# Patient Record
Sex: FEMALE | Race: WHITE | NOT HISPANIC OR LATINO | Employment: OTHER | URBAN - METROPOLITAN AREA
[De-identification: names, ages, dates, MRNs, and addresses within clinical notes are randomized per-mention and may not be internally consistent; named-entity substitution may affect disease eponyms.]

---

## 2017-01-17 ENCOUNTER — TELEPHONE (OUTPATIENT)
Dept: RHEUMATOLOGY | Facility: CLINIC | Age: 67
End: 2017-01-17

## 2017-01-17 DIAGNOSIS — M05.772 RHEUMATOID ARTHRITIS INVOLVING BOTH ANKLES WITH POSITIVE RHEUMATOID FACTOR: ICD-10-CM

## 2017-01-17 DIAGNOSIS — M05.771 RHEUMATOID ARTHRITIS INVOLVING BOTH ANKLES WITH POSITIVE RHEUMATOID FACTOR: ICD-10-CM

## 2017-01-17 RX ORDER — METHOTREXATE 25 MG/ML
INJECTION, SOLUTION INTRA-ARTERIAL; INTRAMUSCULAR; INTRAVENOUS
Qty: 10 ML | Refills: 0 | Status: SHIPPED | OUTPATIENT
Start: 2017-01-17 | End: 2017-03-06 | Stop reason: SDUPTHER

## 2017-03-02 ENCOUNTER — TELEPHONE (OUTPATIENT)
Dept: RHEUMATOLOGY | Facility: CLINIC | Age: 67
End: 2017-03-02

## 2017-03-02 ENCOUNTER — HOSPITAL ENCOUNTER (OUTPATIENT)
Dept: RADIOLOGY | Facility: HOSPITAL | Age: 67
Discharge: HOME | End: 2017-03-02
Attending: INTERNAL MEDICINE
Payer: MEDICARE

## 2017-03-02 DIAGNOSIS — D72.10 EOSINOPHILIA: Primary | ICD-10-CM

## 2017-03-02 DIAGNOSIS — M05.79 RHEUMATOID ARTHRITIS INVOLVING MULTIPLE SITES WITH POSITIVE RHEUMATOID FACTOR: Chronic | ICD-10-CM

## 2017-03-02 PROCEDURE — 77077 JOINT SURVEY SINGLE VIEW: CPT | Mod: TC

## 2017-03-02 PROCEDURE — 77077 JOINT SURVEY SINGLE VIEW: CPT | Mod: 26,,, | Performed by: RADIOLOGY

## 2017-03-02 NOTE — TELEPHONE ENCOUNTER
----- Message from Timi Royal MD sent at 3/2/2017  3:42 PM CST -----  Please tell pt the neck x-rays show mild slippage of C4- % on degenerative basis, no RA changes. The hands show degenerative changes in the thumb bases and mild narrowing of the index and middle finger knuckle joints as before likely related to RA. There are mild degenerative changes in knees and great toes. Overall, no substantial change compared with previous x-rays. DELMY

## 2017-03-06 ENCOUNTER — OFFICE VISIT (OUTPATIENT)
Dept: RHEUMATOLOGY | Facility: CLINIC | Age: 67
End: 2017-03-06
Payer: MEDICARE

## 2017-03-06 VITALS
WEIGHT: 219.5 LBS | HEIGHT: 64 IN | HEART RATE: 104 BPM | DIASTOLIC BLOOD PRESSURE: 93 MMHG | SYSTOLIC BLOOD PRESSURE: 151 MMHG | BODY MASS INDEX: 37.47 KG/M2

## 2017-03-06 DIAGNOSIS — M17.12 PRIMARY OSTEOARTHRITIS OF LEFT KNEE: ICD-10-CM

## 2017-03-06 DIAGNOSIS — I10 BENIGN ESSENTIAL HTN: ICD-10-CM

## 2017-03-06 DIAGNOSIS — M18.0 PRIMARY OSTEOARTHRITIS OF BOTH FIRST CARPOMETACARPAL JOINTS: ICD-10-CM

## 2017-03-06 DIAGNOSIS — E55.9 VITAMIN D DEFICIENCY: ICD-10-CM

## 2017-03-06 DIAGNOSIS — Z79.899 ON ABATACEPT THERAPY: Primary | ICD-10-CM

## 2017-03-06 DIAGNOSIS — M05.771 RHEUMATOID ARTHRITIS INVOLVING BOTH ANKLES WITH POSITIVE RHEUMATOID FACTOR: ICD-10-CM

## 2017-03-06 DIAGNOSIS — G47.00 INSOMNIA, UNSPECIFIED TYPE: ICD-10-CM

## 2017-03-06 DIAGNOSIS — Z79.631 LONG TERM METHOTREXATE USER: ICD-10-CM

## 2017-03-06 DIAGNOSIS — M85.80 OSTEOPENIA: ICD-10-CM

## 2017-03-06 DIAGNOSIS — M05.772 RHEUMATOID ARTHRITIS INVOLVING BOTH ANKLES WITH POSITIVE RHEUMATOID FACTOR: ICD-10-CM

## 2017-03-06 PROBLEM — M19.041 OSTEOARTHRITIS OF HANDS, BILATERAL: Status: ACTIVE | Noted: 2017-03-06

## 2017-03-06 PROBLEM — M19.042 OSTEOARTHRITIS OF HANDS, BILATERAL: Status: ACTIVE | Noted: 2017-03-06

## 2017-03-06 PROBLEM — M18.9 CMC ARTHRITIS, THUMB, DEGENERATIVE: Status: ACTIVE | Noted: 2017-03-06

## 2017-03-06 PROCEDURE — 99999 PR PBB SHADOW E&M-EST. PATIENT-LVL III: CPT | Mod: PBBFAC,,, | Performed by: INTERNAL MEDICINE

## 2017-03-06 PROCEDURE — 99213 OFFICE O/P EST LOW 20 MIN: CPT | Mod: PBBFAC | Performed by: INTERNAL MEDICINE

## 2017-03-06 PROCEDURE — 96372 THER/PROPH/DIAG INJ SC/IM: CPT | Mod: PBBFAC

## 2017-03-06 PROCEDURE — 99214 OFFICE O/P EST MOD 30 MIN: CPT | Mod: S$PBB,,, | Performed by: INTERNAL MEDICINE

## 2017-03-06 RX ORDER — AMLODIPINE BESYLATE 5 MG/1
5 TABLET ORAL DAILY
Qty: 90 TABLET | Refills: 3 | Status: SHIPPED | OUTPATIENT
Start: 2017-03-06 | End: 2017-06-06 | Stop reason: SDUPTHER

## 2017-03-06 RX ORDER — TRIAMCINOLONE ACETONIDE 40 MG/ML
40 INJECTION, SUSPENSION INTRA-ARTICULAR; INTRAMUSCULAR
Status: COMPLETED | OUTPATIENT
Start: 2017-03-06 | End: 2017-03-06

## 2017-03-06 RX ORDER — PRAVASTATIN SODIUM 40 MG/1
40 TABLET ORAL NIGHTLY
Qty: 90 TABLET | Refills: 3 | Status: SHIPPED | OUTPATIENT
Start: 2017-03-06 | End: 2017-06-06 | Stop reason: SDUPTHER

## 2017-03-06 RX ORDER — METHOTREXATE 25 MG/ML
INJECTION, SOLUTION INTRA-ARTERIAL; INTRAMUSCULAR; INTRAVENOUS
Qty: 10 ML | Refills: 0 | Status: SHIPPED | OUTPATIENT
Start: 2017-03-06 | End: 2017-06-06 | Stop reason: SDUPTHER

## 2017-03-06 RX ORDER — FOLIC ACID 1 MG/1
1 TABLET ORAL DAILY
Qty: 90 TABLET | Refills: 3 | Status: SHIPPED | OUTPATIENT
Start: 2017-03-06 | End: 2017-06-06 | Stop reason: SDUPTHER

## 2017-03-06 RX ORDER — LEFLUNOMIDE 20 MG/1
20 TABLET ORAL DAILY
Qty: 90 TABLET | Refills: 0 | Status: SHIPPED | OUTPATIENT
Start: 2017-03-06 | End: 2017-06-06 | Stop reason: SDUPTHER

## 2017-03-06 RX ORDER — NORTRIPTYLINE HYDROCHLORIDE 25 MG/1
CAPSULE ORAL
Qty: 270 CAPSULE | Refills: 0 | Status: SHIPPED | OUTPATIENT
Start: 2017-03-06 | End: 2017-06-06 | Stop reason: SDUPTHER

## 2017-03-06 RX ORDER — ERGOCALCIFEROL 1.25 MG/1
50000 CAPSULE ORAL
Qty: 24 CAPSULE | Refills: 3 | Status: SHIPPED | OUTPATIENT
Start: 2017-03-06 | End: 2017-06-06 | Stop reason: SDUPTHER

## 2017-03-06 RX ORDER — ALENDRONATE SODIUM 35 MG/1
TABLET ORAL
Qty: 12 TABLET | Refills: 3 | Status: SHIPPED | OUTPATIENT
Start: 2017-03-06 | End: 2017-06-06 | Stop reason: SDUPTHER

## 2017-03-06 RX ADMIN — TRIAMCINOLONE ACETONIDE 40 MG: 40 INJECTION, SUSPENSION INTRA-ARTICULAR; INTRAMUSCULAR at 08:03

## 2017-03-06 ASSESSMENT — DISEASE ACTIVITY SCORE (DAS28)
TENDER_JOINTS_COUNT: 3
TOTAL_SCORE_CRP: 3.52
GLOBAL_HEALTH_SCORE: 45
ESR_MM_PER_HR: 31
TOTAL_SCORE_ESR: 4
SWOLLEN_JOINTS_COUNT: 0
CRP_MG_PER_LITER: 13.2

## 2017-03-06 ASSESSMENT — ROUTINE ASSESSMENT OF PATIENT INDEX DATA (RAPID3)
PAIN SCORE: 5.5
FATIGUE SCORE: 4.5
PSYCHOLOGICAL DISTRESS SCORE: 0
MDHAQ FUNCTION SCORE: .8
PATIENT GLOBAL ASSESSMENT SCORE: 4.5
TOTAL RAPID3 SCORE: 4.22
AM STIFFNESS SCORE: 0, NO

## 2017-03-06 NOTE — PROGRESS NOTES
Subjective:       Patient ID: Maru Shelby is a 66 y.o. female.    Chief Complaint: RA  Resolving URI, resolving wheezing. Mild residual dry cough. No fever/chills/ SOB at present. Held methotrexate appropriately last 2 wks. Some increased pain  Left and right 2nd mcps, left and right 1st cmcs, left knee, and concerned about bunions both great toes. No am stiffness.  HPI  Review of Systems   Constitutional: Positive for fatigue. Negative for appetite change, fever and unexpected weight change.   HENT: Negative for mouth sores.    Eyes: Negative for visual disturbance.   Respiratory: Positive for cough. Negative for shortness of breath and wheezing.    Cardiovascular: Negative for chest pain and palpitations.   Gastrointestinal: Negative for abdominal pain, anal bleeding, blood in stool, constipation, diarrhea, nausea and vomiting.   Genitourinary: Negative for dysuria, frequency and urgency.   Musculoskeletal: Negative for arthralgias, back pain, gait problem, joint swelling, myalgias, neck pain and neck stiffness.   Skin: Negative for rash.   Neurological: Negative for weakness, numbness and headaches.   Hematological: Negative for adenopathy. Does not bruise/bleed easily.   Psychiatric/Behavioral: Negative for sleep disturbance. The patient is not nervous/anxious.          Objective:   There were no vitals taken for this visit.     Physical Exam   Constitutional: She is oriented to person, place, and time and well-developed, well-nourished, and in no distress.   HENT:   Head: Normocephalic and atraumatic.   Mouth/Throat: Oropharynx is clear and moist.   Eyes: Conjunctivae and EOM are normal.   Neck: Normal range of motion. Neck supple. No thyromegaly present.   Cardiovascular: Normal rate, regular rhythm, normal heart sounds and intact distal pulses.  Exam reveals no gallop and no friction rub.    No murmur heard.  Pulmonary/Chest: Breath sounds normal. She has no wheezes. She has no rales. She exhibits no  tenderness.   Abdominal: Soft. She exhibits no distension and no mass. There is no tenderness.       Right Side Rheumatological Exam     Examination finds the shoulder, elbow, wrist, knee, 1st PIP, 1st MCP, 2nd PIP, 2nd MCP, 3rd PIP, 3rd MCP, 4th PIP, 4th MCP, 5th PIP and 5th MCP normal.    Left Side Rheumatological Exam     Examination finds the shoulder, elbow, wrist, knee, 1st PIP, 1st MCP, 2nd PIP, 2nd MCP, 3rd PIP, 3rd MCP, 4th PIP, 4th MCP, 5th PIP and 5th MCP normal.      Lymphadenopathy:     She has no cervical adenopathy.   Neurological: She is alert and oriented to person, place, and time. She displays normal reflexes. Gait normal.   Nl motor strength UE and LE bilateral   Musculoskeletal: She exhibits no edema.         Results for MECCA DUONG (MRN [id number]) as of 3/6/2017 08:00   Ref. Range 3/2/2017 07:10 3/2/2017 07:21   WBC Latest Ref Range: 3.90 - 12.70 K/uL 11.98    RBC Latest Ref Range: 4.00 - 5.40 M/uL 4.99    Hemoglobin Latest Ref Range: 12.0 - 16.0 g/dL 15.2    Hematocrit Latest Ref Range: 37.0 - 48.5 % 45.2    MCV Latest Ref Range: 82 - 98 fL 91    MCH Latest Ref Range: 27.0 - 31.0 pg 30.5    MCHC Latest Ref Range: 32.0 - 36.0 % 33.6    RDW Latest Ref Range: 11.5 - 14.5 % 14.6 (H)    Platelets Latest Ref Range: 150 - 350 K/uL 259    MPV Latest Ref Range: 9.2 - 12.9 fL 9.6    Gran% Latest Ref Range: 38.0 - 73.0 % 38.6    Gran # Latest Ref Range: 1.8 - 7.7 K/uL 4.6    Lymph% Latest Ref Range: 18.0 - 48.0 % 39.2    Lymph # Latest Ref Range: 1.0 - 4.8 K/uL 4.7    Mono% Latest Ref Range: 4.0 - 15.0 % 6.8    Mono # Latest Ref Range: 0.3 - 1.0 K/uL 0.8    Eosinophil% Latest Ref Range: 0.0 - 8.0 % 14.9 (H)    Eos # Latest Ref Range: 0.0 - 0.5 K/uL 1.8 (H)    Basophil% Latest Ref Range: 0.0 - 1.9 % 0.5    Baso # Latest Ref Range: 0.00 - 0.20 K/uL 0.06    Sed Rate Latest Ref Range: 0 - 20 mm/Hr 31 (H)    Sodium Latest Ref Range: 136 - 145 mmol/L 142    Potassium Latest Ref Range: 3.5 - 5.1 mmol/L 4.4     Chloride Latest Ref Range: 95 - 110 mmol/L 105    CO2 Latest Ref Range: 23 - 29 mmol/L 26    Anion Gap Latest Ref Range: 8 - 16 mmol/L 11    BUN, Bld Latest Ref Range: 8 - 23 mg/dL 14    Creatinine Latest Ref Range: 0.5 - 1.4 mg/dL 0.9    eGFR if non African American Latest Ref Range: >60 mL/min/1.73 m^2 >60    eGFR if  Latest Ref Range: >60 mL/min/1.73 m^2 >60    Glucose Latest Ref Range: 70 - 110 mg/dL 107    Calcium Latest Ref Range: 8.7 - 10.5 mg/dL 9.4    Alkaline Phosphatase Latest Ref Range: 55 - 135 U/L 97    Total Protein Latest Ref Range: 6.0 - 8.4 g/dL 7.1    Albumin Latest Ref Range: 3.5 - 5.2 g/dL 3.7    Total Bilirubin Latest Ref Range: 0.1 - 1.0 mg/dL 0.4    AST Latest Ref Range: 10 - 40 U/L 20    ALT Latest Ref Range: 10 - 44 U/L 19    CRP Latest Ref Range: 0.0 - 8.2 mg/L 13.2 (H)    XR ARTHRITIS SURVEY Unknown  Rpt   Differential Method Unknown Automated    Results for MECCA DUONG (MRN [id number]) as of 3/6/2017 08:00   Ref. Range 8/8/2016 08:15   Cholesterol Latest Ref Range: 120 - 199 mg/dL 153   HDL Latest Ref Range: 40 - 75 mg/dL 43   LDL Cholesterol Latest Ref Range: 63.0 - 159.0 mg/dL 72.4   Total Cholesterol/HDL Ratio Latest Ref Range: 2.0 - 5.0  3.6   T  Please tell pt the neck x-rays show mild slippage of C4- % on degenerative basis, no RA changes. The hands show degenerative changes in the thumb bases and mild narrowing of the index and middle finger knuckle joints as before likely related to RA. There are mild degenerative changes in knees and great toes. Overall, no substantial change compared with previous x-rays. RJQ          Signed by   Signed Credentials Date/Time    Phone Pager   CLAUDIO MOORE MD 3/02/2017 09:00 [phone]    PACS Images   Show images for XR Arthritis Survey   Reviewed By Brooks Crabtree MD on 3/2/2017  1:34 PM   External Result Report   External Result Report   Narrative   Arthritis survey:    Bilateral hands: The bones are  slightly osteopenic.  No osseous erosions are identified.  There are moderate degenerative changes of the bilateral carpometacarpal joint spaces, left greater than right.    Cervical spine: There is anterolisthesis of C4 and C5 by approximately 4 mm, stable.  The atlantodental interval is maintained.  There are multilevel degenerative changes consisting of disc space narrowing, endplate sclerosis and facet arthropathy.    Bilateral knees: There is mild bilateral medial compartment joint narrowing with subchondral sclerosis and marginal osteophytosis.  No osseous erosions.    Bilateral feet: There is bilateral hallux valgus deformity with bunion formation.  Minimal degenerative changes are noted.  No osseous erosions are detected.   Impression    As above.      Electronically signed by: Farida Llanes MD  Date: 17  Time: 09:00         riglycerides Latest Ref Range: 30 - 150 mg/dL 188 (H)         : 1950 ORDERING PHYSICIAN: MARI Royal LOCATION: [facility]    HISTORY: 66 y/o female with no hx of fractures. She had menopausal symptoms at 48 y/o. Pts Sibling had a hip fx. Pt is taking Calcium, Vit D and 50,000 once a week. She has a hx of Rheumatoid Arthritis. She does not exercise or smoke.    TECHNIQUE: Bone Mineral Density performed using [employer] [place] (S/N [id number]) reveals good positioning of lumbar spine and hip.    BONE MINERAL DENSITY RESULTS:  Lumbar Spine: Lumbar bone mineral density L1-L4 is 1.084g/cm2, which is a t-score of 0.3. The z-score is 2.3.    Total Hip: The total hip bone mineral density is 0.960g/cm2.  The t-score is 0.1, and the z-score is 1.5.  Femoral neck BMD is 0.595g/cm2 and the t-score is -2.3.    COMPARISONS:  Date Location BMD T-score  13 L-spine 1.120 0.7  Total Hip 0.924 -0.1   Impression     Osteopenia, approaching osteoporosis of the femoral neck. Recalculation of FRAX with diagnosis of RA yields 10 year at hip= 3.2% and spine 15%.    Recommendations:  1) Adequate  calcium and Vitamin D therapy  2) Appropriate exercise  3) Consider medical therapy including bisphosphonates,  denosumab.  4) Consider repeat BMD in 2 years.    EXPLANATION OF RESULTS:  The t-score compares this results to the bone density of a 25 year old of the same gender. The z-score compares this result to the average bone density to people of the same age and gender. The amounts indicate the number of standard deviations above or   below the mean.  * Osteoporosis is generally defined as having a t-score between less than -2.5.  * Osteopenia is generally defined as having a t-score between -1 and -2.5.  * The normal range is generally defined as having a t-score between -1 to 1.      Electronically signed by: ANTON BLAIR MD  Date: 09/19/15  Time: 10:37     Encounter   View Encounter      Reviewed By   Timi Royal MD on 9/19/2015  1:09 PM         Assessment:         RA DAS28: 4(moderate) LEES-CRP 3.52(moderate)due for Orencia 1000mg IV 3/13/17 and resume methotrexate then as well, missed 2 wks b/o URI  Osteopenia > NOF FRAX therapeutic threshold on alendronate 35mg po once a week  Hypertension, not ideally controlled, needs to monitor at home 1-2 x wk keep < 140/90  Obesity gained 3#  Hyperlipidemia with est 10 yr ASCVD risk 10.8% improved with pravastatin 40mg nightly      Resolving URI      Plan:     *Kenalog 40mg IM  Home BP monitor 1-2 x wk, call if running > 140/90 and will increase amlodipine from 5 to 10mg daily  Resume weight reduction  Daily exercise gym, outdoor bike, with bunions, walking not best exercise.  May reconsult with her podiatrist to orthoses, not interested in surgery.    cont pravastatin 40mg q pm  Cont abatacept 1000mg IV q 4wks. Always hold with suspected, actual, or resolving infection, antibiotics, just like mtx. Re-ordered next dose 3/13/17  resume methotrexate 25mg sc once a week with folic acid 1mg daily next week  Cont leflunomide 20mg daily  Cont alendronate 35mg once a  week  Cont amlodipine 5mg daily for persistent mild hypertension  Cont pravastatin 40mg nightly for hyperlipidemia with elevated ASCVD risk  RTC 3 months with standing labs and quantiferon gold TB

## 2017-03-06 NOTE — MR AVS SNAPSHOT
[facility]  [address]  [facility] [address]  Phone: [phone]  Fax: [phone]                  Maru Shelby   3/6/2017 8:00 AM   Office Visit    Description:  Female : 1950   Provider:  Timi Royal MD   Department:  [facility]           Diagnoses this Visit        Comments    On abatacept therapy    -  Primary     Insomnia, unspecified type         Rheumatoid arthritis involving both ankles with positive rheumatoid factor         Long term methotrexate user         Osteopenia         Benign essential HTN         Vitamin D deficiency         Primary osteoarthritis of left knee         Primary osteoarthritis of both first carpometacarpal joints                To Do List           Future Appointments        Provider Department Dept Phone    3/13/2017 8:00 AM CHAIR 03 [facility] [phone]    3/21/2017 9:40 AM LAB, [facility] [phone]    2017 8:00 AM CHAIR 03 [facility] [phone]    2017 8:10 AM LAB, [facility] [phone]    2017 8:20 AM LAB, [facility] [phone]      Goals (5 Years of Data)     None      Follow-Up and Disposition     Return in about 3 months (around 2017).       These Medications        Disp Refills Start End    pravastatin (PRAVACHOL) 40 MG tablet 90 tablet 3 3/6/2017 3/6/2018    Take 1 tablet (40 mg total) by mouth every evening. - Oral    [facility] [address] [place] [address] [facility] [place] [facility] [address] AT [facility] Ph #: [phone]       nortriptyline (PAMELOR) 25 MG capsule 270 capsule 0 3/6/2017     TAKE 3 CAPSULES BY MOUTH EVERY EVENING    [facility] [id number] [facility] [place] - [address] AT [facility] Ph #: [phone]       methotrexate 25 mg/mL injection 10 mL 0 3/6/2017      INJECT 1 ML( 25 MG) EVERY 7 DAYS    Pharmacy: [place] [address] [facility] [place] [facility] Ph #: [phone]       leflunomide (ARAVA) 20 MG Tab 90 tablet 0 3/6/2017 3/6/2018    Take 1 tablet (20 mg total) by mouth once daily. - Oral    Pharmacy: [address] Ph #: [phone]       folic acid (FOLVITE) 1 MG tablet 90 tablet 3 3/6/2017     Take 1 tablet (1 mg total) by mouth once daily. - Oral    Pharmacy: [address] Ph #: [phone]       ergocalciferol (VITAMIN D2) 50,000 unit Cap 24 capsule 3 3/6/2017     Take 1 capsule (50,000 Units total) by mouth twice a week. - Oral    Pharmacy: [address] Ph #: [phone]       Notes to Pharmacy: **Patient requests 90 days supply**    amlodipine (NORVASC) 5 MG tablet 90 tablet 3 3/6/2017 3/6/2018    Take 1 tablet (5 mg total) by mouth once daily. - Oral    Pharmacy: [address] Ph #: [phone]       alendronate (FOSAMAX) 35 MG tablet 12 tablet 3 3/6/2017     TAKE 1 TABLET BY MOUTH EVERY 7 DAYS    Pharmacy: [place] [address] [facility] [place] [facility] Ph #: [phone]       Notes to Pharmacy: **Patient requests 90 days supply**      [facility]     [facility] On [facility] Nurse Care Line - 24/7 Assistance  Registered nurses in the [facility] provide clinical advisement, health education, appointment booking, and other advisory services.  Call for this free service at [phone].             Medications           Message regarding Medications     Verify the changes and/or additions to your medication regime listed below are the same as discussed with your clinician today.  If any of  "these changes or additions are incorrect, please notify your healthcare provider.        These medications were administered today        Dose Freq    triamcinolone acetonide injection 40 mg 40 mg Clinic/HOD 1 time    Sig: Inject 1 mL (40 mg total) into the muscle one time.    Class: Normal    Route: Intramuscular           Verify that the below list of medications is an accurate representation of the medications you are currently taking.  If none reported, the list may be blank. If incorrect, please contact your healthcare provider. Carry this list with you in case of emergency.           Current Medications     alendronate (FOSAMAX) 35 MG tablet TAKE 1 TABLET BY MOUTH EVERY 7 DAYS    amlodipine (NORVASC) 5 MG tablet Take 1 tablet (5 mg total) by mouth once daily.    BD SAFETY-ALONDRA TUBERCULIN 1 mL 27 x 1/2" Syrg USE AS DIRECTED    DOCOSAHEXANOIC ACID/EPA (FISH OIL ORAL) Take by mouth.      eflornithine 13.9 % cream Apply topically 2 (two) times daily with meals.    ergocalciferol (VITAMIN D2) 50,000 unit Cap Take 1 capsule (50,000 Units total) by mouth twice a week.    folic acid (FOLVITE) 1 MG tablet Take 1 tablet (1 mg total) by mouth once daily.    latanoprost 0.005 % ophthalmic solution     leflunomide (ARAVA) 20 MG Tab Take 1 tablet (20 mg total) by mouth once daily.    methotrexate 25 mg/mL injection INJECT 1 ML( 25 MG) EVERY 7 DAYS    multivitamin (MULTIVITAMIN) per tablet Take 1 tablet by mouth once daily.      nortriptyline (PAMELOR) 25 MG capsule TAKE 3 CAPSULES BY MOUTH EVERY EVENING    pravastatin (PRAVACHOL) 40 MG tablet Take 1 tablet (40 mg total) by mouth every evening.           Clinical Reference Information           Your Vitals Were     BP Pulse Height Weight BMI    151/93 104 5' 3.6" (1.615 m) 99.6 kg (219 lb 8 oz) 38.15 kg/m2      Blood Pressure          Most Recent Value    BP  (!)  151/93      Allergies as of 3/6/2017     No Known Allergies      Immunizations Administered on Date of Encounter " - 3/6/2017     None      Orders Placed During Today's Visit     Future Labs/Procedures Expected by Expires    Quantiferon Gold TB  3/6/2017 5/5/2018      [facility] Sign-Up     Activating your [facility] account is as easy as 1-2-3!     1) Visit [email or web address], select Sign Up Now, enter this activation code and your date of birth, then select Next.  [email or web address]  Expires: 4/20/2017  8:31 AM      2) Create a username and password to use when you visit [facility] in the future and select a security question in case you lose your password and select Next.    3) Enter your e-mail address and click Sign Up!    Additional Information  If you have questions, please e-mail [email or web address] or call [phone] to talk to our [facility] staff. Remember, [facility] is NOT to be used for urgent needs. For medical emergencies, dial 911.         Instructions    Check BP 1-2 x/wk. Should be < 140/90 consistently.       Language Assistance Services     ATTENTION: Language assistance services are available, free of charge. Please call [phone].      ATENCIÓN: Si habla español, tiene a driver disposición servicios gratuitos de asistencia lingüística. Llame al [phone].     SHANNAN Ý: N?u b?n nói Ti?ng Vi?t, có các d?ch v? h? tr? ngôn ng? mi?n phí dành cho b?n. G?i s? [phone].         [facility] complies with applicable Federal civil rights laws and does not discriminate on the basis of race, color, national origin, age, disability, or sex.

## 2017-03-13 ENCOUNTER — INFUSION (OUTPATIENT)
Dept: INFUSION THERAPY | Facility: HOSPITAL | Age: 67
End: 2017-03-13
Attending: INTERNAL MEDICINE
Payer: MEDICARE

## 2017-03-13 VITALS
SYSTOLIC BLOOD PRESSURE: 180 MMHG | DIASTOLIC BLOOD PRESSURE: 93 MMHG | RESPIRATION RATE: 18 BRPM | TEMPERATURE: 96 F | HEART RATE: 93 BPM

## 2017-03-13 DIAGNOSIS — M05.79 RHEUMATOID ARTHRITIS INVOLVING MULTIPLE SITES WITH POSITIVE RHEUMATOID FACTOR: Primary | ICD-10-CM

## 2017-03-13 PROCEDURE — 63600175 PHARM REV CODE 636 W HCPCS: Performed by: INTERNAL MEDICINE

## 2017-03-13 PROCEDURE — 25000003 PHARM REV CODE 250: Performed by: INTERNAL MEDICINE

## 2017-03-13 PROCEDURE — 96365 THER/PROPH/DIAG IV INF INIT: CPT

## 2017-03-13 RX ORDER — HEPARIN 100 UNIT/ML
500 SYRINGE INTRAVENOUS
Status: CANCELLED | OUTPATIENT
Start: 2017-03-20

## 2017-03-13 RX ORDER — ACETAMINOPHEN 325 MG/1
650 TABLET ORAL
Status: CANCELLED | OUTPATIENT
Start: 2017-03-20

## 2017-03-13 RX ORDER — SODIUM CHLORIDE 0.9 % (FLUSH) 0.9 %
10 SYRINGE (ML) INJECTION
Status: CANCELLED | OUTPATIENT
Start: 2017-03-20

## 2017-03-13 RX ADMIN — SODIUM CHLORIDE 1000 MG: 9 INJECTION, SOLUTION INTRAVENOUS at 10:03

## 2017-03-13 NOTE — PATIENT INSTRUCTIONS
Abatacept solution for injection (subcutaneous or intravenous use)  What is this medicine?  ABATACEPT (a ba TA sept) is used to treat moderate to severe active rheumatoid arthritis in adults. This medicine is also used to treat juvenile idiopathic arthritis.  How should I use this medicine?  This medicine is for infusion into a vein or for injection under the skin. Infusions are given by a health care professional in a hospital or clinic setting. If you are to give your own medicine at home, you will be taught how to prepare and give this medicine under the skin. Use exactly as directed. Take your medicine at regular intervals. Do not take your medicine more often than directed.  It is important that you put your used needles and syringes in a special sharps container. Do not put them in a trash can. If you do not have a sharps container, call your pharmacist or healthcare provider to get one.  Talk to your pediatrician regarding the use of this medicine in children. While infusions in a clinic may be prescribed for children as young as 6 years for selected conditions, precautions do apply.  What side effects may I notice from receiving this medicine?  Side effects that you should report to your doctor or health care professional as soon as possible:  · allergic reactions like skin rash, itching or hives, swelling of the face, lips, or tongue  · breathing problems  · chest pain  · signs of infection - fever or chills, cough, unusual tiredness, pain or trouble passing urine, or warm, red or painful skin  Side effects that usually do not require medical attention (Report these to your doctor or health care professional if they continue or are bothersome.):  · dizziness  · headache  · nausea, vomiting  · sore throat  · stomach upset  What may interact with this medicine?  Do not take this medicine with any of the following  medications:  · adalimumab  · anakinra  · certolizumab  · etanercept  · golimumab  · infliximab  · live virus vaccines  · rituximab  · tocilizumab  This medicine may also interact with the following medications:  · vaccines  What if I miss a dose?  This medicine is used once a week if given by injection under the skin. If you miss a dose, take it as soon as you can. If it is almost time for your next dose, take only that dose. Do not take double or extra doses.  If you are to be given an infusion, it is important not to miss your dose. Doses are usually every 4 weeks. Call your doctor or health care professional if you are unable to keep an appointment.  Where should I keep my medicine?  Infusions will be given in a hospital or clinic and will not be stored at home.  Storage for syringes given under the skin and stored at home:  Keep out of the reach of children. Store in a refrigerator between 2 and 8 degrees C (36 and 46 degrees F). Keep this medicine in the original container. Protect from light. Do not freeze. Throw away any unused medicine after the expiration date.  What should I tell my health care provider before I take this medicine?  They need to know if you have any of these conditions:  · are taking other medicines to treat rheumatoid arthritis  · COPD  · diabetes  · infection or history of infections  · recently received or scheduled to receive a vaccine  · scheduled to have surgery  · tuberculosis, a positive skin test for tuberculosis or have recently been in close contact with someone who has tuberculosis  · viral hepatitis  · an unusual or allergic reaction to abatacept, other medicines, foods, dyes, or preservatives  · pregnant or trying to get pregnant  · breast-feeding  What should I watch for while using this medicine?  Visit your doctor for regular check ups while you are taking this medicine. Tell your doctor or healthcare professional if your symptoms do not start to get better or if they get  worse.  Call your doctor or health care professional if you get a cold or other infection while receiving this medicine. Do not treat yourself. This medicine may decrease your body's ability to fight infection. Try to avoid being around people who are sick.  Date Last Reviewed:   NOTE:This sheet is a summary. It may not cover all possible information. If you have questions about this medicine, talk to your doctor, pharmacist, or health care provider. Copyright© 2016 [employer]

## 2017-03-13 NOTE — MR AVS SNAPSHOT
[facility] [address] [address]  Phone: [phone]                  Maru Shelby   3/13/2017 8:00 AM   Infusion    Description:  Female : 1950   Provider:  CHAIR Adam QUINN   Department:  [facility] Diagnoses this Visit        Comments    Rheumatoid arthritis involving multiple sites with positive rheumatoid factor    -  Primary            To Do List           Future Appointments        Provider Department Dept Phone    3/20/2017 11:10 AM LAB, [facility] [phone]    3/21/2017 9:40 AM LAB, [facility] [phone]    2017 8:00 AM CHAIR Adam QUINN [facility] [phone]    2017 8:10 AM LAB, [facility] [phone]    2017 8:20 AM LAB, [facility] [phone]      Goals (5 Years of Data)     None      [facility]     [facility] -  Assistance  Registered nurses in the [facility] provide clinical advisement, health education, appointment booking, and other advisory services.  Call for this free service at [phone].             Medications           Message regarding Medications     Verify the changes and/or additions to your medication regime listed below are the same as discussed with your clinician today.  If any of these changes or additions are incorrect, please notify your healthcare provider.        These medications were administered today        Dose Freq    abatacept (with maltose) (ORENCIA) 1,000 mg in sodium chloride 0.9% 100 mL IVPB 1,000 mg Clinic/HOD 1 time    Sig: Inject 1,000 mg into the vein one time.    Class: Normal    Route: Intravenous           Verify that the below list of medications is an accurate representation of the medications you are currently taking.  If none reported, the list  "may be blank. If incorrect, please contact your healthcare provider. Carry this list with you in case of emergency.           Current Medications     alendronate (FOSAMAX) 35 MG tablet TAKE 1 TABLET BY MOUTH EVERY 7 DAYS    amlodipine (NORVASC) 5 MG tablet Take 1 tablet (5 mg total) by mouth once daily.    BD SAFETY-ALONDRA TUBERCULIN 1 mL 27 x 1/2" Syrg USE AS DIRECTED    DOCOSAHEXANOIC ACID/EPA (FISH OIL ORAL) Take by mouth.      eflornithine 13.9 % cream Apply topically 2 (two) times daily with meals.    ergocalciferol (VITAMIN D2) 50,000 unit Cap Take 1 capsule (50,000 Units total) by mouth twice a week.    folic acid (FOLVITE) 1 MG tablet Take 1 tablet (1 mg total) by mouth once daily.    latanoprost 0.005 % ophthalmic solution     leflunomide (ARAVA) 20 MG Tab Take 1 tablet (20 mg total) by mouth once daily.    methotrexate 25 mg/mL injection INJECT 1 ML( 25 MG) EVERY 7 DAYS    multivitamin (MULTIVITAMIN) per tablet Take 1 tablet by mouth once daily.      nortriptyline (PAMELOR) 25 MG capsule TAKE 3 CAPSULES BY MOUTH EVERY EVENING    pravastatin (PRAVACHOL) 40 MG tablet Take 1 tablet (40 mg total) by mouth every evening.           Clinical Reference Information           Your Vitals Were     BP Pulse Temp Resp          180/93 93 96.3 °F (35.7 °C) 18        Allergies as of 3/13/2017     No Known Allergies      Immunizations Administered on Date of Encounter - 3/13/2017     None      Administrations This Visit     abatacept (with maltose) (ORENCIA) 1,000 mg in sodium chloride 0.9% 100 mL IVPB     Admin Date Action Dose Rate Route Administered By          03/13/2017 New Bag 1000 mg 200 mL/hr Intravenous Cristine Mc RN                     [facility] Sign-Up     Activating your [facility] account is as easy as 1-2-3!     1) Visit [email or web address], select Sign Up Now, enter this activation code and your date of birth, then select Next.  [email or web address]  Expires: 4/20/2017  9:31 AM      2) Create a username and password to " use when you visit [facility] in the future and select a security question in case you lose your password and select Next.    3) Enter your e-mail address and click Sign Up!    Additional Information  If you have questions, please e-mail [email or web address] or call [phone] to talk to our [facility]. Remember, [facility] is NOT to be used for urgent needs. For medical emergencies, dial 911.         Instructions      Abatacept solution for injection (subcutaneous or intravenous use)  What is this medicine?  ABATACEPT (a ba TA sept) is used to treat moderate to severe active rheumatoid arthritis in adults. This medicine is also used to treat juvenile idiopathic arthritis.  How should I use this medicine?  This medicine is for infusion into a vein or for injection under the skin. Infusions are given by a health care professional in a hospital or clinic setting. If you are to give your own medicine at home, you will be taught how to prepare and give this medicine under the skin. Use exactly as directed. Take your medicine at regular intervals. Do not take your medicine more often than directed.  It is important that you put your used needles and syringes in a special sharps container. Do not put them in a trash can. If you do not have a sharps container, call your pharmacist or healthcare provider to get one.  Talk to your pediatrician regarding the use of this medicine in children. While infusions in a clinic may be prescribed for children as young as 6 years for selected conditions, precautions do apply.  What side effects may I notice from receiving this medicine?  Side effects that you should report to your doctor or health care professional as soon as possible:  · allergic reactions like skin rash, itching or hives, swelling of the face, lips, or tongue  · breathing problems  · chest pain  · signs of infection - fever or chills, cough, unusual tiredness, pain or trouble passing urine, or warm, red or  painful skin  Side effects that usually do not require medical attention (Report these to your doctor or health care professional if they continue or are bothersome.):  · dizziness  · headache  · nausea, vomiting  · sore throat  · stomach upset  What may interact with this medicine?  Do not take this medicine with any of the following medications:  · adalimumab  · anakinra  · certolizumab  · etanercept  · golimumab  · infliximab  · live virus vaccines  · rituximab  · tocilizumab  This medicine may also interact with the following medications:  · vaccines  What if I miss a dose?  This medicine is used once a week if given by injection under the skin. If you miss a dose, take it as soon as you can. If it is almost time for your next dose, take only that dose. Do not take double or extra doses.  If you are to be given an infusion, it is important not to miss your dose. Doses are usually every 4 weeks. Call your doctor or health care professional if you are unable to keep an appointment.  Where should I keep my medicine?  Infusions will be given in a hospital or clinic and will not be stored at home.  Storage for syringes given under the skin and stored at home:  Keep out of the reach of children. Store in a refrigerator between 2 and 8 degrees C (36 and 46 degrees F). Keep this medicine in the original container. Protect from light. Do not freeze. Throw away any unused medicine after the expiration date.  What should I tell my health care provider before I take this medicine?  They need to know if you have any of these conditions:  · are taking other medicines to treat rheumatoid arthritis  · COPD  · diabetes  · infection or history of infections  · recently received or scheduled to receive a vaccine  · scheduled to have surgery  · tuberculosis, a positive skin test for tuberculosis or have recently been in close contact with someone who has tuberculosis  · viral hepatitis  · an unusual or allergic reaction to  abatacept, other medicines, foods, dyes, or preservatives  · pregnant or trying to get pregnant  · breast-feeding  What should I watch for while using this medicine?  Visit your doctor for regular check ups while you are taking this medicine. Tell your doctor or healthcare professional if your symptoms do not start to get better or if they get worse.  Call your doctor or health care professional if you get a cold or other infection while receiving this medicine. Do not treat yourself. This medicine may decrease your body's ability to fight infection. Try to avoid being around people who are sick.  Date Last Reviewed:   NOTE:This sheet is a summary. It may not cover all possible information. If you have questions about this medicine, talk to your doctor, pharmacist, or health care provider. Copyright© 2016 [employer]             Language Assistance Services     ATTENTION: Language assistance services are available, free of charge. Please call [phone].      ATENCIÓN: Si habla shyanne, tiene a driver disposición servicios gratuitos de asistencia lingüística. Llame al [phone].     [facility] Ý: N?u b?n nói Ti?ng Vi?t, có các d?ch v? h? tr? ngôn ng? mi?n phí dành cho b?n. G?i s? [phone].         [facility] complies with applicable Federal civil rights laws and does not discriminate on the basis of race, color, national origin, age, disability, or sex.

## 2017-03-20 ENCOUNTER — LAB VISIT (OUTPATIENT)
Dept: LAB | Facility: HOSPITAL | Age: 67
End: 2017-03-20
Attending: INTERNAL MEDICINE
Payer: MEDICARE

## 2017-03-20 DIAGNOSIS — M06.9 RHEUMATOID ARTHRITIS OF HAND, UNSPECIFIED LATERALITY, UNSPECIFIED RHEUMATOID FACTOR PRESENCE: ICD-10-CM

## 2017-03-20 LAB
BASOPHILS # BLD AUTO: 0.06 K/UL
BASOPHILS NFR BLD: 0.4 %
DIFFERENTIAL METHOD: ABNORMAL
EOSINOPHIL # BLD AUTO: 0.7 K/UL
EOSINOPHIL NFR BLD: 4.8 %
ERYTHROCYTE [DISTWIDTH] IN BLOOD BY AUTOMATED COUNT: 14.8 %
HCT VFR BLD AUTO: 44.6 %
HGB BLD-MCNC: 14.9 G/DL
LYMPHOCYTES # BLD AUTO: 4.3 K/UL
LYMPHOCYTES NFR BLD: 29.6 %
MCH RBC QN AUTO: 30.5 PG
MCHC RBC AUTO-ENTMCNC: 33.4 %
MCV RBC AUTO: 91 FL
MONOCYTES # BLD AUTO: 1 K/UL
MONOCYTES NFR BLD: 7.1 %
NEUTROPHILS # BLD AUTO: 8.5 K/UL
NEUTROPHILS NFR BLD: 58.1 %
PLATELET # BLD AUTO: 306 K/UL
PMV BLD AUTO: 9.5 FL
RBC # BLD AUTO: 4.89 M/UL
WBC # BLD AUTO: 14.6 K/UL

## 2017-03-20 PROCEDURE — 85025 COMPLETE CBC W/AUTO DIFF WBC: CPT

## 2017-03-20 PROCEDURE — 36415 COLL VENOUS BLD VENIPUNCTURE: CPT

## 2017-03-21 ENCOUNTER — TELEPHONE (OUTPATIENT)
Dept: RHEUMATOLOGY | Facility: CLINIC | Age: 67
End: 2017-03-21

## 2017-03-21 NOTE — TELEPHONE ENCOUNTER
----- Message from Timi Royal MD sent at 3/20/2017 10:56 AM CDT -----  Please tell pt the WBC is elevated which can be seen with there steroid injection she received 3/7. Any current infectious symptoms? The eosinophilia seen with allergies is much improved, likely also from the steroid injection. DELMY

## 2017-05-01 ENCOUNTER — INFUSION (OUTPATIENT)
Dept: INFUSION THERAPY | Facility: HOSPITAL | Age: 67
End: 2017-05-01
Attending: INTERNAL MEDICINE
Payer: MEDICARE

## 2017-05-01 VITALS
HEIGHT: 66 IN | WEIGHT: 219.56 LBS | RESPIRATION RATE: 18 BRPM | DIASTOLIC BLOOD PRESSURE: 73 MMHG | SYSTOLIC BLOOD PRESSURE: 128 MMHG | BODY MASS INDEX: 35.29 KG/M2 | HEART RATE: 82 BPM | TEMPERATURE: 97 F

## 2017-05-01 DIAGNOSIS — M05.79 RHEUMATOID ARTHRITIS INVOLVING MULTIPLE SITES WITH POSITIVE RHEUMATOID FACTOR: Primary | ICD-10-CM

## 2017-05-01 PROCEDURE — 25000003 PHARM REV CODE 250: Performed by: INTERNAL MEDICINE

## 2017-05-01 PROCEDURE — 96365 THER/PROPH/DIAG IV INF INIT: CPT

## 2017-05-01 PROCEDURE — 63600175 PHARM REV CODE 636 W HCPCS: Performed by: INTERNAL MEDICINE

## 2017-05-01 RX ORDER — SODIUM CHLORIDE 0.9 % (FLUSH) 0.9 %
10 SYRINGE (ML) INJECTION
Status: CANCELLED | OUTPATIENT
Start: 2017-05-08

## 2017-05-01 RX ORDER — ACETAMINOPHEN 325 MG/1
650 TABLET ORAL
Status: CANCELLED | OUTPATIENT
Start: 2017-05-08

## 2017-05-01 RX ORDER — HEPARIN 100 UNIT/ML
500 SYRINGE INTRAVENOUS
Status: CANCELLED | OUTPATIENT
Start: 2017-05-08

## 2017-05-01 RX ADMIN — SODIUM CHLORIDE 1000 MG: 9 INJECTION, SOLUTION INTRAVENOUS at 08:05

## 2017-06-05 ENCOUNTER — LAB VISIT (OUTPATIENT)
Dept: LAB | Facility: HOSPITAL | Age: 67
End: 2017-06-05
Attending: INTERNAL MEDICINE
Payer: MEDICARE

## 2017-06-05 DIAGNOSIS — M06.9 RHEUMATOID ARTHRITIS OF HAND, UNSPECIFIED LATERALITY, UNSPECIFIED RHEUMATOID FACTOR PRESENCE: ICD-10-CM

## 2017-06-05 DIAGNOSIS — Z79.899 ON ABATACEPT THERAPY: ICD-10-CM

## 2017-06-05 LAB
ALBUMIN SERPL BCP-MCNC: 3.7 G/DL
ALP SERPL-CCNC: 98 U/L
ALT SERPL W/O P-5'-P-CCNC: 17 U/L
ANION GAP SERPL CALC-SCNC: 10 MMOL/L
AST SERPL-CCNC: 17 U/L
BASOPHILS # BLD AUTO: 0.04 K/UL
BASOPHILS NFR BLD: 0.3 %
BILIRUB SERPL-MCNC: 0.5 MG/DL
BUN SERPL-MCNC: 14 MG/DL
CALCIUM SERPL-MCNC: 9.4 MG/DL
CHLORIDE SERPL-SCNC: 105 MMOL/L
CO2 SERPL-SCNC: 27 MMOL/L
CREAT SERPL-MCNC: 0.8 MG/DL
CRP SERPL-MCNC: 10.3 MG/L
DIFFERENTIAL METHOD: ABNORMAL
EOSINOPHIL # BLD AUTO: 0.4 K/UL
EOSINOPHIL NFR BLD: 3.7 %
ERYTHROCYTE [DISTWIDTH] IN BLOOD BY AUTOMATED COUNT: 15.6 %
ERYTHROCYTE [SEDIMENTATION RATE] IN BLOOD BY WESTERGREN METHOD: 23 MM/HR
EST. GFR  (AFRICAN AMERICAN): >60 ML/MIN/1.73 M^2
EST. GFR  (NON AFRICAN AMERICAN): >60 ML/MIN/1.73 M^2
GLUCOSE SERPL-MCNC: 111 MG/DL
HCT VFR BLD AUTO: 45.5 %
HGB BLD-MCNC: 15 G/DL
LYMPHOCYTES # BLD AUTO: 3.8 K/UL
LYMPHOCYTES NFR BLD: 32 %
MCH RBC QN AUTO: 30.1 PG
MCHC RBC AUTO-ENTMCNC: 33 %
MCV RBC AUTO: 91 FL
MONOCYTES # BLD AUTO: 0.8 K/UL
MONOCYTES NFR BLD: 7 %
NEUTROPHILS # BLD AUTO: 6.7 K/UL
NEUTROPHILS NFR BLD: 57 %
PLATELET # BLD AUTO: 283 K/UL
PMV BLD AUTO: 9.5 FL
POTASSIUM SERPL-SCNC: 4.6 MMOL/L
PROT SERPL-MCNC: 7.4 G/DL
RBC # BLD AUTO: 4.99 M/UL
SODIUM SERPL-SCNC: 142 MMOL/L
WBC # BLD AUTO: 11.78 K/UL

## 2017-06-05 PROCEDURE — 80053 COMPREHEN METABOLIC PANEL: CPT

## 2017-06-05 PROCEDURE — 86480 TB TEST CELL IMMUN MEASURE: CPT

## 2017-06-05 PROCEDURE — 85651 RBC SED RATE NONAUTOMATED: CPT

## 2017-06-05 PROCEDURE — 86140 C-REACTIVE PROTEIN: CPT

## 2017-06-05 PROCEDURE — 85025 COMPLETE CBC W/AUTO DIFF WBC: CPT

## 2017-06-05 PROCEDURE — 36415 COLL VENOUS BLD VENIPUNCTURE: CPT

## 2017-06-06 ENCOUNTER — OFFICE VISIT (OUTPATIENT)
Dept: RHEUMATOLOGY | Facility: CLINIC | Age: 67
End: 2017-06-06
Payer: MEDICARE

## 2017-06-06 VITALS
SYSTOLIC BLOOD PRESSURE: 155 MMHG | HEART RATE: 104 BPM | HEIGHT: 64 IN | WEIGHT: 223.31 LBS | DIASTOLIC BLOOD PRESSURE: 89 MMHG | BODY MASS INDEX: 38.12 KG/M2

## 2017-06-06 DIAGNOSIS — M19.041 PRIMARY OSTEOARTHRITIS OF BOTH HANDS: ICD-10-CM

## 2017-06-06 DIAGNOSIS — M85.80 OSTEOPENIA, UNSPECIFIED LOCATION: ICD-10-CM

## 2017-06-06 DIAGNOSIS — Z78.0 MENOPAUSE: ICD-10-CM

## 2017-06-06 DIAGNOSIS — I10 BENIGN ESSENTIAL HTN: ICD-10-CM

## 2017-06-06 DIAGNOSIS — M18.0 PRIMARY OSTEOARTHRITIS OF BOTH FIRST CARPOMETACARPAL JOINTS: ICD-10-CM

## 2017-06-06 DIAGNOSIS — M05.772 RHEUMATOID ARTHRITIS INVOLVING BOTH ANKLES WITH POSITIVE RHEUMATOID FACTOR: ICD-10-CM

## 2017-06-06 DIAGNOSIS — I10 ESSENTIAL HYPERTENSION: ICD-10-CM

## 2017-06-06 DIAGNOSIS — G47.00 INSOMNIA, UNSPECIFIED TYPE: ICD-10-CM

## 2017-06-06 DIAGNOSIS — M15.9 PRIMARY OSTEOARTHRITIS INVOLVING MULTIPLE JOINTS: Chronic | ICD-10-CM

## 2017-06-06 DIAGNOSIS — E55.9 VITAMIN D DEFICIENCY: ICD-10-CM

## 2017-06-06 DIAGNOSIS — Z79.899 ON ABATACEPT THERAPY: Primary | ICD-10-CM

## 2017-06-06 DIAGNOSIS — M05.771 RHEUMATOID ARTHRITIS INVOLVING BOTH ANKLES WITH POSITIVE RHEUMATOID FACTOR: ICD-10-CM

## 2017-06-06 DIAGNOSIS — E66.01 MORBID OBESITY DUE TO EXCESS CALORIES: ICD-10-CM

## 2017-06-06 DIAGNOSIS — E78.00 PURE HYPERCHOLESTEROLEMIA: ICD-10-CM

## 2017-06-06 DIAGNOSIS — M19.042 PRIMARY OSTEOARTHRITIS OF BOTH HANDS: ICD-10-CM

## 2017-06-06 DIAGNOSIS — Z79.631 LONG TERM METHOTREXATE USER: ICD-10-CM

## 2017-06-06 LAB
MITOGEN NIL: >10 IU/ML
NIL: 0.06 IU/ML
TB ANTIGEN NIL: -0.01 IU/ML
TB ANTIGEN: 0.06 IU/ML
TB GOLD: NEGATIVE

## 2017-06-06 PROCEDURE — 99214 OFFICE O/P EST MOD 30 MIN: CPT | Mod: PBBFAC | Performed by: INTERNAL MEDICINE

## 2017-06-06 PROCEDURE — 99214 OFFICE O/P EST MOD 30 MIN: CPT | Mod: S$PBB,,, | Performed by: INTERNAL MEDICINE

## 2017-06-06 PROCEDURE — 99999 PR PBB SHADOW E&M-EST. PATIENT-LVL IV: CPT | Mod: PBBFAC,,, | Performed by: INTERNAL MEDICINE

## 2017-06-06 RX ORDER — AMLODIPINE BESYLATE 10 MG/1
10 TABLET ORAL DAILY
Qty: 90 TABLET | Refills: 3 | Status: SHIPPED | OUTPATIENT
Start: 2017-06-06 | End: 2018-01-09 | Stop reason: SDUPTHER

## 2017-06-06 RX ORDER — PRAVASTATIN SODIUM 40 MG/1
40 TABLET ORAL NIGHTLY
Qty: 90 TABLET | Refills: 3 | Status: SHIPPED | OUTPATIENT
Start: 2017-06-06 | End: 2018-01-09 | Stop reason: SDUPTHER

## 2017-06-06 RX ORDER — FOLIC ACID 1 MG/1
1 TABLET ORAL DAILY
Qty: 90 TABLET | Refills: 3 | Status: SHIPPED | OUTPATIENT
Start: 2017-06-06 | End: 2018-01-09 | Stop reason: SDUPTHER

## 2017-06-06 RX ORDER — NORTRIPTYLINE HYDROCHLORIDE 25 MG/1
CAPSULE ORAL
Qty: 270 CAPSULE | Refills: 0 | Status: SHIPPED | OUTPATIENT
Start: 2017-06-06 | End: 2017-10-06 | Stop reason: SDUPTHER

## 2017-06-06 RX ORDER — LEFLUNOMIDE 20 MG/1
20 TABLET ORAL DAILY
Qty: 90 TABLET | Refills: 0 | Status: SHIPPED | OUTPATIENT
Start: 2017-06-06 | End: 2017-09-12 | Stop reason: SDUPTHER

## 2017-06-06 RX ORDER — ERGOCALCIFEROL 1.25 MG/1
50000 CAPSULE ORAL
Qty: 24 CAPSULE | Refills: 3 | Status: SHIPPED | OUTPATIENT
Start: 2017-06-08 | End: 2017-06-06 | Stop reason: SDUPTHER

## 2017-06-06 RX ORDER — ERGOCALCIFEROL 1.25 MG/1
CAPSULE ORAL
Qty: 25 CAPSULE | Refills: 3 | Status: SHIPPED | OUTPATIENT
Start: 2017-06-06 | End: 2018-01-09 | Stop reason: SDUPTHER

## 2017-06-06 RX ORDER — METHOTREXATE 25 MG/ML
INJECTION, SOLUTION INTRA-ARTERIAL; INTRAMUSCULAR; INTRAVENOUS
Qty: 10 ML | Refills: 0 | Status: SHIPPED | OUTPATIENT
Start: 2017-06-06 | End: 2017-09-12 | Stop reason: SDUPTHER

## 2017-06-06 RX ORDER — TRAMADOL HYDROCHLORIDE 50 MG/1
50 TABLET ORAL EVERY 6 HOURS PRN
Qty: 30 TABLET | Refills: 0 | Status: SHIPPED | OUTPATIENT
Start: 2017-06-06 | End: 2017-06-16

## 2017-06-06 RX ORDER — ALENDRONATE SODIUM 35 MG/1
TABLET ORAL
Qty: 12 TABLET | Refills: 3 | Status: SHIPPED | OUTPATIENT
Start: 2017-06-06 | End: 2017-09-12

## 2017-06-06 ASSESSMENT — DISEASE ACTIVITY SCORE (DAS28)
TOTAL_SCORE_CRP: 3.57
TOTAL_SCORE_ESR: 3.93
CRP_MG_PER_LITER: 10.3
SWOLLEN_JOINTS_COUNT: 3
TENDER_JOINTS_COUNT: 3
ESR_MM_PER_HR: 23
GLOBAL_HEALTH_SCORE: 20

## 2017-06-06 ASSESSMENT — ROUTINE ASSESSMENT OF PATIENT INDEX DATA (RAPID3)
TOTAL RAPID3 SCORE: 2.28
PATIENT GLOBAL ASSESSMENT SCORE: 2
MDHAQ FUNCTION SCORE: .4
AM STIFFNESS SCORE: 0, NO
PSYCHOLOGICAL DISTRESS SCORE: 0
FATIGUE SCORE: 3
PAIN SCORE: 3.5

## 2017-06-06 NOTE — TELEPHONE ENCOUNTER
----- Message from Timi Royal MD sent at 6/5/2017  4:50 PM CDT -----  Please tell pt the sed rate and crp inflammation tests are both minimally elevated and both improved compared with three months ago. The liver and kidney function tests are normal. The cbc is normal, with wbc back to normal as well. DELMY

## 2017-06-06 NOTE — PROGRESS NOTES
Subjective:       Patient ID: Maru Shelby is a 66 y.o. female.    Chief Complaint: RA    HPI last week had some pain and swelling left knee. And also right index mcp, both now resolved. Some walking, no other exercise. BP checks at home 140/90 only on 5mg amlodipine.  Review of Systems   Constitutional: Positive for fatigue. Negative for appetite change, fever and unexpected weight change.   HENT: Negative for mouth sores.    Eyes: Negative for visual disturbance.   Respiratory: Negative for cough, shortness of breath and wheezing.    Cardiovascular: Negative for chest pain and palpitations.   Gastrointestinal: Negative for abdominal pain, anal bleeding, blood in stool, constipation, diarrhea, nausea and vomiting.   Genitourinary: Negative for dysuria, frequency and urgency.   Musculoskeletal: Positive for arthralgias. Negative for back pain, gait problem, joint swelling, myalgias, neck pain and neck stiffness.   Skin: Negative for rash.   Neurological: Negative for weakness, numbness and headaches.   Hematological: Negative for adenopathy. Does not bruise/bleed easily.   Psychiatric/Behavioral: Negative for sleep disturbance. The patient is not nervous/anxious.          Objective:   There were no vitals taken for this visit.     Physical Exam   Constitutional: She is oriented to person, place, and time and well-developed, well-nourished, and in no distress.   HENT:   Head: Normocephalic and atraumatic.   Mouth/Throat: Oropharynx is clear and moist.   Eyes: Conjunctivae and EOM are normal.   Neck: Normal range of motion. Neck supple. No thyromegaly present.   Cardiovascular: Normal rate, regular rhythm, normal heart sounds and intact distal pulses.  Exam reveals no gallop and no friction rub.    No murmur heard.  Pulmonary/Chest: Breath sounds normal. She has no wheezes. She has no rales. She exhibits no tenderness.   Abdominal: Soft. She exhibits no distension and no mass. There is no tenderness.       Right  Side Rheumatological Exam     Examination finds the shoulder, elbow, wrist, knee, 1st PIP, 1st MCP, 2nd PIP, 3rd PIP, 3rd MCP, 4th PIP, 4th MCP, 5th PIP and 5th MCP normal.    The patient is tender to palpation of the 2nd MCP    She has swelling of the 2nd MCP    The patient has an enlarged 2nd MCP    Left Side Rheumatological Exam     Examination finds the shoulder, elbow, wrist, 1st PIP, 1st MCP, 2nd PIP, 3rd PIP, 3rd MCP, 4th PIP, 4th MCP, 5th PIP and 5th MCP normal.    The patient is tender to palpation of the knee and 2nd MCP.    She has swelling of the knee and 2nd MCP    The patient has an enlarged knee and 2nd MCP.      Lymphadenopathy:     She has no cervical adenopathy.   Neurological: She is alert and oriented to person, place, and time. She displays normal reflexes. Gait normal.   Nl motor strength UE and LE bilateral   Musculoskeletal: She exhibits no edema.       Results for MECCA DUONG (MRN [id number]) as of 6/6/2017 08:06   Ref. Range 6/5/2017 07:13   WBC Latest Ref Range: 3.90 - 12.70 K/uL 11.78   RBC Latest Ref Range: 4.00 - 5.40 M/uL 4.99   Hemoglobin Latest Ref Range: 12.0 - 16.0 g/dL 15.0   Hematocrit Latest Ref Range: 37.0 - 48.5 % 45.5   MCV Latest Ref Range: 82 - 98 fL 91   MCH Latest Ref Range: 27.0 - 31.0 pg 30.1   MCHC Latest Ref Range: 32.0 - 36.0 % 33.0   RDW Latest Ref Range: 11.5 - 14.5 % 15.6 (H)   Platelets Latest Ref Range: 150 - 350 K/uL 283   MPV Latest Ref Range: 9.2 - 12.9 fL 9.5   Gran% Latest Ref Range: 38.0 - 73.0 % 57.0   Gran # Latest Ref Range: 1.8 - 7.7 K/uL 6.7   Lymph% Latest Ref Range: 18.0 - 48.0 % 32.0   Lymph # Latest Ref Range: 1.0 - 4.8 K/uL 3.8   Mono% Latest Ref Range: 4.0 - 15.0 % 7.0   Mono # Latest Ref Range: 0.3 - 1.0 K/uL 0.8   Eosinophil% Latest Ref Range: 0.0 - 8.0 % 3.7   Eos # Latest Ref Range: 0.0 - 0.5 K/uL 0.4   Basophil% Latest Ref Range: 0.0 - 1.9 % 0.3   Baso # Latest Ref Range: 0.00 - 0.20 K/uL 0.04   Sed Rate Latest Ref Range: 0 - 20 mm/Hr  23 (H)   Sodium Latest Ref Range: 136 - 145 mmol/L 142   Potassium Latest Ref Range: 3.5 - 5.1 mmol/L 4.6   Chloride Latest Ref Range: 95 - 110 mmol/L 105   CO2 Latest Ref Range: 23 - 29 mmol/L 27   Anion Gap Latest Ref Range: 8 - 16 mmol/L 10   BUN, Bld Latest Ref Range: 8 - 23 mg/dL 14   Creatinine Latest Ref Range: 0.5 - 1.4 mg/dL 0.8   eGFR if non African American Latest Ref Range: >60 mL/min/1.73 m^2 >60   eGFR if  Latest Ref Range: >60 mL/min/1.73 m^2 >60   Glucose Latest Ref Range: 70 - 110 mg/dL 111 (H)   Calcium Latest Ref Range: 8.7 - 10.5 mg/dL 9.4   Alkaline Phosphatase Latest Ref Range: 55 - 135 U/L 98   Total Protein Latest Ref Range: 6.0 - 8.4 g/dL 7.4   Albumin Latest Ref Range: 3.5 - 5.2 g/dL 3.7   Total Bilirubin Latest Ref Range: 0.1 - 1.0 mg/dL 0.5   AST Latest Ref Range: 10 - 40 U/L 17   ALT Latest Ref Range: 10 - 44 U/L 17   CRP Latest Ref Range: 0.0 - 8.2 mg/L 10.3 (H)   Differential Method Unknown Automated     Please tell pt the neck x-rays show mild slippage of C4- % on degenerative basis, no RA changes. The hands show degenerative changes in the thumb bases and mild narrowing of the index and middle finger knuckle joints as before likely related to RA. There are mild degenerative changes in knees and great toes. Overall, no substantial change compared with previous x-rays. RJQ          Signed by     Signed Credentials Date/Time  Phone Pager   CLAUDIO MOORE MD 3/02/2017 09:00 [phone]    PACS Images     Show images for XR Arthritis Survey   Reviewed By Brooks Crabtree MD on 3/2/2017 13:34   External Result Report     External Result Report   Narrative     Arthritis survey:    Bilateral hands: The bones are slightly osteopenic.  No osseous erosions are identified.  There are moderate degenerative changes of the bilateral carpometacarpal joint spaces, left greater than right.    Cervical spine: There is anterolisthesis of C4 and C5 by approximately 4 mm,  stable.  The atlantodental interval is maintained.  There are multilevel degenerative changes consisting of disc space narrowing, endplate sclerosis and facet arthropathy.    Bilateral knees: There is mild bilateral medial compartment joint narrowing with subchondral sclerosis and marginal osteophytosis.  No osseous erosions.    Bilateral feet: There is bilateral hallux valgus deformity with bunion formation.  Minimal degenerative changes are noted.  No osseous erosions are detected.   Impression      As above.      Electronically signed by: Farida Llanes MD  Date: 17  Time: 09:00     Encounter     View Encounter          Reviewed By     Mikey Crabtree MD on 3/2/2017 13:34          : 1950 ORDERING PHYSICIAN: MARI Royal LOCATION: [facility]    HISTORY: 66 y/o female with no hx of fractures. She had menopausal symptoms at 48 y/o. Pts Sibling had a hip fx. Pt is taking Calcium, Vit D and 50,000 once a week. She has a hx of Rheumatoid Arthritis. She does not exercise or smoke.    TECHNIQUE: Bone Mineral Density performed using V[employer] Dodge (S/N [id number]) reveals good positioning of lumbar spine and hip.    BONE MINERAL DENSITY RESULTS:  Lumbar Spine: Lumbar bone mineral density L1-L4 is 1.084g/cm2, which is a t-score of 0.3. The z-score is 2.3.    Total Hip: The total hip bone mineral density is 0.960g/cm2.  The t-score is 0.1, and the z-score is 1.5.  Femoral neck BMD is 0.595g/cm2 and the t-score is -2.3.    COMPARISONS:  Date Location BMD T-score  13 L-spine 1.120 0.7  Total Hip 0.924 -0.1   Impression       Osteopenia, approaching osteoporosis of the femoral neck. Recalculation of FRAX with diagnosis of RA yields 10 year at hip= 3.2% and spine 15%.    Recommendations:  1) Adequate calcium and Vitamin D therapy  2) Appropriate exercise  3) Consider medical therapy including bisphosphonates,  denosumab.  4) Consider repeat BMD in 2 years.    EXPLANATION OF RESULTS:  The t-score compares  this results to the bone density of a 25 year old of the same gender. The z-score compares this result to the average bone density to people of the same age and gender. The amounts indicate the number of standard deviations above or   below the mean.  * Osteoporosis is generally defined as having a t-score between less than -2.5.  * Osteopenia is generally defined as having a t-score between -1 and -2.5.  * The normal range is generally defined as having a t-score between -1 to 1.      Electronically signed by: ANTON BLAIR MD  Date: 09/19/15  Time: 10:37     Encounter     View Encounter          Reviewed By     Timi Royal MD on 9/19/2015 13:09     Results for MECCA DUONG (MRN [id number]) as of 6/6/2017 08:08   Ref. Range 8/8/2016 08:15   Cholesterol Latest Ref Range: 120 - 199 mg/dL 153   HDL Latest Ref Range: 40 - 75 mg/dL 43   LDL Cholesterol Latest Ref Range: 63.0 - 159.0 mg/dL 72.4   Total Cholesterol/HDL Ratio Latest Ref Range: 2.0 - 5.0  3.6   Triglycerides Latest Ref Range: 30 - 150 mg/dL 188 (H)     Assessment:            RA DAS28: 3.57(moderate) LEES-CRP 3.93(moderate)due for Orencia 1000mg IV 6/12/17 Osteopenia > NOF FRAX therapeutic threshold on alendronate 35mg po once a week due for DXA in 3 months  Hypertension, not ideally controlled, needs to monitor at home 1-2 x wk keep < 140/90 will increase amlodipine from 5 to 10mg daily  Obesity gained 3#  Hyperlipidemia with est 10 yr ASCVD risk 10.8% improved with pravastatin 40mg nightly due for recheck in 3 months               Plan:       Declines Kenalog for mild flare as due for abatacept in 6 days.  Home BP monitor 1-2 x wk, increase amlodipine to 10mg daily  Resume weight reduction  Daily exercise gym, outdoor bike, with bunions, walking not best exercise.    cont pravastatin 40mg q pm  Cont abatacept 1000mg IV q 4wks. Always hold with suspected, actual, or resolving infection, antibiotics, just like mtx. Re-ordered next dose  3/13/17  resume methotrexate 25mg sc once a week with folic acid 1mg daily next week  Cont leflunomide 20mg daily  Cont alendronate 35mg once a week  Cont amlodipine 5mg daily for persistent mild hypertension  Cont pravastatin 40mg nightly for hyperlipidemia with elevated ASCVD risk  RTC 3 months with standing labs, lipids and DXA

## 2017-06-07 NOTE — TELEPHONE ENCOUNTER
----- Message from Timi Royal MD sent at 6/6/2017  4:44 PM CDT -----  Please tell pt the TB blood test remains negative. DELMY

## 2017-07-10 ENCOUNTER — INFUSION (OUTPATIENT)
Dept: INFUSION THERAPY | Facility: HOSPITAL | Age: 67
End: 2017-07-10
Attending: INTERNAL MEDICINE
Payer: MEDICARE

## 2017-07-10 VITALS
DIASTOLIC BLOOD PRESSURE: 59 MMHG | BODY MASS INDEX: 38.07 KG/M2 | TEMPERATURE: 97 F | HEIGHT: 64 IN | SYSTOLIC BLOOD PRESSURE: 110 MMHG | RESPIRATION RATE: 18 BRPM | WEIGHT: 223 LBS | HEART RATE: 80 BPM

## 2017-07-10 DIAGNOSIS — M05.79 RHEUMATOID ARTHRITIS INVOLVING MULTIPLE SITES WITH POSITIVE RHEUMATOID FACTOR: Primary | ICD-10-CM

## 2017-07-10 PROCEDURE — 96365 THER/PROPH/DIAG IV INF INIT: CPT

## 2017-07-10 PROCEDURE — 63600175 PHARM REV CODE 636 W HCPCS: Performed by: INTERNAL MEDICINE

## 2017-07-10 PROCEDURE — 25000003 PHARM REV CODE 250: Performed by: INTERNAL MEDICINE

## 2017-07-10 RX ORDER — SODIUM CHLORIDE 0.9 % (FLUSH) 0.9 %
10 SYRINGE (ML) INJECTION
Status: CANCELLED | OUTPATIENT
Start: 2017-07-10

## 2017-07-10 RX ORDER — ACETAMINOPHEN 325 MG/1
650 TABLET ORAL
Status: CANCELLED | OUTPATIENT
Start: 2017-07-10

## 2017-07-10 RX ORDER — HEPARIN 100 UNIT/ML
500 SYRINGE INTRAVENOUS
Status: CANCELLED | OUTPATIENT
Start: 2017-07-10

## 2017-07-10 RX ADMIN — SODIUM CHLORIDE 1000 MG: 9 INJECTION, SOLUTION INTRAVENOUS at 08:07

## 2017-07-10 NOTE — PLAN OF CARE
Problem: Health Knowledge, Opportunity to Enhance (Adult,NICU,[place],Obstetrics,Pediatric)  Goal: Knowledgeable about Health Subject/Topic  Patient will demonstrate the desired outcomes by discharge/transition of care.   07/10/17 0825   Health Knowledge, Opportunity to Enhance (Adult,NICU,[place],Obstetrics,Pediatric)   Knowledgeable about Health Subject/Topic achieves outcome   Reviewed plan of care

## 2017-07-10 NOTE — PATIENT INSTRUCTIONS
Abatacept Solution for injection(Orencia)  What is this medicine?  ABATACEPT (a ba TA sept) is used to treat moderate to severe active rheumatoid arthritis in adults. This medicine is also used to treat juvenile idiopathic arthritis.  How should I use this medicine?  This medicine is for infusion into a vein. It is given by a health care professional in a hospital or clinic setting.  Talk to your pediatrician regarding the use of this medicine in children. While this drug may be prescribed for children as young as 6 years for selected conditions, precautions do apply.  What side effects may I notice from receiving this medicine?  Side effects that you should report to your doctor or health care professional as soon as possible:  · allergic reactions like skin rash, itching or hives, swelling of the face, lips, or tongue  · breathing problems  · chest pain  · signs of infection - fever or chills, cough, unusual tiredness, pain or trouble passing urine, or warm, red or painful skin  Side effects that usually do not require medical attention (Report these to your doctor or health care professional if they continue or are bothersome.):  · dizziness  · headache  · nausea, vomiting  · sore throat  · stomach upset  What may interact with this medicine?  Do not take this medicine with any of the following medications:  · adalimumab  · anakinra  · certolizumab  · etanercept  · golimumab  · infliximab  · live virus vaccines  · rituximab  · tocilizumab  This medicine may also interact with the following medications:  · vaccines  What if I miss a dose?  It is important not to miss your dose. Call your doctor or health care professional if you are unable to keep an appointment.  Where should I keep my medicine?  This drug is given in a hospital or clinic and will not be stored at home.  What should I tell my health care provider before I take this medicine?  They need to know if you have any of these conditions:  · are taking  other medicines to treat rheumatoid arthritis  · COPD  · diabetes  · infection or history of infections  · recently received or scheduled to receive a vaccine  · scheduled to have surgery  · tuberculosis, a positive skin test for tuberculosis or have recently been in close contact with someone who has tuberculosis  · viral hepatitis  · an unusual or allergic reaction to abatacept, other medicines, foods, dyes, or preservatives  · pregnant or trying to get pregnant  · breast-feeding  What should I watch for while using this medicine?  Visit your doctor for regular check ups while you are taking this medicine. Tell your doctor or healthcare professional if your symptoms do not start to get better or if they get worse.  Call your doctor or health care professional if you get a cold or other infection while receiving this medicine. Do not treat yourself. This medicine may decrease your body's ability to fight infection. Try to avoid being around people who are sick.  Date Last Reviewed:   NOTE:This sheet is a summary. It may not cover all possible information. If you have questions about this medicine, talk to your doctor, pharmacist, or health care provider. Copyright© 2016 [employer]

## 2017-07-10 NOTE — PLAN OF CARE
Problem: Fall Risk (Adult)  Goal: Identify Related Risk Factors and Signs and Symptoms  Related risk factors and signs and symptoms are identified upon initiation of Human Response Clinical Practice Guideline (CPG)   07/10/17 0823   Fall Risk   Related Risk Factors (Fall Risk) age-related changes;gait/mobility problems

## 2017-07-10 NOTE — PLAN OF CARE
Problem: Infection, Risk/Actual (Adult)  Goal: Infection Prevention/Resolution  Patient will demonstrate the desired outcomes by discharge/transition of care.   07/10/17 0824   Infection, Risk/Actual (Adult)   Infection Prevention/Resolution achieves outcome   Voiced understanding of instructions.  Hand hygiene promoted

## 2017-07-10 NOTE — PLAN OF CARE
Problem: Fall Risk (Adult)  Goal: Absence of Falls  Patient will demonstrate the desired outcomes by discharge/transition of care.   07/10/17 0852   Fall Risk (Adult)   Absence of Falls achieves outcome   Asks for asst with getting up

## 2017-07-10 NOTE — PLAN OF CARE
Problem: Fall Risk (Adult)  Intervention: Safety Promotion/Fall Prevention   07/10/17 0826   Safety Interventions   Safety Promotion/Fall Prevention Fall Risk reviewed with patient/family;in recliner, wheels locked   Safety reinforced

## 2017-07-10 NOTE — PLAN OF CARE
Problem: Infection, Risk/Actual (Adult)  Goal: Identify Related Risk Factors and Signs and Symptoms  Related risk factors and signs and symptoms are identified upon initiation of Human Response Clinical Practice Guideline (CPG)   07/10/17 0824   Infection, Risk/Actual   Related Risk Factors (Infection, Risk/Actual) chronic illness/condition

## 2017-07-10 NOTE — PLAN OF CARE
Problem: Health Knowledge, Opportunity to Enhance (Adult,NICU,[place],Obstetrics,Pediatric)  Goal: Identify Related Risk Factors and Signs and Symptoms  Related risk factors and signs and symptoms are identified upon initiation of Human Response Clinical Practice Guideline (CPG)   07/10/17 0825   Health Knowledge, Opportunity to Enhance   Related Risk Factors (Health Knowledge Enhance) fatigue;pain

## 2017-07-10 NOTE — PLAN OF CARE
Problem: Health Knowledge, Opportunity to Enhance (Adult,NICU,[place],Obstetrics,Pediatric)  Intervention: Enhance Health Knowledge   07/10/17 0825   Coping/Psychosocial Interventions   Supportive Measures active listening utilized   Reviewed plan of care-voiced understanding

## 2017-07-10 NOTE — PLAN OF CARE
Problem: Infection, Risk/Actual (Adult)  Intervention: Prevent Infection/Maximize Resistance   07/10/17 0823   Hygiene Care   Bathing/Skin Care other (see comments)  (Hand hygiene promoted)   Pain/Comfort/Sleep Interventions   Sleep/Rest Enhancement consistent schedule promoted   Voiced understanding of hand hygiene

## 2017-08-06 ENCOUNTER — HOSPITAL ENCOUNTER (EMERGENCY)
Facility: HOSPITAL | Age: 67
Discharge: HOME | End: 2017-08-06
Attending: SURGERY
Payer: MEDICARE

## 2017-08-06 VITALS
HEART RATE: 76 BPM | WEIGHT: 225 LBS | DIASTOLIC BLOOD PRESSURE: 80 MMHG | BODY MASS INDEX: 39.11 KG/M2 | TEMPERATURE: 97 F | RESPIRATION RATE: 16 BRPM | OXYGEN SATURATION: 98 % | SYSTOLIC BLOOD PRESSURE: 144 MMHG

## 2017-08-06 DIAGNOSIS — R13.10 DYSPHAGIA: ICD-10-CM

## 2017-08-06 DIAGNOSIS — J02.9 SORETHROAT: Primary | ICD-10-CM

## 2017-08-06 LAB
ALBUMIN SERPL BCP-MCNC: 3.6 G/DL
ALP SERPL-CCNC: 104 U/L
ALT SERPL W/O P-5'-P-CCNC: 16 U/L
ANION GAP SERPL CALC-SCNC: 11 MMOL/L
APTT BLDCRRT: 27.1 SEC
AST SERPL-CCNC: 16 U/L
BASOPHILS # BLD AUTO: 0.01 K/UL
BASOPHILS NFR BLD: 0.1 %
BILIRUB SERPL-MCNC: 0.4 MG/DL
BNP SERPL-MCNC: <10 PG/ML
BUN SERPL-MCNC: 19 MG/DL
CALCIUM SERPL-MCNC: 9.6 MG/DL
CHLORIDE SERPL-SCNC: 102 MMOL/L
CK MB SERPL-MCNC: 0.5 NG/ML
CK MB SERPL-RTO: 1 %
CK SERPL-CCNC: 52 U/L
CK SERPL-CCNC: 52 U/L
CO2 SERPL-SCNC: 24 MMOL/L
CREAT SERPL-MCNC: 1.1 MG/DL
DIFFERENTIAL METHOD: ABNORMAL
EOSINOPHIL # BLD AUTO: 0.6 K/UL
EOSINOPHIL NFR BLD: 4.6 %
ERYTHROCYTE [DISTWIDTH] IN BLOOD BY AUTOMATED COUNT: 14.6 %
EST. GFR  (AFRICAN AMERICAN): 59 ML/MIN/1.73 M^2
EST. GFR  (NON AFRICAN AMERICAN): 51 ML/MIN/1.73 M^2
GLUCOSE SERPL-MCNC: 148 MG/DL
HCT VFR BLD AUTO: 45 %
HGB BLD-MCNC: 14.8 G/DL
INR PPP: 1
LYMPHOCYTES # BLD AUTO: 1.5 K/UL
LYMPHOCYTES NFR BLD: 10.8 %
MCH RBC QN AUTO: 29.9 PG
MCHC RBC AUTO-ENTMCNC: 32.9 G/DL
MCV RBC AUTO: 91 FL
MONOCYTES # BLD AUTO: 0.5 K/UL
MONOCYTES NFR BLD: 3.4 %
NEUTROPHILS # BLD AUTO: 11.1 K/UL
NEUTROPHILS NFR BLD: 81.1 %
PLATELET # BLD AUTO: 342 K/UL
PMV BLD AUTO: 9.5 FL
POTASSIUM SERPL-SCNC: 4.3 MMOL/L
PROT SERPL-MCNC: 7.6 G/DL
PROTHROMBIN TIME: 10.4 SEC
RBC # BLD AUTO: 4.95 M/UL
SODIUM SERPL-SCNC: 137 MMOL/L
TROPONIN I SERPL DL<=0.01 NG/ML-MCNC: <0.006 NG/ML
WBC # BLD AUTO: 13.66 K/UL

## 2017-08-06 PROCEDURE — 85730 THROMBOPLASTIN TIME PARTIAL: CPT

## 2017-08-06 PROCEDURE — 96372 THER/PROPH/DIAG INJ SC/IM: CPT

## 2017-08-06 PROCEDURE — 85610 PROTHROMBIN TIME: CPT

## 2017-08-06 PROCEDURE — 63600175 PHARM REV CODE 636 W HCPCS: Performed by: SURGERY

## 2017-08-06 PROCEDURE — 84484 ASSAY OF TROPONIN QUANT: CPT

## 2017-08-06 PROCEDURE — 99284 EMERGENCY DEPT VISIT MOD MDM: CPT | Mod: 25

## 2017-08-06 PROCEDURE — 93005 ELECTROCARDIOGRAM TRACING: CPT

## 2017-08-06 PROCEDURE — 83880 ASSAY OF NATRIURETIC PEPTIDE: CPT

## 2017-08-06 PROCEDURE — 80053 COMPREHEN METABOLIC PANEL: CPT

## 2017-08-06 PROCEDURE — 85025 COMPLETE CBC W/AUTO DIFF WBC: CPT

## 2017-08-06 PROCEDURE — 82553 CREATINE MB FRACTION: CPT

## 2017-08-06 PROCEDURE — 36415 COLL VENOUS BLD VENIPUNCTURE: CPT

## 2017-08-06 RX ORDER — METHYLPREDNISOLONE SOD SUCC 125 MG
125 VIAL (EA) INJECTION
Status: COMPLETED | OUTPATIENT
Start: 2017-08-06 | End: 2017-08-06

## 2017-08-06 RX ORDER — AZITHROMYCIN 200 MG/5ML
500 POWDER, FOR SUSPENSION ORAL DAILY
Qty: 65 ML | Refills: 0 | Status: SHIPPED | OUTPATIENT
Start: 2017-08-06 | End: 2017-08-06

## 2017-08-06 RX ORDER — SUCRALFATE 1 G/10ML
1 SUSPENSION ORAL 4 TIMES DAILY
Qty: 1200 ML | Refills: 0 | Status: SHIPPED | OUTPATIENT
Start: 2017-08-06 | End: 2017-09-05

## 2017-08-06 RX ORDER — HYDROCODONE BITARTRATE AND ACETAMINOPHEN 7.5; 325 MG/15ML; MG/15ML
15 SOLUTION ORAL EVERY 6 HOURS PRN
Qty: 240 ML | Refills: 0 | Status: SHIPPED | OUTPATIENT
Start: 2017-08-06 | End: 2017-08-16

## 2017-08-06 RX ORDER — AZITHROMYCIN 200 MG/5ML
500 POWDER, FOR SUSPENSION ORAL DAILY
Qty: 65 ML | Refills: 0 | Status: SHIPPED | OUTPATIENT
Start: 2017-08-06 | End: 2017-08-11

## 2017-08-06 RX ADMIN — METHYLPREDNISOLONE SODIUM SUCCINATE 125 MG: 125 INJECTION, POWDER, FOR SOLUTION INTRAMUSCULAR; INTRAVENOUS at 10:08

## 2017-08-06 NOTE — ED TRIAGE NOTES
STATED SHE TOOK RX PILL YESTERDAY AND THE PILL GOT STUCK IN BACK OF HER THROAT CAUSING DISCOMFORT.

## 2017-08-06 NOTE — ED PROVIDER NOTES
[facility]                                        August 6, 2017                   Chief Complaint  67 y.o. female with Sore Throat (developed sore throat after a pill got stuck)    History of Present Illness  Maru Shelby presents to the emergency room with a sore throat today  Patient swallowed some pills last Thursday with residual pain after that event  Patient states she's had burning in the throat with pain on any swallowing since  Patient on exam is mild oropharyngitis with no exudate or complication noted  Patient has normal swallowing in the ER without stridor or drool noted this a.m.  Has normal phonation, has been eating and drinking with some degree of pain  Pt is afebrile with good stable vital signs, no signs of distress on presentation     The history is provided by the patient    Past Medical History    Anxiety     Arthritis     Depression     Hyperlipidemia     Hypertension     Neuromuscular disorder     Obesity      Past Surgical History    CHOLECYSTECTOMY      HAND SURGERY      LUMBAR SPINE SURGERY      TUBAL LIGATION        No Known Allergies   Social history: , nonsmoker children  Family history: Pancreatitis and cancer    Review of Systems and Physical Exam     Review of Systems  -- Constitution - no fever, denies fatigue, no weakness, no chills  -- Eyes - no tearing or redness, no visual disturbance  -- Ear, Nose - no tinnitus or earache, no nasal congestion or discharge  -- Mouth,Throat - sore throat and pain with swallowing  -- Respiratory - denies cough and congestion, no shortness of breath, no SPEARS  -- Cardiovascular - denies chest pain, no palpitations, denies claudication  -- Gastrointestinal - denies abdominal pain, nausea, vomiting, or diarrhea  -- Genitourinary - no dysuria, no denies flank pain, no hematuria or frequency   -- Musculoskeletal - denies back pain, negative for myalgias and arthralgias   -- Neurological - no headache, denies weakness or  seizure; no LOC  -- Skin - denies pallor, rash, or changes in skin. no hives or welts noted    Vital Signs  -- Her oral temperature is 97 °F (36.1 °C).   -- Her blood pressure is 130/90 and her pulse is 96.   -- Her respiration is 16 and oxygen saturation is 98%.      Physical Exam  -- Nursing note and vitals reviewed  -- Constitutional: Appears well-developed and well-nourished  -- Head: Atraumatic. Normocephalic. No obvious abnormality  -- Eyes: Pupils are equal and reactive to light. Normal conjunctiva and lids  -- Nose: Nose normal in appearance, nares grossly normal. No discharge  -- Throat: mild posterior oropharnyx erythema with no exudate   -- Ears: External ears and TM normal bilaterally. Normal hearing and no drainage  -- Neck: Normal range of motion. Neck supple. No masses, trachea midline  -- Cardiac: Normal rate, regular rhythm and normal heart sounds  -- Pulmonary: Normal respiratory effort, breath sounds clear to auscultation  -- Abdominal: Soft, no tenderness. Normal bowel sounds. Normal liver edge  -- Musculoskeletal: Normal range of motion, no effusions. Joints stable   -- Neurological: No focal deficits. Showed good interaction with staff  -- Vascular: Posterior tibial, dorsalis pedis and radial pulses 2+ bilaterally      Emergency Room Course     Lab Values  --    -- K 4.3   --    -- CO2 24   -- BUN 19   -- CREATININE 1.1   --  (H)   -- ALKPHOS 104   -- AST 16   -- ALT 16   -- BILITOT 0.4   -- ALBUMIN 3.6   -- PROT 7.6   -- WBC 13.66 (H)   -- HGB 14.8   -- HCT 45.0   --    -- CPK 52   -- CPK 52   -- CPKMB 0.5   -- TROPONINI <0.006   -- INR 1.0   -- BNP <10     EKG  -- The EKG findings today were without concerning findings from baseline    Radiology  -- CT of the neck showed no foreign body or complication  -- Chest x-ray showed no infiltrate and showed no acute pathology     Medications Given  --  mg Solumedrol given today in the ER    ED Management  -- Maru ECKERT  Afsaneh is a 67 y.o. female presents with a sore throat ×3 days  -- This occurred after swallowing pills last Thursday, residual soreness after  -- Normal CT of the neck, no retained pill fragments noted on evaluation today  -- Discussed with her MD Jas, liquid pain medicine and antibiotics with Carafate  -- Patient to follow up in clinic tomorrow for further evaluation of this throat pain    Diagnosis  -- The primary encounter diagnosis was Sorethroat.   -- A diagnosis of Dysphagia was also pertinent to this visit.    Disposition and Plan  -- Disposition: home  -- Condition: stable  -- Follow-up: Patient to follow up with internal medicine clinic tomorrow  -- I advised the patient that we have found no life threatening condition today  -- At this time, I believe the patient is clinically stable for discharge.   -- The patient acknowledges that close follow up with a MD is required   -- Patient agrees to comply with all instruction and direction given in the ER    This note is dictated on Dragon Natural Speaking word recognition program.  There are word recognition mistakes that are occasionally missed on review.           Jeffrey Garcia MD  08/06/17 1038

## 2017-08-07 ENCOUNTER — OFFICE VISIT (OUTPATIENT)
Dept: INTERNAL MEDICINE | Facility: CLINIC | Age: 67
End: 2017-08-07
Payer: MEDICARE

## 2017-08-07 VITALS
HEIGHT: 67 IN | DIASTOLIC BLOOD PRESSURE: 58 MMHG | HEART RATE: 96 BPM | BODY MASS INDEX: 34.5 KG/M2 | RESPIRATION RATE: 18 BRPM | SYSTOLIC BLOOD PRESSURE: 124 MMHG | WEIGHT: 219.81 LBS

## 2017-08-07 DIAGNOSIS — S03.00XD TMJ (DISLOCATION OF TEMPOROMANDIBULAR JOINT), SUBSEQUENT ENCOUNTER: ICD-10-CM

## 2017-08-07 DIAGNOSIS — R13.10 DYSPHAGIA, UNSPECIFIED TYPE: ICD-10-CM

## 2017-08-07 DIAGNOSIS — Z09 FOLLOW UP: Primary | ICD-10-CM

## 2017-08-07 DIAGNOSIS — R07.0 THROAT PAIN: ICD-10-CM

## 2017-08-07 DIAGNOSIS — Z09 HOSPITAL DISCHARGE FOLLOW-UP: ICD-10-CM

## 2017-08-07 DIAGNOSIS — M05.79 RHEUMATOID ARTHRITIS INVOLVING MULTIPLE SITES WITH POSITIVE RHEUMATOID FACTOR: Chronic | ICD-10-CM

## 2017-08-07 PROCEDURE — 99999 PR PBB SHADOW E&M-EST. PATIENT-LVL IV: CPT | Mod: PBBFAC,,, | Performed by: NURSE PRACTITIONER

## 2017-08-07 PROCEDURE — 99214 OFFICE O/P EST MOD 30 MIN: CPT | Mod: PBBFAC | Performed by: NURSE PRACTITIONER

## 2017-08-07 PROCEDURE — 99999 PR STA SHADOW: CPT | Mod: PBBFAC,,, | Performed by: NURSE PRACTITIONER

## 2017-08-07 PROCEDURE — 99214 OFFICE O/P EST MOD 30 MIN: CPT | Mod: S$PBB | Performed by: NURSE PRACTITIONER

## 2017-08-07 NOTE — PROGRESS NOTES
Subjective:       Patient ID: Maru Shelby is a 67 y.o. female.    Chief Complaint: ER Follow up (sore throat)    HPI: pt known to me presents for follow up on sore throat. Reports she swallowed 3 amitryptyline 3 nights ago and they got stuck in her throat. Reports the next am waking up with a terrible sore throat. Reports gradually getting better.     Also reports having to go to ER yesterday for severe arthritis. Give cortisone shot and got immediate relief. Reports her sore throat is getting better with carafate. Admits to having some issues with swallowing, but nothing like this.     Review of Systems   Constitutional: Negative for chills, diaphoresis, fatigue and fever.   HENT: Positive for sore throat and trouble swallowing. Negative for congestion, rhinorrhea, sinus pressure and sneezing.         Jaw pain   Respiratory: Negative for cough, shortness of breath and wheezing.    Cardiovascular: Negative for chest pain.   Gastrointestinal: Negative for abdominal distention, abdominal pain, constipation, diarrhea, nausea and vomiting.   Musculoskeletal: Positive for arthralgias (with limited rom to mult joints). Negative for back pain and myalgias.   Neurological: Negative for headaches.   Psychiatric/Behavioral: Negative for sleep disturbance.       Objective:      Physical Exam   Constitutional: She is oriented to person, place, and time. She appears well-developed and well-nourished.   HENT:   Head: Normocephalic and atraumatic.   Right Ear: External ear normal.   Left Ear: External ear normal.   Nose: Nose normal.   Mouth/Throat: Oropharynx is clear and moist. No oropharyngeal exudate.   Partial view of oropharynx; having a hard time opening mouth wide secondary to TMJ s/s   Cardiovascular: Normal rate, regular rhythm and normal heart sounds.    Pulmonary/Chest: Effort normal and breath sounds normal.   Abdominal: Soft. Bowel sounds are normal. There is no tenderness.   Musculoskeletal: She exhibits no  edema.   Neurological: She is alert and oriented to person, place, and time.   Skin: Skin is warm and dry.   Psychiatric: She has a normal mood and affect. Her behavior is normal. Judgment and thought content normal.   Nursing note and vitals reviewed.      Assessment:       1. Follow up    2. Hospital discharge follow-up    3. Dysphagia, unspecified type    4. TMJ (dislocation of temporomandibular joint), subsequent encounter    5. Rheumatoid arthritis involving multiple sites with positive rheumatoid factor    6. Throat pain        Plan:   1. Follow up      2. Hospital discharge follow-up      3. Dysphagia, unspecified type  Advised she may be starting with a stricture, I would suggest f/up with GI.   - Ambulatory referral to Gastroenterology    4. TMJ (dislocation of temporomandibular joint), subsequent encounter  Getting better. Advised to contact rheumatology to see what else she can take.     5. Rheumatoid arthritis involving multiple sites with positive rheumatoid factor  See above, overall getting some relief s/p steroid.     6. Throat pain  Better, no need for a/b.

## 2017-08-14 ENCOUNTER — INFUSION (OUTPATIENT)
Dept: INFUSION THERAPY | Facility: HOSPITAL | Age: 67
End: 2017-08-14
Attending: INTERNAL MEDICINE
Payer: MEDICARE

## 2017-08-14 VITALS
HEIGHT: 64 IN | SYSTOLIC BLOOD PRESSURE: 99 MMHG | BODY MASS INDEX: 38.07 KG/M2 | RESPIRATION RATE: 18 BRPM | TEMPERATURE: 97 F | WEIGHT: 223 LBS | DIASTOLIC BLOOD PRESSURE: 63 MMHG | HEART RATE: 86 BPM

## 2017-08-14 DIAGNOSIS — M05.79 RHEUMATOID ARTHRITIS INVOLVING MULTIPLE SITES WITH POSITIVE RHEUMATOID FACTOR: Primary | ICD-10-CM

## 2017-08-14 PROCEDURE — 96374 THER/PROPH/DIAG INJ IV PUSH: CPT

## 2017-08-14 PROCEDURE — 63600175 PHARM REV CODE 636 W HCPCS: Performed by: INTERNAL MEDICINE

## 2017-08-14 PROCEDURE — 25000003 PHARM REV CODE 250: Performed by: INTERNAL MEDICINE

## 2017-08-14 RX ORDER — SODIUM CHLORIDE 0.9 % (FLUSH) 0.9 %
10 SYRINGE (ML) INJECTION
Status: CANCELLED | OUTPATIENT
Start: 2017-08-14

## 2017-08-14 RX ORDER — ACETAMINOPHEN 325 MG/1
650 TABLET ORAL
Status: CANCELLED | OUTPATIENT
Start: 2017-08-14

## 2017-08-14 RX ORDER — HEPARIN 100 UNIT/ML
500 SYRINGE INTRAVENOUS
Status: CANCELLED | OUTPATIENT
Start: 2017-08-14

## 2017-08-14 RX ADMIN — SODIUM CHLORIDE 1000 MG: 9 INJECTION, SOLUTION INTRAVENOUS at 08:08

## 2017-09-07 ENCOUNTER — TELEPHONE (OUTPATIENT)
Dept: RHEUMATOLOGY | Facility: CLINIC | Age: 67
End: 2017-09-07

## 2017-09-07 ENCOUNTER — HOSPITAL ENCOUNTER (OUTPATIENT)
Dept: RADIOLOGY | Facility: HOSPITAL | Age: 67
Discharge: HOME | End: 2017-09-07
Attending: INTERNAL MEDICINE
Payer: MEDICARE

## 2017-09-07 DIAGNOSIS — Z78.0 MENOPAUSE: ICD-10-CM

## 2017-09-07 PROCEDURE — 77080 DXA BONE DENSITY AXIAL: CPT | Mod: TC

## 2017-09-07 PROCEDURE — 77080 DXA BONE DENSITY AXIAL: CPT | Mod: 26,,, | Performed by: RADIOLOGY

## 2017-09-07 NOTE — TELEPHONE ENCOUNTER
----- Message from Timi Royal MD sent at 9/7/2017  2:36 PM CDT -----  Please tell pt the bone density shows osteopenia but not low enough to require continued alendronate(Fosamax) She can finish whatever Fosamax she has left and then can stop and not refill. Will continue vitamin D2 50,000 units twice a week, and repeat bone density in 2 years. DELMY

## 2017-09-08 ENCOUNTER — LAB VISIT (OUTPATIENT)
Dept: LAB | Facility: HOSPITAL | Age: 67
End: 2017-09-08
Attending: INTERNAL MEDICINE
Payer: MEDICARE

## 2017-09-08 DIAGNOSIS — M06.9 RHEUMATOID ARTHRITIS OF HAND, UNSPECIFIED LATERALITY, UNSPECIFIED RHEUMATOID FACTOR PRESENCE: ICD-10-CM

## 2017-09-08 DIAGNOSIS — E78.00 PURE HYPERCHOLESTEROLEMIA: ICD-10-CM

## 2017-09-08 LAB
ALBUMIN SERPL BCP-MCNC: 3.5 G/DL
ALP SERPL-CCNC: 103 U/L
ALT SERPL W/O P-5'-P-CCNC: 20 U/L
ANION GAP SERPL CALC-SCNC: 7 MMOL/L
AST SERPL-CCNC: 22 U/L
BASOPHILS # BLD AUTO: 0.05 K/UL
BASOPHILS NFR BLD: 0.5 %
BILIRUB SERPL-MCNC: 0.5 MG/DL
BUN SERPL-MCNC: 13 MG/DL
CALCIUM SERPL-MCNC: 9.3 MG/DL
CHLORIDE SERPL-SCNC: 107 MMOL/L
CHOLEST SERPL-MCNC: 160 MG/DL
CHOLEST/HDLC SERPL: 3.6 {RATIO}
CO2 SERPL-SCNC: 28 MMOL/L
CREAT SERPL-MCNC: 0.8 MG/DL
CRP SERPL-MCNC: 15.2 MG/L
DIFFERENTIAL METHOD: ABNORMAL
EOSINOPHIL # BLD AUTO: 0.5 K/UL
EOSINOPHIL NFR BLD: 5.2 %
ERYTHROCYTE [DISTWIDTH] IN BLOOD BY AUTOMATED COUNT: 14.9 %
ERYTHROCYTE [SEDIMENTATION RATE] IN BLOOD BY WESTERGREN METHOD: 22 MM/HR
EST. GFR  (AFRICAN AMERICAN): >60 ML/MIN/1.73 M^2
EST. GFR  (NON AFRICAN AMERICAN): >60 ML/MIN/1.73 M^2
GLUCOSE SERPL-MCNC: 115 MG/DL
HCT VFR BLD AUTO: 44.6 %
HDLC SERPL-MCNC: 45 MG/DL
HDLC SERPL: 28.1 %
HGB BLD-MCNC: 14.7 G/DL
LDLC SERPL CALC-MCNC: 79.2 MG/DL
LYMPHOCYTES # BLD AUTO: 3.6 K/UL
LYMPHOCYTES NFR BLD: 36.6 %
MCH RBC QN AUTO: 30.2 PG
MCHC RBC AUTO-ENTMCNC: 33 G/DL
MCV RBC AUTO: 92 FL
MONOCYTES # BLD AUTO: 0.7 K/UL
MONOCYTES NFR BLD: 6.9 %
NEUTROPHILS # BLD AUTO: 5 K/UL
NEUTROPHILS NFR BLD: 50.8 %
NONHDLC SERPL-MCNC: 115 MG/DL
PLATELET # BLD AUTO: 257 K/UL
PMV BLD AUTO: 9.5 FL
POTASSIUM SERPL-SCNC: 4.9 MMOL/L
PROT SERPL-MCNC: 7.2 G/DL
RBC # BLD AUTO: 4.86 M/UL
SODIUM SERPL-SCNC: 142 MMOL/L
TRIGL SERPL-MCNC: 179 MG/DL
WBC # BLD AUTO: 9.79 K/UL

## 2017-09-08 PROCEDURE — 80061 LIPID PANEL: CPT

## 2017-09-08 PROCEDURE — 36415 COLL VENOUS BLD VENIPUNCTURE: CPT

## 2017-09-08 PROCEDURE — 86140 C-REACTIVE PROTEIN: CPT

## 2017-09-08 PROCEDURE — 80053 COMPREHEN METABOLIC PANEL: CPT

## 2017-09-08 PROCEDURE — 85025 COMPLETE CBC W/AUTO DIFF WBC: CPT

## 2017-09-08 PROCEDURE — 85651 RBC SED RATE NONAUTOMATED: CPT

## 2017-09-12 ENCOUNTER — OFFICE VISIT (OUTPATIENT)
Dept: RHEUMATOLOGY | Facility: CLINIC | Age: 67
End: 2017-09-12
Payer: MEDICARE

## 2017-09-12 VITALS
DIASTOLIC BLOOD PRESSURE: 79 MMHG | HEART RATE: 99 BPM | SYSTOLIC BLOOD PRESSURE: 132 MMHG | HEIGHT: 64 IN | BODY MASS INDEX: 38.7 KG/M2 | WEIGHT: 226.69 LBS

## 2017-09-12 DIAGNOSIS — M05.771 RHEUMATOID ARTHRITIS INVOLVING BOTH ANKLES WITH POSITIVE RHEUMATOID FACTOR: ICD-10-CM

## 2017-09-12 DIAGNOSIS — E55.9 VITAMIN D DEFICIENCY: ICD-10-CM

## 2017-09-12 DIAGNOSIS — E78.5 HYPERLIPIDEMIA, UNSPECIFIED HYPERLIPIDEMIA TYPE: ICD-10-CM

## 2017-09-12 DIAGNOSIS — M19.042 PRIMARY OSTEOARTHRITIS OF BOTH HANDS: ICD-10-CM

## 2017-09-12 DIAGNOSIS — M05.772 RHEUMATOID ARTHRITIS INVOLVING BOTH ANKLES WITH POSITIVE RHEUMATOID FACTOR: ICD-10-CM

## 2017-09-12 DIAGNOSIS — I10 BENIGN ESSENTIAL HTN: ICD-10-CM

## 2017-09-12 DIAGNOSIS — M25.551 PAIN OF BOTH HIP JOINTS: Primary | ICD-10-CM

## 2017-09-12 DIAGNOSIS — E66.9 NON MORBID OBESITY, UNSPECIFIED OBESITY TYPE: ICD-10-CM

## 2017-09-12 DIAGNOSIS — M18.0 PRIMARY OSTEOARTHRITIS OF BOTH FIRST CARPOMETACARPAL JOINTS: ICD-10-CM

## 2017-09-12 DIAGNOSIS — M19.041 PRIMARY OSTEOARTHRITIS OF BOTH HANDS: ICD-10-CM

## 2017-09-12 DIAGNOSIS — M25.552 PAIN OF BOTH HIP JOINTS: Primary | ICD-10-CM

## 2017-09-12 DIAGNOSIS — M17.12 PRIMARY OSTEOARTHRITIS OF LEFT KNEE: ICD-10-CM

## 2017-09-12 PROCEDURE — 3075F SYST BP GE 130 - 139MM HG: CPT | Mod: ,,, | Performed by: INTERNAL MEDICINE

## 2017-09-12 PROCEDURE — 99999 PR PBB SHADOW E&M-EST. PATIENT-LVL III: CPT | Mod: PBBFAC,,, | Performed by: INTERNAL MEDICINE

## 2017-09-12 PROCEDURE — 1159F MED LIST DOCD IN RCRD: CPT | Mod: ,,, | Performed by: INTERNAL MEDICINE

## 2017-09-12 PROCEDURE — 99213 OFFICE O/P EST LOW 20 MIN: CPT | Mod: PBBFAC | Performed by: INTERNAL MEDICINE

## 2017-09-12 PROCEDURE — 99214 OFFICE O/P EST MOD 30 MIN: CPT | Mod: S$PBB,,, | Performed by: INTERNAL MEDICINE

## 2017-09-12 PROCEDURE — 1125F AMNT PAIN NOTED PAIN PRSNT: CPT | Mod: ,,, | Performed by: INTERNAL MEDICINE

## 2017-09-12 PROCEDURE — 3078F DIAST BP <80 MM HG: CPT | Mod: ,,, | Performed by: INTERNAL MEDICINE

## 2017-09-12 RX ORDER — TRAMADOL HYDROCHLORIDE 50 MG/1
50 TABLET ORAL DAILY PRN
Qty: 7 TABLET | Refills: 0 | Status: SHIPPED | OUTPATIENT
Start: 2017-09-12 | End: 2017-09-22

## 2017-09-12 RX ORDER — METHOTREXATE 25 MG/ML
INJECTION, SOLUTION INTRA-ARTERIAL; INTRAMUSCULAR; INTRAVENOUS
Qty: 10 ML | Refills: 0 | Status: SHIPPED | OUTPATIENT
Start: 2017-09-12 | End: 2017-11-28 | Stop reason: SDUPTHER

## 2017-09-12 RX ORDER — LEFLUNOMIDE 20 MG/1
20 TABLET ORAL DAILY
Qty: 90 TABLET | Refills: 0 | Status: SHIPPED | OUTPATIENT
Start: 2017-09-12 | End: 2018-01-09 | Stop reason: SDUPTHER

## 2017-09-12 ASSESSMENT — ROUTINE ASSESSMENT OF PATIENT INDEX DATA (RAPID3)
MDHAQ FUNCTION SCORE: .4
FATIGUE SCORE: 4
WHEN YOU AWAKENED IN THE MORNING OVER THE LAST WEEK, PLEASE INDICATE THE AMOUNT OF TIME IT TAKES UNTIL YOU ARE AS LIMBER AS YOU WILL BE FOR THE DAY: 30 MINS
AM STIFFNESS SCORE: 1, YES
PSYCHOLOGICAL DISTRESS SCORE: 3.3
PATIENT GLOBAL ASSESSMENT SCORE: 5
TOTAL RAPID3 SCORE: 3.78
PAIN SCORE: 5

## 2017-09-12 ASSESSMENT — DISEASE ACTIVITY SCORE (DAS28)
ESR_MM_PER_HR: 22
GLOBAL_HEALTH_SCORE: 50
TENDER_JOINTS_COUNT: 0
TOTAL_SCORE_CRP: 2.66
TOTAL_SCORE_ESR: 2.86
SWOLLEN_JOINTS_COUNT: 0
CRP_MG_PER_LITER: 15.2

## 2017-09-12 NOTE — PROGRESS NOTES
"Subjective:       Patient ID: Maru Shelby is a 67 y.o. female.    Chief Complaint: RA :ACPA+, OA    HPI 30 min am stiffness. For 2.5 months, not doing well.Sister passed. Had severe pain for several weeks either groin,now resolved. The left knee pain, now also resolved.  Still trying to get over sister's death. No exercise  Review of Systems   Constitutional: Positive for fatigue. Negative for appetite change, fever and unexpected weight change.   HENT: Negative for mouth sores.    Eyes: Negative for visual disturbance.   Respiratory: Negative for cough, shortness of breath and wheezing.    Cardiovascular: Negative for chest pain and palpitations.   Gastrointestinal: Negative for abdominal pain, anal bleeding, blood in stool, constipation, diarrhea, nausea and vomiting.   Genitourinary: Negative for dysuria, frequency and urgency.   Musculoskeletal: Negative for arthralgias, back pain, gait problem, joint swelling, myalgias, neck pain and neck stiffness.   Skin: Negative for rash.   Neurological: Negative for weakness, numbness and headaches.   Hematological: Negative for adenopathy. Does not bruise/bleed easily.   Psychiatric/Behavioral: Negative for sleep disturbance. The patient is not nervous/anxious.          Objective:   /79   Pulse 99   Ht 5' 3.6" (1.615 m)   Wt 102.8 kg (226 lb 11.2 oz)   BMI 39.40 kg/m²      Physical Exam   Constitutional: She is oriented to person, place, and time and well-developed, well-nourished, and in no distress.   HENT:   Head: Normocephalic and atraumatic.   Mouth/Throat: Oropharynx is clear and moist.   Eyes: Conjunctivae and EOM are normal.   Neck: Normal range of motion. Neck supple. No thyromegaly present.   Cardiovascular: Normal rate, regular rhythm, normal heart sounds and intact distal pulses.  Exam reveals no gallop and no friction rub.    No murmur heard.  Pulmonary/Chest: Breath sounds normal. She has no wheezes. She has no rales. She exhibits no " tenderness.   Abdominal: Soft. She exhibits no distension and no mass. There is no tenderness.   obese       Right Side Rheumatological Exam     Examination finds the shoulder, elbow, wrist, knee, 1st PIP, 1st MCP, 2nd PIP, 2nd MCP, 3rd PIP, 3rd MCP, 4th PIP, 4th MCP, 5th PIP and 5th MCP normal.    Left Side Rheumatological Exam     Examination finds the shoulder, elbow, wrist, knee, 1st PIP, 1st MCP, 2nd PIP, 2nd MCP, 3rd PIP, 3rd MCP, 4th PIP, 4th MCP, 5th PIP and 5th MCP normal.      Lymphadenopathy:     She has no cervical adenopathy.   Neurological: She is alert and oriented to person, place, and time. She displays normal reflexes. Gait normal.   Nl motor strength UE and LE bilateral   Skin: No rash noted. No erythema.     Musculoskeletal: She exhibits no edema.         Results for MECCA DUONG (MRN [id number]) as of 9/12/2017 08:02   Ref. Range 9/8/2017 07:20   WBC Latest Ref Range: 3.90 - 12.70 K/uL 9.79   RBC Latest Ref Range: 4.00 - 5.40 M/uL 4.86   Hemoglobin Latest Ref Range: 12.0 - 16.0 g/dL 14.7   Hematocrit Latest Ref Range: 37.0 - 48.5 % 44.6   MCV Latest Ref Range: 82 - 98 fL 92   MCH Latest Ref Range: 27.0 - 31.0 pg 30.2   MCHC Latest Ref Range: 32.0 - 36.0 g/dL 33.0   RDW Latest Ref Range: 11.5 - 14.5 % 14.9 (H)   Platelets Latest Ref Range: 150 - 350 K/uL 257   MPV Latest Ref Range: 9.2 - 12.9 fL 9.5   Gran% Latest Ref Range: 38.0 - 73.0 % 50.8   Gran # Latest Ref Range: 1.8 - 7.7 K/uL 5.0   Lymph% Latest Ref Range: 18.0 - 48.0 % 36.6   Lymph # Latest Ref Range: 1.0 - 4.8 K/uL 3.6   Mono% Latest Ref Range: 4.0 - 15.0 % 6.9   Mono # Latest Ref Range: 0.3 - 1.0 K/uL 0.7   Eosinophil% Latest Ref Range: 0.0 - 8.0 % 5.2   Eos # Latest Ref Range: 0.0 - 0.5 K/uL 0.5   Basophil% Latest Ref Range: 0.0 - 1.9 % 0.5   Baso # Latest Ref Range: 0.00 - 0.20 K/uL 0.05   Sed Rate Latest Ref Range: 0 - 20 mm/Hr 22 (H)   Sodium Latest Ref Range: 136 - 145 mmol/L 142   Potassium Latest Ref Range: 3.5 - 5.1 mmol/L  4.9   Chloride Latest Ref Range: 95 - 110 mmol/L 107   CO2 Latest Ref Range: 23 - 29 mmol/L 28   Anion Gap Latest Ref Range: 8 - 16 mmol/L 7 (L)   BUN, Bld Latest Ref Range: 8 - 23 mg/dL 13   Creatinine Latest Ref Range: 0.5 - 1.4 mg/dL 0.8   eGFR if non African American Latest Ref Range: >60 mL/min/1.73 m^2 >60   eGFR if  Latest Ref Range: >60 mL/min/1.73 m^2 >60   Glucose Latest Ref Range: 70 - 110 mg/dL 115 (H)   Calcium Latest Ref Range: 8.7 - 10.5 mg/dL 9.3   Alkaline Phosphatase Latest Ref Range: 55 - 135 U/L 103   Total Protein Latest Ref Range: 6.0 - 8.4 g/dL 7.2   Albumin Latest Ref Range: 3.5 - 5.2 g/dL 3.5   Total Bilirubin Latest Ref Range: 0.1 - 1.0 mg/dL 0.5   AST Latest Ref Range: 10 - 40 U/L 22   ALT Latest Ref Range: 10 - 44 U/L 20   CRP Latest Ref Range: 0.0 - 8.2 mg/L 15.2 (H)   Triglycerides Latest Ref Range: 30 - 150 mg/dL 179 (H)   Cholesterol Latest Ref Range: 120 - 199 mg/dL 160   HDL Latest Ref Range: 40 - 75 mg/dL 45   LDL Cholesterol Latest Ref Range: 63.0 - 159.0 mg/dL 79.2   Total Cholesterol/HDL Ratio Latest Ref Range: 2.0 - 5.0  3.6   Differential Method Unknown Automated   HDL/Chol Ratio Latest Ref Range: 20.0 - 50.0 % 28.1   Non-HDL Cholesterol Latest Units: mg/dL 115     Please tell pt the bone density shows osteopenia but not low enough to require continued alendronate(Fosamax) She can finish whatever Fosamax she has left and then can stop and not refill. Will continue vitamin D2 50,000 units twice a week, and repeat bone density in 2 years. RJQ          PACS Images     Show images for DXA Bone Density Spine And Hip   Reviewed By Brooks Royal MD on 9/7/2017 14:36   External Result Report     External Result Report   Narrative     Bone density examination was performed dated 09/07/2017.    Prior study from 09/16/2015.    Bone density examination was performed with measurements obtained of the spine and the femoral necks.    The T score value of the  lumbar spine from L1-L4 is 0.7.  The T score value of the left femoral neck is -1.7.  The T score value of the right femoral neck is -2.0.    Findings are compatible with osteopenia.  The patient is at increased risk of fracture compared to the young adult reference group.   Impression       Osteopenia.    The probability of a major osteoporotic fracture is 13.5% within the next ten years.    The probability of a hip fracture is 2.6% within the next ten years.      Electronically signed by: ASPEN DOWNING MD  Date: 09/07/17  Time: 14:05     Encounter     View Encounter          Signed by     Signed Credentials Date/Time Phone Pager   ASPEN DOWNING MD        Please tell pt the neck x-rays show mild slippage of C4- % on degenerative basis, no RA changes. The hands show degenerative changes in the thumb bases and mild narrowing of the index and middle finger knuckle joints as before likely related to RA. There are mild degenerative changes in knees and great toes. Overall, no substantial change compared with previous x-rays. [facility]          PACS Images     Show images for XR Arthritis Survey   Reviewed By Brooks Crabtree MD on 3/2/2017 13:34   External Result Report     External Result Report   Narrative     Arthritis survey:    Bilateral hands: The bones are slightly osteopenic.  No osseous erosions are identified.  There are moderate degenerative changes of the bilateral carpometacarpal joint spaces, left greater than right.    Cervical spine: There is anterolisthesis of C4 and C5 by approximately 4 mm, stable.  The atlantodental interval is maintained.  There are multilevel degenerative changes consisting of disc space narrowing, endplate sclerosis and facet arthropathy.    Bilateral knees: There is mild bilateral medial compartment joint narrowing with subchondral sclerosis and marginal osteophytosis.  No osseous erosions.    Bilateral feet: There is bilateral hallux valgus deformity with bunion  "formation.  Minimal degenerative changes are noted.  No osseous erosions are detected.   Impression      As above.      Electronically signed by: Claudio Llanes MD  Date: 03/02/17  Time: 09:00     Encounter     View Encounter          Signed by     Signed Credentials Date/Time  Phone Pager   CLAUDIO LLANES MD 3/02/2017 09:00 [phone]    Reviewed By     Mikey Crabtree MD on 3/2/2017 13:34           Assessment:         RA DAS28: 2.86(LDA) LEES-CRP 2.66(LDA)) improved compared with previous visit  Resolved hip pain   Osteopenia now < NOF FRAX therapy threshold  Hypertension,  controlled, with increased amlodipine to 10mg daily  Obesity gained another 3#  Hyperlipidemia with est 10 yr ASCVD risk 10.8% improved with pravastatin 40mg nightly due for recheck in 3 months    Plan:       Cont amlodipine  10mg daily  Start weight reduction discussed in detail with patient and   Daily exercise gym, outdoor bike, with bunions, walking not best exercise discussed in detail pt and , not interested in PT  Cont abatacept 1000mg IV q 4wks. Always hold with suspected, actual, or resolving infection, antibiotics, just like mtx. Re-ordered next dose 3/13/17  Cont methotrexate 25mg sc once a week with folic acid 1mg daily next week  Cont leflunomide 20mg daily  May stop alendronate 35mg once a week and go on "drug holiday"  Cont pravastatin 40mg nightly for hyperlipidemia with elevated ASCVD risk  X-ray hips/pelvis  RTC 3 months with standing labs  "

## 2017-09-19 ENCOUNTER — HOSPITAL ENCOUNTER (OUTPATIENT)
Dept: RADIOLOGY | Facility: HOSPITAL | Age: 67
Discharge: HOME | End: 2017-09-19
Attending: INTERNAL MEDICINE
Payer: MEDICARE

## 2017-09-19 DIAGNOSIS — M25.552 PAIN OF BOTH HIP JOINTS: ICD-10-CM

## 2017-09-19 DIAGNOSIS — M25.551 PAIN OF BOTH HIP JOINTS: ICD-10-CM

## 2017-09-19 PROCEDURE — 73521 X-RAY EXAM HIPS BI 2 VIEWS: CPT | Mod: 26,,, | Performed by: RADIOLOGY

## 2017-09-19 PROCEDURE — 73521 X-RAY EXAM HIPS BI 2 VIEWS: CPT | Mod: TC

## 2017-09-20 ENCOUNTER — TELEPHONE (OUTPATIENT)
Dept: RHEUMATOLOGY | Facility: CLINIC | Age: 67
End: 2017-09-20

## 2017-09-20 NOTE — TELEPHONE ENCOUNTER
----- Message from Timi Royal MD sent at 9/19/2017  2:04 PM CDT -----  Please tell pt the hip x-rays show minimal degenerative changes only. DELMY

## 2017-09-21 ENCOUNTER — HOSPITAL ENCOUNTER (EMERGENCY)
Facility: HOSPITAL | Age: 67
Discharge: HOME | End: 2017-09-21
Attending: SURGERY
Payer: MEDICARE

## 2017-09-21 VITALS
OXYGEN SATURATION: 99 % | TEMPERATURE: 97 F | BODY MASS INDEX: 34.76 KG/M2 | WEIGHT: 200 LBS | SYSTOLIC BLOOD PRESSURE: 132 MMHG | HEART RATE: 74 BPM | DIASTOLIC BLOOD PRESSURE: 68 MMHG | RESPIRATION RATE: 17 BRPM

## 2017-09-21 DIAGNOSIS — M25.562 CHRONIC PAIN OF LEFT KNEE: Primary | ICD-10-CM

## 2017-09-21 DIAGNOSIS — G89.29 CHRONIC PAIN OF LEFT KNEE: Primary | ICD-10-CM

## 2017-09-21 DIAGNOSIS — R52 PAIN: ICD-10-CM

## 2017-09-21 PROCEDURE — 63600175 PHARM REV CODE 636 W HCPCS: Performed by: SURGERY

## 2017-09-21 PROCEDURE — 96372 THER/PROPH/DIAG INJ SC/IM: CPT

## 2017-09-21 PROCEDURE — 29505 APPLICATION LONG LEG SPLINT: CPT | Mod: LT

## 2017-09-21 PROCEDURE — 99283 EMERGENCY DEPT VISIT LOW MDM: CPT | Mod: 25

## 2017-09-21 RX ORDER — KETOROLAC TROMETHAMINE 30 MG/ML
30 INJECTION, SOLUTION INTRAMUSCULAR; INTRAVENOUS
Status: COMPLETED | OUTPATIENT
Start: 2017-09-21 | End: 2017-09-21

## 2017-09-21 RX ADMIN — KETOROLAC TROMETHAMINE 30 MG: 30 INJECTION, SOLUTION INTRAMUSCULAR at 07:09

## 2017-09-22 NOTE — ED TRIAGE NOTES
Patient states she fell 2 days ago and her left knee is swollen and painful. States she can bear wheight bu can't bend it.

## 2017-09-22 NOTE — ED PROVIDER NOTES
[facility]                                     September 21, 2017                   Chief Complaint  67 y.o. female with Knee Pain (left)    History of Present Illness  Maru Shelby presents to the emergency room with left knee pain ×2 days  Patient injured her left knee with the slip and fall yesterday per interview today  Patient on exam his left knee tenderness with a small joint effusion, no instability  Patient has good distal pulses and capillary refill, neurovascular intact on exam now  The pain is entirely anterior with a negative Homans sign today in the emergency room    The history is provided by the patient     Past Medical History    -- Anxiety      -- Arthritis      -- Depression      -- Hyperlipidemia      -- Hypertension      -- Neuromuscular disorder      -- Obesity        Past Surgical History    -- CHOLECYSTECTOMY        -- HAND SURGERY        -- LUMBAR SPINE SURGERY        -- TUBAL LIGATION          No Known Allergies   Social history: , nonsmoker children  Family history: Pancreatitis and cancer    Review of Systems and Physical Exam     Review of Systems  -- Constitution - no fever, denies fatigue, no weakness, no chills  -- Eyes - no tearing or redness, no visual disturbance  -- Ear, Nose - no tinnitus or earache, no nasal congestion or discharge  -- Mouth,Throat - no sore throat, no toothache, normal voice, normal swallowing  -- Respiratory - denies cough and congestion, no shortness of breath, no SPEARS  -- Cardiovascular - denies chest pain, no palpitations, denies claudication  -- Gastrointestinal - denies abdominal pain, nausea, vomiting, or diarrhea  -- Musculoskeletal - left knee pain  -- Neurological - no headache, denies weakness or seizure; no LOC  -- Skin - denies pallor, rash, or changes in skin. no hives or welts noted    Vital Signs  --  Her oral temperature is 97.3 °F (36.3 °C).   -- Her blood pressure is 132/68 and her pulse is 74.   -- Her  respiration is 17 and oxygen saturation is 99%.      Physical Exam  -- Nursing note and vitals reviewed  -- Head: Atraumatic. Normocephalic. No obvious abnormality  -- Eyes: Pupils are equal and reactive to light. Normal conjunctiva and lids  -- Cardiac: Normal rate, regular rhythm and normal heart sounds  -- Pulmonary: Normal respiratory effort, breath sounds clear to auscultation  -- Abdominal: Soft, no tenderness. Normal bowel sounds. Normal liver edge  -- Musculoskeletal: Left knee pain and swelling, no instability noted  -- Neurological: No focal deficits. Showed good interaction with staff  -- Vascular: Posterior tibial, dorsalis pedis and radial pulses 2+ bilaterally      Emergency Room Course     Treatment and Evaluation  -- Preliminary ER x-ray readings showed no evidence of fracture or dislocation  -- All x-rays are reviewed with a final disposition given by the radiologist   -- A knee immobilizer is placed on the affected knee by the CNA  -- Crutches were also given and taught for ambulation    -- IM 30 mg Toradol given today in the ER    Diagnosis  -- The primary encounter diagnosis was Chronic pain of left knee    Disposition and Plan  -- Disposition: home  -- Condition: stable  -- Follow-up: Patient to follow up with Florence Mark MD in 1-2 days.  -- I advised the patient that we have found no life threatening condition today  -- At this time, I believe the patient is clinically stable for discharge.   -- The patient acknowledges that close follow up with a MD is required   -- Patient agrees to comply with all instruction and direction given in the ER    This note is dictated on Dragon Natural Speaking word recognition program.  There are word recognition mistakes that are occasionally missed on review.             Jeffrey Garcia MD  09/21/17 [id number]

## 2017-09-25 ENCOUNTER — INFUSION (OUTPATIENT)
Dept: INFUSION THERAPY | Facility: HOSPITAL | Age: 67
End: 2017-09-25
Attending: INTERNAL MEDICINE
Payer: MEDICARE

## 2017-09-25 VITALS
RESPIRATION RATE: 18 BRPM | HEIGHT: 63 IN | HEART RATE: 83 BPM | WEIGHT: 200 LBS | BODY MASS INDEX: 35.44 KG/M2 | DIASTOLIC BLOOD PRESSURE: 67 MMHG | SYSTOLIC BLOOD PRESSURE: 139 MMHG | TEMPERATURE: 97 F

## 2017-09-25 DIAGNOSIS — M05.79 RHEUMATOID ARTHRITIS INVOLVING MULTIPLE SITES WITH POSITIVE RHEUMATOID FACTOR: Primary | ICD-10-CM

## 2017-09-25 PROCEDURE — 96374 THER/PROPH/DIAG INJ IV PUSH: CPT

## 2017-09-25 PROCEDURE — 63600175 PHARM REV CODE 636 W HCPCS: Performed by: INTERNAL MEDICINE

## 2017-09-25 PROCEDURE — 25000003 PHARM REV CODE 250: Performed by: INTERNAL MEDICINE

## 2017-09-25 RX ORDER — SODIUM CHLORIDE 0.9 % (FLUSH) 0.9 %
10 SYRINGE (ML) INJECTION
Status: CANCELLED | OUTPATIENT
Start: 2017-09-25

## 2017-09-25 RX ORDER — HEPARIN 100 UNIT/ML
500 SYRINGE INTRAVENOUS
Status: CANCELLED | OUTPATIENT
Start: 2017-09-25

## 2017-09-25 RX ORDER — ACETAMINOPHEN 325 MG/1
650 TABLET ORAL
Status: CANCELLED | OUTPATIENT
Start: 2017-09-25

## 2017-09-25 RX ADMIN — SODIUM CHLORIDE 1000 MG: 9 INJECTION, SOLUTION INTRAVENOUS at 08:09

## 2017-09-25 NOTE — PATIENT INSTRUCTIONS
Abatacept solution for injection (subcutaneous or intravenous use)  What is this medicine?  ABATACEPT (a ba TA sept) is used to treat moderate to severe active rheumatoid arthritis in adults. This medicine is also used to treat juvenile idiopathic arthritis.  How should I use this medicine?  This medicine is for infusion into a vein or for injection under the skin. Infusions are given by a health care professional in a hospital or clinic setting. If you are to give your own medicine at home, you will be taught how to prepare and give this medicine under the skin. Use exactly as directed. Take your medicine at regular intervals. Do not take your medicine more often than directed.  It is important that you put your used needles and syringes in a special sharps container. Do not put them in a trash can. If you do not have a sharps container, call your pharmacist or healthcare provider to get one.  Talk to your pediatrician regarding the use of this medicine in children. While infusions in a clinic may be prescribed for children as young as 6 years for selected conditions, precautions do apply.  What side effects may I notice from receiving this medicine?  Side effects that you should report to your doctor or health care professional as soon as possible:  · allergic reactions like skin rash, itching or hives, swelling of the face, lips, or tongue  · breathing problems  · chest pain  · signs of infection - fever or chills, cough, unusual tiredness, pain or trouble passing urine, or warm, red or painful skin  Side effects that usually do not require medical attention (Report these to your doctor or health care professional if they continue or are bothersome.):  · dizziness  · headache  · nausea, vomiting  · sore throat  · stomach upset  What may interact with this medicine?  Do not take this medicine with any of the following  medications:  · adalimumab  · anakinra  · certolizumab  · etanercept  · golimumab  · infliximab  · live virus vaccines  · rituximab  · tocilizumab  This medicine may also interact with the following medications:  · vaccines  What if I miss a dose?  This medicine is used once a week if given by injection under the skin. If you miss a dose, take it as soon as you can. If it is almost time for your next dose, take only that dose. Do not take double or extra doses.  If you are to be given an infusion, it is important not to miss your dose. Doses are usually every 4 weeks. Call your doctor or health care professional if you are unable to keep an appointment.  Where should I keep my medicine?  Infusions will be given in a hospital or clinic and will not be stored at home.  Storage for syringes given under the skin and stored at home:  Keep out of the reach of children. Store in a refrigerator between 2 and 8 degrees C (36 and 46 degrees F). Keep this medicine in the original container. Protect from light. Do not freeze. Throw away any unused medicine after the expiration date.  What should I tell my health care provider before I take this medicine?  They need to know if you have any of these conditions:  · are taking other medicines to treat rheumatoid arthritis  · COPD  · diabetes  · infection or history of infections  · recently received or scheduled to receive a vaccine  · scheduled to have surgery  · tuberculosis, a positive skin test for tuberculosis or have recently been in close contact with someone who has tuberculosis  · viral hepatitis  · an unusual or allergic reaction to abatacept, other medicines, foods, dyes, or preservatives  · pregnant or trying to get pregnant  · breast-feeding  What should I watch for while using this medicine?  Visit your doctor for regular check ups while you are taking this medicine. Tell your doctor or healthcare professional if your symptoms do not start to get better or if they get  worse.  Call your doctor or health care professional if you get a cold or other infection while receiving this medicine. Do not treat yourself. This medicine may decrease your body's ability to fight infection. Try to avoid being around people who are sick.  NOTE:This sheet is a summary. It may not cover all possible information. If you have questions about this medicine, talk to your doctor, pharmacist, or health care provider. Copyright© 2017 [employer]

## 2017-10-06 DIAGNOSIS — G47.00 INSOMNIA, UNSPECIFIED TYPE: ICD-10-CM

## 2017-10-06 RX ORDER — NORTRIPTYLINE HYDROCHLORIDE 25 MG/1
CAPSULE ORAL
Qty: 270 CAPSULE | Refills: 1 | Status: SHIPPED | OUTPATIENT
Start: 2017-10-06 | End: 2018-01-09 | Stop reason: SDUPTHER

## 2017-10-19 ENCOUNTER — TELEPHONE (OUTPATIENT)
Dept: INTERNAL MEDICINE | Facility: CLINIC | Age: 67
End: 2017-10-19

## 2017-10-19 NOTE — TELEPHONE ENCOUNTER
----- Message from Lyndsey Enamorado sent at 10/19/2017 10:15 AM CDT -----  Contact: self  Maru Shelby  MRN: [id number]  : 1950  PCP: Florence Mark  Home Phone      [phone]  Work Phone      Not on file.  Mobile          [phone]      MESSAGE: [facility] told her to call us to get a referral to go to there clinic--------[phone]

## 2017-11-28 DIAGNOSIS — M05.771 RHEUMATOID ARTHRITIS INVOLVING BOTH ANKLES WITH POSITIVE RHEUMATOID FACTOR: ICD-10-CM

## 2017-11-28 DIAGNOSIS — M05.772 RHEUMATOID ARTHRITIS INVOLVING BOTH ANKLES WITH POSITIVE RHEUMATOID FACTOR: ICD-10-CM

## 2017-11-29 RX ORDER — METHOTREXATE 25 MG/ML
INJECTION INTRA-ARTERIAL; INTRAMUSCULAR; INTRATHECAL; INTRAVENOUS
Qty: 10 ML | Refills: 0 | Status: SHIPPED | OUTPATIENT
Start: 2017-11-29 | End: 2018-01-09 | Stop reason: SDUPTHER

## 2017-12-04 ENCOUNTER — INFUSION (OUTPATIENT)
Dept: INFUSION THERAPY | Facility: HOSPITAL | Age: 67
End: 2017-12-04
Attending: INTERNAL MEDICINE
Payer: MEDICARE

## 2017-12-04 VITALS
BODY MASS INDEX: 31.39 KG/M2 | RESPIRATION RATE: 18 BRPM | WEIGHT: 200 LBS | SYSTOLIC BLOOD PRESSURE: 147 MMHG | HEIGHT: 67 IN | DIASTOLIC BLOOD PRESSURE: 73 MMHG | HEART RATE: 91 BPM

## 2017-12-04 DIAGNOSIS — M05.79 RHEUMATOID ARTHRITIS INVOLVING MULTIPLE SITES WITH POSITIVE RHEUMATOID FACTOR: Primary | ICD-10-CM

## 2017-12-04 PROCEDURE — 96365 THER/PROPH/DIAG IV INF INIT: CPT

## 2017-12-04 PROCEDURE — 25000003 PHARM REV CODE 250: Performed by: INTERNAL MEDICINE

## 2017-12-04 PROCEDURE — 63600175 PHARM REV CODE 636 W HCPCS: Performed by: INTERNAL MEDICINE

## 2017-12-04 RX ORDER — SODIUM CHLORIDE 0.9 % (FLUSH) 0.9 %
10 SYRINGE (ML) INJECTION
Status: CANCELLED | OUTPATIENT
Start: 2017-12-04

## 2017-12-04 RX ORDER — ACETAMINOPHEN 325 MG/1
650 TABLET ORAL
Status: CANCELLED | OUTPATIENT
Start: 2017-12-04

## 2017-12-04 RX ORDER — HEPARIN 100 UNIT/ML
500 SYRINGE INTRAVENOUS
Status: CANCELLED | OUTPATIENT
Start: 2017-12-04

## 2017-12-04 RX ADMIN — SODIUM CHLORIDE 1000 MG: 9 INJECTION, SOLUTION INTRAVENOUS at 08:12

## 2017-12-04 NOTE — PLAN OF CARE
Problem: Health Knowledge, Opportunity to Enhance (Adult,NICU,[place],Obstetrics,Pediatric)  Goal: Knowledgeable about Health Subject/Topic  Patient will demonstrate the desired outcomes by discharge/transition of care.  Outcome: Outcome(s) achieved Date Met: 17 0904   Health Knowledge, Opportunity to Enhance (Adult,NICU,[place],Obstetrics,Pediatric)   Knowledgeable about Health Subject/Topic achieves outcome

## 2017-12-04 NOTE — PATIENT INSTRUCTIONS
Abatacept Solution for injection  What is this medicine?  ABATACEPT (a ba TA sept) is used to treat moderate to severe active rheumatoid arthritis in adults. This medicine is also used to treat juvenile idiopathic arthritis.  How should I use this medicine?  This medicine is for infusion into a vein. It is given by a health care professional in a hospital or clinic setting.  Talk to your pediatrician regarding the use of this medicine in children. While this drug may be prescribed for children as young as 6 years for selected conditions, precautions do apply.  What side effects may I notice from receiving this medicine?  Side effects that you should report to your doctor or health care professional as soon as possible:  · allergic reactions like skin rash, itching or hives, swelling of the face, lips, or tongue  · breathing problems  · chest pain  · signs of infection - fever or chills, cough, unusual tiredness, pain or trouble passing urine, or warm, red or painful skin  Side effects that usually do not require medical attention (Report these to your doctor or health care professional if they continue or are bothersome.):  · dizziness  · headache  · nausea, vomiting  · sore throat  · stomach upset  What may interact with this medicine?  Do not take this medicine with any of the following medications:  · adalimumab  · anakinra  · certolizumab  · etanercept  · golimumab  · infliximab  · live virus vaccines  · rituximab  · tocilizumab  This medicine may also interact with the following medications:  · vaccines  What if I miss a dose?  It is important not to miss your dose. Call your doctor or health care professional if you are unable to keep an appointment.  Where should I keep my medicine?  This drug is given in a hospital or clinic and will not be stored at home.  What should I tell my health care provider before I take this medicine?  They need to know if you have any of these conditions:  · are taking other  medicines to treat rheumatoid arthritis  · COPD  · diabetes  · infection or history of infections  · recently received or scheduled to receive a vaccine  · scheduled to have surgery  · tuberculosis, a positive skin test for tuberculosis or have recently been in close contact with someone who has tuberculosis  · viral hepatitis  · an unusual or allergic reaction to abatacept, other medicines, foods, dyes, or preservatives  · pregnant or trying to get pregnant  · breast-feeding  What should I watch for while using this medicine?  Visit your doctor for regular check ups while you are taking this medicine. Tell your doctor or healthcare professional if your symptoms do not start to get better or if they get worse.  Call your doctor or health care professional if you get a cold or other infection while receiving this medicine. Do not treat yourself. This medicine may decrease your body's ability to fight infection. Try to avoid being around people who are sick.  NOTE:This sheet is a summary. It may not cover all possible information. If you have questions about this medicine, talk to your doctor, pharmacist, or health care provider. Copyright© 2017 [employer]        Abatacept Solution for injection  What is this medicine?  ABATACEPT (a ba TA sept) is used to treat moderate to severe active rheumatoid arthritis in adults. This medicine is also used to treat juvenile idiopathic arthritis.  How should I use this medicine?  This medicine is for infusion into a vein. It is given by a health care professional in a hospital or clinic setting.  Talk to your pediatrician regarding the use of this medicine in children. While this drug may be prescribed for children as young as 6 years for selected conditions, precautions do apply.  What side effects may I notice from receiving this medicine?  Side effects that you should report to your doctor or health care professional as soon as possible:  · allergic reactions like skin rash,  itching or hives, swelling of the face, lips, or tongue  · breathing problems  · chest pain  · signs of infection - fever or chills, cough, unusual tiredness, pain or trouble passing urine, or warm, red or painful skin  Side effects that usually do not require medical attention (Report these to your doctor or health care professional if they continue or are bothersome.):  · dizziness  · headache  · nausea, vomiting  · sore throat  · stomach upset  What may interact with this medicine?  Do not take this medicine with any of the following medications:  · adalimumab  · anakinra  · certolizumab  · etanercept  · golimumab  · infliximab  · live virus vaccines  · rituximab  · tocilizumab  This medicine may also interact with the following medications:  · vaccines  What if I miss a dose?  It is important not to miss your dose. Call your doctor or health care professional if you are unable to keep an appointment.  Where should I keep my medicine?  This drug is given in a hospital or clinic and will not be stored at home.  What should I tell my health care provider before I take this medicine?  They need to know if you have any of these conditions:  · are taking other medicines to treat rheumatoid arthritis  · COPD  · diabetes  · infection or history of infections  · recently received or scheduled to receive a vaccine  · scheduled to have surgery  · tuberculosis, a positive skin test for tuberculosis or have recently been in close contact with someone who has tuberculosis  · viral hepatitis  · an unusual or allergic reaction to abatacept, other medicines, foods, dyes, or preservatives  · pregnant or trying to get pregnant  · breast-feeding  What should I watch for while using this medicine?  Visit your doctor for regular check ups while you are taking this medicine. Tell your doctor or healthcare professional if your symptoms do not start to get better or if they get worse.  Call your doctor or health care professional if you  get a cold or other infection while receiving this medicine. Do not treat yourself. This medicine may decrease your body's ability to fight infection. Try to avoid being around people who are sick.  NOTE:This sheet is a summary. It may not cover all possible information. If you have questions about this medicine, talk to your doctor, pharmacist, or health care provider. Copyright© 2017 [employer]

## 2017-12-04 NOTE — PLAN OF CARE
Problem: Patient Care Overview  Goal: Individualization & Mutuality  Outcome: Outcome(s) achieved Date Met: 12/04/17 12/04/17 0902   Individualization   Patient Specific Preferences none   Mutuality/Individual Preferences   What Anxieties, Fears, Concerns, or Questions Do You Have About Your Care? none   Mutuality   What Questions Do You Have About Your Health or Care? none

## 2017-12-19 ENCOUNTER — TELEPHONE (OUTPATIENT)
Dept: INTERNAL MEDICINE | Facility: CLINIC | Age: 67
End: 2017-12-19

## 2017-12-19 NOTE — TELEPHONE ENCOUNTER
----- Message from Sammy Zhao sent at 2017  3:23 PM CST -----  Contact: SELF   Maru Shelby  MRN: [id number]  : 1950  PCP: Florence Mark  Home Phone      [phone]  Work Phone      Not on file.  Mobile          [phone]      MESSAGE: PT STATED THAT LAST YEAR, SHE HAD MAMMOGRAM DONE, AND RESULTS FROM THAT LEAD HER TO HAVE ADDITIONAL ULTRASOUND AND BIOPSY. SHE WAS SUPPOSED TO SCHEDULE TO HAVE ANOTHER ULTRASOUND DONE IN 6 MONTHS BUT WAS WITHOUT INSURANCE FOR A WHILE. SHE IS NOW COVERED BY [facility] AND MEDICARE AND NEEDS TO RESCHEDULE THESE APPTS. PLEASE CALL     PHONE: [phone]

## 2017-12-19 NOTE — TELEPHONE ENCOUNTER
I advised this patient to follow-up with Dr. Roth because he ordered the breast imaging that she had done last year. This patient was seeing Hafsa Escobar NP @ this clinic, but she no longer works here so patient will need to establish care with another provider.

## 2017-12-20 ENCOUNTER — LAB VISIT (OUTPATIENT)
Dept: LAB | Facility: HOSPITAL | Age: 67
End: 2017-12-20
Attending: INTERNAL MEDICINE
Payer: MEDICARE

## 2017-12-20 DIAGNOSIS — M06.9 RHEUMATOID ARTHRITIS OF HAND, UNSPECIFIED LATERALITY, UNSPECIFIED RHEUMATOID FACTOR PRESENCE: ICD-10-CM

## 2017-12-20 LAB
ALBUMIN SERPL BCP-MCNC: 3.5 G/DL
ALP SERPL-CCNC: 120 U/L
ALT SERPL W/O P-5'-P-CCNC: 22 U/L
ANION GAP SERPL CALC-SCNC: 8 MMOL/L
AST SERPL-CCNC: 21 U/L
BASOPHILS # BLD AUTO: 0.06 K/UL
BASOPHILS NFR BLD: 0.6 %
BILIRUB SERPL-MCNC: 0.4 MG/DL
BUN SERPL-MCNC: 11 MG/DL
CALCIUM SERPL-MCNC: 9.1 MG/DL
CHLORIDE SERPL-SCNC: 107 MMOL/L
CO2 SERPL-SCNC: 28 MMOL/L
CREAT SERPL-MCNC: 0.8 MG/DL
CRP SERPL-MCNC: 11.7 MG/L
DIFFERENTIAL METHOD: ABNORMAL
EOSINOPHIL # BLD AUTO: 1.3 K/UL
EOSINOPHIL NFR BLD: 13 %
ERYTHROCYTE [DISTWIDTH] IN BLOOD BY AUTOMATED COUNT: 15.2 %
ERYTHROCYTE [SEDIMENTATION RATE] IN BLOOD BY WESTERGREN METHOD: 33 MM/HR
EST. GFR  (AFRICAN AMERICAN): >60 ML/MIN/1.73 M^2
EST. GFR  (NON AFRICAN AMERICAN): >60 ML/MIN/1.73 M^2
GLUCOSE SERPL-MCNC: 128 MG/DL
HCT VFR BLD AUTO: 43 %
HGB BLD-MCNC: 14.1 G/DL
LYMPHOCYTES # BLD AUTO: 3.7 K/UL
LYMPHOCYTES NFR BLD: 37.5 %
MCH RBC QN AUTO: 30.1 PG
MCHC RBC AUTO-ENTMCNC: 32.8 G/DL
MCV RBC AUTO: 92 FL
MONOCYTES # BLD AUTO: 0.9 K/UL
MONOCYTES NFR BLD: 9 %
NEUTROPHILS # BLD AUTO: 3.9 K/UL
NEUTROPHILS NFR BLD: 39.9 %
PLATELET # BLD AUTO: 273 K/UL
PMV BLD AUTO: 9.3 FL
POTASSIUM SERPL-SCNC: 4.6 MMOL/L
PROT SERPL-MCNC: 6.8 G/DL
RBC # BLD AUTO: 4.69 M/UL
SODIUM SERPL-SCNC: 143 MMOL/L
WBC # BLD AUTO: 9.82 K/UL

## 2017-12-20 PROCEDURE — 85651 RBC SED RATE NONAUTOMATED: CPT

## 2017-12-20 PROCEDURE — 36415 COLL VENOUS BLD VENIPUNCTURE: CPT

## 2017-12-20 PROCEDURE — 80053 COMPREHEN METABOLIC PANEL: CPT

## 2017-12-20 PROCEDURE — 85025 COMPLETE CBC W/AUTO DIFF WBC: CPT

## 2017-12-20 PROCEDURE — 86140 C-REACTIVE PROTEIN: CPT

## 2018-01-08 ENCOUNTER — INFUSION (OUTPATIENT)
Dept: INFUSION THERAPY | Facility: HOSPITAL | Age: 68
End: 2018-01-08
Attending: INTERNAL MEDICINE
Payer: MEDICARE

## 2018-01-08 VITALS
SYSTOLIC BLOOD PRESSURE: 139 MMHG | DIASTOLIC BLOOD PRESSURE: 75 MMHG | HEIGHT: 67 IN | RESPIRATION RATE: 18 BRPM | BODY MASS INDEX: 31.39 KG/M2 | WEIGHT: 200 LBS | TEMPERATURE: 96 F | HEART RATE: 88 BPM

## 2018-01-08 DIAGNOSIS — M05.79 RHEUMATOID ARTHRITIS INVOLVING MULTIPLE SITES WITH POSITIVE RHEUMATOID FACTOR: Primary | ICD-10-CM

## 2018-01-08 PROCEDURE — 63600175 PHARM REV CODE 636 W HCPCS: Performed by: INTERNAL MEDICINE

## 2018-01-08 PROCEDURE — 96365 THER/PROPH/DIAG IV INF INIT: CPT

## 2018-01-08 PROCEDURE — 25000003 PHARM REV CODE 250: Performed by: INTERNAL MEDICINE

## 2018-01-08 RX ORDER — HEPARIN 100 UNIT/ML
500 SYRINGE INTRAVENOUS
Status: CANCELLED | OUTPATIENT
Start: 2018-01-08

## 2018-01-08 RX ORDER — SODIUM CHLORIDE 0.9 % (FLUSH) 0.9 %
10 SYRINGE (ML) INJECTION
Status: CANCELLED | OUTPATIENT
Start: 2018-01-08

## 2018-01-08 RX ORDER — ACETAMINOPHEN 325 MG/1
650 TABLET ORAL
Status: CANCELLED | OUTPATIENT
Start: 2018-01-08

## 2018-01-08 RX ADMIN — SODIUM CHLORIDE 1000 MG: 900 INJECTION, SOLUTION INTRAVENOUS at 08:01

## 2018-01-08 NOTE — PATIENT INSTRUCTIONS
Abatacept Solution ([place])  What is this medicine?  ABATACEPT (a ba TA sept) is used to treat moderate to severe active rheumatoid arthritis in adults. This medicine is also used to treat juvenile idiopathic arthritis.  How should I use this medicine?  This medicine is for infusion into a vein. It is given by a health care professional in a hospital or clinic setting.  Talk to your pediatrician regarding the use of this medicine in children. While this drug may be prescribed for children as young as 6 years for selected conditions, precautions do apply.  What side effects may I notice from receiving this medicine?  Side effects that you should report to your doctor or health care professional as soon as possible:  · allergic reactions like skin rash, itching or hives, swelling of the face, lips, or tongue  · breathing problems  · chest pain  · signs of infection - fever or chills, cough, unusual tiredness, pain or trouble passing urine, or warm, red or painful skin  Side effects that usually do not require medical attention (Report these to your doctor or health care professional if they continue or are bothersome.):  · dizziness  · headache  · nausea, vomiting  · sore throat  · stomach upset  What may interact with this medicine?  Do not take this medicine with any of the following medications:  · adalimumab  · anakinra  · certolizumab  · etanercept  · golimumab  · infliximab  · live virus vaccines  · rituximab  · tocilizumab  This medicine may also interact with the following medications:  · vaccines  What if I miss a dose?  It is important not to miss your dose. Call your doctor or health care professional if you are unable to keep an appointment.  Where should I keep my medicine?  This drug is given in a hospital or clinic and will not be stored at home.  What should I tell my health care provider before I take this medicine?  They need to know if you have any of these conditions:  · are taking other  medicines to treat rheumatoid arthritis  · COPD  · diabetes  · infection or history of infections  · recently received or scheduled to receive a vaccine  · scheduled to have surgery  · tuberculosis, a positive skin test for tuberculosis or have recently been in close contact with someone who has tuberculosis  · viral hepatitis  · an unusual or allergic reaction to abatacept, other medicines, foods, dyes, or preservatives  · pregnant or trying to get pregnant  · breast-feeding  What should I watch for while using this medicine?  Visit your doctor for regular check ups while you are taking this medicine. Tell your doctor or healthcare professional if your symptoms do not start to get better or if they get worse.  Call your doctor or health care professional if you get a cold or other infection while receiving this medicine. Do not treat yourself. This medicine may decrease your body's ability to fight infection. Try to avoid being around people who are sick.  NOTE:This sheet is a summary. It may not cover all possible information. If you have questions about this medicine, talk to your doctor, pharmacist, or health care provider. Copyright© 2017 [employer]

## 2018-01-09 ENCOUNTER — OFFICE VISIT (OUTPATIENT)
Dept: RHEUMATOLOGY | Facility: CLINIC | Age: 68
End: 2018-01-09
Payer: MEDICARE

## 2018-01-09 VITALS
HEART RATE: 104 BPM | BODY MASS INDEX: 35.63 KG/M2 | HEIGHT: 67 IN | WEIGHT: 227 LBS | SYSTOLIC BLOOD PRESSURE: 153 MMHG | DIASTOLIC BLOOD PRESSURE: 93 MMHG

## 2018-01-09 DIAGNOSIS — M05.772 RHEUMATOID ARTHRITIS INVOLVING BOTH ANKLES WITH POSITIVE RHEUMATOID FACTOR: ICD-10-CM

## 2018-01-09 DIAGNOSIS — M06.9 RHEUMATOID ARTHRITIS, INVOLVING UNSPECIFIED SITE, UNSPECIFIED RHEUMATOID FACTOR PRESENCE: Chronic | ICD-10-CM

## 2018-01-09 DIAGNOSIS — M17.12 PRIMARY OSTEOARTHRITIS OF LEFT KNEE: ICD-10-CM

## 2018-01-09 DIAGNOSIS — M05.771 RHEUMATOID ARTHRITIS INVOLVING BOTH ANKLES WITH POSITIVE RHEUMATOID FACTOR: ICD-10-CM

## 2018-01-09 DIAGNOSIS — M17.12 LOCALIZED OSTEOARTHRITIS OF LEFT KNEE: Primary | ICD-10-CM

## 2018-01-09 DIAGNOSIS — I10 ESSENTIAL HYPERTENSION: ICD-10-CM

## 2018-01-09 DIAGNOSIS — E66.01 CLASS 2 SEVERE OBESITY DUE TO EXCESS CALORIES WITH SERIOUS COMORBIDITY AND BODY MASS INDEX (BMI) OF 35.0 TO 35.9 IN ADULT: ICD-10-CM

## 2018-01-09 DIAGNOSIS — M85.80 OSTEOPENIA, UNSPECIFIED LOCATION: ICD-10-CM

## 2018-01-09 DIAGNOSIS — E78.49 OTHER HYPERLIPIDEMIA: ICD-10-CM

## 2018-01-09 DIAGNOSIS — I10 BENIGN ESSENTIAL HTN: ICD-10-CM

## 2018-01-09 DIAGNOSIS — Z79.631 LONG TERM METHOTREXATE USER: ICD-10-CM

## 2018-01-09 DIAGNOSIS — G47.00 INSOMNIA, UNSPECIFIED TYPE: ICD-10-CM

## 2018-01-09 PROCEDURE — 99214 OFFICE O/P EST MOD 30 MIN: CPT | Mod: PBBFAC | Performed by: INTERNAL MEDICINE

## 2018-01-09 PROCEDURE — 99214 OFFICE O/P EST MOD 30 MIN: CPT | Mod: S$PBB,,, | Performed by: INTERNAL MEDICINE

## 2018-01-09 PROCEDURE — 99999 PR PBB SHADOW E&M-EST. PATIENT-LVL IV: CPT | Mod: PBBFAC,,, | Performed by: INTERNAL MEDICINE

## 2018-01-09 RX ORDER — AMLODIPINE BESYLATE 10 MG/1
10 TABLET ORAL DAILY
Qty: 90 TABLET | Refills: 3 | Status: SHIPPED | OUTPATIENT
Start: 2018-01-09 | End: 2018-06-14 | Stop reason: SDUPTHER

## 2018-01-09 RX ORDER — LEFLUNOMIDE 20 MG/1
20 TABLET ORAL DAILY
Qty: 90 TABLET | Refills: 0 | Status: SHIPPED | OUTPATIENT
Start: 2018-01-09 | End: 2018-03-05 | Stop reason: SDUPTHER

## 2018-01-09 RX ORDER — NORTRIPTYLINE HYDROCHLORIDE 25 MG/1
CAPSULE ORAL
Qty: 270 CAPSULE | Refills: 1 | Status: SHIPPED | OUTPATIENT
Start: 2018-01-09 | End: 2019-01-14 | Stop reason: SDUPTHER

## 2018-01-09 RX ORDER — FOLIC ACID 1 MG/1
1 TABLET ORAL DAILY
Qty: 90 TABLET | Refills: 3 | Status: SHIPPED | OUTPATIENT
Start: 2018-01-09 | End: 2019-01-14 | Stop reason: SDUPTHER

## 2018-01-09 RX ORDER — METHOTREXATE 25 MG/ML
INJECTION INTRA-ARTERIAL; INTRAMUSCULAR; INTRATHECAL; INTRAVENOUS
Qty: 10 ML | Refills: 0 | Status: SHIPPED | OUTPATIENT
Start: 2018-01-09 | End: 2018-10-15

## 2018-01-09 RX ORDER — ERGOCALCIFEROL 1.25 MG/1
50000 CAPSULE ORAL
Qty: 24 CAPSULE | Refills: 3 | Status: SHIPPED | OUTPATIENT
Start: 2018-01-11 | End: 2018-10-15 | Stop reason: SDUPTHER

## 2018-01-09 RX ORDER — PRAVASTATIN SODIUM 40 MG/1
40 TABLET ORAL NIGHTLY
Qty: 90 TABLET | Refills: 3 | Status: SHIPPED | OUTPATIENT
Start: 2018-01-09 | End: 2019-01-14 | Stop reason: SDUPTHER

## 2018-01-09 ASSESSMENT — DISEASE ACTIVITY SCORE (DAS28)
TENDER_JOINTS_COUNT: 0
CRP_MG_PER_LITER: 11.7
TOTAL_SCORE_CRP: 2.57
GLOBAL_HEALTH_SCORE: 30
TOTAL_SCORE_ESR: 3.15
ESR_MM_PER_HR: 33
SWOLLEN_JOINTS_COUNT: 1

## 2018-01-09 ASSESSMENT — ROUTINE ASSESSMENT OF PATIENT INDEX DATA (RAPID3)
PAIN SCORE: 1.5
FATIGUE SCORE: 3
MDHAQ FUNCTION SCORE: .4
AM STIFFNESS SCORE: 1, YES
TOTAL RAPID3 SCORE: 1.94
PSYCHOLOGICAL DISTRESS SCORE: 0
PATIENT GLOBAL ASSESSMENT SCORE: 3
WHEN YOU AWAKENED IN THE MORNING OVER THE LAST WEEK, PLEASE INDICATE THE AMOUNT OF TIME IT TAKES UNTIL YOU ARE AS LIMBER AS YOU WILL BE FOR THE DAY: 30 MINS

## 2018-01-09 NOTE — ASSESSMENT & PLAN NOTE
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

## 2018-01-09 NOTE — ASSESSMENT & PLAN NOTE
Ref to Bariatric Medicine for weight control  Ref to PT, ext for quad/hamstring muscle strengthening, gait training

## 2018-01-09 NOTE — ASSESSMENT & PLAN NOTE
153/93    Cont Amlodipine 10mg daily  See Dr. Mark for further management, adding 2nd agent: diuretic or ACEI

## 2018-01-09 NOTE — PROGRESS NOTES
"Subjective:       Patient ID: Maru Shelby is a 67 y.o. female.    Chief Complaint: RA, ACPA+  HPI Had 2 falls onto left knee, 2 ED visits. X-rays mild OA medial TF, treated symptomatically, both resolved both times in 1.5  Wks, used walker for a time. No fever, antibiotics, infection. + dry skin using cream from Derm given 2 years ago.  Review of Systems   Constitutional: Positive for fatigue. Negative for appetite change, fever and unexpected weight change.   HENT: Negative for mouth sores.    Eyes: Negative for visual disturbance.   Respiratory: Negative for cough, shortness of breath and wheezing.    Cardiovascular: Negative for chest pain and palpitations.   Gastrointestinal: Negative for abdominal pain, anal bleeding, blood in stool, constipation, diarrhea, nausea and vomiting.   Genitourinary: Negative for dysuria, frequency and urgency.   Musculoskeletal: Negative for arthralgias, back pain, gait problem, joint swelling, myalgias, neck pain and neck stiffness.   Skin: Positive for rash.   Neurological: Negative for weakness, numbness and headaches.   Hematological: Negative for adenopathy. Does not bruise/bleed easily.   Psychiatric/Behavioral: Negative for sleep disturbance. The patient is not nervous/anxious.          Objective:   BP (!) 153/93   Pulse 104   Ht 5' 3.6" (1.615 m)   Wt 103 kg (227 lb)   BMI 39.46 kg/m²      Physical Exam   Constitutional: She is oriented to person, place, and time and well-developed, well-nourished, and in no distress.   HENT:   Head: Normocephalic and atraumatic.   Mouth/Throat: Oropharynx is clear and moist.   Eyes: Conjunctivae and EOM are normal.   Neck: Normal range of motion. Neck supple. No thyromegaly present.   Cardiovascular: Normal rate, regular rhythm, normal heart sounds and intact distal pulses.  Exam reveals no gallop and no friction rub.    No murmur heard.  Pulmonary/Chest: Breath sounds normal. She has no wheezes. She has no rales. She exhibits no " tenderness.   Abdominal: Soft. She exhibits no distension and no mass. There is no tenderness.       Right Side Rheumatological Exam     Examination finds the shoulder, elbow, wrist, knee, 1st PIP, 1st MCP, 2nd PIP, 2nd MCP, 3rd PIP, 3rd MCP, 4th PIP, 4th MCP, 5th PIP and 5th MCP normal.    The patient has an enlarged 1st CMC    Left Side Rheumatological Exam     Examination finds the shoulder, elbow, wrist, 1st PIP, 1st MCP, 2nd PIP, 2nd MCP, 3rd PIP, 3rd MCP, 4th PIP, 4th MCP, 5th PIP and 5th MCP normal.    She has swelling of the knee    The patient has an enlarged 1st CMC.      Lymphadenopathy:     She has no cervical adenopathy.   Neurological: She is alert and oriented to person, place, and time. She displays normal reflexes. Gait normal.   Nl motor strength UE and LE bilateral   Skin:     Dry   eczematous patches forearms, dorsal hands, legs   Musculoskeletal: She exhibits no edema.           Assessment/Plan         Problem List Items Addressed This Visit     RA (rheumatoid arthritis) (Chronic)    Overview     Results for MECCA DUONG (MRN [id number]) as of 1/9/2018 07:13   Ref. Range 8/1/2008 07:11 7/6/2009 08:26 8/15/2013 08:25   ds DNA Ab Latest Ref Range: Negative Titer Negative     Protein, Serum Latest Ref Range: 6.0 - 8.4 g/dL   6.4   Albumin grams/dl Latest Ref Range: 3.35 - 5.55 gm/dL   3.88   Alpha-1 grams/dl Latest Ref Range: 0.17 - 0.41 gm/dL   0.29   Alpha-2 grams/dl Latest Ref Range: 0.43 - 0.99 gm/dL   0.71   Beta grams/dl Latest Ref Range: 0.50 - 1.10 gm/dL   0.82   Gamma grams/dl Latest Ref Range: 0.67 - 1.58 gm/dL   0.70   CCP Antibodies Latest Ref Range: <5.0 U/mL  119.9      Please tell pt the neck x-rays show mild slippage of C4- % on degenerative basis, no RA changes. The hands show degenerative changes in the thumb bases and mild narrowing of the index and middle finger knuckle joints as before likely related to RA. There are mild degenerative changes in knees and great toes.  Overall, no substantial change compared with previous x-rays. RJQ          PACS Images     Show images for XR Arthritis Survey   Reviewed By Brooks Crabtree MD on 3/2/2017 13:34   External Result Report     External Result Report   Narrative     Arthritis survey:    Bilateral hands: The bones are slightly osteopenic.  No osseous erosions are identified.  There are moderate degenerative changes of the bilateral carpometacarpal joint spaces, left greater than right.    Cervical spine: There is anterolisthesis of C4 and C5 by approximately 4 mm, stable.  The atlantodental interval is maintained.  There are multilevel degenerative changes consisting of disc space narrowing, endplate sclerosis and facet arthropathy.    Bilateral knees: There is mild bilateral medial compartment joint narrowing with subchondral sclerosis and marginal osteophytosis.  No osseous erosions.    Bilateral feet: There is bilateral hallux valgus deformity with bunion formation.  Minimal degenerative changes are noted.  No osseous erosions are detected.   Impression      As above.      Electronically signed by: Farida Llanes MD  Date: 03/02/17  Time: 09:00                  Current Assessment & Plan     ACPA+ RA: ESR 33 CRP 11.7 global 30    TJ  0       SJ    1       DAS28   3.15(LDA)         VBB62-UZM(2.57(LDA)      Cont methotrexate 25mg sc once a week with folic acid 1mg daily  Cont leflunomide 20mg daily  Cont abatacept 1000mg IV q 28 days  RTC 3months         Obesity    Current Assessment & Plan     Gained another 1#    Ref to Bariatric Medicine         Relevant Orders    Ambulatory Referral to Bariatric Medicine    Benign essential HTN    Current Assessment & Plan     153/93    Cont Amlodipine 10mg daily  See Dr. Mark for further management, adding 2nd agent: diuretic or ACEI         Hyperlipidemia    Overview     Results for MECCA DUONG (MRN [id number]) as of 1/9/2018 07:34   Ref. Range 9/8/2017 07:20   Cholesterol Latest  Ref Range: 120 - 199 mg/dL 160   HDL Latest Ref Range: 40 - 75 mg/dL 45   LDL Cholesterol Latest Ref Range: 63.0 - 159.0 mg/dL 79.2   Total Cholesterol/HDL Ratio Latest Ref Range: 2.0 - 5.0  3.6   Triglycerides Latest Ref Range: 30 - 150 mg/dL 179 (H)   E 10 year  ASCVD 10.8%         Current Assessment & Plan     Cont pravastatin 40mg each pm         Osteoarthritis of left knee    Overview     2 views the left knee dated 09/21/2017.    Mild degenerative changes along the medial knee compartment and patellofemoral joint.  Small joint effusion.  Indolent appearing sclerotic density along the distal femur.    No evidence of fracture, dislocation or other acute osseous abnormality.   Impression       As above.      Electronically signed by: ASPEN DOWNING MD  Date: 09/22/17  Time: 08:03               Current Assessment & Plan     Ref to Bariatric Medicine for weight control  Ref to PT, ext for quad/hamstring muscle strengthening, gait training         Osteopenia    Overview     Please tell pt the bone density shows osteopenia but not low enough to require continued alendronate(Fosamax) She can finish whatever Fosamax she has left and then can stop and not refill. Will continue vitamin D2 50,000 units twice a week, and repeat bone density in 2 years. [facility]          PACS Images     Show images for DXA Bone Density Spine And Hip   Reviewed By Brooks Royal MD on 9/7/2017 14:36   External Result Report     External Result Report   Narrative     Bone density examination was performed dated 09/07/2017.    Prior study from 09/16/2015.    Bone density examination was performed with measurements obtained of the spine and the femoral necks.    The T score value of the lumbar spine from L1-L4 is 0.7.  The T score value of the left femoral neck is -1.7.  The T score value of the right femoral neck is -2.0.    Findings are compatible with osteopenia.  The patient is at increased risk of fracture compared to the young adult  reference group.   Impression       Osteopenia.    The probability of a major osteoporotic fracture is 13.5% within the next ten years.    The probability of a hip fracture is 2.6% within the next ten years.      Electronically signed by: ASPEN DOWNING MD  Date: 09/07/17  Time: 14:05                  Current Assessment & Plan     Vitamin D2  50,000 units twice a  Week , 1200mg dietary calcium.            Other Visit Diagnoses     Localized osteoarthritis of left knee    -  Primary    Relevant Orders    Ambulatory Referral to Physical/Occupational Therapy

## 2018-01-24 ENCOUNTER — TELEPHONE (OUTPATIENT)
Dept: INTERNAL MEDICINE | Facility: CLINIC | Age: 68
End: 2018-01-24

## 2018-01-24 NOTE — TELEPHONE ENCOUNTER
Patient's previous MMG done on 12/9/16 was abnormal  and ordered per Dr. Roth. She needs to see a provider prior to MMG being scheduled. I spoke to patient and advised that she needs to re-establish care with another PCP since [facility]afsa is no longer practicing here. Appt scheduled with RADHAMES Rangel NP for 1/31/18. MMG cancelled

## 2018-01-24 NOTE — TELEPHONE ENCOUNTER
----- Message from Ruby Moralez sent at 2018  9:09 AM CST -----  Contact: pt  Maru Shelby  MRN: [id number]  : 1950  PCP: Florence Mark  Home Phone      [phone]  Work Phone      Not on file.  Mobile          [phone]      MESSAGE:  Hafsa's pt. Called to get scheduled for a mammogram and I put her on for Friday. Please create orders for the appt. Thanks.

## 2018-01-31 ENCOUNTER — OFFICE VISIT (OUTPATIENT)
Dept: INTERNAL MEDICINE | Facility: CLINIC | Age: 68
End: 2018-01-31
Payer: MEDICARE

## 2018-01-31 VITALS
SYSTOLIC BLOOD PRESSURE: 140 MMHG | HEIGHT: 67 IN | RESPIRATION RATE: 18 BRPM | HEART RATE: 93 BPM | DIASTOLIC BLOOD PRESSURE: 82 MMHG | WEIGHT: 223.56 LBS | BODY MASS INDEX: 35.09 KG/M2

## 2018-01-31 DIAGNOSIS — Z13.220 SCREENING FOR HYPERLIPIDEMIA: ICD-10-CM

## 2018-01-31 DIAGNOSIS — Z12.31 ENCOUNTER FOR SCREENING MAMMOGRAM FOR MALIGNANT NEOPLASM OF BREAST: Primary | ICD-10-CM

## 2018-01-31 DIAGNOSIS — Z13.29 SCREENING FOR HYPOTHYROIDISM: ICD-10-CM

## 2018-01-31 DIAGNOSIS — I10 ESSENTIAL (PRIMARY) HYPERTENSION: ICD-10-CM

## 2018-01-31 PROCEDURE — 99214 OFFICE O/P EST MOD 30 MIN: CPT | Mod: S$PBB | Performed by: NURSE PRACTITIONER

## 2018-01-31 PROCEDURE — 99214 OFFICE O/P EST MOD 30 MIN: CPT | Mod: PBBFAC | Performed by: NURSE PRACTITIONER

## 2018-01-31 PROCEDURE — 99999 PR PBB SHADOW E&M-EST. PATIENT-LVL IV: CPT | Mod: PBBFAC,,, | Performed by: NURSE PRACTITIONER

## 2018-01-31 PROCEDURE — 99999 PR STA SHADOW: CPT | Mod: PBBFAC,,, | Performed by: NURSE PRACTITIONER

## 2018-01-31 RX ORDER — CHLORHEXIDINE GLUCONATE ORAL RINSE 1.2 MG/ML
SOLUTION DENTAL
Refills: 0 | COMMUNITY
Start: 2018-01-19 | End: 2018-04-03

## 2018-01-31 NOTE — PROGRESS NOTES
Subjective:       Patient ID: Maru Shelby is a 67 y.o. female.    Chief Complaint: Establish Care (Transfer from [facility]; needs mmg) and Otalgia    HPI: Pt presents to clinic today known to me with c/o needing to have her routine visit. She reports that she has an ulcer on her tongue. She noticed it 4 weeks ago after biting on it. She reports that she has bit it a couple times since then. She is seeing dentist and ready for bx if not healing  Review of Systems   Constitutional: Negative for chills, fatigue, fever and unexpected weight change.   HENT: Negative for congestion, ear pain, sore throat and trouble swallowing.    Eyes: Negative for pain and visual disturbance.   Respiratory: Negative for cough, chest tightness and shortness of breath.    Cardiovascular: Negative for chest pain, palpitations and leg swelling.   Gastrointestinal: Negative for abdominal distention, abdominal pain, constipation, diarrhea and vomiting.   Genitourinary: Negative for difficulty urinating, dysuria, flank pain, frequency and hematuria.   Musculoskeletal: Negative for back pain, gait problem, joint swelling, neck pain and neck stiffness.   Skin: Negative for rash and wound.        Right tongue ulcer   Neurological: Negative for dizziness, seizures, speech difficulty, light-headedness and headaches.       Objective:      Physical Exam   Constitutional: She is oriented to person, place, and time. She appears well-developed and well-nourished.   HENT:   Head: Normocephalic and atraumatic.   Right Ear: External ear normal.   Left Ear: External ear normal.   Nose: Nose normal.   Mouth/Throat: Oropharynx is clear and moist. No oropharyngeal exudate.   Eyes: Conjunctivae and EOM are normal. Pupils are equal, round, and reactive to light.   Neck: Normal range of motion. Neck supple. No thyromegaly present.   Cardiovascular: Normal rate, regular rhythm, normal heart sounds and intact distal pulses.    No murmur heard.  Pulmonary/Chest:  Effort normal and breath sounds normal. No respiratory distress. She has no wheezes. She has no rales.   Abdominal: Soft. Bowel sounds are normal. She exhibits no distension and no mass. There is no tenderness. There is no guarding.   Musculoskeletal: Normal range of motion.   Lymphadenopathy:     She has no cervical adenopathy.   Neurological: She is alert and oriented to person, place, and time.   Skin: Skin is warm and dry. Capillary refill takes less than 2 seconds.   Psychiatric: She has a normal mood and affect. Her behavior is normal. Judgment and thought content normal.   Nursing note and vitals reviewed.      Assessment:       1. Encounter for screening mammogram for malignant neoplasm of breast    2. Screening for hypothyroidism    3. Screening for hyperlipidemia    4. Essential (primary) hypertension         Plan:   Maru was seen today for establish care and otalgia.    Diagnoses and all orders for this visit:    Encounter for screening mammogram for malignant neoplasm of breast  -     Mammo Digital Screening Bilateral With CAD; Future    Screening for hypothyroidism  -     TSH; Future    Screening for hyperlipidemia  -     Lipid panel; Future    Essential (primary) hypertension   -     TSH; Future  -     Lipid panel; Future         Hep c screen in past negative  Had flu and pneumonia shots up to date.   mammo ordered today  Dexa yearly with rheumatologist, osteopenia and on Ca vit d, no fracture  UTD with colonoscopy- but due this summer wants to go back top Dr Afsaneh Mae 2013  Cont every 3 months labs with rheumatology

## 2018-02-08 ENCOUNTER — HOSPITAL ENCOUNTER (OUTPATIENT)
Dept: RADIOLOGY | Facility: HOSPITAL | Age: 68
Discharge: HOME | End: 2018-02-08
Attending: NURSE PRACTITIONER
Payer: MEDICARE

## 2018-02-08 VITALS — BODY MASS INDEX: 35 KG/M2 | WEIGHT: 223 LBS | HEIGHT: 67 IN

## 2018-02-08 DIAGNOSIS — Z12.31 ENCOUNTER FOR SCREENING MAMMOGRAM FOR MALIGNANT NEOPLASM OF BREAST: ICD-10-CM

## 2018-02-08 PROCEDURE — 77067 SCR MAMMO BI INCL CAD: CPT | Mod: 26,,, | Performed by: RADIOLOGY

## 2018-02-08 PROCEDURE — 77067 SCR MAMMO BI INCL CAD: CPT | Mod: TC

## 2018-02-08 PROCEDURE — 77063 BREAST TOMOSYNTHESIS BI: CPT | Mod: 26,,, | Performed by: RADIOLOGY

## 2018-02-27 ENCOUNTER — TELEPHONE (OUTPATIENT)
Dept: INTERNAL MEDICINE | Facility: CLINIC | Age: 68
End: 2018-02-27

## 2018-02-27 NOTE — TELEPHONE ENCOUNTER
----- Message from Ruby Moralez sent at 2018  9:35 AM CST -----  Contact: pt  Maru Shelby  MRN: [id number]  : 1950  PCP: Florence Mark  Home Phone      [phone]  Work Phone      Not on file.  Mobile          [phone]      MESSAGE:  Pt has been having an ulcer on her tongue for about 5 weeks now and has tried to get rid of it but it's still there. Wants to know if she needs to see a specialist or what. Says she's bit it a few times. Please advise.  PHONE:  [phone]

## 2018-03-02 ENCOUNTER — OFFICE VISIT (OUTPATIENT)
Dept: INTERNAL MEDICINE | Facility: CLINIC | Age: 68
End: 2018-03-02
Payer: MEDICARE

## 2018-03-02 VITALS
HEIGHT: 67 IN | BODY MASS INDEX: 35.16 KG/M2 | WEIGHT: 224 LBS | RESPIRATION RATE: 16 BRPM | HEART RATE: 96 BPM | SYSTOLIC BLOOD PRESSURE: 146 MMHG | OXYGEN SATURATION: 96 % | DIASTOLIC BLOOD PRESSURE: 82 MMHG

## 2018-03-02 DIAGNOSIS — J20.9 ACUTE BRONCHITIS, UNSPECIFIED ORGANISM: Primary | ICD-10-CM

## 2018-03-02 DIAGNOSIS — R06.2 WHEEZE: ICD-10-CM

## 2018-03-02 DIAGNOSIS — R05.9 COUGH: ICD-10-CM

## 2018-03-02 DIAGNOSIS — K12.0 APHTHOUS ULCER OF TONGUE: ICD-10-CM

## 2018-03-02 DIAGNOSIS — M06.9 RHEUMATOID ARTHRITIS, INVOLVING UNSPECIFIED SITE, UNSPECIFIED RHEUMATOID FACTOR PRESENCE: Chronic | ICD-10-CM

## 2018-03-02 PROCEDURE — 99999 PR STA SHADOW: CPT | Mod: PBBFAC,,, | Performed by: NURSE PRACTITIONER

## 2018-03-02 PROCEDURE — 96372 THER/PROPH/DIAG INJ SC/IM: CPT | Mod: PBBFAC

## 2018-03-02 PROCEDURE — 99214 OFFICE O/P EST MOD 30 MIN: CPT | Mod: S$PBB | Performed by: NURSE PRACTITIONER

## 2018-03-02 PROCEDURE — 99999 PR PBB SHADOW E&M-EST. PATIENT-LVL III: CPT | Mod: PBBFAC,,, | Performed by: NURSE PRACTITIONER

## 2018-03-02 PROCEDURE — 99213 OFFICE O/P EST LOW 20 MIN: CPT | Mod: PBBFAC | Performed by: NURSE PRACTITIONER

## 2018-03-02 RX ORDER — PROMETHAZINE HYDROCHLORIDE AND CODEINE PHOSPHATE 6.25; 1 MG/5ML; MG/5ML
5 SOLUTION ORAL EVERY 6 HOURS PRN
Qty: 180 ML | Refills: 0 | Status: SHIPPED | OUTPATIENT
Start: 2018-03-02 | End: 2018-03-12

## 2018-03-02 RX ORDER — ALBUTEROL SULFATE 90 UG/1
2 AEROSOL, METERED RESPIRATORY (INHALATION) EVERY 6 HOURS PRN
Status: DISCONTINUED | OUTPATIENT
Start: 2018-03-02 | End: 2018-04-03

## 2018-03-02 RX ORDER — METHYLPREDNISOLONE ACETATE 40 MG/ML
60 INJECTION, SUSPENSION INTRA-ARTICULAR; INTRALESIONAL; INTRAMUSCULAR; SOFT TISSUE
Status: COMPLETED | OUTPATIENT
Start: 2018-03-02 | End: 2018-03-02

## 2018-03-02 RX ORDER — FLUOCINONIDE GEL 0.5 MG/G
GEL TOPICAL 2 TIMES DAILY
Qty: 15 G | Refills: 0 | Status: SHIPPED | OUTPATIENT
Start: 2018-03-02 | End: 2018-04-03

## 2018-03-02 RX ORDER — ALBUTEROL SULFATE 90 UG/1
2 AEROSOL, METERED RESPIRATORY (INHALATION)
Status: DISCONTINUED | OUTPATIENT
Start: 2018-03-02 | End: 2018-04-03

## 2018-03-02 RX ORDER — DOXYCYCLINE 100 MG/1
100 CAPSULE ORAL 2 TIMES DAILY
Qty: 14 CAPSULE | Refills: 0 | Status: SHIPPED | OUTPATIENT
Start: 2018-03-02 | End: 2018-04-03

## 2018-03-02 RX ADMIN — METHYLPREDNISOLONE ACETATE 60 MG: 40 INJECTION, SUSPENSION INTRA-ARTICULAR; INTRALESIONAL; INTRAMUSCULAR; SOFT TISSUE at 02:03

## 2018-03-02 NOTE — PATIENT INSTRUCTIONS
What Is Acute Bronchitis?  Acute bronchitis is when the airways in your lungs (bronchial tubes) become red and swollen (inflamed). It is usually caused by a viral infection. But it can also occur because of a bacteria or allergen. Symptoms include a cough that produces yellow or greenish mucus and can last for days or sometimes weeks.  Inside healthy lungs    Air travels in and out of the lungs through the airways. The linings of these airways produce sticky mucus. This mucus traps particles that enter the lungs. Tiny structures called cilia then sweep the particles out of the airways.     Healthy airway: Airways are normally open. Air moves in and out easily.      Healthy cilia: Tiny, hairlike cilia sweep mucus and particles up and out of the airways.   Lungs with bronchitis  Bronchitis often occurs with a cold or the flu virus. The airways become inflamed (red and swollen). There is a deep hacking cough from the extra mucus. Other symptoms may include:  · Wheezing  · Chest discomfort  · Shortness of breath  · Mild fever  A second infection, this time due to bacteria, may then occur. And airways irritated by allergens or smoke are more likely to get infected.        Inflamed airway: Inflammation and extra mucus narrow the airway, causing shortness of breath.      Impaired cilia: Extra mucus impairs cilia, causing congestion and wheezing. Smoking makes the problem worse.   Making a diagnosis  A physical exam, health history, and certain tests help your healthcare provider make the diagnosis.  Health history  Your healthcare provider will ask you about your symptoms.  The exam  Your provider listens to your chest for signs of congestion. He or she may also check your ears, nose, and throat.  Possible tests  · A sputum test for bacteria. This requires a sample of mucus from your lungs.  · A nasal or throat swab. This tests to see if you have a bacterial infection.  · A chest X-ray. This is done if your healthcare  provider thinks you have pneumonia.  · Tests to check for an underlying condition. Other tests may be done to check for things such as allergies, asthma, or COPD (chronic obstructive pulmonary disease). You may need to see a specialist for more lung function testing.  Treating a cough  The main treatment for bronchitis is easing symptoms. Avoiding smoke, allergens, and other things that trigger coughing can often help. If the infection is bacterial, you may be given antibiotics. During the illness, it's important to get plenty of sleep. To ease symptoms:  · Dont smoke. Also avoid secondhand smoke.  · Use a humidifier. Or try breathing in steam from a hot shower. This may help loosen mucus.  · Drink a lot of water and juice. They can soothe the throat and may help thin mucus.  · Sit up or use extra pillows when in bed. This helps to lessen coughing and congestion.  · Ask your provider about using medicine. Ask about using cough medicine, pain and fever medicine, or a decongestant.  Antibiotics  Most cases of bronchitis are caused by cold or flu viruses. They dont need antibiotics to treat them, even if your mucus is thick and green or yellow. Antibiotics dont treat viral illness and antibiotics have not been shown to have any benefit in cases of acute bronchitis. Taking antibiotics when they are not needed increases your risk of getting an infection later that is antibiotic-resistant. Antibiotics can also cause severe cases of diarrhea that require other antibiotics to treat.  It is important that you accept your healthcare provider's opinion to not use antibiotics. Your provider will prescribe antibiotics if the infection is caused by bacteria. If they are prescribed:  · Take all of the medicine. Take the medicine until it is used up, even if symptoms have improved. If you dont, the bronchitis may come back.  · Take the medicines as directed. For instance, some medicines should be taken with food.  · Ask about  side effects. Ask your provider or pharmacist what side effects are common, and what to do about them.  Follow-up care  You should see your provider again in 2 to 3 weeks. By this time, symptoms should have improved. An infection that lasts longer may mean you have a more serious problem.  Prevention  · Avoid tobacco smoke. If you smoke, quit. Stay away from smoky places. Ask friends and family not to smoke around you, or in your home or car.  · Get checked for allergies.  · Ask your provider about getting a yearly flu shot. Also ask about pneumococcal or pneumonia shots.  · Wash your hands often. This helps reduce the chance of picking up viruses that cause colds and flu.  Call your healthcare provider if:  · Symptoms worsen, or you have new symptoms  · Breathing problems worsen or  become severe  · Symptoms dont get better within a week, or within 3 days of taking antibiotics   Date Last Reviewed: 2/1/2017  © 5023-2033 [employer] [place]. [address] [place] [address], [place] [address]. All rights reserved. This information is not intended as a substitute for professional medical care. Always follow your healthcare professional's instructions.

## 2018-03-02 NOTE — PROGRESS NOTES
"Subjective:           Patient ID: Maru Shelby is a 67 y.o. female.    Chief Complaint: tongue sore; Cough; and Shortness of Breath    66 YO WF here with c/o sore- "ulcer to tongue" Has been about 5 weeks. Tried cream of narciso" ; which helped a little. Also Saw Dr. Silver 4 weeks ago and he prescribed mouth wash- which seemed to help but still not getting better. States methotrexate stops her from heeling. Dr. Mackay instructed her to return for possible Bx.   States he has a cough she cannot get rid of. No fever. Dry cough. Has tried multiple OTC meds.NO Ace, NO GERD    Has RA and stopped taking methotrexate due to Cough. Notes ache in neck ; possibly from not taking methotrexate and coughing.   Denies heart burn or reflux.   + Nasal congestion. Clear mucous. No fever.       Cough   This is a recurrent problem. The current episode started 1 to 4 weeks ago (2 weeks ). The problem has been waxing and waning. The cough is non-productive. Associated symptoms include nasal congestion. Pertinent negatives include no chills, ear congestion or fever. She has tried OTC cough suppressant for the symptoms. Her past medical history is significant for bronchitis.     Review of Systems   Constitutional: Negative for chills and fever.   Respiratory: Positive for cough.        Objective:      Physical Exam   Constitutional: She is oriented to person, place, and time. She appears well-developed and well-nourished.   HENT:   Head: Normocephalic.   Right Ear: External ear normal.   Left Ear: External ear normal.   Nose: Nose normal.   Mouth/Throat:       Eyes: Conjunctivae are normal. Pupils are equal, round, and reactive to light.   Neck: No thyromegaly present.   Cardiovascular: Normal rate, regular rhythm and normal heart sounds.    No murmur heard.  Pulmonary/Chest: Effort normal. No respiratory distress. She has wheezes (bilateral exp wheezing, weheezy cough).   Abdominal: Soft. Bowel sounds are normal.   Musculoskeletal: " Normal range of motion. She exhibits no edema.   Lymphadenopathy:     She has no cervical adenopathy.   Neurological: She is alert and oriented to person, place, and time.   Skin: Skin is warm and dry. No rash noted.       Assessment:       1. Acute bronchitis, unspecified organism    2. Cough    3. Wheeze    4. Aphthous ulcer of tongue    5. Rheumatoid arthritis, involving unspecified site, unspecified rheumatoid factor presence        Plan:   Maru was seen today for tongue sore, cough and shortness of breath.    Diagnoses and all orders for this visit:    Acute bronchitis, unspecified organism  -     methylPREDNISolone acetate injection 60 mg; Inject 1.5 mLs (60 mg total) into the muscle one time.  -     doxycycline (MONODOX) 100 MG capsule; Take 1 capsule (100 mg total) by mouth 2 (two) times daily.    Cough  -     methylPREDNISolone acetate injection 60 mg; Inject 1.5 mLs (60 mg total) into the muscle one time.  -     promethazine-codeine 6.25-10 mg/5 ml (PHENERGAN WITH CODEINE) 6.25-10 mg/5 mL syrup; Take 5 mLs by mouth every 6 (six) hours as needed for Cough.    Wheeze  -     methylPREDNISolone acetate injection 60 mg; Inject 1.5 mLs (60 mg total) into the muscle one time.  -     albuterol inhaler 2 puff; Inhale 2 puffs into the lungs one time.    Aphthous ulcer of tongue  -     fluocinonide (LIDEX) 0.05 % gel; Apply topically 2 (two) times daily. To affected area on tongue    Rheumatoid arthritis, involving unspecified site, unspecified rheumatoid factor presence    Back to Dr. Alfonso if tongue not healed in 1 week- may need BX

## 2018-03-05 ENCOUNTER — INFUSION (OUTPATIENT)
Dept: INFUSION THERAPY | Facility: HOSPITAL | Age: 68
End: 2018-03-05
Attending: INTERNAL MEDICINE
Payer: MEDICARE

## 2018-03-05 VITALS
HEART RATE: 85 BPM | RESPIRATION RATE: 18 BRPM | WEIGHT: 224 LBS | TEMPERATURE: 96 F | SYSTOLIC BLOOD PRESSURE: 110 MMHG | HEIGHT: 67 IN | BODY MASS INDEX: 35.16 KG/M2 | DIASTOLIC BLOOD PRESSURE: 66 MMHG

## 2018-03-05 DIAGNOSIS — M05.771 RHEUMATOID ARTHRITIS INVOLVING BOTH ANKLES WITH POSITIVE RHEUMATOID FACTOR: ICD-10-CM

## 2018-03-05 DIAGNOSIS — M05.772 RHEUMATOID ARTHRITIS INVOLVING BOTH ANKLES WITH POSITIVE RHEUMATOID FACTOR: ICD-10-CM

## 2018-03-05 DIAGNOSIS — M05.79 RHEUMATOID ARTHRITIS INVOLVING MULTIPLE SITES WITH POSITIVE RHEUMATOID FACTOR: Primary | ICD-10-CM

## 2018-03-05 PROCEDURE — 63600175 PHARM REV CODE 636 W HCPCS: Performed by: INTERNAL MEDICINE

## 2018-03-05 PROCEDURE — 96365 THER/PROPH/DIAG IV INF INIT: CPT

## 2018-03-05 PROCEDURE — 25000003 PHARM REV CODE 250: Performed by: INTERNAL MEDICINE

## 2018-03-05 RX ORDER — HEPARIN 100 UNIT/ML
500 SYRINGE INTRAVENOUS
Status: CANCELLED | OUTPATIENT
Start: 2018-03-05

## 2018-03-05 RX ORDER — LEFLUNOMIDE 20 MG/1
TABLET ORAL
Qty: 90 TABLET | Refills: 0 | Status: SHIPPED | OUTPATIENT
Start: 2018-03-05 | End: 2018-04-03 | Stop reason: SDUPTHER

## 2018-03-05 RX ORDER — SODIUM CHLORIDE 0.9 % (FLUSH) 0.9 %
10 SYRINGE (ML) INJECTION
Status: CANCELLED | OUTPATIENT
Start: 2018-03-05

## 2018-03-05 RX ORDER — ACETAMINOPHEN 325 MG/1
650 TABLET ORAL
Status: CANCELLED | OUTPATIENT
Start: 2018-03-05

## 2018-03-05 RX ADMIN — SODIUM CHLORIDE 1000 MG: 9 INJECTION, SOLUTION INTRAVENOUS at 08:03

## 2018-03-07 ENCOUNTER — TELEPHONE (OUTPATIENT)
Dept: INTERNAL MEDICINE | Facility: CLINIC | Age: 68
End: 2018-03-07

## 2018-03-07 NOTE — TELEPHONE ENCOUNTER
----- Message from Ruby Moralez sent at 3/7/2018  9:52 AM CST -----  Contact: pt  Maru Shelby  MRN: [id number]  : 1950  PCP: Florence Mark  Home Phone      [phone]  Work Phone      Not on file.  Mobile          [phone]      MESSAGE:  Pt calling to speak to a nurse about fluocinonide (LIDEX) 0.05 % gel we prescribed for her for her ulcer on her tongue. Says Sloane told her she can place it on her tongue but the instructions on the package say not to place in mouth. Please advise.  PHONE:  [phone]

## 2018-03-29 ENCOUNTER — TELEPHONE (OUTPATIENT)
Dept: RHEUMATOLOGY | Facility: HOSPITAL | Age: 68
End: 2018-03-29

## 2018-03-29 ENCOUNTER — LAB VISIT (OUTPATIENT)
Dept: LAB | Facility: HOSPITAL | Age: 68
End: 2018-03-29
Attending: INTERNAL MEDICINE
Payer: MEDICARE

## 2018-03-29 DIAGNOSIS — D72.10 EOSINOPHILIA: ICD-10-CM

## 2018-03-29 DIAGNOSIS — M06.9 RHEUMATOID ARTHRITIS OF HAND, UNSPECIFIED LATERALITY, UNSPECIFIED RHEUMATOID FACTOR PRESENCE: ICD-10-CM

## 2018-03-29 DIAGNOSIS — R73.9 HYPERGLYCEMIA: Primary | ICD-10-CM

## 2018-03-29 DIAGNOSIS — Z13.29 SCREENING FOR HYPOTHYROIDISM: ICD-10-CM

## 2018-03-29 DIAGNOSIS — Z13.220 SCREENING FOR HYPERLIPIDEMIA: ICD-10-CM

## 2018-03-29 DIAGNOSIS — I10 ESSENTIAL (PRIMARY) HYPERTENSION: ICD-10-CM

## 2018-03-29 LAB
ALBUMIN SERPL BCP-MCNC: 3.4 G/DL
ALP SERPL-CCNC: 127 U/L
ALT SERPL W/O P-5'-P-CCNC: 17 U/L
ANION GAP SERPL CALC-SCNC: 9 MMOL/L
AST SERPL-CCNC: 18 U/L
BASOPHILS # BLD AUTO: 0.04 K/UL
BASOPHILS NFR BLD: 0.4 %
BILIRUB SERPL-MCNC: 0.4 MG/DL
BUN SERPL-MCNC: 13 MG/DL
CALCIUM SERPL-MCNC: 9.6 MG/DL
CHLORIDE SERPL-SCNC: 107 MMOL/L
CHOLEST SERPL-MCNC: 170 MG/DL
CHOLEST/HDLC SERPL: 3.9 {RATIO}
CO2 SERPL-SCNC: 27 MMOL/L
CREAT SERPL-MCNC: 0.8 MG/DL
CRP SERPL-MCNC: 12.4 MG/L
DIFFERENTIAL METHOD: ABNORMAL
EOSINOPHIL # BLD AUTO: 1.3 K/UL
EOSINOPHIL NFR BLD: 11.7 %
ERYTHROCYTE [DISTWIDTH] IN BLOOD BY AUTOMATED COUNT: 14.4 %
ERYTHROCYTE [SEDIMENTATION RATE] IN BLOOD BY WESTERGREN METHOD: 28 MM/HR
EST. GFR  (AFRICAN AMERICAN): >60 ML/MIN/1.73 M^2
EST. GFR  (NON AFRICAN AMERICAN): >60 ML/MIN/1.73 M^2
GLUCOSE SERPL-MCNC: 130 MG/DL
HCT VFR BLD AUTO: 43.6 %
HDLC SERPL-MCNC: 44 MG/DL
HDLC SERPL: 25.9 %
HGB BLD-MCNC: 14.6 G/DL
LDLC SERPL CALC-MCNC: 92 MG/DL
LYMPHOCYTES # BLD AUTO: 3.9 K/UL
LYMPHOCYTES NFR BLD: 33.9 %
MCH RBC QN AUTO: 30.2 PG
MCHC RBC AUTO-ENTMCNC: 33.5 G/DL
MCV RBC AUTO: 90 FL
MONOCYTES # BLD AUTO: 0.7 K/UL
MONOCYTES NFR BLD: 6.2 %
NEUTROPHILS # BLD AUTO: 5.4 K/UL
NEUTROPHILS NFR BLD: 47.8 %
NONHDLC SERPL-MCNC: 126 MG/DL
PLATELET # BLD AUTO: 260 K/UL
PMV BLD AUTO: 9.3 FL
POTASSIUM SERPL-SCNC: 4.4 MMOL/L
PROT SERPL-MCNC: 7 G/DL
RBC # BLD AUTO: 4.83 M/UL
SODIUM SERPL-SCNC: 143 MMOL/L
TRIGL SERPL-MCNC: 170 MG/DL
TSH SERPL DL<=0.005 MIU/L-ACNC: 1.97 UIU/ML
WBC # BLD AUTO: 11.35 K/UL

## 2018-03-29 PROCEDURE — 84443 ASSAY THYROID STIM HORMONE: CPT

## 2018-03-29 PROCEDURE — 80053 COMPREHEN METABOLIC PANEL: CPT

## 2018-03-29 PROCEDURE — 86140 C-REACTIVE PROTEIN: CPT

## 2018-03-29 PROCEDURE — 85651 RBC SED RATE NONAUTOMATED: CPT

## 2018-03-29 PROCEDURE — 36415 COLL VENOUS BLD VENIPUNCTURE: CPT

## 2018-03-29 PROCEDURE — 85025 COMPLETE CBC W/AUTO DIFF WBC: CPT

## 2018-03-29 PROCEDURE — 80061 LIPID PANEL: CPT

## 2018-04-03 ENCOUNTER — OFFICE VISIT (OUTPATIENT)
Dept: RHEUMATOLOGY | Facility: CLINIC | Age: 68
End: 2018-04-03
Payer: MEDICARE

## 2018-04-03 ENCOUNTER — HOSPITAL ENCOUNTER (OUTPATIENT)
Dept: RADIOLOGY | Facility: HOSPITAL | Age: 68
Discharge: HOME | End: 2018-04-03
Attending: INTERNAL MEDICINE
Payer: MEDICARE

## 2018-04-03 VITALS
WEIGHT: 221.38 LBS | HEART RATE: 92 BPM | DIASTOLIC BLOOD PRESSURE: 85 MMHG | SYSTOLIC BLOOD PRESSURE: 133 MMHG | BODY MASS INDEX: 34.75 KG/M2 | HEIGHT: 67 IN

## 2018-04-03 DIAGNOSIS — R73.9 HYPERGLYCEMIA: ICD-10-CM

## 2018-04-03 DIAGNOSIS — M06.9 RHEUMATOID ARTHRITIS OF HAND, UNSPECIFIED LATERALITY, UNSPECIFIED RHEUMATOID FACTOR PRESENCE: ICD-10-CM

## 2018-04-03 DIAGNOSIS — E78.00 PURE HYPERCHOLESTEROLEMIA: ICD-10-CM

## 2018-04-03 DIAGNOSIS — E66.09 CLASS 1 OBESITY DUE TO EXCESS CALORIES WITH SERIOUS COMORBIDITY IN ADULT, UNSPECIFIED BMI: ICD-10-CM

## 2018-04-03 DIAGNOSIS — M05.771 RHEUMATOID ARTHRITIS INVOLVING BOTH ANKLES WITH POSITIVE RHEUMATOID FACTOR: ICD-10-CM

## 2018-04-03 DIAGNOSIS — M06.9 RHEUMATOID ARTHRITIS OF HAND, UNSPECIFIED LATERALITY, UNSPECIFIED RHEUMATOID FACTOR PRESENCE: Primary | ICD-10-CM

## 2018-04-03 DIAGNOSIS — M05.772 RHEUMATOID ARTHRITIS INVOLVING BOTH ANKLES WITH POSITIVE RHEUMATOID FACTOR: ICD-10-CM

## 2018-04-03 PROCEDURE — 77077 JOINT SURVEY SINGLE VIEW: CPT | Mod: 26,,, | Performed by: RADIOLOGY

## 2018-04-03 PROCEDURE — 99999 PR PBB SHADOW E&M-EST. PATIENT-LVL III: CPT | Mod: PBBFAC,,, | Performed by: INTERNAL MEDICINE

## 2018-04-03 PROCEDURE — 96372 THER/PROPH/DIAG INJ SC/IM: CPT | Mod: PBBFAC

## 2018-04-03 PROCEDURE — 77077 JOINT SURVEY SINGLE VIEW: CPT | Mod: TC

## 2018-04-03 PROCEDURE — 99213 OFFICE O/P EST LOW 20 MIN: CPT | Mod: PBBFAC,25 | Performed by: INTERNAL MEDICINE

## 2018-04-03 PROCEDURE — 99214 OFFICE O/P EST MOD 30 MIN: CPT | Mod: S$PBB,,, | Performed by: INTERNAL MEDICINE

## 2018-04-03 RX ORDER — TRIAMCINOLONE ACETONIDE 40 MG/ML
40 INJECTION, SUSPENSION INTRA-ARTICULAR; INTRAMUSCULAR
Status: COMPLETED | OUTPATIENT
Start: 2018-04-03 | End: 2018-04-03

## 2018-04-03 RX ORDER — LEFLUNOMIDE 20 MG/1
20 TABLET ORAL DAILY
Qty: 90 TABLET | Refills: 0 | Status: SHIPPED | OUTPATIENT
Start: 2018-04-03 | End: 2018-11-01 | Stop reason: SDUPTHER

## 2018-04-03 RX ADMIN — TRIAMCINOLONE ACETONIDE 40 MG: 40 INJECTION, SUSPENSION INTRA-ARTICULAR; INTRAMUSCULAR at 08:04

## 2018-04-03 ASSESSMENT — ROUTINE ASSESSMENT OF PATIENT INDEX DATA (RAPID3)
PAIN SCORE: 7.5
FATIGUE SCORE: 5.5
TOTAL RAPID3 SCORE: 5.17
AM STIFFNESS SCORE: 1, YES
PATIENT GLOBAL ASSESSMENT SCORE: 6
WHEN YOU AWAKENED IN THE MORNING OVER THE LAST WEEK, PLEASE INDICATE THE AMOUNT OF TIME IT TAKES UNTIL YOU ARE AS LIMBER AS YOU WILL BE FOR THE DAY: 4 HRS
PSYCHOLOGICAL DISTRESS SCORE: 0
MDHAQ FUNCTION SCORE: .6

## 2018-04-03 ASSESSMENT — DISEASE ACTIVITY SCORE (DAS28)
SWOLLEN_JOINTS_COUNT: 5
TOTAL_SCORE_CRP: 4.61
CRP_MG_PER_LITER: 12.4
ESR_MM_PER_HR: 28
TENDER_JOINTS_COUNT: 5
TOTAL_SCORE_ESR: 5.05
GLOBAL_HEALTH_SCORE: 60

## 2018-04-03 NOTE — ASSESSMENT & PLAN NOTE
TJ  5  SJ 5  Global 60   ESR 28 CRP 12.4  LEES 28 5.05  BSF33-POD 4.61: flare due to being off methotrexate x 6wks, resumed x 2wks      *Kenalog 40mg IM today  Arthritis survey  Cont methotrexate 25mg sc once a week with folic acid 1mg daily  Cont leflunomide 20mg daily  Cont abatacept 1000mg IV q 4 wks  RTC 3 months with standing labs

## 2018-04-03 NOTE — PROGRESS NOTES
"Subjective:       Patient ID: Maru Shelby is a 67 y.o. female.    Chief Complaint: RA ACPA+   HPI 4 hrs am stiffness. Off methotrexate x 6 wks, resumed x 2wks. Kept biting tongue, wouldn't heal, so held methotrexate. Local MD gave Rx for doxy x 7d, now completed. Tongue healed. Noted pain and swelling rod left knee right 2-5 mcps and some paresthesia right hand.  Review of Systems   Constitutional: Negative for appetite change, fatigue, fever and unexpected weight change.   HENT: Negative for mouth sores.    Eyes: Negative for visual disturbance.   Respiratory: Negative for cough, shortness of breath and wheezing.    Cardiovascular: Negative for chest pain and palpitations.   Gastrointestinal: Negative for abdominal pain, anal bleeding, blood in stool, constipation, diarrhea, nausea and vomiting.   Genitourinary: Negative for dysuria, frequency and urgency.   Musculoskeletal: Negative for arthralgias, back pain, gait problem, joint swelling, myalgias, neck pain and neck stiffness.   Skin: Negative for rash.   Neurological: Negative for weakness, numbness and headaches.   Hematological: Negative for adenopathy. Does not bruise/bleed easily.   Psychiatric/Behavioral: Negative for sleep disturbance. The patient is not nervous/anxious.          Objective:   /85   Pulse 92   Ht 5' 7.2" (1.707 m)   Wt 100.4 kg (221 lb 6.4 oz)   BMI 34.47 kg/m²      Physical Exam   Constitutional: She is oriented to person, place, and time and well-developed, well-nourished, and in no distress.   HENT:   Head: Normocephalic and atraumatic.   Mouth/Throat: Oropharynx is clear and moist.   Eyes: Conjunctivae and EOM are normal.   Neck: Normal range of motion. Neck supple. No thyromegaly present.   Cardiovascular: Normal rate, regular rhythm, normal heart sounds and intact distal pulses.  Exam reveals no gallop and no friction rub.    No murmur heard.  Pulmonary/Chest: Breath sounds normal. She has no wheezes. She has no " rales. She exhibits no tenderness.   Abdominal: Soft. She exhibits no distension and no mass. There is no tenderness.       Right Side Rheumatological Exam     Examination finds the shoulder, elbow, wrist, 1st PIP, 1st MCP, 2nd PIP, 3rd PIP, 4th PIP and 5th PIP normal.    The patient is tender to palpation of the knee, 2nd MCP, 3rd MCP, 4th MCP and 5th MCP    She has swelling of the knee, 2nd MCP, 3rd MCP, 4th MCP and 5th MCP    The patient has an enlarged knee    Left Side Rheumatological Exam     Examination finds the shoulder, elbow, wrist, knee, 1st PIP, 1st MCP, 2nd PIP, 2nd MCP, 3rd PIP, 3rd MCP, 4th PIP, 4th MCP, 5th PIP and 5th MCP normal.      Lymphadenopathy:     She has no cervical adenopathy.   Neurological: She is alert and oriented to person, place, and time. She displays normal reflexes. Gait normal.   Nl motor strength UE and LE bilateral   Skin: No rash noted. No erythema. No pallor.     Psychiatric: Mood, memory, affect and judgment normal.   Musculoskeletal: She exhibits no edema.         Results for MECCA DUONG (MRN [id number]) as of 4/3/2018 07:05   Ref. Range 3/29/2018 07:50   WBC Latest Ref Range: 3.90 - 12.70 K/uL 11.35   RBC Latest Ref Range: 4.00 - 5.40 M/uL 4.83   Hemoglobin Latest Ref Range: 12.0 - 16.0 g/dL 14.6   Hematocrit Latest Ref Range: 37.0 - 48.5 % 43.6   MCV Latest Ref Range: 82 - 98 fL 90   MCH Latest Ref Range: 27.0 - 31.0 pg 30.2   MCHC Latest Ref Range: 32.0 - 36.0 g/dL 33.5   RDW Latest Ref Range: 11.5 - 14.5 % 14.4   Platelets Latest Ref Range: 150 - 350 K/uL 260   MPV Latest Ref Range: 9.2 - 12.9 fL 9.3   Gran% Latest Ref Range: 38.0 - 73.0 % 47.8   Gran # (ANC) Latest Ref Range: 1.8 - 7.7 K/uL 5.4   Lymph% Latest Ref Range: 18.0 - 48.0 % 33.9   Lymph # Latest Ref Range: 1.0 - 4.8 K/uL 3.9   Mono% Latest Ref Range: 4.0 - 15.0 % 6.2   Mono # Latest Ref Range: 0.3 - 1.0 K/uL 0.7   Eosinophil% Latest Ref Range: 0.0 - 8.0 % 11.7 (H)   Eos # Latest Ref Range: 0.0 - 0.5  K/uL 1.3 (H)   Basophil% Latest Ref Range: 0.0 - 1.9 % 0.4   Baso # Latest Ref Range: 0.00 - 0.20 K/uL 0.04   Sed Rate Latest Ref Range: 0 - 20 mm/Hr 28 (H)   Sodium Latest Ref Range: 136 - 145 mmol/L 143   Potassium Latest Ref Range: 3.5 - 5.1 mmol/L 4.4   Chloride Latest Ref Range: 95 - 110 mmol/L 107   CO2 Latest Ref Range: 23 - 29 mmol/L 27   Anion Gap Latest Ref Range: 8 - 16 mmol/L 9   BUN, Bld Latest Ref Range: 8 - 23 mg/dL 13   Creatinine Latest Ref Range: 0.5 - 1.4 mg/dL 0.8   eGFR if non African American Latest Ref Range: >60 mL/min/1.73 m^2 >60   eGFR if  Latest Ref Range: >60 mL/min/1.73 m^2 >60   Glucose Latest Ref Range: 70 - 110 mg/dL 130 (H)   Calcium Latest Ref Range: 8.7 - 10.5 mg/dL 9.6   Alkaline Phosphatase Latest Ref Range: 55 - 135 U/L 127   Total Protein Latest Ref Range: 6.0 - 8.4 g/dL 7.0   Albumin Latest Ref Range: 3.5 - 5.2 g/dL 3.4 (L)   Total Bilirubin Latest Ref Range: 0.1 - 1.0 mg/dL 0.4   AST Latest Ref Range: 10 - 40 U/L 18   ALT Latest Ref Range: 10 - 44 U/L 17   CRP Latest Ref Range: 0.0 - 8.2 mg/L 12.4 (H)   Triglycerides Latest Ref Range: 30 - 150 mg/dL 170 (H)   Cholesterol Latest Ref Range: 120 - 199 mg/dL 170   HDL Latest Ref Range: 40 - 75 mg/dL 44   LDL Cholesterol Latest Ref Range: 63.0 - 159.0 mg/dL 92.0   Total Cholesterol/HDL Ratio Latest Ref Range: 2.0 - 5.0  3.9   TSH Latest Ref Range: 0.400 - 4.000 uIU/mL 1.966     Assessment/Plan         Problem List Items Addressed This Visit     RA (rheumatoid arthritis) - Primary (Chronic)    Overview     Results for MECCA DUONG (MRN [id number]) as of 1/9/2018 07:13   Ref. Range 8/1/2008 07:11 7/6/2009 08:26 8/15/2013 08:25   ds DNA Ab Latest Ref Range: Negative Titer Negative     Protein, Serum Latest Ref Range: 6.0 - 8.4 g/dL   6.4   Albumin grams/dl Latest Ref Range: 3.35 - 5.55 gm/dL   3.88   Alpha-1 grams/dl Latest Ref Range: 0.17 - 0.41 gm/dL   0.29   Alpha-2 grams/dl Latest Ref Range: 0.43 - 0.99 gm/dL    0.71   Beta grams/dl Latest Ref Range: 0.50 - 1.10 gm/dL   0.82   Gamma grams/dl Latest Ref Range: 0.67 - 1.58 gm/dL   0.70   CCP Antibodies Latest Ref Range: <5.0 U/mL  119.9      Please tell pt the neck x-rays show mild slippage of C4- % on degenerative basis, no RA changes. The hands show degenerative changes in the thumb bases and mild narrowing of the index and middle finger knuckle joints as before likely related to RA. There are mild degenerative changes in knees and great toes. Overall, no substantial change compared with previous x-rays. RJQ          PACS Images     Show images for XR Arthritis Survey   Reviewed By Brooks Crabtree MD on 3/2/2017 13:34   External Result Report     External Result Report   Narrative     Arthritis survey:    Bilateral hands: The bones are slightly osteopenic.  No osseous erosions are identified.  There are moderate degenerative changes of the bilateral carpometacarpal joint spaces, left greater than right.    Cervical spine: There is anterolisthesis of C4 and C5 by approximately 4 mm, stable.  The atlantodental interval is maintained.  There are multilevel degenerative changes consisting of disc space narrowing, endplate sclerosis and facet arthropathy.    Bilateral knees: There is mild bilateral medial compartment joint narrowing with subchondral sclerosis and marginal osteophytosis.  No osseous erosions.    Bilateral feet: There is bilateral hallux valgus deformity with bunion formation.  Minimal degenerative changes are noted.  No osseous erosions are detected.   Impression      As above.      Electronically signed by: Farida Llanes MD  Date: 03/02/17  Time: 09:00                Current Assessment & Plan     TJ  5  SJ 5  Global 60   ESR 28 CRP 12.4  LEES 28 5.05  [id number] 4.61: flare due to being off methotrexate x 6wks, resumed x 2wks      *Kenalog 40mg IM today  Arthritis survey  Cont methotrexate 25mg sc once a week with folic acid 1mg daily  Cont  leflunomide 20mg daily  Cont abatacept 1000mg IV q 4 wks  RTC 3 months with standing labs           Relevant Medications    triamcinolone acetonide injection 40 mg    leflunomide (ARAVA) 20 MG Tab    Other Relevant Orders    XR Arthritis Survey    Obesity    Current Assessment & Plan     Lost 6#  Cont weight reduction diet  Increase aerobic exercise         Hyperlipidemia    Overview     Results for MECCA DUONG (MRN [id number]) as of 4/3/2018 07:05   Ref. Range 3/29/2018 07:50   Cholesterol Latest Ref Range: 120 - 199 mg/dL 170   HDL Latest Ref Range: 40 - 75 mg/dL 44   LDL Cholesterol Latest Ref Range: 63.0 - 159.0 mg/dL 92.0   Total Cholesterol/HDL Ratio Latest Ref Range: 2.0 - 5.0  3.9   Triglycerides Latest Ref Range: 30 - 150 mg/dL 170 (H)            Current Assessment & Plan     Cont pravastatin 40mg q pm         Hyperglycemia    Current Assessment & Plan     FBS,  HbA1c today

## 2018-04-16 ENCOUNTER — INFUSION (OUTPATIENT)
Dept: INFUSION THERAPY | Facility: HOSPITAL | Age: 68
End: 2018-04-16
Attending: INTERNAL MEDICINE
Payer: MEDICARE

## 2018-04-16 VITALS
RESPIRATION RATE: 18 BRPM | SYSTOLIC BLOOD PRESSURE: 107 MMHG | HEART RATE: 84 BPM | WEIGHT: 221.31 LBS | TEMPERATURE: 97 F | DIASTOLIC BLOOD PRESSURE: 67 MMHG | BODY MASS INDEX: 34.73 KG/M2 | HEIGHT: 67 IN

## 2018-04-16 DIAGNOSIS — M05.79 RHEUMATOID ARTHRITIS INVOLVING MULTIPLE SITES WITH POSITIVE RHEUMATOID FACTOR: Primary | ICD-10-CM

## 2018-04-16 PROCEDURE — 63600175 PHARM REV CODE 636 W HCPCS: Performed by: INTERNAL MEDICINE

## 2018-04-16 PROCEDURE — 96374 THER/PROPH/DIAG INJ IV PUSH: CPT

## 2018-04-16 PROCEDURE — 25000003 PHARM REV CODE 250: Performed by: INTERNAL MEDICINE

## 2018-04-16 RX ADMIN — SODIUM CHLORIDE 1000 MG: 9 INJECTION, SOLUTION INTRAVENOUS at 08:04

## 2018-05-21 ENCOUNTER — INFUSION (OUTPATIENT)
Dept: INFUSION THERAPY | Facility: HOSPITAL | Age: 68
End: 2018-05-21
Attending: INTERNAL MEDICINE
Payer: MEDICARE

## 2018-05-21 VITALS
TEMPERATURE: 95 F | HEART RATE: 96 BPM | RESPIRATION RATE: 20 BRPM | DIASTOLIC BLOOD PRESSURE: 70 MMHG | SYSTOLIC BLOOD PRESSURE: 128 MMHG

## 2018-05-21 DIAGNOSIS — M05.79 RHEUMATOID ARTHRITIS INVOLVING MULTIPLE SITES WITH POSITIVE RHEUMATOID FACTOR: Primary | ICD-10-CM

## 2018-05-21 PROCEDURE — 25000003 PHARM REV CODE 250: Performed by: INTERNAL MEDICINE

## 2018-05-21 PROCEDURE — 96365 THER/PROPH/DIAG IV INF INIT: CPT

## 2018-05-21 PROCEDURE — 63600175 PHARM REV CODE 636 W HCPCS: Performed by: INTERNAL MEDICINE

## 2018-05-21 RX ADMIN — SODIUM CHLORIDE 1000 MG: 9 INJECTION, SOLUTION INTRAVENOUS at 08:05

## 2018-05-21 NOTE — PLAN OF CARE
Problem: Health Knowledge, Opportunity to Enhance (Adult,NICU,Odin,Obstetrics,Pediatric)  Goal: Knowledgeable about Health Subject/Topic  Patient will demonstrate the desired outcomes by discharge/transition of care.  Outcome: Outcome(s) achieved Date Met: 18 0844   Health Knowledge, Opportunity to Enhance (Adult,NICU,Odin,Obstetrics,Pediatric)   Knowledgeable about Health Subject/Topic achieves outcome

## 2018-05-21 NOTE — PATIENT INSTRUCTIONS
Abatacept solution for injection (subcutaneous or intravenous use)  What is this medicine?  ABATACEPT (a ba TA sept) is used to treat moderate to severe active rheumatoid arthritis in adults. This medicine is also used to treat juvenile idiopathic arthritis.  How should I use this medicine?  This medicine is for infusion into a vein or for injection under the skin. Infusions are given by a health care professional in a hospital or clinic setting. If you are to give your own medicine at home, you will be taught how to prepare and give this medicine under the skin. Use exactly as directed. Take your medicine at regular intervals. Do not take your medicine more often than directed.  It is important that you put your used needles and syringes in a special sharps container. Do not put them in a trash can. If you do not have a sharps container, call your pharmacist or healthcare provider to get one.  Talk to your pediatrician regarding the use of this medicine in children. While infusions in a clinic may be prescribed for children as young as 6 years for selected conditions, precautions do apply.  What side effects may I notice from receiving this medicine?  Side effects that you should report to your doctor or health care professional as soon as possible:  · allergic reactions like skin rash, itching or hives, swelling of the face, lips, or tongue  · breathing problems  · chest pain  · signs of infection - fever or chills, cough, unusual tiredness, pain or trouble passing urine, or warm, red or painful skin  Side effects that usually do not require medical attention (Report these to your doctor or health care professional if they continue or are bothersome.):  · dizziness  · headache  · nausea, vomiting  · sore throat  · stomach upset  What may interact with this medicine?  Do not take this medicine with any of the following  medications:  · adalimumab  · anakinra  · certolizumab  · etanercept  · golimumab  · infliximab  · live virus vaccines  · rituximab  · tocilizumab  This medicine may also interact with the following medications:  · vaccines  What if I miss a dose?  This medicine is used once a week if given by injection under the skin. If you miss a dose, take it as soon as you can. If it is almost time for your next dose, take only that dose. Do not take double or extra doses.  If you are to be given an infusion, it is important not to miss your dose. Doses are usually every 4 weeks. Call your doctor or health care professional if you are unable to keep an appointment.  Where should I keep my medicine?  Infusions will be given in a hospital or clinic and will not be stored at home.  Storage for syringes given under the skin and stored at home:  Keep out of the reach of children. Store in a refrigerator between 2 and 8 degrees C (36 and 46 degrees F). Keep this medicine in the original container. Protect from light. Do not freeze. Throw away any unused medicine after the expiration date.  What should I tell my health care provider before I take this medicine?  They need to know if you have any of these conditions:  · are taking other medicines to treat rheumatoid arthritis  · COPD  · diabetes  · infection or history of infections  · recently received or scheduled to receive a vaccine  · scheduled to have surgery  · tuberculosis, a positive skin test for tuberculosis or have recently been in close contact with someone who has tuberculosis  · viral hepatitis  · an unusual or allergic reaction to abatacept, other medicines, foods, dyes, or preservatives  · pregnant or trying to get pregnant  · breast-feeding  What should I watch for while using this medicine?  Visit your doctor for regular check ups while you are taking this medicine. Tell your doctor or healthcare professional if your symptoms do not start to get better or if they get  worse.  Call your doctor or health care professional if you get a cold or other infection while receiving this medicine. Do not treat yourself. This medicine may decrease your body's ability to fight infection. Try to avoid being around people who are sick.  NOTE:This sheet is a summary. It may not cover all possible information. If you have questions about this medicine, talk to your doctor, pharmacist, or health care provider. Copyright© 2017 [employer]        Abatacept solution for injection (subcutaneous or intravenous use)  What is this medicine?  ABATACEPT (a ba TA sept) is used to treat moderate to severe active rheumatoid arthritis in adults. This medicine is also used to treat juvenile idiopathic arthritis.  How should I use this medicine?  This medicine is for infusion into a vein or for injection under the skin. Infusions are given by a health care professional in a hospital or clinic setting. If you are to give your own medicine at home, you will be taught how to prepare and give this medicine under the skin. Use exactly as directed. Take your medicine at regular intervals. Do not take your medicine more often than directed.  It is important that you put your used needles and syringes in a special sharps container. Do not put them in a trash can. If you do not have a sharps container, call your pharmacist or healthcare provider to get one.  Talk to your pediatrician regarding the use of this medicine in children. While infusions in a clinic may be prescribed for children as young as 6 years for selected conditions, precautions do apply.  What side effects may I notice from receiving this medicine?  Side effects that you should report to your doctor or health care professional as soon as possible:  · allergic reactions like skin rash, itching or hives, swelling of the face, lips, or tongue  · breathing problems  · chest pain  · signs of infection - fever or chills, cough, unusual tiredness, pain or  trouble passing urine, or warm, red or painful skin  Side effects that usually do not require medical attention (Report these to your doctor or health care professional if they continue or are bothersome.):  · dizziness  · headache  · nausea, vomiting  · sore throat  · stomach upset  What may interact with this medicine?  Do not take this medicine with any of the following medications:  · adalimumab  · anakinra  · certolizumab  · etanercept  · golimumab  · infliximab  · live virus vaccines  · rituximab  · tocilizumab  This medicine may also interact with the following medications:  · vaccines  What if I miss a dose?  This medicine is used once a week if given by injection under the skin. If you miss a dose, take it as soon as you can. If it is almost time for your next dose, take only that dose. Do not take double or extra doses.  If you are to be given an infusion, it is important not to miss your dose. Doses are usually every 4 weeks. Call your doctor or health care professional if you are unable to keep an appointment.  Where should I keep my medicine?  Infusions will be given in a hospital or clinic and will not be stored at home.  Storage for syringes given under the skin and stored at home:  Keep out of the reach of children. Store in a refrigerator between 2 and 8 degrees C (36 and 46 degrees F). Keep this medicine in the original container. Protect from light. Do not freeze. Throw away any unused medicine after the expiration date.  What should I tell my health care provider before I take this medicine?  They need to know if you have any of these conditions:  · are taking other medicines to treat rheumatoid arthritis  · COPD  · diabetes  · infection or history of infections  · recently received or scheduled to receive a vaccine  · scheduled to have surgery  · tuberculosis, a positive skin test for tuberculosis or have recently been in close contact with someone who has tuberculosis  · viral hepatitis  · an  unusual or allergic reaction to abatacept, other medicines, foods, dyes, or preservatives  · pregnant or trying to get pregnant  · breast-feeding  What should I watch for while using this medicine?  Visit your doctor for regular check ups while you are taking this medicine. Tell your doctor or healthcare professional if your symptoms do not start to get better or if they get worse.  Call your doctor or health care professional if you get a cold or other infection while receiving this medicine. Do not treat yourself. This medicine may decrease your body's ability to fight infection. Try to avoid being around people who are sick.  NOTE:This sheet is a summary. It may not cover all possible information. If you have questions about this medicine, talk to your doctor, pharmacist, or health care provider. Copyright© 2017 [employer]

## 2018-06-12 DIAGNOSIS — I10 ESSENTIAL HYPERTENSION: ICD-10-CM

## 2018-06-12 DIAGNOSIS — M05.772 RHEUMATOID ARTHRITIS INVOLVING BOTH ANKLES WITH POSITIVE RHEUMATOID FACTOR: ICD-10-CM

## 2018-06-12 DIAGNOSIS — M05.771 RHEUMATOID ARTHRITIS INVOLVING BOTH ANKLES WITH POSITIVE RHEUMATOID FACTOR: ICD-10-CM

## 2018-06-12 RX ORDER — METHOTREXATE 25 MG/ML
INJECTION, SOLUTION INTRA-ARTERIAL; INTRAMUSCULAR; INTRAVENOUS
Qty: 10 ML | Refills: 0 | Status: SHIPPED | OUTPATIENT
Start: 2018-06-12 | End: 2018-06-14 | Stop reason: SDUPTHER

## 2018-06-12 RX ORDER — AMLODIPINE BESYLATE 10 MG/1
TABLET ORAL
Qty: 90 TABLET | Refills: 3 | Status: SHIPPED | OUTPATIENT
Start: 2018-06-12 | End: 2018-07-18 | Stop reason: SDUPTHER

## 2018-06-13 RX ORDER — METHOTREXATE 25 MG/ML
INJECTION, SOLUTION INTRA-ARTERIAL; INTRAMUSCULAR; INTRAVENOUS
Qty: 10 ML | Refills: 0 | Status: CANCELLED | OUTPATIENT
Start: 2018-06-13

## 2018-06-14 DIAGNOSIS — M05.772 RHEUMATOID ARTHRITIS INVOLVING BOTH ANKLES WITH POSITIVE RHEUMATOID FACTOR: ICD-10-CM

## 2018-06-14 DIAGNOSIS — I10 ESSENTIAL HYPERTENSION: ICD-10-CM

## 2018-06-14 DIAGNOSIS — M05.771 RHEUMATOID ARTHRITIS INVOLVING BOTH ANKLES WITH POSITIVE RHEUMATOID FACTOR: ICD-10-CM

## 2018-06-14 RX ORDER — AMLODIPINE BESYLATE 10 MG/1
10 TABLET ORAL DAILY
Qty: 90 TABLET | Refills: 3 | Status: SHIPPED | OUTPATIENT
Start: 2018-06-14 | End: 2019-01-14 | Stop reason: SDUPTHER

## 2018-06-14 RX ORDER — AMLODIPINE BESYLATE 10 MG/1
TABLET ORAL
Qty: 90 TABLET | Refills: 3 | OUTPATIENT
Start: 2018-06-14

## 2018-06-14 RX ORDER — METHOTREXATE 25 MG/ML
INJECTION, SOLUTION INTRA-ARTERIAL; INTRAMUSCULAR; INTRAVENOUS
Qty: 10 ML | Refills: 0 | Status: SHIPPED | OUTPATIENT
Start: 2018-06-14 | End: 2018-07-18 | Stop reason: SDUPTHER

## 2018-06-25 ENCOUNTER — INFUSION (OUTPATIENT)
Dept: INFUSION THERAPY | Facility: HOSPITAL | Age: 68
End: 2018-06-25
Attending: INTERNAL MEDICINE
Payer: MEDICARE

## 2018-06-25 VITALS
WEIGHT: 221.31 LBS | HEART RATE: 87 BPM | DIASTOLIC BLOOD PRESSURE: 66 MMHG | BODY MASS INDEX: 34.73 KG/M2 | HEIGHT: 67 IN | SYSTOLIC BLOOD PRESSURE: 110 MMHG | RESPIRATION RATE: 18 BRPM | TEMPERATURE: 97 F

## 2018-06-25 DIAGNOSIS — M05.79 RHEUMATOID ARTHRITIS INVOLVING MULTIPLE SITES WITH POSITIVE RHEUMATOID FACTOR: Primary | ICD-10-CM

## 2018-06-25 PROCEDURE — 96413 CHEMO IV INFUSION 1 HR: CPT

## 2018-06-25 PROCEDURE — 96374 THER/PROPH/DIAG INJ IV PUSH: CPT

## 2018-06-25 PROCEDURE — 96365 THER/PROPH/DIAG IV INF INIT: CPT

## 2018-06-25 PROCEDURE — 25000003 PHARM REV CODE 250: Performed by: INTERNAL MEDICINE

## 2018-06-25 PROCEDURE — 63600175 PHARM REV CODE 636 W HCPCS: Performed by: INTERNAL MEDICINE

## 2018-06-25 RX ADMIN — SODIUM CHLORIDE 1000 MG: 9 INJECTION, SOLUTION INTRAVENOUS at 08:06

## 2018-06-27 ENCOUNTER — PES CALL (OUTPATIENT)
Dept: ADMINISTRATIVE | Facility: CLINIC | Age: 68
End: 2018-06-27

## 2018-07-12 ENCOUNTER — OFFICE VISIT (OUTPATIENT)
Dept: FAMILY MEDICINE | Facility: CLINIC | Age: 68
End: 2018-07-12
Payer: MEDICARE

## 2018-07-12 VITALS
DIASTOLIC BLOOD PRESSURE: 74 MMHG | HEART RATE: 67 BPM | BODY MASS INDEX: 34.81 KG/M2 | HEIGHT: 67 IN | SYSTOLIC BLOOD PRESSURE: 118 MMHG | RESPIRATION RATE: 13 BRPM | WEIGHT: 221.81 LBS

## 2018-07-12 DIAGNOSIS — Z01.818 PRE-OP EVALUATION: ICD-10-CM

## 2018-07-12 DIAGNOSIS — Z12.11 SCREEN FOR COLON CANCER: Primary | ICD-10-CM

## 2018-07-12 PROCEDURE — 93010 ELECTROCARDIOGRAM REPORT: CPT | Mod: ,,, | Performed by: INTERNAL MEDICINE

## 2018-07-12 PROCEDURE — 99999 PR PBB SHADOW E&M-EST. PATIENT-LVL III: CPT | Mod: PBBFAC,,, | Performed by: FAMILY MEDICINE

## 2018-07-12 PROCEDURE — 99213 OFFICE O/P EST LOW 20 MIN: CPT | Mod: S$PBB | Performed by: FAMILY MEDICINE

## 2018-07-12 PROCEDURE — 99213 OFFICE O/P EST LOW 20 MIN: CPT | Mod: PBBFAC | Performed by: FAMILY MEDICINE

## 2018-07-12 PROCEDURE — 99999 PR STA SHADOW: CPT | Mod: PBBFAC,,, | Performed by: FAMILY MEDICINE

## 2018-07-12 PROCEDURE — 99999 EKG 12-LEAD: CPT | Mod: PBBFAC,,, | Performed by: FAMILY MEDICINE

## 2018-07-12 PROCEDURE — 93005 ELECTROCARDIOGRAM TRACING: CPT | Mod: PBBFAC | Performed by: FAMILY MEDICINE

## 2018-07-12 NOTE — PROGRESS NOTES
Subjective:       Patient ID: Maru Shelby is a 68 y.o. female.    Chief Complaint: Follow-up (pt states she is due for a colonoscopy)    Pt is a 68 y.o. female who presents for evaluation and management of   Encounter Diagnoses   Name Primary?    Screen for colon cancer Yes    Pre-op evaluation    .  Doing well on current meds. Denies any side effects. Prevention is up to date.    Review of Systems   Respiratory: Negative for shortness of breath.    Cardiovascular: Negative for chest pain.   Gastrointestinal: Negative for abdominal pain and blood in stool.       Objective:      Physical Exam   Constitutional: She is oriented to person, place, and time. She appears well-developed and well-nourished.   HENT:   Head: Normocephalic and atraumatic.   Right Ear: External ear normal.   Left Ear: External ear normal.   Nose: Nose normal.   Mouth/Throat: Oropharynx is clear and moist.   Eyes: EOM are normal. Pupils are equal, round, and reactive to light.   Neck: Normal range of motion. Neck supple. No JVD present. No tracheal deviation present. No thyromegaly present.   Cardiovascular: Normal rate, normal heart sounds and intact distal pulses.    No murmur heard.  Pulmonary/Chest: Effort normal and breath sounds normal. No respiratory distress. She has no wheezes. She has no rales. She exhibits no tenderness.   Abdominal: Soft. Bowel sounds are normal. She exhibits no distension and no mass. There is no tenderness. There is no rebound and no guarding.   Musculoskeletal: Normal range of motion. She exhibits no edema or tenderness.   Lymphadenopathy:     She has no cervical adenopathy.   Neurological: She is alert and oriented to person, place, and time. She has normal reflexes. She displays normal reflexes. No cranial nerve deficit. She exhibits normal muscle tone. Coordination normal.   Skin: Skin is warm and dry. No rash noted. No erythema. No pallor.   Psychiatric: She has a normal mood and affect. Her behavior  is normal. Judgment and thought content normal.       Assessment:       1. Screen for colon cancer    2. Pre-op evaluation        Plan:   Maru was seen today for follow-up.    Diagnoses and all orders for this visit:    Screen for colon cancer    Pre-op evaluation  -     EKG 12-lead      Problem List Items Addressed This Visit     None      Visit Diagnoses     Screen for colon cancer    -  Primary    Pre-op evaluation        Relevant Orders    EKG 12-lead        No Follow-up on file.

## 2018-07-13 ENCOUNTER — LAB VISIT (OUTPATIENT)
Dept: LAB | Facility: HOSPITAL | Age: 68
End: 2018-07-13
Attending: INTERNAL MEDICINE
Payer: MEDICARE

## 2018-07-13 DIAGNOSIS — M06.9 RHEUMATOID ARTHRITIS OF HAND, UNSPECIFIED LATERALITY, UNSPECIFIED RHEUMATOID FACTOR PRESENCE: ICD-10-CM

## 2018-07-13 LAB
ALBUMIN SERPL BCP-MCNC: 3.5 G/DL
ALP SERPL-CCNC: 112 U/L
ALT SERPL W/O P-5'-P-CCNC: 22 U/L
ANION GAP SERPL CALC-SCNC: 6 MMOL/L
AST SERPL-CCNC: 24 U/L
BASOPHILS # BLD AUTO: 0.04 K/UL
BASOPHILS NFR BLD: 0.4 %
BILIRUB SERPL-MCNC: 0.5 MG/DL
BUN SERPL-MCNC: 13 MG/DL
CALCIUM SERPL-MCNC: 9.2 MG/DL
CHLORIDE SERPL-SCNC: 106 MMOL/L
CO2 SERPL-SCNC: 30 MMOL/L
CREAT SERPL-MCNC: 0.8 MG/DL
CRP SERPL-MCNC: 9.8 MG/L
DIFFERENTIAL METHOD: ABNORMAL
EOSINOPHIL # BLD AUTO: 0.8 K/UL
EOSINOPHIL NFR BLD: 8.5 %
ERYTHROCYTE [DISTWIDTH] IN BLOOD BY AUTOMATED COUNT: 15.9 %
ERYTHROCYTE [SEDIMENTATION RATE] IN BLOOD BY WESTERGREN METHOD: 39 MM/HR
EST. GFR  (AFRICAN AMERICAN): >60 ML/MIN/1.73 M^2
EST. GFR  (NON AFRICAN AMERICAN): >60 ML/MIN/1.73 M^2
GLUCOSE SERPL-MCNC: 117 MG/DL
HCT VFR BLD AUTO: 43.3 %
HGB BLD-MCNC: 14.2 G/DL
LYMPHOCYTES # BLD AUTO: 3 K/UL
LYMPHOCYTES NFR BLD: 30.4 %
MCH RBC QN AUTO: 31.3 PG
MCHC RBC AUTO-ENTMCNC: 32.8 G/DL
MCV RBC AUTO: 95 FL
MONOCYTES # BLD AUTO: 0.8 K/UL
MONOCYTES NFR BLD: 7.9 %
NEUTROPHILS # BLD AUTO: 5.1 K/UL
NEUTROPHILS NFR BLD: 52.8 %
PLATELET # BLD AUTO: 262 K/UL
PMV BLD AUTO: 8.9 FL
POTASSIUM SERPL-SCNC: 4.6 MMOL/L
PROT SERPL-MCNC: 6.9 G/DL
RBC # BLD AUTO: 4.54 M/UL
SODIUM SERPL-SCNC: 142 MMOL/L
WBC # BLD AUTO: 9.69 K/UL

## 2018-07-13 PROCEDURE — 86140 C-REACTIVE PROTEIN: CPT

## 2018-07-13 PROCEDURE — 36415 COLL VENOUS BLD VENIPUNCTURE: CPT

## 2018-07-13 PROCEDURE — 80053 COMPREHEN METABOLIC PANEL: CPT

## 2018-07-13 PROCEDURE — 85651 RBC SED RATE NONAUTOMATED: CPT

## 2018-07-13 PROCEDURE — 85025 COMPLETE CBC W/AUTO DIFF WBC: CPT

## 2018-07-18 ENCOUNTER — LAB VISIT (OUTPATIENT)
Dept: LAB | Facility: HOSPITAL | Age: 68
End: 2018-07-18
Attending: INTERNAL MEDICINE
Payer: MEDICARE

## 2018-07-18 ENCOUNTER — OFFICE VISIT (OUTPATIENT)
Dept: RHEUMATOLOGY | Facility: CLINIC | Age: 68
End: 2018-07-18
Payer: MEDICARE

## 2018-07-18 ENCOUNTER — HOSPITAL ENCOUNTER (OUTPATIENT)
Dept: RADIOLOGY | Facility: HOSPITAL | Age: 68
Discharge: HOME | End: 2018-07-18
Attending: STUDENT IN AN ORGANIZED HEALTH CARE EDUCATION/TRAINING PROGRAM
Payer: MEDICARE

## 2018-07-18 VITALS
BODY MASS INDEX: 34.95 KG/M2 | SYSTOLIC BLOOD PRESSURE: 129 MMHG | DIASTOLIC BLOOD PRESSURE: 77 MMHG | HEART RATE: 94 BPM | WEIGHT: 222.69 LBS | HEIGHT: 67 IN

## 2018-07-18 DIAGNOSIS — E55.9 VITAMIN D DEFICIENCY: ICD-10-CM

## 2018-07-18 DIAGNOSIS — M06.9 RHEUMATOID ARTHRITIS INVOLVING MULTIPLE SITES, UNSPECIFIED RHEUMATOID FACTOR PRESENCE: Chronic | ICD-10-CM

## 2018-07-18 DIAGNOSIS — M75.42 SHOULDER IMPINGEMENT SYNDROME, LEFT: ICD-10-CM

## 2018-07-18 DIAGNOSIS — M75.41 IMPINGEMENT SYNDROME OF RIGHT SHOULDER: ICD-10-CM

## 2018-07-18 DIAGNOSIS — E78.00 PURE HYPERCHOLESTEROLEMIA: ICD-10-CM

## 2018-07-18 DIAGNOSIS — E66.09 CLASS 1 OBESITY DUE TO EXCESS CALORIES WITH SERIOUS COMORBIDITY IN ADULT, UNSPECIFIED BMI: ICD-10-CM

## 2018-07-18 DIAGNOSIS — E66.9 OBESITY (BMI 30.0-34.9): Primary | ICD-10-CM

## 2018-07-18 LAB
25(OH)D3+25(OH)D2 SERPL-MCNC: 63 NG/ML
RHEUMATOID FACT SERPL-ACNC: 266 IU/ML

## 2018-07-18 PROCEDURE — 73030 X-RAY EXAM OF SHOULDER: CPT | Mod: 26,LT,, | Performed by: RADIOLOGY

## 2018-07-18 PROCEDURE — 99215 OFFICE O/P EST HI 40 MIN: CPT | Mod: PBBFAC,25 | Performed by: INTERNAL MEDICINE

## 2018-07-18 PROCEDURE — 82306 VITAMIN D 25 HYDROXY: CPT

## 2018-07-18 PROCEDURE — 73030 X-RAY EXAM OF SHOULDER: CPT | Mod: TC,LT

## 2018-07-18 PROCEDURE — 86431 RHEUMATOID FACTOR QUANT: CPT

## 2018-07-18 PROCEDURE — 99999 PR PBB SHADOW E&M-EST. PATIENT-LVL V: CPT | Mod: PBBFAC,,, | Performed by: INTERNAL MEDICINE

## 2018-07-18 PROCEDURE — 73030 X-RAY EXAM OF SHOULDER: CPT | Mod: 26,RT,, | Performed by: RADIOLOGY

## 2018-07-18 PROCEDURE — 73030 X-RAY EXAM OF SHOULDER: CPT | Mod: TC,RT

## 2018-07-18 PROCEDURE — 99214 OFFICE O/P EST MOD 30 MIN: CPT | Mod: S$PBB,,, | Performed by: INTERNAL MEDICINE

## 2018-07-18 RX ORDER — HEPARIN 100 UNIT/ML
500 SYRINGE INTRAVENOUS
Status: CANCELLED | OUTPATIENT
Start: 2018-07-18

## 2018-07-18 RX ORDER — SODIUM CHLORIDE 0.9 % (FLUSH) 0.9 %
10 SYRINGE (ML) INJECTION
Status: CANCELLED | OUTPATIENT
Start: 2018-07-18

## 2018-07-18 RX ORDER — ACETAMINOPHEN 325 MG/1
650 TABLET ORAL
Status: CANCELLED | OUTPATIENT
Start: 2018-07-18

## 2018-07-18 ASSESSMENT — DISEASE ACTIVITY SCORE (DAS28)
GLOBAL_HEALTH_SCORE: 65
ESR_MM_PER_HR: 39
CRP_MG_PER_LITER: 9.8
SWOLLEN_JOINTS_COUNT: 0
TOTAL_SCORE_ESR: 4.03
TENDER_JOINTS_COUNT: 1
TOTAL_SCORE_CRP: 3.29

## 2018-07-18 ASSESSMENT — ROUTINE ASSESSMENT OF PATIENT INDEX DATA (RAPID3)
TOTAL RAPID3 SCORE: 4.28
AM STIFFNESS SCORE: 0, NO
PATIENT GLOBAL ASSESSMENT SCORE: 6.5
MDHAQ FUNCTION SCORE: .4
FATIGUE SCORE: 3
PSYCHOLOGICAL DISTRESS SCORE: 0
PAIN SCORE: 5

## 2018-07-18 NOTE — ASSESSMENT & PLAN NOTE
Gained another 1#    Ref to Bariatric medicine  Increase aerobic exercise: pool, bike, gym 5/7 days

## 2018-07-18 NOTE — PROGRESS NOTES
Subjective:       Patient ID: Maru Shelby is a 68 y.o. female.    Chief Complaint: Shoulder pain  HPI   Left shoulder pain chronically. Pain in the shoulder with movement. Unable to do abduction, but can still do shoulder flexion. She has been taking Tylenol PM for the pain couple times a week. Pain does not wake her up at night. Denies any numbness tingling or weakness into the shoulder or arm. Has never had any physical therapy. Not exercising.    Been having right wrist pain the last few days which is resolving. Pain was worse in the morning. Nothing brought on the pain. Was sore for most of the day.  No NSAIDs use.    Review of Systems   Constitutional: Negative for fatigue and fever.   HENT: Negative for trouble swallowing.    Eyes: Negative for pain and redness.   Respiratory: Negative for cough and shortness of breath.    Cardiovascular: Negative for chest pain.   Gastrointestinal: Negative for constipation and diarrhea.   Genitourinary: Negative for dysuria.   Musculoskeletal: Positive for joint swelling. Negative for myalgias.   Skin: Negative for rash.   Neurological: Negative for weakness and numbness.         Objective:   There were no vitals taken for this visit.     Physical Exam   Constitutional: She is well-developed, well-nourished, and in no distress.   Neck: Normal range of motion.   Cardiovascular: Normal rate, regular rhythm and normal heart sounds.    Pulmonary/Chest: Effort normal and breath sounds normal. She has no decreased breath sounds.       Right Side Rheumatological Exam     Examination finds the shoulder, elbow, wrist, knee, 1st PIP, 1st MCP, 2nd PIP, 3rd PIP, 4th PIP, 4th MCP and 5th PIP normal.    The patient has an enlarged 2nd MCP and 3rd MCP    Shoulder Exam     Range of Motion   Active Abduction: 130   Extension: normal   Forward Flexion: 130     Knee Exam     Range of Motion   The patient has normal right knee ROM.    Muscle Strength (0-5 scale):  Deltoid:  5  Biceps:  5/5   Triceps:  5  : 5/5   Iliopsoas: 5  Quadriceps:  5   Distal Lower Extremity: 5    Left Side Rheumatological Exam     Examination finds the elbow, wrist, knee, 1st PIP, 1st MCP, 2nd PIP, 3rd PIP, 4th PIP, 4th MCP, 5th PIP and 5th MCP normal.    The patient is tender to palpation of the shoulder.    The patient has an enlarged 2nd MCP and 3rd MCP.    Shoulder Exam   Tenderness Location: acromioclavicular joint, biceps tendon and posterior shoulder    Range of Motion   Active Abduction:  80 abnormal   Passive Abduction:  80 abnormal   Extension: normal   Forward Flexion: 120     Knee Exam   Tenderness Location: medial joint line and pes anerinus    Range of Motion   Flexion:  110 abnormal     Patellofemoral Crepitus: positive  Effusion: negative  Warmth: negative    Muscle Strength (0-5 scale):  Deltoid:  4.2  Biceps: 4.4/5   Triceps:  4.4  :  5/5   Iliopsoas: 5  Quadriceps:  5   Distal Lower Extremity: 5        Positive Zee, empty can and speeds left shoulder  TTP along posterior joint capsule, supraspinatus and infraspinatus       SJ0 TJ3 CRP 9.8 SED 39 DAS28 4.44 w CRP 3.7  Assessment:   Rheumatoid Arthiritis  Obesity  Hyperlipidemia  Diabetes II  Left and right shoulder impingement      Plan:   Cont methotrexate 25mg sc once a week with folic acid 1mg daily  Cont leflunomide 20mg daily  Cont abatacept 1000mg IV q 4 wks  Vit D , QG-TB Vit D labs today  Bilateral Shoulder X-rays  Physical therapy for shoulder rehabilitation  Referral to Bariatric medicine  Shingrix  RTC 3 months with standing labs

## 2018-07-18 NOTE — PROGRESS NOTES
I have personally taken the history and examined the patient and agree with the resident,s note as stated above Left shoulder pain only    Exam: + Neer, empty can and Zee-Smooth, pain with resisted abd/ext rot normal motor strength      RA ACPA+:  TJ 1  SJ 0 Pt global 65  ESR 39    CRP  9.8  DAS28  4.03 [id number] 3.29(both MDA) but due to left shoulder rotator cuff tendinitis  Osteopenia FRAX hip 2.6% major 13.5%  Obesity gained 1#  Hypertension 129/77    Cont methotrexate 25mg sc once  A week with folic acid 1mg daily  Cont leflunomide 20mg po daily  Cont abatacept 1000mg IV q 28 days  *Shingrix  QG-TB, RF, vitamin D  X-ray shoulders  Ref to PT(ext)  Ref to Bariatric Medicine with RTC 3 months with standing labs

## 2018-07-18 NOTE — PATIENT INSTRUCTIONS
No need to stop medications before colonoscopy  Increase consumption of fresh berries and dark green leafy vegetables.  Do daily aerobic exercise 20-30 minutes

## 2018-07-30 ENCOUNTER — INFUSION (OUTPATIENT)
Dept: INFUSION THERAPY | Facility: HOSPITAL | Age: 68
End: 2018-07-30
Attending: INTERNAL MEDICINE
Payer: MEDICARE

## 2018-07-30 VITALS
WEIGHT: 222.69 LBS | TEMPERATURE: 97 F | SYSTOLIC BLOOD PRESSURE: 120 MMHG | RESPIRATION RATE: 18 BRPM | HEIGHT: 67 IN | DIASTOLIC BLOOD PRESSURE: 68 MMHG | HEART RATE: 84 BPM | BODY MASS INDEX: 34.95 KG/M2

## 2018-07-30 DIAGNOSIS — M05.79 RHEUMATOID ARTHRITIS INVOLVING MULTIPLE SITES WITH POSITIVE RHEUMATOID FACTOR: Primary | ICD-10-CM

## 2018-07-30 PROCEDURE — 96365 THER/PROPH/DIAG IV INF INIT: CPT

## 2018-07-30 PROCEDURE — 63600175 PHARM REV CODE 636 W HCPCS: Performed by: INTERNAL MEDICINE

## 2018-07-30 PROCEDURE — 25000003 PHARM REV CODE 250: Performed by: INTERNAL MEDICINE

## 2018-07-30 RX ORDER — ACETAMINOPHEN 325 MG/1
650 TABLET ORAL
Status: CANCELLED | OUTPATIENT
Start: 2018-07-30

## 2018-07-30 RX ORDER — HEPARIN 100 UNIT/ML
500 SYRINGE INTRAVENOUS
Status: CANCELLED | OUTPATIENT
Start: 2018-07-30

## 2018-07-30 RX ORDER — SODIUM CHLORIDE 0.9 % (FLUSH) 0.9 %
10 SYRINGE (ML) INJECTION
Status: CANCELLED | OUTPATIENT
Start: 2018-07-30

## 2018-07-30 RX ADMIN — SODIUM CHLORIDE 1000 MG: 9 INJECTION, SOLUTION INTRAVENOUS at 08:07

## 2018-07-31 ENCOUNTER — OFFICE VISIT (OUTPATIENT)
Dept: INTERNAL MEDICINE | Facility: CLINIC | Age: 68
End: 2018-07-31
Payer: MEDICARE

## 2018-07-31 ENCOUNTER — HOSPITAL ENCOUNTER (OUTPATIENT)
Dept: PREADMISSION TESTING | Facility: HOSPITAL | Age: 68
Discharge: HOME | End: 2018-07-31
Attending: FAMILY MEDICINE
Payer: MEDICARE

## 2018-07-31 VITALS — BODY MASS INDEX: 34.95 KG/M2 | HEIGHT: 67 IN | WEIGHT: 222.69 LBS

## 2018-07-31 VITALS
DIASTOLIC BLOOD PRESSURE: 76 MMHG | BODY MASS INDEX: 35.09 KG/M2 | SYSTOLIC BLOOD PRESSURE: 114 MMHG | RESPIRATION RATE: 18 BRPM | HEIGHT: 67 IN | WEIGHT: 223.56 LBS | HEART RATE: 95 BPM

## 2018-07-31 DIAGNOSIS — E88.819 INSULIN RESISTANCE: Primary | ICD-10-CM

## 2018-07-31 DIAGNOSIS — R79.9 ABNORMAL FINDING OF BLOOD CHEMISTRY: ICD-10-CM

## 2018-07-31 DIAGNOSIS — B37.2 YEAST DERMATITIS: ICD-10-CM

## 2018-07-31 DIAGNOSIS — E78.5 HYPERLIPIDEMIA, UNSPECIFIED HYPERLIPIDEMIA TYPE: ICD-10-CM

## 2018-07-31 PROCEDURE — 99999 PR PBB SHADOW E&M-EST. PATIENT-LVL III: CPT | Mod: PBBFAC,,, | Performed by: NURSE PRACTITIONER

## 2018-07-31 PROCEDURE — 99999 PR STA SHADOW: CPT | Mod: PBBFAC,,, | Performed by: NURSE PRACTITIONER

## 2018-07-31 PROCEDURE — 99214 OFFICE O/P EST MOD 30 MIN: CPT | Mod: S$PBB | Performed by: NURSE PRACTITIONER

## 2018-07-31 PROCEDURE — 99213 OFFICE O/P EST LOW 20 MIN: CPT | Mod: PBBFAC | Performed by: NURSE PRACTITIONER

## 2018-07-31 RX ORDER — NYSTATIN 100000 U/G
CREAM TOPICAL 2 TIMES DAILY
Qty: 30 G | Refills: 1 | Status: SHIPPED | OUTPATIENT
Start: 2018-07-31 | End: 2019-07-08 | Stop reason: SDUPTHER

## 2018-07-31 RX ORDER — NYSTATIN 100000 [USP'U]/G
POWDER TOPICAL 4 TIMES DAILY
Qty: 60 G | Refills: 1 | Status: SHIPPED | OUTPATIENT
Start: 2018-07-31 | End: 2019-01-14 | Stop reason: SDUPTHER

## 2018-07-31 NOTE — DISCHARGE INSTRUCTIONS
Colonoscopy Outpatient procedure instructions    Prep Review  Nothing to eat or drink after midnight unless your doctor tells you differently.    Take medications as instructed by your doctor.    Wear something comfortable that is easy for you to take off and put on.   Do not wear any makeup, jewelry, or body piercings. Leave valuables at home or let your family member keep them for you. Do not bring them to the Surgery area.     Date/Day of Procedure: Tuesday 8-14-18    Arrival time: 7am  If asked to report to the hospital at 7:00 a.m. or later report to Patient Registration  It is not necessary to report earlier than the time you are told.   Ignore any automated/computer generated calls telling to what time to report to the hospital.   Plan to be at the hospital for about 4 hours, however, it could be longer.

## 2018-07-31 NOTE — PROGRESS NOTES
Subjective:       Patient ID: Maru Shelby is a 68 y.o. female.    Chief Complaint: Follow-up and Thrush (breasts)    HPI: Pt presents to clinic today known to me with c/o needing her routine f/u. She reports that she us scheduled for colonoscopy 8/2018. She had her labs  Vit D 63  Rheumtoid 266  CRP 9.8  Sed rate 39  TB negative   BMP  Lab Results   Component Value Date     07/13/2018    K 4.6 07/13/2018     07/13/2018    CO2 30 (H) 07/13/2018    BUN 13 07/13/2018    CREATININE 0.8 07/13/2018    CALCIUM 9.2 07/13/2018    ANIONGAP 6 (L) 07/13/2018    ESTGFRAFRICA >60 07/13/2018    EGFRNONAA >60 07/13/2018     Lab Results   Component Value Date    ALT 22 07/13/2018    AST 24 07/13/2018    ALKPHOS 112 07/13/2018    BILITOT 0.5 07/13/2018     Lab Results   Component Value Date    WBC 9.69 07/13/2018    HGB 14.2 07/13/2018    HCT 43.3 07/13/2018    MCV 95 07/13/2018     07/13/2018       She also has a yeast rash to her breast. She gets it in the summer  Review of Systems   Constitutional: Negative for chills, fatigue, fever and unexpected weight change.   HENT: Negative for congestion, ear pain, sore throat and trouble swallowing.    Eyes: Negative for pain and visual disturbance.   Respiratory: Negative for cough, chest tightness and shortness of breath.    Cardiovascular: Negative for chest pain, palpitations and leg swelling.   Gastrointestinal: Negative for abdominal distention, abdominal pain, constipation, diarrhea and vomiting.   Genitourinary: Negative for difficulty urinating, dysuria, flank pain, frequency and hematuria.   Musculoskeletal: Negative for back pain, gait problem, joint swelling, neck pain and neck stiffness.   Skin: Positive for rash. Negative for wound.   Neurological: Negative for dizziness, seizures, speech difficulty, light-headedness and headaches.       Objective:      Physical Exam   Constitutional: She is oriented to person, place, and time. She appears  well-developed and well-nourished.   HENT:   Head: Normocephalic and atraumatic.   Right Ear: External ear normal.   Left Ear: External ear normal.   Nose: Nose normal.   Mouth/Throat: Oropharynx is clear and moist.   Eyes: Conjunctivae and EOM are normal. Pupils are equal, round, and reactive to light.   Neck: Normal range of motion. Neck supple.   Cardiovascular: Normal rate, regular rhythm, normal heart sounds and intact distal pulses.    Pulmonary/Chest: Effort normal and breath sounds normal.   Abdominal: Soft. Bowel sounds are normal.   Musculoskeletal: Normal range of motion.   Neurological: She is alert and oriented to person, place, and time.   Skin: Skin is warm and dry. Rash noted.   Red patches to right and left breast folds, wet. No drainage   Psychiatric: She has a normal mood and affect. Her behavior is normal. Judgment and thought content normal.   Nursing note and vitals reviewed.      Assessment:       1. Insulin resistance    2. Hyperlipidemia, unspecified hyperlipidemia type    3. Abnormal finding of blood chemistry     4. Yeast dermatitis        Plan:     Problem List Items Addressed This Visit     Hyperlipidemia    Overview     Results for MECCA DUONG (MRN [id number]) as of 4/3/2018 07:05   Ref. Range 3/29/2018 07:50   Cholesterol Latest Ref Range: 120 - 199 mg/dL 170   HDL Latest Ref Range: 40 - 75 mg/dL 44   LDL Cholesterol Latest Ref Range: 63.0 - 159.0 mg/dL 92.0   Total Cholesterol/HDL Ratio Latest Ref Range: 2.0 - 5.0  3.9   Triglycerides Latest Ref Range: 30 - 150 mg/dL 170 (H)            Relevant Orders    Lipid panel      Other Visit Diagnoses     Insulin resistance    -  Primary    Relevant Orders    Hemoglobin A1c    Abnormal finding of blood chemistry         Relevant Orders    Hemoglobin A1c    Yeast dermatitis        Relevant Medications    nystatin (MYCOSTATIN) cream    nystatin (MYCOSTATIN) powder          F/u 3-6  months

## 2018-08-14 ENCOUNTER — ANESTHESIA (OUTPATIENT)
Dept: ENDOSCOPY | Facility: HOSPITAL | Age: 68
End: 2018-08-14
Payer: COMMERCIAL

## 2018-08-14 ENCOUNTER — HOSPITAL ENCOUNTER (OUTPATIENT)
Facility: HOSPITAL | Age: 68
Discharge: HOME | End: 2018-08-14
Attending: FAMILY MEDICINE | Admitting: FAMILY MEDICINE
Payer: MEDICARE

## 2018-08-14 ENCOUNTER — ANESTHESIA EVENT (OUTPATIENT)
Dept: ENDOSCOPY | Facility: HOSPITAL | Age: 68
End: 2018-08-14
Payer: MEDICARE

## 2018-08-14 VITALS
TEMPERATURE: 96 F | SYSTOLIC BLOOD PRESSURE: 109 MMHG | RESPIRATION RATE: 18 BRPM | DIASTOLIC BLOOD PRESSURE: 58 MMHG | OXYGEN SATURATION: 92 % | HEART RATE: 87 BPM

## 2018-08-14 DIAGNOSIS — Z12.11 COLON CANCER SCREENING: ICD-10-CM

## 2018-08-14 PROCEDURE — 25000003 PHARM REV CODE 250: Performed by: FAMILY MEDICINE

## 2018-08-14 PROCEDURE — 45378 DIAGNOSTIC COLONOSCOPY: CPT | Mod: ,,, | Performed by: FAMILY MEDICINE

## 2018-08-14 PROCEDURE — 00812 ANES LWR INTST SCR COLSC: CPT | Mod: QZ,P2 | Performed by: NURSE ANESTHETIST, CERTIFIED REGISTERED

## 2018-08-14 PROCEDURE — 63600175 PHARM REV CODE 636 W HCPCS: Performed by: NURSE ANESTHETIST, CERTIFIED REGISTERED

## 2018-08-14 PROCEDURE — 37000009 HC ANESTHESIA EA ADD 15 MINS: Performed by: FAMILY MEDICINE

## 2018-08-14 PROCEDURE — 37000008 HC ANESTHESIA 1ST 15 MINUTES: Performed by: FAMILY MEDICINE

## 2018-08-14 PROCEDURE — G0121 COLON CA SCRN NOT HI RSK IND: HCPCS | Performed by: FAMILY MEDICINE

## 2018-08-14 RX ORDER — SODIUM CHLORIDE, SODIUM LACTATE, POTASSIUM CHLORIDE, CALCIUM CHLORIDE 600; 310; 30; 20 MG/100ML; MG/100ML; MG/100ML; MG/100ML
INJECTION, SOLUTION INTRAVENOUS CONTINUOUS
Status: DISCONTINUED | OUTPATIENT
Start: 2018-08-14 | End: 2018-08-14 | Stop reason: HOSPADM

## 2018-08-14 RX ORDER — LIDOCAINE HCL/PF 100 MG/5ML
SYRINGE (ML) INTRAVENOUS
Status: DISCONTINUED | OUTPATIENT
Start: 2018-08-14 | End: 2018-08-14

## 2018-08-14 RX ORDER — PROPOFOL 10 MG/ML
INJECTION, EMULSION INTRAVENOUS
Status: DISCONTINUED | OUTPATIENT
Start: 2018-08-14 | End: 2018-08-14

## 2018-08-14 RX ADMIN — PROPOFOL 50 MG: 10 INJECTION, EMULSION INTRAVENOUS at 08:08

## 2018-08-14 RX ADMIN — SODIUM CHLORIDE, SODIUM LACTATE, POTASSIUM CHLORIDE, AND CALCIUM CHLORIDE: .6; .31; .03; .02 INJECTION, SOLUTION INTRAVENOUS at 08:08

## 2018-08-14 RX ADMIN — PROPOFOL 100 MG: 10 INJECTION, EMULSION INTRAVENOUS at 08:08

## 2018-08-14 RX ADMIN — LIDOCAINE HYDROCHLORIDE 75 MG: 20 INJECTION, SOLUTION INTRAVENOUS at 08:08

## 2018-08-14 NOTE — OP NOTE
DATE OF PROCEDURE:  08/14/2018    PREOPERATIVE DIAGNOSIS:  Screening colonoscopy.    POSTOPERATIVE DIAGNOSIS:  Normal screening colonoscopy.    PRIMARY SURGEON:  Kimani Shelby M.D.     ESTIMATED BLOOD LOSS:  None.    PROCEDURE IN DETAIL:  After all the patient's questions were answered and   informed consent had been obtained, the patient wished to proceed.  She was   placed in the left lateral decubitus position and a peripheral IV was begun   where she was administered propofol for deep sedation by Anesthesia.  Continuous   cardiopulmonary monitoring was done throughout the procedure.  The colonoscope   was introduced into the anal canal and advanced under insufflation without   difficulty through the sigmoid, descending, transverse and descending colon.  I   used about 140 cm of scope to reach the cecum.  The scope was then withdrawn.    The entire ascending, transverse and descending colon was unremarkable.  The   patient tolerated the procedure very well.    IMPRESSION:  Normal screening colonoscopy.    PLAN:  Repeat colonoscopy in 10 years.      ADD/HN  dd: 08/14/2018 09:01:47 (CDT)  td: 08/14/2018 09:20:24 (CDT)  Doc ID   #[id number]  Job ID #[id number]    CC:

## 2018-08-14 NOTE — ANESTHESIA PREPROCEDURE EVALUATION
08/14/2018  Maru Shelby is a 68 y.o., female.    Anesthesia Evaluation    I have reviewed the Patient Summary Reports.    I have reviewed the Nursing Notes.   I have reviewed the Medications.     Review of Systems  Anesthesia Hx:  No problems with previous Anesthesia    Social:  Non-Smoker, No Alcohol Use    Hematology/Oncology:  Hematology Normal   Oncology Normal     EENT/Dental:EENT/Dental Normal   Cardiovascular:   Exercise tolerance: good Hypertension, well controlled    Pulmonary:  Pulmonary Normal    Renal/:  Renal/ Normal     Hepatic/GI:  Hepatic/GI Normal    Musculoskeletal:   Arthritis     Neurological:   Neuromuscular Disease,    Endocrine:  Endocrine Normal    Dermatological:  Skin Normal    Psych:   Psychiatric History          Physical Exam  General:  Well nourished    Airway/Jaw/Neck:  Airway Findings: Mouth Opening: Normal Tongue: Normal  General Airway Assessment: Adult  Mallampati: II  TM Distance: Normal, at least 6 cm  Jaw/Neck Findings:  Neck ROM: Normal ROM      Dental:  Dental Findings: In tact        Mental Status:  Mental Status Findings:  Cooperative         Anesthesia Plan  Type of Anesthesia, risks & benefits discussed:  Anesthesia Type:  general, MAC  Patient's Preference:   Intra-op Monitoring Plan:   Intra-op Monitoring Plan Comments:   Post Op Pain Control Plan: multimodal analgesia  Post Op Pain Control Plan Comments:   Induction:   IV  Beta Blocker:  Patient is not currently on a Beta-Blocker (No further documentation required).       Informed Consent: Patient understands risks and agrees with Anesthesia plan.  Questions answered. Anesthesia consent signed with patient.  ASA Score: 2     Day of Surgery Review of History & Physical: I have interviewed and examined the patient. I have reviewed the patient's H&P dated: 8/14/18. There are no significant changes.  H&P  update referred to the surgeon.         Ready For Surgery From Anesthesia Perspective.

## 2018-08-14 NOTE — H&P
Subjective:    Maru Shelby is a 68 y.o. female here for colonoscopy    Past Medical History:   Diagnosis Date    Anxiety     Arthritis     rheumatoid and osteoarthritis    Depression     Hyperlipidemia     Hypertension     Neuromuscular disorder     Obesity      Past Surgical History:   Procedure Laterality Date    BREAST BIOPSY Right     2016    CHOLECYSTECTOMY  2009    COLONOSCOPY  2005    2008, 2013    HAND SURGERY      LUMBAR SPINE SURGERY      TUBAL LIGATION       Family History   Problem Relation Age of Onset    Pancreatitis Mother 61    Cancer Father         amedistatic    Breast cancer Sister     Breast cancer Sister      Social History     Tobacco Use    Smoking status: Never Smoker    Smokeless tobacco: Never Used   Substance Use Topics    Alcohol use: Yes     Alcohol/week: 1.2 oz     Types: 2 Glasses of wine per week    Drug use: No       Facility-Administered Medications Prior to Admission   Medication    abatacept (with maltose) (ORENCIA) 1,000 mg in sodium chloride 0.9% 100 mL IVPB     PTA Medications   Medication Sig    amLODIPine (NORVASC) 10 MG tablet Take 1 tablet (10 mg total) by mouth once daily.    DOCOSAHEXANOIC ACID/EPA (FISH OIL ORAL) Take by mouth.      ergocalciferol (ERGOCALCIFEROL) 50,000 unit Cap Take 1 capsule (50,000 Units total) by mouth twice a week.    folic acid (FOLVITE) 1 MG tablet Take 1 tablet (1 mg total) by mouth once daily.    latanoprost 0.005 % ophthalmic solution     leflunomide (ARAVA) 20 MG Tab Take 1 tablet (20 mg total) by mouth once daily.    methotrexate, PF, 25 mg/mL Soln INJECT 1 ML( 25 MG) UNDER THE SKIN EVERY 7 DAYS    multivitamin (MULTIVITAMIN) per tablet Take 1 tablet by mouth once daily.      nortriptyline (PAMELOR) 25 MG capsule TAKE 3 CAPSULES BY MOUTH EVERY EVENING    nystatin (MYCOSTATIN) cream Apply topically 2 (two) times daily.    nystatin (MYCOSTATIN) powder Apply topically 4 (four) times daily.    pravastatin  "(PRAVACHOL) 40 MG tablet Take 1 tablet (40 mg total) by mouth every evening.    syringe with needle, safety (BD SAFETY-ALONDRA TUBERCULIN) 1 mL 27 gauge x 1/2" Syrg USE AS DIRECTED       Review of patient's allergies indicates:  No Known Allergies    Review of Systems   Constitutional: Negative for fever and chills.   HENT: Negative for hearing loss.    Eyes: Negative for blurred vision and double vision.   Respiratory: Negative for cough and shortness of breath.    Cardiovascular: Negative for chest pain and palpitations.   Gastrointestinal: Negative for heartburn, nausea, vomiting and abdominal pain.   Genitourinary: Negative for dysuria and frequency.   Musculoskeletal: Negative for myalgias, joint pain and falls.   Skin: Negative for itching and rash.   Neurological: Negative for dizziness, tingling and headaches.   Endo/Heme/Allergies: Does not bruise/bleed easily.   Psychiatric/Behavioral: Negative for depression and suicidal ideas.       Objective:        Temp:  [96.3 °F (35.7 °C)]   Pulse:  [92]   Resp:  [18]   BP: (137)/(77)   SpO2:  [92 %]       Physical Exam   Constitutional: She is oriented to person, place, and time. She appears well-developed and well-nourished.   HENT:   Head: Normocephalic and atraumatic.   Right Ear: External ear normal.   Left Ear: External ear normal.   Nose: Nose normal.   Mouth/Throat: Oropharynx is clear and moist.   Eyes: Conjunctivae normal and EOM are normal. Pupils are equal, round, and reactive to light. Right eye exhibits no discharge. Left eye exhibits no discharge. No scleral icterus.   Neck: Normal range of motion. Neck supple. No JVD present. No tracheal deviation present. No thyromegaly present.   Cardiovascular: Normal rate, regular rhythm, normal heart sounds and intact distal pulses.    No murmur heard.  Pulmonary/Chest: Effort normal and breath sounds normal. No respiratory distress. She has no wheezes. She has no rales. She exhibits no tenderness.   Abdominal: " Soft. Bowel sounds are normal. She exhibits no distension and no mass. There is no tenderness. There is no rebound and no guarding.   Musculoskeletal: Normal range of motion.   Lymphadenopathy:     She has no cervical adenopathy.   Neurological: She is alert and oriented to person, place, and time. She has normal reflexes. No cranial nerve deficit. She exhibits normal muscle tone. Coordination normal.   Skin: Skin is warm and dry.   Psychiatric: She has a normal mood and affect. Her behavior is normal. Judgment and thought content normal.           Assessment:      Active Hospital Problems    Diagnosis  POA    Colon cancer screening [Z12.11]  Not Applicable      Resolved Hospital Problems   No resolved problems to display.         Plan:    ASA grade 2. Proceed with MAC for colonoscopy    Patient cleared for Anesthesia:  MAC and general    Anesthesia/Surgery risks, benefits, and alternative options discussed and understood by patient/family.

## 2018-08-14 NOTE — ANESTHESIA POSTPROCEDURE EVALUATION
Anesthesia Post Evaluation    Patient: Maru Shelby    Procedure(s) Performed: Procedure(s) (LRB):  COLONOSCOPY (N/A)    Final Anesthesia Type: general  Patient location during evaluation: PACU  Patient participation: Yes- Able to Participate  Level of consciousness: awake and alert, oriented and awake  Post-procedure vital signs: reviewed and stable  Pain management: adequate  Airway patency: patent  PONV status at discharge: No PONV  Anesthetic complications: no      Cardiovascular status: blood pressure returned to baseline, hemodynamically stable and stable  Respiratory status: unassisted, spontaneous ventilation and room air  Hydration status: euvolemic  Follow-up not needed.        Visit Vitals  BP (!) 109/58 (BP Location: Right arm, Patient Position: Lying)   Pulse 87   Temp 35.6 °C (96 °F)   Resp 18   SpO2 (!) 92%       Pain/Jordan Score: Pain Assessment Performed: Yes (8/14/2018  9:02 AM)  Presence of Pain: denies (8/14/2018  9:02 AM)  Jordan Score: 8 (8/14/2018  9:02 AM)

## 2018-08-14 NOTE — TRANSFER OF CARE
Anesthesia Transfer of Care Note    Patient: Maru Shelby    Procedure(s) Performed: Procedure(s) (LRB):  COLONOSCOPY (N/A)    Patient location: PACU    Anesthesia Type: general    Transport from OR: Transported from OR on 6-10 L/min O2 by face mask with adequate spontaneous ventilation    Post pain: adequate analgesia    Post assessment: no apparent anesthetic complications and tolerated procedure well    Post vital signs: stable    Level of consciousness: sedated    Nausea/Vomiting: no nausea/vomiting    Complications: none          Last vitals:   Visit Vitals  BP (!) 109/58 (BP Location: Right arm, Patient Position: Lying)   Pulse 87   Temp 35.6 °C (96 °F)   Resp 18   SpO2 (!) 92%

## 2018-08-14 NOTE — BRIEF OP NOTE
[facility]  Brief Operative Note     SUMMARY     Surgery Date: 8/14/2018     Surgeon(s) and Role:     * Kimani Shelby MD - Primary    Assisting Surgeon: None    Pre-op Diagnosis:  Screen for colon cancer [Z12.11]    Post-op Diagnosis:  Post-Op Diagnosis Codes:     * Screen for colon cancer [Z12.11]    Procedure(s) (LRB):  COLONOSCOPY (N/A)    Anesthesia: General    Description of the findings of the procedure: normal    Findings/Key Components: normal    Estimated Blood Loss: * No values recorded between 8/14/2018 12:00 AM and 8/14/2018  8:55 AM *         Specimens:   Specimen (12h ago, onward)    None          Discharge Note    SUMMARY     Admit Date: 8/14/2018    Discharge Date and Time:  08/14/2018 8:57 AM    Hospital Course (synopsis of major diagnoses, care, treatment, and services provided during the course of the hospital stay): unremarkable      Final Diagnosis: Post-Op Diagnosis Codes:     * Screen for colon cancer [Z12.11]    Disposition: Home or Self Care    Follow Up/Patient Instructions:     Medications:  Reconciled Home Medications:      Medication List      CONTINUE taking these medications    amLODIPine 10 MG tablet  Commonly known as:  NORVASC  Take 1 tablet (10 mg total) by mouth once daily.     ergocalciferol 50,000 unit Cap  Commonly known as:  ERGOCALCIFEROL  Take 1 capsule (50,000 Units total) by mouth twice a week.     FISH OIL ORAL  Take by mouth.     folic acid 1 MG tablet  Commonly known as:  FOLVITE  Take 1 tablet (1 mg total) by mouth once daily.     latanoprost 0.005 % ophthalmic solution     leflunomide 20 MG Tab  Commonly known as:  ARAVA  Take 1 tablet (20 mg total) by mouth once daily.     methotrexate (PF) 25 mg/mL Soln  INJECT 1 ML( 25 MG) UNDER THE SKIN EVERY 7 DAYS     nortriptyline 25 MG capsule  Commonly known as:  PAMELOR  TAKE 3 CAPSULES BY MOUTH EVERY EVENING     * nystatin cream  Commonly known as:  MYCOSTATIN  Apply topically 2 (two) times  "daily.     * nystatin powder  Commonly known as:  MYCOSTATIN  Apply topically 4 (four) times daily.     ONE DAILY MULTIVITAMIN per tablet  Generic drug:  multivitamin  Take 1 tablet by mouth once daily.     pravastatin 40 MG tablet  Commonly known as:  PRAVACHOL  Take 1 tablet (40 mg total) by mouth every evening.     syringe with needle, safety 1 mL 27 gauge x 1/2" Syrg  Commonly known as:  BD SAFETY-ALONDRA TUBERCULIN  USE AS DIRECTED         * This list has 2 medication(s) that are the same as other medications prescribed for you. Read the directions carefully, and ask your doctor or other care provider to review them with you.              Discharge Procedure Orders   Diet general       "

## 2018-08-31 ENCOUNTER — INFUSION (OUTPATIENT)
Dept: INFUSION THERAPY | Facility: HOSPITAL | Age: 68
End: 2018-08-31
Attending: INTERNAL MEDICINE
Payer: MEDICARE

## 2018-08-31 VITALS
HEART RATE: 86 BPM | BODY MASS INDEX: 35 KG/M2 | RESPIRATION RATE: 18 BRPM | WEIGHT: 223 LBS | SYSTOLIC BLOOD PRESSURE: 118 MMHG | DIASTOLIC BLOOD PRESSURE: 66 MMHG | HEIGHT: 67 IN | TEMPERATURE: 97 F

## 2018-08-31 DIAGNOSIS — M05.79 RHEUMATOID ARTHRITIS INVOLVING MULTIPLE SITES WITH POSITIVE RHEUMATOID FACTOR: Primary | ICD-10-CM

## 2018-08-31 PROCEDURE — 25000003 PHARM REV CODE 250: Performed by: INTERNAL MEDICINE

## 2018-08-31 PROCEDURE — 63600175 PHARM REV CODE 636 W HCPCS: Performed by: INTERNAL MEDICINE

## 2018-08-31 PROCEDURE — 96365 THER/PROPH/DIAG IV INF INIT: CPT

## 2018-08-31 RX ORDER — ACETAMINOPHEN 325 MG/1
650 TABLET ORAL
Status: CANCELLED | OUTPATIENT
Start: 2018-08-31

## 2018-08-31 RX ORDER — HEPARIN 100 UNIT/ML
500 SYRINGE INTRAVENOUS
Status: CANCELLED | OUTPATIENT
Start: 2018-08-31

## 2018-08-31 RX ORDER — SODIUM CHLORIDE 0.9 % (FLUSH) 0.9 %
10 SYRINGE (ML) INJECTION
Status: CANCELLED | OUTPATIENT
Start: 2018-08-31

## 2018-08-31 RX ADMIN — SODIUM CHLORIDE 1000 MG: 9 INJECTION, SOLUTION INTRAVENOUS at 08:08

## 2018-09-05 DIAGNOSIS — G47.00 INSOMNIA, UNSPECIFIED TYPE: ICD-10-CM

## 2018-09-05 RX ORDER — NORTRIPTYLINE HYDROCHLORIDE 25 MG/1
CAPSULE ORAL
Qty: 270 CAPSULE | Refills: 1 | Status: SHIPPED | OUTPATIENT
Start: 2018-09-05 | End: 2019-01-14 | Stop reason: SDUPTHER

## 2018-09-27 DIAGNOSIS — Z79.631 LONG TERM METHOTREXATE USER: ICD-10-CM

## 2018-09-27 RX ORDER — FOLIC ACID 1 MG/1
TABLET ORAL
Qty: 90 TABLET | Refills: 3 | Status: SHIPPED | OUTPATIENT
Start: 2018-09-27 | End: 2019-01-14 | Stop reason: DRUGHIGH

## 2018-10-01 ENCOUNTER — INFUSION (OUTPATIENT)
Dept: INFUSION THERAPY | Facility: HOSPITAL | Age: 68
End: 2018-10-01
Attending: INTERNAL MEDICINE
Payer: MEDICARE

## 2018-10-01 VITALS
SYSTOLIC BLOOD PRESSURE: 113 MMHG | RESPIRATION RATE: 18 BRPM | DIASTOLIC BLOOD PRESSURE: 68 MMHG | HEART RATE: 87 BPM | HEIGHT: 67 IN | BODY MASS INDEX: 35 KG/M2 | WEIGHT: 223 LBS

## 2018-10-01 DIAGNOSIS — M05.79 RHEUMATOID ARTHRITIS INVOLVING MULTIPLE SITES WITH POSITIVE RHEUMATOID FACTOR: Primary | ICD-10-CM

## 2018-10-01 PROCEDURE — 96413 CHEMO IV INFUSION 1 HR: CPT

## 2018-10-01 PROCEDURE — 25000003 PHARM REV CODE 250: Performed by: INTERNAL MEDICINE

## 2018-10-01 PROCEDURE — 63600175 PHARM REV CODE 636 W HCPCS: Performed by: INTERNAL MEDICINE

## 2018-10-01 RX ORDER — ACETAMINOPHEN 325 MG/1
650 TABLET ORAL
Status: CANCELLED | OUTPATIENT
Start: 2018-10-01

## 2018-10-01 RX ORDER — SODIUM CHLORIDE 0.9 % (FLUSH) 0.9 %
10 SYRINGE (ML) INJECTION
Status: CANCELLED | OUTPATIENT
Start: 2018-10-01

## 2018-10-01 RX ORDER — HEPARIN 100 UNIT/ML
500 SYRINGE INTRAVENOUS
Status: CANCELLED | OUTPATIENT
Start: 2018-10-01

## 2018-10-01 RX ADMIN — SODIUM CHLORIDE 1000 MG: 9 INJECTION, SOLUTION INTRAVENOUS at 08:10

## 2018-10-01 NOTE — PATIENT INSTRUCTIONS
Abatacept Solution for injection  What is this medicine?  ABATACEPT (a ba TA sept) is used to treat moderate to severe active rheumatoid arthritis in adults. This medicine is also used to treat juvenile idiopathic arthritis.  How should I use this medicine?  This medicine is for infusion into a vein. It is given by a health care professional in a hospital or clinic setting.  Talk to your pediatrician regarding the use of this medicine in children. While this drug may be prescribed for children as young as 6 years for selected conditions, precautions do apply.  What side effects may I notice from receiving this medicine?  Side effects that you should report to your doctor or health care professional as soon as possible:  · allergic reactions like skin rash, itching or hives, swelling of the face, lips, or tongue  · breathing problems  · chest pain  · signs of infection - fever or chills, cough, unusual tiredness, pain or trouble passing urine, or warm, red or painful skin  Side effects that usually do not require medical attention (Report these to your doctor or health care professional if they continue or are bothersome.):  · dizziness  · headache  · nausea, vomiting  · sore throat  · stomach upset  What may interact with this medicine?  Do not take this medicine with any of the following medications:  · adalimumab  · anakinra  · certolizumab  · etanercept  · golimumab  · infliximab  · live virus vaccines  · rituximab  · tocilizumab  This medicine may also interact with the following medications:  · vaccines  What if I miss a dose?  It is important not to miss your dose. Call your doctor or health care professional if you are unable to keep an appointment.  Where should I keep my medicine?  This drug is given in a hospital or clinic and will not be stored at home.  What should I tell my health care provider before I take this medicine?  They need to know if you have any of these conditions:  · are taking other  medicines to treat rheumatoid arthritis  · COPD  · diabetes  · infection or history of infections  · recently received or scheduled to receive a vaccine  · scheduled to have surgery  · tuberculosis, a positive skin test for tuberculosis or have recently been in close contact with someone who has tuberculosis  · viral hepatitis  · an unusual or allergic reaction to abatacept, other medicines, foods, dyes, or preservatives  · pregnant or trying to get pregnant  · breast-feeding  What should I watch for while using this medicine?  Visit your doctor for regular check ups while you are taking this medicine. Tell your doctor or healthcare professional if your symptoms do not start to get better or if they get worse.  Call your doctor or health care professional if you get a cold or other infection while receiving this medicine. Do not treat yourself. This medicine may decrease your body's ability to fight infection. Try to avoid being around people who are sick.  NOTE:This sheet is a summary. It may not cover all possible information. If you have questions about this medicine, talk to your doctor, pharmacist, or health care provider. Copyright© 2017 [employer]

## 2018-10-15 DIAGNOSIS — M05.771 RHEUMATOID ARTHRITIS INVOLVING BOTH ANKLES WITH POSITIVE RHEUMATOID FACTOR: ICD-10-CM

## 2018-10-15 DIAGNOSIS — M85.80 OSTEOPENIA, UNSPECIFIED LOCATION: ICD-10-CM

## 2018-10-15 DIAGNOSIS — M05.772 RHEUMATOID ARTHRITIS INVOLVING BOTH ANKLES WITH POSITIVE RHEUMATOID FACTOR: ICD-10-CM

## 2018-10-15 RX ORDER — ERGOCALCIFEROL 1.25 MG/1
CAPSULE ORAL
Qty: 25 CAPSULE | Refills: 0 | OUTPATIENT
Start: 2018-10-15

## 2018-10-15 RX ORDER — METHOTREXATE 25 MG/ML
25 INJECTION, SOLUTION INTRA-ARTERIAL; INTRAMUSCULAR; INTRAVENOUS
Qty: 10 ML | Refills: 0 | Status: SHIPPED | OUTPATIENT
Start: 2018-10-15 | End: 2019-01-14 | Stop reason: SDUPTHER

## 2018-10-15 RX ORDER — METHOTREXATE 25 MG/ML
INJECTION INTRA-ARTERIAL; INTRAMUSCULAR; INTRATHECAL; INTRAVENOUS
Qty: 10 ML | Refills: 0 | OUTPATIENT
Start: 2018-10-15

## 2018-10-15 RX ORDER — ERGOCALCIFEROL 1.25 MG/1
50000 CAPSULE ORAL
Qty: 24 CAPSULE | Refills: 3 | Status: SHIPPED | OUTPATIENT
Start: 2018-10-15 | End: 2019-01-14 | Stop reason: SDUPTHER

## 2018-10-23 ENCOUNTER — LAB VISIT (OUTPATIENT)
Dept: LAB | Facility: HOSPITAL | Age: 68
End: 2018-10-23
Attending: INTERNAL MEDICINE
Payer: MEDICARE

## 2018-10-23 DIAGNOSIS — M06.9 RHEUMATOID ARTHRITIS OF HAND, UNSPECIFIED LATERALITY, UNSPECIFIED RHEUMATOID FACTOR PRESENCE: ICD-10-CM

## 2018-10-23 DIAGNOSIS — E88.819 INSULIN RESISTANCE: ICD-10-CM

## 2018-10-23 DIAGNOSIS — R79.9 ABNORMAL FINDING OF BLOOD CHEMISTRY: ICD-10-CM

## 2018-10-23 DIAGNOSIS — E78.5 HYPERLIPIDEMIA, UNSPECIFIED HYPERLIPIDEMIA TYPE: ICD-10-CM

## 2018-10-23 LAB
ALBUMIN SERPL BCP-MCNC: 3.5 G/DL
ALP SERPL-CCNC: 119 U/L
ALT SERPL W/O P-5'-P-CCNC: 21 U/L
ANION GAP SERPL CALC-SCNC: 10 MMOL/L
AST SERPL-CCNC: 21 U/L
BASOPHILS # BLD AUTO: 0.05 K/UL
BASOPHILS NFR BLD: 0.5 %
BILIRUB SERPL-MCNC: 0.4 MG/DL
BUN SERPL-MCNC: 13 MG/DL
CALCIUM SERPL-MCNC: 9.1 MG/DL
CHLORIDE SERPL-SCNC: 106 MMOL/L
CHOLEST SERPL-MCNC: 163 MG/DL
CHOLEST/HDLC SERPL: 4.1 {RATIO}
CO2 SERPL-SCNC: 26 MMOL/L
CREAT SERPL-MCNC: 0.8 MG/DL
CRP SERPL-MCNC: 12.9 MG/L
DIFFERENTIAL METHOD: ABNORMAL
EOSINOPHIL # BLD AUTO: 0.6 K/UL
EOSINOPHIL NFR BLD: 5.6 %
ERYTHROCYTE [DISTWIDTH] IN BLOOD BY AUTOMATED COUNT: 15.4 %
ERYTHROCYTE [SEDIMENTATION RATE] IN BLOOD BY WESTERGREN METHOD: 25 MM/HR
EST. GFR  (AFRICAN AMERICAN): >60 ML/MIN/1.73 M^2
EST. GFR  (NON AFRICAN AMERICAN): >60 ML/MIN/1.73 M^2
ESTIMATED AVG GLUCOSE: 128 MG/DL
GLUCOSE SERPL-MCNC: 130 MG/DL
HBA1C MFR BLD HPLC: 6.1 %
HCT VFR BLD AUTO: 44.9 %
HDLC SERPL-MCNC: 40 MG/DL
HDLC SERPL: 24.5 %
HGB BLD-MCNC: 14.7 G/DL
LDLC SERPL CALC-MCNC: 70.4 MG/DL
LYMPHOCYTES # BLD AUTO: 3.7 K/UL
LYMPHOCYTES NFR BLD: 36.5 %
MCH RBC QN AUTO: 30.6 PG
MCHC RBC AUTO-ENTMCNC: 32.7 G/DL
MCV RBC AUTO: 93 FL
MONOCYTES # BLD AUTO: 0.9 K/UL
MONOCYTES NFR BLD: 8.7 %
NEUTROPHILS # BLD AUTO: 4.9 K/UL
NEUTROPHILS NFR BLD: 48.7 %
NONHDLC SERPL-MCNC: 123 MG/DL
PLATELET # BLD AUTO: 285 K/UL
PMV BLD AUTO: 9.5 FL
POTASSIUM SERPL-SCNC: 4.2 MMOL/L
PROT SERPL-MCNC: 6.8 G/DL
RBC # BLD AUTO: 4.81 M/UL
SODIUM SERPL-SCNC: 142 MMOL/L
TRIGL SERPL-MCNC: 263 MG/DL
WBC # BLD AUTO: 10 K/UL

## 2018-10-23 PROCEDURE — 36415 COLL VENOUS BLD VENIPUNCTURE: CPT

## 2018-10-23 PROCEDURE — 86140 C-REACTIVE PROTEIN: CPT

## 2018-10-23 PROCEDURE — 85025 COMPLETE CBC W/AUTO DIFF WBC: CPT

## 2018-10-23 PROCEDURE — 83036 HEMOGLOBIN GLYCOSYLATED A1C: CPT

## 2018-10-23 PROCEDURE — 80053 COMPREHEN METABOLIC PANEL: CPT

## 2018-10-23 PROCEDURE — 85651 RBC SED RATE NONAUTOMATED: CPT

## 2018-10-23 PROCEDURE — 80061 LIPID PANEL: CPT

## 2018-10-24 ENCOUNTER — TELEPHONE (OUTPATIENT)
Dept: RHEUMATOLOGY | Facility: CLINIC | Age: 68
End: 2018-10-24

## 2018-11-01 DIAGNOSIS — M05.772 RHEUMATOID ARTHRITIS INVOLVING BOTH ANKLES WITH POSITIVE RHEUMATOID FACTOR: ICD-10-CM

## 2018-11-01 DIAGNOSIS — M05.771 RHEUMATOID ARTHRITIS INVOLVING BOTH ANKLES WITH POSITIVE RHEUMATOID FACTOR: ICD-10-CM

## 2018-11-01 RX ORDER — LEFLUNOMIDE 20 MG/1
TABLET ORAL
Qty: 90 TABLET | Refills: 0 | Status: SHIPPED | OUTPATIENT
Start: 2018-11-01 | End: 2019-01-14 | Stop reason: SDUPTHER

## 2018-11-05 ENCOUNTER — INFUSION (OUTPATIENT)
Dept: INFUSION THERAPY | Facility: HOSPITAL | Age: 68
End: 2018-11-05
Attending: INTERNAL MEDICINE
Payer: MEDICARE

## 2018-11-05 VITALS
DIASTOLIC BLOOD PRESSURE: 66 MMHG | BODY MASS INDEX: 35 KG/M2 | HEART RATE: 94 BPM | RESPIRATION RATE: 18 BRPM | SYSTOLIC BLOOD PRESSURE: 135 MMHG | TEMPERATURE: 97 F | HEIGHT: 67 IN | WEIGHT: 223 LBS

## 2018-11-05 DIAGNOSIS — M05.79 RHEUMATOID ARTHRITIS INVOLVING MULTIPLE SITES WITH POSITIVE RHEUMATOID FACTOR: Primary | ICD-10-CM

## 2018-11-05 PROCEDURE — 63600175 PHARM REV CODE 636 W HCPCS: Performed by: INTERNAL MEDICINE

## 2018-11-05 PROCEDURE — 96365 THER/PROPH/DIAG IV INF INIT: CPT

## 2018-11-05 PROCEDURE — 25000003 PHARM REV CODE 250: Performed by: INTERNAL MEDICINE

## 2018-11-05 RX ORDER — SODIUM CHLORIDE 0.9 % (FLUSH) 0.9 %
10 SYRINGE (ML) INJECTION
Status: CANCELLED | OUTPATIENT
Start: 2018-11-05

## 2018-11-05 RX ORDER — ACETAMINOPHEN 325 MG/1
650 TABLET ORAL
Status: CANCELLED | OUTPATIENT
Start: 2018-11-05

## 2018-11-05 RX ORDER — HEPARIN 100 UNIT/ML
500 SYRINGE INTRAVENOUS
Status: CANCELLED | OUTPATIENT
Start: 2018-11-05

## 2018-11-05 RX ADMIN — SODIUM CHLORIDE 1000 MG: 9 INJECTION, SOLUTION INTRAVENOUS at 08:11

## 2018-11-29 ENCOUNTER — TELEPHONE (OUTPATIENT)
Dept: INTERNAL MEDICINE | Facility: CLINIC | Age: 68
End: 2018-11-29

## 2018-11-29 NOTE — TELEPHONE ENCOUNTER
----- Message from Homa Hernandez sent at 2018  8:24 AM CST -----  Contact: Self  Maru Shelby  MRN: [id number]  : 1950  PCP: Florence Mark  Home Phone      [phone]  Work Phone      Not on file.  Mobile          [phone]    MESSAGE:   Would like to speak to nurse about scheduling a mammogram.    Please call.    Phone: [phone]

## 2018-12-10 ENCOUNTER — INFUSION (OUTPATIENT)
Dept: INFUSION THERAPY | Facility: HOSPITAL | Age: 68
End: 2018-12-10
Attending: INTERNAL MEDICINE
Payer: MEDICARE

## 2018-12-10 VITALS
DIASTOLIC BLOOD PRESSURE: 66 MMHG | SYSTOLIC BLOOD PRESSURE: 119 MMHG | WEIGHT: 223 LBS | HEIGHT: 67 IN | BODY MASS INDEX: 35 KG/M2 | HEART RATE: 86 BPM | RESPIRATION RATE: 18 BRPM

## 2018-12-10 DIAGNOSIS — M05.79 RHEUMATOID ARTHRITIS INVOLVING MULTIPLE SITES WITH POSITIVE RHEUMATOID FACTOR: Primary | ICD-10-CM

## 2018-12-10 PROCEDURE — 25000003 PHARM REV CODE 250: Performed by: INTERNAL MEDICINE

## 2018-12-10 PROCEDURE — 63600175 PHARM REV CODE 636 W HCPCS: Performed by: INTERNAL MEDICINE

## 2018-12-10 PROCEDURE — 96413 CHEMO IV INFUSION 1 HR: CPT

## 2018-12-10 RX ORDER — HEPARIN 100 UNIT/ML
500 SYRINGE INTRAVENOUS
Status: CANCELLED | OUTPATIENT
Start: 2018-12-10

## 2018-12-10 RX ORDER — SODIUM CHLORIDE 0.9 % (FLUSH) 0.9 %
10 SYRINGE (ML) INJECTION
Status: CANCELLED | OUTPATIENT
Start: 2018-12-10

## 2018-12-10 RX ORDER — ACETAMINOPHEN 325 MG/1
650 TABLET ORAL
Status: CANCELLED | OUTPATIENT
Start: 2018-12-10

## 2018-12-10 RX ADMIN — SODIUM CHLORIDE 1000 MG: 9 INJECTION, SOLUTION INTRAVENOUS at 08:12

## 2019-01-11 ENCOUNTER — LAB VISIT (OUTPATIENT)
Dept: LAB | Facility: HOSPITAL | Age: 69
End: 2019-01-11
Attending: INTERNAL MEDICINE
Payer: MEDICARE

## 2019-01-11 DIAGNOSIS — M06.9 RHEUMATOID ARTHRITIS OF HAND, UNSPECIFIED LATERALITY, UNSPECIFIED RHEUMATOID FACTOR PRESENCE: ICD-10-CM

## 2019-01-11 LAB
ALBUMIN SERPL BCP-MCNC: 3.7 G/DL
ALP SERPL-CCNC: 126 U/L
ALT SERPL W/O P-5'-P-CCNC: 31 U/L
ANION GAP SERPL CALC-SCNC: 9 MMOL/L
AST SERPL-CCNC: 31 U/L
BASOPHILS # BLD AUTO: 0.02 K/UL
BASOPHILS NFR BLD: 0.2 %
BILIRUB SERPL-MCNC: 0.5 MG/DL
BUN SERPL-MCNC: 12 MG/DL
CALCIUM SERPL-MCNC: 9.2 MG/DL
CHLORIDE SERPL-SCNC: 106 MMOL/L
CO2 SERPL-SCNC: 27 MMOL/L
CREAT SERPL-MCNC: 0.8 MG/DL
CRP SERPL-MCNC: 17.9 MG/L
DIFFERENTIAL METHOD: ABNORMAL
EOSINOPHIL # BLD AUTO: 0.3 K/UL
EOSINOPHIL NFR BLD: 3.6 %
ERYTHROCYTE [DISTWIDTH] IN BLOOD BY AUTOMATED COUNT: 15.5 %
ERYTHROCYTE [SEDIMENTATION RATE] IN BLOOD BY WESTERGREN METHOD: 25 MM/HR
EST. GFR  (AFRICAN AMERICAN): >60 ML/MIN/1.73 M^2
EST. GFR  (NON AFRICAN AMERICAN): >60 ML/MIN/1.73 M^2
GLUCOSE SERPL-MCNC: 136 MG/DL
HCT VFR BLD AUTO: 43 %
HGB BLD-MCNC: 14.2 G/DL
LYMPHOCYTES # BLD AUTO: 2.9 K/UL
LYMPHOCYTES NFR BLD: 30.8 %
MCH RBC QN AUTO: 30.5 PG
MCHC RBC AUTO-ENTMCNC: 33 G/DL
MCV RBC AUTO: 93 FL
MONOCYTES # BLD AUTO: 0.8 K/UL
MONOCYTES NFR BLD: 8.9 %
NEUTROPHILS # BLD AUTO: 5.2 K/UL
NEUTROPHILS NFR BLD: 56.5 %
PLATELET # BLD AUTO: 243 K/UL
PMV BLD AUTO: 9.4 FL
POTASSIUM SERPL-SCNC: 4.2 MMOL/L
PROT SERPL-MCNC: 6.8 G/DL
RBC # BLD AUTO: 4.65 M/UL
SODIUM SERPL-SCNC: 142 MMOL/L
WBC # BLD AUTO: 9.25 K/UL

## 2019-01-11 PROCEDURE — 85651 RBC SED RATE NONAUTOMATED: CPT

## 2019-01-11 PROCEDURE — 36415 COLL VENOUS BLD VENIPUNCTURE: CPT

## 2019-01-11 PROCEDURE — 86140 C-REACTIVE PROTEIN: CPT

## 2019-01-11 PROCEDURE — 80053 COMPREHEN METABOLIC PANEL: CPT

## 2019-01-11 PROCEDURE — 85025 COMPLETE CBC W/AUTO DIFF WBC: CPT

## 2019-01-14 ENCOUNTER — OFFICE VISIT (OUTPATIENT)
Dept: RHEUMATOLOGY | Facility: CLINIC | Age: 69
End: 2019-01-14
Payer: MEDICARE

## 2019-01-14 ENCOUNTER — CLINICAL SUPPORT (OUTPATIENT)
Dept: INFECTIOUS DISEASES | Facility: CLINIC | Age: 69
End: 2019-01-14
Payer: MEDICARE

## 2019-01-14 VITALS
BODY MASS INDEX: 35.65 KG/M2 | HEART RATE: 107 BPM | SYSTOLIC BLOOD PRESSURE: 148 MMHG | HEIGHT: 67 IN | WEIGHT: 227.13 LBS | DIASTOLIC BLOOD PRESSURE: 86 MMHG

## 2019-01-14 DIAGNOSIS — Z79.631 LONG TERM METHOTREXATE USER: ICD-10-CM

## 2019-01-14 DIAGNOSIS — M25.561 ACUTE PAIN OF RIGHT KNEE: ICD-10-CM

## 2019-01-14 DIAGNOSIS — M06.9 RHEUMATOID ARTHRITIS OF RIGHT KNEE: ICD-10-CM

## 2019-01-14 DIAGNOSIS — I10 ESSENTIAL HYPERTENSION: ICD-10-CM

## 2019-01-14 DIAGNOSIS — G47.00 INSOMNIA, UNSPECIFIED TYPE: ICD-10-CM

## 2019-01-14 DIAGNOSIS — M05.771 RHEUMATOID ARTHRITIS INVOLVING BOTH ANKLES WITH POSITIVE RHEUMATOID FACTOR: ICD-10-CM

## 2019-01-14 DIAGNOSIS — M17.11 PRIMARY OSTEOARTHRITIS OF RIGHT KNEE: ICD-10-CM

## 2019-01-14 DIAGNOSIS — M77.8 RIGHT SHOULDER TENDONITIS: Primary | ICD-10-CM

## 2019-01-14 DIAGNOSIS — M85.80 OSTEOPENIA, UNSPECIFIED LOCATION: ICD-10-CM

## 2019-01-14 DIAGNOSIS — M05.772 RHEUMATOID ARTHRITIS INVOLVING BOTH ANKLES WITH POSITIVE RHEUMATOID FACTOR: ICD-10-CM

## 2019-01-14 PROCEDURE — 3077F SYST BP >= 140 MM HG: CPT | Mod: CPTII,S$GLB,, | Performed by: INTERNAL MEDICINE

## 2019-01-14 PROCEDURE — 99499 RISK ADDL DX/OHS AUDIT: ICD-10-PCS | Mod: S$GLB,,, | Performed by: STUDENT IN AN ORGANIZED HEALTH CARE EDUCATION/TRAINING PROGRAM

## 2019-01-14 PROCEDURE — 20610 LARGE JOINT ASPIRATION/INJECTION: R KNEE: ICD-10-PCS | Mod: RT,S$GLB,, | Performed by: INTERNAL MEDICINE

## 2019-01-14 PROCEDURE — 99999 PR PBB SHADOW E&M-EST. PATIENT-LVL III: ICD-10-PCS | Mod: PBBFAC,,, | Performed by: INTERNAL MEDICINE

## 2019-01-14 PROCEDURE — 99999 PR PBB SHADOW E&M-EST. PATIENT-LVL III: CPT | Mod: PBBFAC,,, | Performed by: INTERNAL MEDICINE

## 2019-01-14 PROCEDURE — 3077F PR MOST RECENT SYSTOLIC BLOOD PRESSURE >= 140 MM HG: ICD-10-PCS | Mod: CPTII,S$GLB,, | Performed by: INTERNAL MEDICINE

## 2019-01-14 PROCEDURE — 90471 IMMUNIZATION ADMIN: CPT | Mod: S$GLB,,, | Performed by: INTERNAL MEDICINE

## 2019-01-14 PROCEDURE — 99214 PR OFFICE/OUTPT VISIT, EST, LEVL IV, 30-39 MIN: ICD-10-PCS | Mod: 25,S$GLB,, | Performed by: INTERNAL MEDICINE

## 2019-01-14 PROCEDURE — 1101F PT FALLS ASSESS-DOCD LE1/YR: CPT | Mod: CPTII,S$GLB,, | Performed by: INTERNAL MEDICINE

## 2019-01-14 PROCEDURE — 1101F PR PT FALLS ASSESS DOC 0-1 FALLS W/OUT INJ PAST YR: ICD-10-PCS | Mod: CPTII,S$GLB,, | Performed by: INTERNAL MEDICINE

## 2019-01-14 PROCEDURE — 99499 UNLISTED E&M SERVICE: CPT | Mod: S$GLB,,, | Performed by: INTERNAL MEDICINE

## 2019-01-14 PROCEDURE — 20610 DRAIN/INJ JOINT/BURSA W/O US: CPT | Mod: RT,S$GLB,, | Performed by: INTERNAL MEDICINE

## 2019-01-14 PROCEDURE — 99499 UNLISTED E&M SERVICE: CPT | Mod: S$GLB,,, | Performed by: STUDENT IN AN ORGANIZED HEALTH CARE EDUCATION/TRAINING PROGRAM

## 2019-01-14 PROCEDURE — 90471 PR IMMUNIZ ADMIN,1 SINGLE/COMB VAC/TOXOID: ICD-10-PCS | Mod: S$GLB,,, | Performed by: INTERNAL MEDICINE

## 2019-01-14 PROCEDURE — 90750 ZOSTER RECOMBINANT VACCINE: ICD-10-PCS | Mod: S$GLB,,, | Performed by: INTERNAL MEDICINE

## 2019-01-14 PROCEDURE — 3079F DIAST BP 80-89 MM HG: CPT | Mod: CPTII,S$GLB,, | Performed by: INTERNAL MEDICINE

## 2019-01-14 PROCEDURE — 99499 RISK ADDL DX/OHS AUDIT: ICD-10-PCS | Mod: S$GLB,,, | Performed by: INTERNAL MEDICINE

## 2019-01-14 PROCEDURE — 3079F PR MOST RECENT DIASTOLIC BLOOD PRESSURE 80-89 MM HG: ICD-10-PCS | Mod: CPTII,S$GLB,, | Performed by: INTERNAL MEDICINE

## 2019-01-14 PROCEDURE — 90750 HZV VACC RECOMBINANT IM: CPT | Mod: S$GLB,,, | Performed by: INTERNAL MEDICINE

## 2019-01-14 PROCEDURE — 99214 OFFICE O/P EST MOD 30 MIN: CPT | Mod: 25,S$GLB,, | Performed by: INTERNAL MEDICINE

## 2019-01-14 RX ORDER — AMLODIPINE BESYLATE 10 MG/1
10 TABLET ORAL DAILY
Qty: 90 TABLET | Refills: 3 | Status: SHIPPED | OUTPATIENT
Start: 2019-01-14 | End: 2020-03-11 | Stop reason: SDUPTHER

## 2019-01-14 RX ORDER — NORTRIPTYLINE HYDROCHLORIDE 25 MG/1
CAPSULE ORAL
Qty: 270 CAPSULE | Refills: 1 | Status: SHIPPED | OUTPATIENT
Start: 2019-01-14 | End: 2019-07-26 | Stop reason: SDUPTHER

## 2019-01-14 RX ORDER — TRIAMCINOLONE ACETONIDE 40 MG/ML
40 INJECTION, SUSPENSION INTRA-ARTICULAR; INTRAMUSCULAR
Status: DISCONTINUED | OUTPATIENT
Start: 2019-01-14 | End: 2019-01-14 | Stop reason: HOSPADM

## 2019-01-14 RX ORDER — PRAVASTATIN SODIUM 40 MG/1
40 TABLET ORAL NIGHTLY
Qty: 90 TABLET | Refills: 3 | Status: SHIPPED | OUTPATIENT
Start: 2019-01-14 | End: 2020-03-11 | Stop reason: SDUPTHER

## 2019-01-14 RX ORDER — FOLIC ACID 1 MG/1
1 TABLET ORAL DAILY
Qty: 90 TABLET | Refills: 3 | Status: SHIPPED | OUTPATIENT
Start: 2019-01-14 | End: 2020-02-18 | Stop reason: SDUPTHER

## 2019-01-14 RX ORDER — METHOTREXATE 25 MG/ML
25 INJECTION, SOLUTION INTRA-ARTERIAL; INTRAMUSCULAR; INTRAVENOUS
Qty: 10 ML | Refills: 0 | Status: SHIPPED | OUTPATIENT
Start: 2019-01-14 | End: 2019-01-31 | Stop reason: SDUPTHER

## 2019-01-14 RX ORDER — LEFLUNOMIDE 20 MG/1
TABLET ORAL
Qty: 90 TABLET | Refills: 0 | Status: SHIPPED | OUTPATIENT
Start: 2019-01-14 | End: 2019-04-15 | Stop reason: SDUPTHER

## 2019-01-14 RX ORDER — ERGOCALCIFEROL 1.25 MG/1
50000 CAPSULE ORAL
Qty: 24 CAPSULE | Refills: 3 | Status: SHIPPED | OUTPATIENT
Start: 2019-01-14 | End: 2020-02-18 | Stop reason: SDUPTHER

## 2019-01-14 RX ADMIN — TRIAMCINOLONE ACETONIDE 40 MG: 40 INJECTION, SUSPENSION INTRA-ARTICULAR; INTRAMUSCULAR at 09:01

## 2019-01-14 ASSESSMENT — ROUTINE ASSESSMENT OF PATIENT INDEX DATA (RAPID3)
MDHAQ FUNCTION SCORE: .6
WHEN YOU AWAKENED IN THE MORNING OVER THE LAST WEEK, PLEASE INDICATE THE AMOUNT OF TIME IT TAKES UNTIL YOU ARE AS LIMBER AS YOU WILL BE FOR THE DAY: 12 HRS
PSYCHOLOGICAL DISTRESS SCORE: 0
PATIENT GLOBAL ASSESSMENT SCORE: 8
AM STIFFNESS SCORE: 1, YES
FATIGUE SCORE: 7
PAIN SCORE: 8
TOTAL RAPID3 SCORE: 6

## 2019-01-14 ASSESSMENT — DISEASE ACTIVITY SCORE (DAS28)
ESR_MM_PER_HR: 25
SWOLLEN_JOINTS_COUNT: 2
GLOBAL_HEALTH_SCORE: 80
CRP_MG_PER_LITER: 17.9
TOTAL_SCORE_CRP: 4.09
TOTAL_SCORE_ESR: 4.33
TENDER_JOINTS_COUNT: 1

## 2019-01-14 NOTE — PROGRESS NOTES
"Subjective:       Patient ID: Maru Shelby is a 68 y.o. female.    Chief Complaint: "In September, I had a whole month of aching with my hands"    In September, she had a flare with pain in the hands and a radiating left shoulder pain.  In November, she joined a gym. She continously exercised for 1 month before she started to have left groin pain that lasted for 2 weeks.  She has not yet restarted going to the gym.  She was not able to see bariatric medicine and has gained 5 lbs.  2 weeks ago, she also started having pain in her knees and has started to use a walker. Today is the first day that she is walking with no walker.  She says that she is currently in a flare due to swelling in her right knee and 2nd left MCP.  The right knee is painful at the medial joint line and posteriorly.  She is taking her medications as prescribed methotrexate 25mg sc once a week with folic acid 1mg daily, leflunomide 20mg po daily, and abatacept 1000mg IV q 28 days.    BP today 148/86. 135/70 is blood pressure typically; she is checking every week and a half at home.  She received the flu vaccine in October.  She tried to received the shingles vaccine but it was too expensive with her previous insurance.  She received a steroid injection from urgent care 3 weeks ago for URI.    Review of Systems   Constitutional: Positive for fatigue. Negative for fever.   HENT: Positive for sore throat. Negative for congestion.    Eyes: Negative for pain.   Respiratory: Negative for shortness of breath.    Cardiovascular: Negative for chest pain.   Gastrointestinal: Negative for abdominal pain.   Genitourinary: Negative for dysuria.   Musculoskeletal: Positive for arthralgias and joint swelling.   Skin: Negative for rash.   Neurological: Negative for headaches.   Psychiatric/Behavioral: Negative for sleep disturbance.         Objective:   There were no vitals taken for this visit.     Physical Exam    There were no vitals taken for this " visit.  Physical Exam   Constitutional: She is well-developed, well-nourished, and in no distress.   Neck: Normal range of motion.   Cardiovascular: Normal rate, regular rhythm and normal heart sounds.    Pulmonary/Chest: Effort normal and breath sounds normal. She has no decreased breath sounds.         Right Side Rheumatological Exam     Examination finds the shoulder, elbow, wrist, knee, 1st PIP, 1st MCP, 2nd PIP, 3rd PIP, 4th PIP, 4th MCP and 5th PIP normal.    The patient has an enlarged 2nd MCP and 3rd MCP     Shoulder Exam      Range of Motion   Active Abduction: 130   Extension: normal   Forward Flexion: 130      Knee Exam      Range of Motion   The patient has normal right knee ROM.     Muscle Strength (0-5 scale):  Deltoid:  5  Biceps: 5/5   Triceps:  5  : 5/5   Iliopsoas: 5  Quadriceps:  5   Distal Lower Extremity: 5     Left Side Rheumatological Exam     Examination finds the elbow, wrist, knee, 1st PIP, 1st MCP, 2nd PIP, 3rd PIP, 4th PIP, 4th MCP, 5th PIP and 5th MCP normal.    The patient is tender to palpation of the shoulder.    The patient has an enlarged 2nd MCP and 3rd MCP.    The patient has swelling of the 2nd MCP.     Shoulder Exam   Tenderness Location: acromioclavicular joint, biceps tendon and posterior shoulder     Range of Motion   Active Abduction:  80 abnormal   Passive Abduction:  80 abnormal   Extension: normal   Forward Flexion: 120      Knee Exam   Tenderness Location: medial joint line and pes anerinus     Range of Motion   Flexion:  110 abnormal     Patellofemoral Crepitus: positive  Effusion: negative  Warmth: negative     Muscle Strength (0-5 scale):  Deltoid:  4.2  Biceps: 4.4/5   Triceps:  4.4  :  5/5   Iliopsoas: 5  Quadriceps:  5   Distal Lower Extremity: 5    Right knee:  [facility] medial pain  Medial & lateral joint line & posterior TTP  + effusion, crepitus     Assessment:        1. RA RF+ ACPA+ GUSTABO-   TJ 1  SJ 2   Pt global 80  ESR 25    CRP 17.9  DAS28  4.33     IER19-FUZ 4.09  2. Vitamin D deficiency  3. Obesity      Plan:       -Right knee Kenalog injection; advised patient gradual return to activity  -Shingles vaccine today  -Advised elliptical continuity and abstain from the treadmill until next appointment  -Continue well balanced, vegetable based diet with minimal fried foods and  -Cont methotrexate 25mg sc once  A week with folic acid 1mg daily  -Cont leflunomide 20mg po daily  -Cont abatacept 1000mg IV q 28 days  -Decrease vitamin D2 to 50 0000 once daily  -Increase Fish Oil to 2000 mg once daily  -Follow up with bariatric medicine   -Start Physical therapy  -May change abatacept to tocilizumab  -RTC 3 months with standing labs.

## 2019-01-14 NOTE — PROCEDURES
Large Joint Aspiration/Injection: R knee  Date/Time: 1/14/2019 9:21 AM  Performed by: Timi Royal MD  Authorized by: Timi Royal MD     Consent Done?:  Yes (Verbal)  Indications:  Pain  Procedure site marked: Yes    Timeout: Prior to procedure the correct patient, procedure, and site was verified      Location:  Knee  Site:  R knee  Prep: Patient was prepped and draped in usual sterile fashion    Ultrasonic Guidance for needle placement: No  Needle size:  22 G  Approach:  Lateral  Medications:  40 mg triamcinolone acetonide 40 mg/mL  Aspirate amount (ml):  0  Patient tolerance:  Patient tolerated the procedure well with no immediate complications

## 2019-01-14 NOTE — PROGRESS NOTES
Ms. Shelby received Shingrix(first dose) vaccine. Tolerated well. Next appointment scheduled.  Left unit in NAD.

## 2019-01-14 NOTE — PATIENT INSTRUCTIONS
-Shingles vaccine today  -Continue daily activities, but refrain from strenuous activity for 2 days until knee improved  -Gradually increase physical activity to 5 times per week of 30 minute sessions  -Do home exercises printed out today  -Use the elliptical and abstain from the treadmill until next appointment  -Continue well balanced, vegetable based diet with minimal fried foods and  -Cont methotrexate 25mg sc once  A week with folic acid 1mg daily  -Cont leflunomide 20mg po daily  -Cont abatacept 1000mg IV q 28 days  -Decrease vitamin D to 50 0000 once daily  -Increase Fish Oil to 2000 mg once daily  -Please follow up with bariatric medicine   -Start Physical therapy

## 2019-01-14 NOTE — PROGRESS NOTES
I have personally taken the history and examined the patient and agree with the resident,s note as stated above In Sept had flare hands, resolved. Joined gym, after one month using stationary bike and treadmill, left groin pain, now resolved. In past week, left knee pain which has resolved, now right knee pain. Adherent with meds.     Exam: trace synovitis left 2nd mcp, trace bulge with suprapatellar effusion right knee                Results for MECCA DUONG (MRN [id number]) as of 1/14/2019 08:09   Ref. Range 10/23/2018 07:36   Cholesterol Latest Ref Range: 120 - 199 mg/dL 163   HDL Latest Ref Range: 40 - 75 mg/dL 40   HDL/Chol Ratio Latest Ref Range: 20.0 - 50.0 % 24.5   LDL Cholesterol Latest Ref Range: 63.0 - 159.0 mg/dL 70.4   Non-HDL Cholesterol Latest Units: mg/dL 123   Total Cholesterol/HDL Ratio Latest Ref Range: 2.0 - 5.0  4.1   Triglycerides Latest Ref Range: 30 - 150 mg/dL 263 (H)     Results for MECCA DUONG (MRN [id number]) as of 1/14/2019 08:09   Ref. Range 7/18/2018 09:05   Vit D, 25-Hydroxy Latest Ref Range: 30 - 96 ng/mL 63   4/3/18: Please tell pt the joint x-rays show stable mild slippage C4 on C5 on degenerative basis, no RA changes. The hands show mild narrowing at the wrists likely from RA. There are moderate degenerative changes thumb bases. There is mild degenerative change inner aspect of the knees and also of the great toes. No significant progression of the RA compared with previous. RJQ  Results for MECCA DUONG (MRN [id number]) as of 1/14/2019 08:09   Ref. Range 7/18/2018 09:05   Mitogen - Nil Latest Ref Range: See text IU/mL >10.000   NIL Latest Ref Range: See text IU/mL 0.090   TB Gold Plus Unknown Negative   TB1 - Nil Latest Ref Range: See text IU/mL -0.010   TB2 - Nil Latest Ref Range: See text IU/mL -0.010   Notes Recorded by Timi Royal MD on 9/7/2017 at 2:36 PM CDT  Please tell pt the bone density shows osteopenia but not low enough to require continued  alendronate(Fosamax) She can finish whatever Fosamax she has left and then can stop and not refill. Will continue vitamin D2 50,000 units twice a week, and repeat bone density in 2 years. RJQ                  RA RF+ ACPA+ GUSTABO-   TJ 1  SJ 2   Pt global 80  ESR 25    CRP 17.9  DAS28  4.33 [id number] 4.09 driven by right knee OA +RA  Hypertension 148/86 but 135/70 at home check every 10 days or so  Obesity, gained another 5#  Hyperlipidemia, LDL fine, TG still elevated  Vitamin D deficiency          * After verbal consent and cleansing with Chloraprep arthrocentesis right knee performed via the lateral suprapatellar bursa and injected with Kenalog 40mg with 1 ml 1 % lidocaine. Patient tolerated procedure well.  Limited weight bearing right knee x 72 hrs  Then resume gym, but elliptical, stationary bike rather than treadmill  *Shingrix  increase fish oil 2000mg daily  Re-refer to Bariatric Medicine to facilitate weight control  Cont pravastatin 40mg q pm  Cont methotrexate 25mg sc once a week with folic acid 1mg daily  Cont leflunomide 20mg daily  Cont abatacept 1000mg IV q 28 d  Decrease vitamin D2 50,000 units once a week  RTC 3 months with standing labs. If remains active, consider change abatacept to tocilizumab(had neuro issues with TNFi in past, avoid them going forward

## 2019-01-15 NOTE — PROGRESS NOTES
Patient, Maru Shelby (MRN #[id number]), presented with a recorded BMI of 35.36 kg/m^2 and a documented comorbidity(s):  - Hypertension  to which the severe obesity is a contributing factor. This is consistent with the definition of severe obesity (BMI 35.0-39.9) with comorbidity (ICD-10 E66.01, Z68.35). The patient's severe obesity was monitored, evaluated, addressed and/or treated. This addendum to the medical record is made on 01/14/2019.

## 2019-01-21 ENCOUNTER — INFUSION (OUTPATIENT)
Dept: INFUSION THERAPY | Facility: HOSPITAL | Age: 69
End: 2019-01-21
Attending: INTERNAL MEDICINE
Payer: MEDICARE

## 2019-01-21 VITALS
WEIGHT: 227.06 LBS | DIASTOLIC BLOOD PRESSURE: 70 MMHG | TEMPERATURE: 97 F | SYSTOLIC BLOOD PRESSURE: 128 MMHG | BODY MASS INDEX: 35.64 KG/M2 | HEIGHT: 67 IN | RESPIRATION RATE: 18 BRPM | HEART RATE: 91 BPM

## 2019-01-21 DIAGNOSIS — M05.79 RHEUMATOID ARTHRITIS INVOLVING MULTIPLE SITES WITH POSITIVE RHEUMATOID FACTOR: Primary | ICD-10-CM

## 2019-01-21 PROCEDURE — 96413 CHEMO IV INFUSION 1 HR: CPT

## 2019-01-21 PROCEDURE — 96365 THER/PROPH/DIAG IV INF INIT: CPT

## 2019-01-21 PROCEDURE — 63600175 PHARM REV CODE 636 W HCPCS: Performed by: INTERNAL MEDICINE

## 2019-01-21 PROCEDURE — 25000003 PHARM REV CODE 250: Performed by: INTERNAL MEDICINE

## 2019-01-21 RX ORDER — ACETAMINOPHEN 325 MG/1
650 TABLET ORAL
Status: CANCELLED | OUTPATIENT
Start: 2019-01-21

## 2019-01-21 RX ORDER — HEPARIN 100 UNIT/ML
500 SYRINGE INTRAVENOUS
Status: CANCELLED | OUTPATIENT
Start: 2019-01-21

## 2019-01-21 RX ORDER — SODIUM CHLORIDE 0.9 % (FLUSH) 0.9 %
10 SYRINGE (ML) INJECTION
Status: CANCELLED | OUTPATIENT
Start: 2019-01-21

## 2019-01-21 RX ADMIN — SODIUM CHLORIDE 1000 MG: 9 INJECTION, SOLUTION INTRAVENOUS at 08:01

## 2019-01-31 ENCOUNTER — TELEPHONE (OUTPATIENT)
Dept: INTERNAL MEDICINE | Facility: CLINIC | Age: 69
End: 2019-01-31

## 2019-01-31 DIAGNOSIS — M05.772 RHEUMATOID ARTHRITIS INVOLVING BOTH ANKLES WITH POSITIVE RHEUMATOID FACTOR: ICD-10-CM

## 2019-01-31 DIAGNOSIS — M05.771 RHEUMATOID ARTHRITIS INVOLVING BOTH ANKLES WITH POSITIVE RHEUMATOID FACTOR: ICD-10-CM

## 2019-01-31 DIAGNOSIS — Z12.39 SCREENING FOR MALIGNANT NEOPLASM OF BREAST: Primary | ICD-10-CM

## 2019-01-31 RX ORDER — METHOTREXATE 25 MG/ML
25 INJECTION, SOLUTION INTRA-ARTERIAL; INTRAMUSCULAR; INTRAVENOUS
Qty: 10 ML | Refills: 0 | Status: SHIPPED | OUTPATIENT
Start: 2019-01-31 | End: 2019-04-08 | Stop reason: SDUPTHER

## 2019-01-31 NOTE — TELEPHONE ENCOUNTER
----- Message from Nenita Tomas sent at 2019  7:24 AM CST -----  Contact: self   Maru Shelby  MRN: [id number]  : 1950  PCP: Florence Mark  Home Phone      [phone]  Work Phone      Not on file.  Mobile          [phone]    MESSAGE:   The pt came in for an appt today, the appt was cancelled. She does not want to reschedule at this time. Pt requested a order and appt for a mammogram at [facility].     Phone # [phone]    [facility]/pharmacy #[id number] - [place], [place] - [address] AT [facility]

## 2019-02-11 ENCOUNTER — HOSPITAL ENCOUNTER (OUTPATIENT)
Dept: RADIOLOGY | Facility: HOSPITAL | Age: 69
Discharge: HOME | End: 2019-02-11
Attending: NURSE PRACTITIONER
Payer: MEDICARE

## 2019-02-11 VITALS — HEIGHT: 67 IN | BODY MASS INDEX: 35.63 KG/M2 | WEIGHT: 227 LBS

## 2019-02-11 DIAGNOSIS — Z12.39 SCREENING FOR MALIGNANT NEOPLASM OF BREAST: ICD-10-CM

## 2019-02-11 PROCEDURE — 77067 SCR MAMMO BI INCL CAD: CPT | Mod: TC

## 2019-02-11 PROCEDURE — 77063 BREAST TOMOSYNTHESIS BI: CPT | Mod: 26,,, | Performed by: RADIOLOGY

## 2019-02-11 PROCEDURE — 77063 MAMMO DIGITAL SCREENING BILAT WITH TOMOSYNTHESIS_CAD: ICD-10-PCS | Mod: 26,,, | Performed by: RADIOLOGY

## 2019-02-11 PROCEDURE — 77067 MAMMO DIGITAL SCREENING BILAT WITH TOMOSYNTHESIS_CAD: ICD-10-PCS | Mod: 26,,, | Performed by: RADIOLOGY

## 2019-02-11 PROCEDURE — 77067 SCR MAMMO BI INCL CAD: CPT | Mod: 26,,, | Performed by: RADIOLOGY

## 2019-02-18 ENCOUNTER — INFUSION (OUTPATIENT)
Dept: INFUSION THERAPY | Facility: HOSPITAL | Age: 69
End: 2019-02-18
Attending: INTERNAL MEDICINE
Payer: MEDICARE

## 2019-02-18 VITALS
DIASTOLIC BLOOD PRESSURE: 69 MMHG | HEIGHT: 67 IN | BODY MASS INDEX: 35.64 KG/M2 | WEIGHT: 227.06 LBS | RESPIRATION RATE: 18 BRPM | TEMPERATURE: 97 F | SYSTOLIC BLOOD PRESSURE: 131 MMHG | HEART RATE: 78 BPM

## 2019-02-18 DIAGNOSIS — M05.79 RHEUMATOID ARTHRITIS INVOLVING MULTIPLE SITES WITH POSITIVE RHEUMATOID FACTOR: Primary | ICD-10-CM

## 2019-02-18 PROCEDURE — 63600175 PHARM REV CODE 636 W HCPCS: Performed by: INTERNAL MEDICINE

## 2019-02-18 PROCEDURE — 25000003 PHARM REV CODE 250: Performed by: INTERNAL MEDICINE

## 2019-02-18 PROCEDURE — 96365 THER/PROPH/DIAG IV INF INIT: CPT

## 2019-02-18 RX ORDER — HEPARIN 100 UNIT/ML
500 SYRINGE INTRAVENOUS
Status: CANCELLED | OUTPATIENT
Start: 2019-02-18

## 2019-02-18 RX ORDER — ACETAMINOPHEN 325 MG/1
650 TABLET ORAL
Status: CANCELLED | OUTPATIENT
Start: 2019-02-18

## 2019-02-18 RX ORDER — SODIUM CHLORIDE 0.9 % (FLUSH) 0.9 %
10 SYRINGE (ML) INJECTION
Status: CANCELLED | OUTPATIENT
Start: 2019-02-18

## 2019-02-18 RX ADMIN — SODIUM CHLORIDE 1000 MG: 9 INJECTION, SOLUTION INTRAVENOUS at 08:02

## 2019-03-18 ENCOUNTER — INFUSION (OUTPATIENT)
Dept: INFUSION THERAPY | Facility: HOSPITAL | Age: 69
End: 2019-03-18
Attending: INTERNAL MEDICINE
Payer: MEDICARE

## 2019-03-18 ENCOUNTER — TELEPHONE (OUTPATIENT)
Dept: INFUSION THERAPY | Facility: HOSPITAL | Age: 69
End: 2019-03-18

## 2019-03-18 ENCOUNTER — TELEPHONE (OUTPATIENT)
Dept: RHEUMATOLOGY | Facility: CLINIC | Age: 69
End: 2019-03-18

## 2019-03-18 VITALS
DIASTOLIC BLOOD PRESSURE: 72 MMHG | SYSTOLIC BLOOD PRESSURE: 139 MMHG | RESPIRATION RATE: 20 BRPM | HEIGHT: 67 IN | HEART RATE: 85 BPM | WEIGHT: 227 LBS | BODY MASS INDEX: 35.63 KG/M2

## 2019-03-18 DIAGNOSIS — Z11.4 ENCOUNTER FOR SCREENING FOR HIV: ICD-10-CM

## 2019-03-18 DIAGNOSIS — Z79.899 ON ABATACEPT THERAPY: Primary | ICD-10-CM

## 2019-03-18 DIAGNOSIS — M05.79 RHEUMATOID ARTHRITIS INVOLVING MULTIPLE SITES WITH POSITIVE RHEUMATOID FACTOR: Primary | ICD-10-CM

## 2019-03-18 PROCEDURE — 25000003 PHARM REV CODE 250: Performed by: INTERNAL MEDICINE

## 2019-03-18 PROCEDURE — 96365 THER/PROPH/DIAG IV INF INIT: CPT

## 2019-03-18 PROCEDURE — 63600175 PHARM REV CODE 636 W HCPCS: Performed by: INTERNAL MEDICINE

## 2019-03-18 RX ORDER — ACETAMINOPHEN 325 MG/1
650 TABLET ORAL
Status: CANCELLED | OUTPATIENT
Start: 2019-03-18

## 2019-03-18 RX ORDER — HEPARIN 100 UNIT/ML
500 SYRINGE INTRAVENOUS
Status: CANCELLED | OUTPATIENT
Start: 2019-03-18

## 2019-03-18 RX ORDER — SODIUM CHLORIDE 0.9 % (FLUSH) 0.9 %
10 SYRINGE (ML) INJECTION
Status: CANCELLED | OUTPATIENT
Start: 2019-03-18

## 2019-03-18 RX ADMIN — SODIUM CHLORIDE 1000 MG: 9 INJECTION, SOLUTION INTRAVENOUS at 08:03

## 2019-03-18 NOTE — TELEPHONE ENCOUNTER
Good Morning Dr Royal;  Mrs Mahoney came for her Orencia today and there's now an alert on the Orencia to make sure that a Hep B lab has been done within 6 months. Do you want to order one for her next infusion/lab visit?  Thanks,  Cristine

## 2019-03-20 ENCOUNTER — LAB VISIT (OUTPATIENT)
Dept: LAB | Facility: HOSPITAL | Age: 69
End: 2019-03-20
Attending: INTERNAL MEDICINE
Payer: MEDICARE

## 2019-03-20 ENCOUNTER — TELEPHONE (OUTPATIENT)
Dept: RHEUMATOLOGY | Facility: CLINIC | Age: 69
End: 2019-03-20

## 2019-03-20 DIAGNOSIS — M06.9 RHEUMATOID ARTHRITIS OF HAND, UNSPECIFIED LATERALITY, UNSPECIFIED RHEUMATOID FACTOR PRESENCE: ICD-10-CM

## 2019-03-20 DIAGNOSIS — Z11.4 ENCOUNTER FOR SCREENING FOR HIV: ICD-10-CM

## 2019-03-20 DIAGNOSIS — Z79.899 ON ABATACEPT THERAPY: ICD-10-CM

## 2019-03-20 LAB
ERYTHROCYTE [SEDIMENTATION RATE] IN BLOOD BY WESTERGREN METHOD: 23 MM/HR
RPR SER QL: NORMAL

## 2019-03-20 PROCEDURE — 86706 HEP B SURFACE ANTIBODY: CPT

## 2019-03-20 PROCEDURE — 36415 COLL VENOUS BLD VENIPUNCTURE: CPT

## 2019-03-20 PROCEDURE — 86480 TB TEST CELL IMMUN MEASURE: CPT

## 2019-03-20 PROCEDURE — 87340 HEPATITIS B SURFACE AG IA: CPT

## 2019-03-20 PROCEDURE — 86787 VARICELLA-ZOSTER ANTIBODY: CPT

## 2019-03-20 PROCEDURE — 86592 SYPHILIS TEST NON-TREP QUAL: CPT

## 2019-03-20 PROCEDURE — 86803 HEPATITIS C AB TEST: CPT

## 2019-03-20 PROCEDURE — 86704 HEP B CORE ANTIBODY TOTAL: CPT

## 2019-03-20 PROCEDURE — 86682 HELMINTH ANTIBODY: CPT

## 2019-03-20 PROCEDURE — 85651 RBC SED RATE NONAUTOMATED: CPT

## 2019-03-20 PROCEDURE — 86790 VIRUS ANTIBODY NOS: CPT

## 2019-03-20 PROCEDURE — 86703 HIV-1/HIV-2 1 RESULT ANTBDY: CPT

## 2019-03-21 ENCOUNTER — TELEPHONE (OUTPATIENT)
Dept: RHEUMATOLOGY | Facility: CLINIC | Age: 69
End: 2019-03-21

## 2019-03-21 LAB
HBV CORE AB SERPL QL IA: NEGATIVE
HBV SURFACE AB SER-ACNC: NEGATIVE M[IU]/ML
HBV SURFACE AG SERPL QL IA: NEGATIVE
HCV AB SERPL QL IA: NEGATIVE
HEPATITIS A ANTIBODY, IGG: POSITIVE
HIV 1+2 AB+HIV1 P24 AG SERPL QL IA: NEGATIVE
VARICELLA INTERPRETATION: POSITIVE
VARICELLA ZOSTER IGG: 2.96 ISR

## 2019-03-22 LAB
M TB IFN-G CD4+ BCKGRND COR BLD-ACNC: -0.01 IU/ML
MITOGEN IGNF BCKGRD COR BLD-ACNC: >10 IU/ML
MITOGEN IGNF BCKGRD COR BLD-ACNC: NEGATIVE [IU]/ML
NIL: 0.06 IU/ML
STRONGYLOIDES ANTIBODY IGG: NEGATIVE
TB2 - NIL: 0 IU/ML

## 2019-03-27 ENCOUNTER — TELEPHONE (OUTPATIENT)
Dept: RHEUMATOLOGY | Facility: CLINIC | Age: 69
End: 2019-03-27

## 2019-04-08 ENCOUNTER — TELEPHONE (OUTPATIENT)
Dept: RHEUMATOLOGY | Facility: CLINIC | Age: 69
End: 2019-04-08

## 2019-04-08 DIAGNOSIS — M05.772 RHEUMATOID ARTHRITIS INVOLVING BOTH ANKLES WITH POSITIVE RHEUMATOID FACTOR: ICD-10-CM

## 2019-04-08 DIAGNOSIS — M05.771 RHEUMATOID ARTHRITIS INVOLVING BOTH ANKLES WITH POSITIVE RHEUMATOID FACTOR: ICD-10-CM

## 2019-04-08 RX ORDER — METHOTREXATE 25 MG/ML
25 INJECTION, SOLUTION INTRA-ARTERIAL; INTRAMUSCULAR; INTRAVENOUS
Qty: 10 ML | Refills: 0 | Status: SHIPPED | OUTPATIENT
Start: 2019-04-08 | End: 2019-04-10 | Stop reason: SDUPTHER

## 2019-04-10 ENCOUNTER — TELEPHONE (OUTPATIENT)
Dept: RHEUMATOLOGY | Facility: CLINIC | Age: 69
End: 2019-04-10

## 2019-04-10 DIAGNOSIS — M05.772 RHEUMATOID ARTHRITIS INVOLVING BOTH ANKLES WITH POSITIVE RHEUMATOID FACTOR: ICD-10-CM

## 2019-04-10 DIAGNOSIS — M05.771 RHEUMATOID ARTHRITIS INVOLVING BOTH ANKLES WITH POSITIVE RHEUMATOID FACTOR: ICD-10-CM

## 2019-04-10 RX ORDER — METHOTREXATE 25 MG/ML
25 INJECTION, SOLUTION INTRA-ARTERIAL; INTRAMUSCULAR; INTRAVENOUS
Qty: 10 ML | Refills: 0 | Status: SHIPPED | OUTPATIENT
Start: 2019-04-10 | End: 2019-07-26 | Stop reason: SDUPTHER

## 2019-04-15 DIAGNOSIS — M05.771 RHEUMATOID ARTHRITIS INVOLVING BOTH ANKLES WITH POSITIVE RHEUMATOID FACTOR: ICD-10-CM

## 2019-04-15 DIAGNOSIS — M05.772 RHEUMATOID ARTHRITIS INVOLVING BOTH ANKLES WITH POSITIVE RHEUMATOID FACTOR: ICD-10-CM

## 2019-04-15 RX ORDER — LEFLUNOMIDE 20 MG/1
TABLET ORAL
Qty: 90 TABLET | Refills: 0 | Status: SHIPPED | OUTPATIENT
Start: 2019-04-15 | End: 2019-07-26 | Stop reason: SDUPTHER

## 2019-04-22 ENCOUNTER — INFUSION (OUTPATIENT)
Dept: INFUSION THERAPY | Facility: HOSPITAL | Age: 69
End: 2019-04-22
Attending: INTERNAL MEDICINE
Payer: MEDICARE

## 2019-04-22 VITALS
RESPIRATION RATE: 18 BRPM | HEIGHT: 67 IN | HEART RATE: 86 BPM | BODY MASS INDEX: 35.63 KG/M2 | SYSTOLIC BLOOD PRESSURE: 145 MMHG | DIASTOLIC BLOOD PRESSURE: 69 MMHG | WEIGHT: 227 LBS | TEMPERATURE: 97 F

## 2019-04-22 DIAGNOSIS — M05.79 RHEUMATOID ARTHRITIS INVOLVING MULTIPLE SITES WITH POSITIVE RHEUMATOID FACTOR: Primary | ICD-10-CM

## 2019-04-22 PROCEDURE — 25000003 PHARM REV CODE 250: Performed by: INTERNAL MEDICINE

## 2019-04-22 PROCEDURE — 63600175 PHARM REV CODE 636 W HCPCS: Performed by: INTERNAL MEDICINE

## 2019-04-22 PROCEDURE — 96365 THER/PROPH/DIAG IV INF INIT: CPT

## 2019-04-22 RX ORDER — HEPARIN 100 UNIT/ML
500 SYRINGE INTRAVENOUS
Status: CANCELLED | OUTPATIENT
Start: 2019-05-20

## 2019-04-22 RX ORDER — ACETAMINOPHEN 325 MG/1
650 TABLET ORAL
Status: CANCELLED | OUTPATIENT
Start: 2019-05-20

## 2019-04-22 RX ORDER — SODIUM CHLORIDE 0.9 % (FLUSH) 0.9 %
10 SYRINGE (ML) INJECTION
Status: CANCELLED | OUTPATIENT
Start: 2019-05-20

## 2019-04-22 RX ADMIN — SODIUM CHLORIDE 1000 MG: 9 INJECTION, SOLUTION INTRAVENOUS at 08:04

## 2019-04-22 NOTE — DISCHARGE INSTRUCTIONS
Abatacept (ORENCIA)  What is this medicine?  ABATACEPT (a ba TA sept) is used to treat moderate to severe active rheumatoid arthritis in adults. This medicine is also used to treat juvenile idiopathic arthritis.  How should I use this medicine?  This medicine is for infusion into a vein. It is given by a health care professional in a hospital or clinic setting.  Talk to your pediatrician regarding the use of this medicine in children. While this drug may be prescribed for children as young as 6 years for selected conditions, precautions do apply.  What side effects may I notice from receiving this medicine?  Side effects that you should report to your doctor or health care professional as soon as possible:  · allergic reactions like skin rash, itching or hives, swelling of the face, lips, or tongue  · breathing problems  · chest pain  · signs of infection - fever or chills, cough, unusual tiredness, pain or trouble passing urine, or warm, red or painful skin  Side effects that usually do not require medical attention (Report these to your doctor or health care professional if they continue or are bothersome.):  · dizziness  · headache  · nausea, vomiting  · sore throat  · stomach upset  What may interact with this medicine?  Do not take this medicine with any of the following medications:  · adalimumab  · anakinra  · certolizumab  · etanercept  · golimumab  · infliximab  · live virus vaccines  · rituximab  · tocilizumab  This medicine may also interact with the following medications:  · vaccines  What if I miss a dose?  It is important not to miss your dose. Call your doctor or health care professional if you are unable to keep an appointment.  Where should I keep my medicine?  This drug is given in a hospital or clinic and will not be stored at home.  What should I tell my health care provider before I take this medicine?  They need to know if you have any of these conditions:  · are taking other medicines to  treat rheumatoid arthritis  · COPD  · diabetes  · infection or history of infections  · recently received or scheduled to receive a vaccine  · scheduled to have surgery  · tuberculosis, a positive skin test for tuberculosis or have recently been in close contact with someone who has tuberculosis  · viral hepatitis  · an unusual or allergic reaction to abatacept, other medicines, foods, dyes, or preservatives  · pregnant or trying to get pregnant  · breast-feeding  What should I watch for while using this medicine?  Visit your doctor for regular check ups while you are taking this medicine. Tell your doctor or healthcare professional if your symptoms do not start to get better or if they get worse.  Call your doctor or health care professional if you get a cold or other infection while receiving this medicine. Do not treat yourself. This medicine may decrease your body's ability to fight infection. Try to avoid being around people who are sick.  NOTE:This sheet is a summary. It may not cover all possible information. If you have questions about this medicine, talk to your doctor, pharmacist, or health care provider. Copyright© 2017 [employer]

## 2019-04-23 ENCOUNTER — TELEPHONE (OUTPATIENT)
Dept: RHEUMATOLOGY | Facility: CLINIC | Age: 69
End: 2019-04-23

## 2019-04-24 ENCOUNTER — TELEPHONE (OUTPATIENT)
Dept: RHEUMATOLOGY | Facility: CLINIC | Age: 69
End: 2019-04-24

## 2019-04-24 ENCOUNTER — CLINICAL SUPPORT (OUTPATIENT)
Dept: INFECTIOUS DISEASES | Facility: CLINIC | Age: 69
End: 2019-04-24
Payer: MEDICARE

## 2019-04-24 ENCOUNTER — OFFICE VISIT (OUTPATIENT)
Dept: RHEUMATOLOGY | Facility: CLINIC | Age: 69
End: 2019-04-24
Payer: MEDICARE

## 2019-04-24 VITALS
HEIGHT: 67 IN | BODY MASS INDEX: 36.68 KG/M2 | HEART RATE: 103 BPM | DIASTOLIC BLOOD PRESSURE: 82 MMHG | SYSTOLIC BLOOD PRESSURE: 134 MMHG | WEIGHT: 233.69 LBS

## 2019-04-24 DIAGNOSIS — M06.9 RHEUMATOID ARTHRITIS, INVOLVING UNSPECIFIED SITE, UNSPECIFIED RHEUMATOID FACTOR PRESENCE: Primary | ICD-10-CM

## 2019-04-24 PROCEDURE — 3075F PR MOST RECENT SYSTOLIC BLOOD PRESS GE 130-139MM HG: ICD-10-PCS | Mod: CPTII,S$GLB,, | Performed by: INTERNAL MEDICINE

## 2019-04-24 PROCEDURE — 3079F DIAST BP 80-89 MM HG: CPT | Mod: CPTII,S$GLB,, | Performed by: INTERNAL MEDICINE

## 2019-04-24 PROCEDURE — 90471 IMMUNIZATION ADMIN: CPT | Mod: S$GLB,,, | Performed by: STUDENT IN AN ORGANIZED HEALTH CARE EDUCATION/TRAINING PROGRAM

## 2019-04-24 PROCEDURE — 90471 ZOSTER RECOMBINANT VACCINE: ICD-10-PCS | Mod: S$GLB,,, | Performed by: STUDENT IN AN ORGANIZED HEALTH CARE EDUCATION/TRAINING PROGRAM

## 2019-04-24 PROCEDURE — 99999 PR PBB SHADOW E&M-EST. PATIENT-LVL III: CPT | Mod: PBBFAC,,, | Performed by: INTERNAL MEDICINE

## 2019-04-24 PROCEDURE — 99499 UNLISTED E&M SERVICE: CPT | Mod: S$GLB,,, | Performed by: INTERNAL MEDICINE

## 2019-04-24 PROCEDURE — 3079F PR MOST RECENT DIASTOLIC BLOOD PRESSURE 80-89 MM HG: ICD-10-PCS | Mod: CPTII,S$GLB,, | Performed by: INTERNAL MEDICINE

## 2019-04-24 PROCEDURE — 99499 RISK ADDL DX/OHS AUDIT: ICD-10-PCS | Mod: S$GLB,,, | Performed by: INTERNAL MEDICINE

## 2019-04-24 PROCEDURE — 1101F PT FALLS ASSESS-DOCD LE1/YR: CPT | Mod: CPTII,S$GLB,, | Performed by: INTERNAL MEDICINE

## 2019-04-24 PROCEDURE — 99999 PR PBB SHADOW E&M-EST. PATIENT-LVL III: ICD-10-PCS | Mod: PBBFAC,,, | Performed by: INTERNAL MEDICINE

## 2019-04-24 PROCEDURE — 3075F SYST BP GE 130 - 139MM HG: CPT | Mod: CPTII,S$GLB,, | Performed by: INTERNAL MEDICINE

## 2019-04-24 PROCEDURE — 1101F PR PT FALLS ASSESS DOC 0-1 FALLS W/OUT INJ PAST YR: ICD-10-PCS | Mod: CPTII,S$GLB,, | Performed by: INTERNAL MEDICINE

## 2019-04-24 PROCEDURE — 90750 HZV VACC RECOMBINANT IM: CPT | Mod: S$GLB,,, | Performed by: STUDENT IN AN ORGANIZED HEALTH CARE EDUCATION/TRAINING PROGRAM

## 2019-04-24 PROCEDURE — 99214 OFFICE O/P EST MOD 30 MIN: CPT | Mod: S$GLB,,, | Performed by: INTERNAL MEDICINE

## 2019-04-24 PROCEDURE — 99214 PR OFFICE/OUTPT VISIT, EST, LEVL IV, 30-39 MIN: ICD-10-PCS | Mod: S$GLB,,, | Performed by: INTERNAL MEDICINE

## 2019-04-24 PROCEDURE — 90750 ZOSTER RECOMBINANT VACCINE: ICD-10-PCS | Mod: S$GLB,,, | Performed by: STUDENT IN AN ORGANIZED HEALTH CARE EDUCATION/TRAINING PROGRAM

## 2019-04-24 ASSESSMENT — DISEASE ACTIVITY SCORE (DAS28)
CRP_MG_PER_LITER: 11.9
TENDER_JOINTS_COUNT: 0
SWOLLEN_JOINTS_COUNT: 0
GLOBAL_HEALTH_SCORE: 20
TOTAL_SCORE_CRP: 2.16
TOTAL_SCORE_ESR: 2.5
ESR_MM_PER_HR: 24

## 2019-04-24 ASSESSMENT — ROUTINE ASSESSMENT OF PATIENT INDEX DATA (RAPID3)
AM STIFFNESS SCORE: 0, NO
PAIN SCORE: 1
MDHAQ FUNCTION SCORE: .3
FATIGUE SCORE: 4
PATIENT GLOBAL ASSESSMENT SCORE: 2
PSYCHOLOGICAL DISTRESS SCORE: 0
TOTAL RAPID3 SCORE: 1.33

## 2019-04-24 NOTE — PROGRESS NOTES
Subjective:       Patient ID: Maru Shelby is a 68 y.o. female.     Chief Complaint: F/U RA RF+ ACPA+ GUSTABO-     HPI: At last visit patient received kenalog injection to right knee for knee pain and sent to Physical Therapy. Vitamin D2 prescription was also reduced. She was continued on Abetacept, leflunomide, methotrexate. She is taking her medications as prescribed methotrexate 25mg sc once a week with folic acid 1mg daily, leflunomide 20mg po daily, and abatacept 1000mg IV q 28 days. Today she reports no problems at all. She has no tender or swollen joints, her knee is much better, and her shoulder is feeling better as well. She is not having trouble getting her meds or taking them. She has gained 6 lbs from last visit and needs to f/u with bariatric medicine but she did just buy an exercise bike and plans to get into an exercise regimen to lose weight.       Review of Systems   Constitutional: Negative for fever.   HENT: Negative for congestion.    Eyes: Negative for pain.   Respiratory: Negative for shortness of breath.    Cardiovascular: Negative for chest pain.   Gastrointestinal: Negative for abdominal pain.   Genitourinary: Negative for dysuria.   Musculoskeletal: Negative for arthralgias, joint swelling  Skin: Negative for rash.   Neurological: Negative for headaches.   Psychiatric/Behavioral: Negative for sleep disturbance.          Objective:     Vitals:    04/24/19 0753   BP: 134/82   Pulse: 103     Physical Exam   Constitutional: She is well-developed, well-nourished, and in no distress.   Neck: Normal range of motion.   Cardiovascular: Normal rate, regular rhythm and normal heart sounds.    Pulmonary/Chest: Effort normal and breath sounds normal. She has no decreased breath sounds.         Right Side Rheumatological Exam     Examination finds the shoulder, elbow, wrist, knee, 1st PIP, 1st MCP, 2nd PIP, 3rd PIP, 4th PIP, 4th MCP and 5th PIP normal.    The patient has an enlarged 2nd MCP and 3rd  MCP     Knee Exam      Range of Motion   The patient has normal right knee ROM.     Muscle Strength (0-5 scale):  Deltoid:  4.6  Biceps: 5/5   Triceps:  5  : 5/5   Iliopsoas: 5  Quadriceps:  5   Distal Lower Extremity: 5     Left Side Rheumatological Exam     Examination finds the elbow, wrist, knee, 1st PIP, 1st MCP, 2nd PIP, 3rd PIP, 4th PIP, 4th MCP, 5th PIP and 5th MCP normal.    The patient has an enlarged 2nd MCP and 3rd MCP     Knee Exam      Range of Motion   Flexion: normal  Patellofemoral Crepitus: negative  Effusion: negative  Warmth: negative     Muscle Strength (0-5 scale):  Deltoid:  4.2  Biceps: 4.4/5   Triceps:  4.4  :  5/5   Iliopsoas: 5  Quadriceps:  5   Distal Lower Extremity: 5     Assessment:        1. RA RF+ ACPA+ GUSTABO-   TJ 0  SJ 0   Pt global 20  ESR 24    CRP 11.9  DAS28  2.16    [id number] 2.5  2. Vitamin D deficiency  3. Obesity        Plan:       1. RA  - Gets second shingles vaccine today  - encouraged elliptical continuity and use of new exercise bike  - Continue well balanced, vegetable based diet with minimal fried foods  - Cont methotrexate 25mg sc once a week with folic acid 1mg daily  - Cont leflunomide 20mg po daily  - Cont abatacept 1000mg IV q 28 days  - Continue vitamin D2 50 0000 once daily  - Continue  Fish Oil to 2000 mg once daily  - Follow up with bariatric medicine  - RTC 3 months with standing labs.

## 2019-04-24 NOTE — PROGRESS NOTES
I have personally taken the history and examined the patient and agree with the resident,s note as stated above           RA RF+ ACPA+ GUSTABO-   TJ 0 SJ 0   Pt global 20  ESR 24  CRP 11.9  DAS28 2.5 down from   4.33 [id number] 2.16 down from  4.09 last value driven by right knee OA +RA  OA right knee pain resolved after IAS 1/14/19  Hypertension 134/82   Obesity, gained another 6#  Hyperlipidemia, LDL fine, TG still elevated  Vitamin D deficiency              Stationary bike, just purchased  *Shingrix #2 todau  increase fish oil 2000mg daily  Re-refered to Bariatric Medicine to facilitate weight control last visit. She wishes to work on this on her own instead.  Cont pravastatin 40mg q pm  Cont methotrexate 25mg sc once a week with folic acid 1mg daily  Cont leflunomide 20mg daily  Cont abatacept 1000mg IV q 28 d  vitamin D2 50,000 units once a week  RTC 3 months with standing labs.

## 2019-05-06 ENCOUNTER — OFFICE VISIT (OUTPATIENT)
Dept: INTERNAL MEDICINE | Facility: CLINIC | Age: 69
End: 2019-05-06
Payer: MEDICARE

## 2019-05-06 VITALS
BODY MASS INDEX: 35.26 KG/M2 | RESPIRATION RATE: 18 BRPM | HEIGHT: 67 IN | DIASTOLIC BLOOD PRESSURE: 72 MMHG | HEART RATE: 90 BPM | SYSTOLIC BLOOD PRESSURE: 120 MMHG | WEIGHT: 224.63 LBS | OXYGEN SATURATION: 94 %

## 2019-05-06 DIAGNOSIS — K52.9 ACUTE GASTROENTERITIS: Primary | ICD-10-CM

## 2019-05-06 PROCEDURE — 3074F PR MOST RECENT SYSTOLIC BLOOD PRESSURE < 130 MM HG: ICD-10-PCS | Mod: CPTII,S$GLB,, | Performed by: NURSE PRACTITIONER

## 2019-05-06 PROCEDURE — 1101F PT FALLS ASSESS-DOCD LE1/YR: CPT | Mod: CPTII,S$GLB,, | Performed by: NURSE PRACTITIONER

## 2019-05-06 PROCEDURE — 99999 PR PBB SHADOW E&M-EST. PATIENT-LVL IV: CPT | Mod: PBBFAC,,, | Performed by: NURSE PRACTITIONER

## 2019-05-06 PROCEDURE — 99213 OFFICE O/P EST LOW 20 MIN: CPT | Mod: S$GLB,,, | Performed by: NURSE PRACTITIONER

## 2019-05-06 PROCEDURE — 1101F PR PT FALLS ASSESS DOC 0-1 FALLS W/OUT INJ PAST YR: ICD-10-PCS | Mod: CPTII,S$GLB,, | Performed by: NURSE PRACTITIONER

## 2019-05-06 PROCEDURE — 3078F PR MOST RECENT DIASTOLIC BLOOD PRESSURE < 80 MM HG: ICD-10-PCS | Mod: CPTII,S$GLB,, | Performed by: NURSE PRACTITIONER

## 2019-05-06 PROCEDURE — 99213 PR OFFICE/OUTPT VISIT, EST, LEVL III, 20-29 MIN: ICD-10-PCS | Mod: S$GLB,,, | Performed by: NURSE PRACTITIONER

## 2019-05-06 PROCEDURE — 99999 PR PBB SHADOW E&M-EST. PATIENT-LVL IV: ICD-10-PCS | Mod: PBBFAC,,, | Performed by: NURSE PRACTITIONER

## 2019-05-06 PROCEDURE — 3074F SYST BP LT 130 MM HG: CPT | Mod: CPTII,S$GLB,, | Performed by: NURSE PRACTITIONER

## 2019-05-06 PROCEDURE — 3078F DIAST BP <80 MM HG: CPT | Mod: CPTII,S$GLB,, | Performed by: NURSE PRACTITIONER

## 2019-05-06 RX ORDER — ONDANSETRON 8 MG/1
8 TABLET, ORALLY DISINTEGRATING ORAL EVERY 8 HOURS PRN
Qty: 15 TABLET | Refills: 0 | Status: SHIPPED | OUTPATIENT
Start: 2019-05-06 | End: 2019-08-06

## 2019-05-06 NOTE — PROGRESS NOTES
Subjective:       Patient ID: Maru Shelby is a 68 y.o. female.    Chief Complaint: Nausea (x2 days, denies fever or any other symptoms) and Diarrhea    New to me but seen previously in clinic by a fellow provider.  Presents with gastro symptoms that began yesterday    GI Problem   The primary symptoms include fatigue, nausea, diarrhea and myalgias. Primary symptoms do not include fever, weight loss, abdominal pain, vomiting, melena, hematemesis, jaundice, hematochezia, dysuria, arthralgias or rash. The illness began yesterday. The onset was sudden. The problem has been gradually improving.   Nausea began yesterday. The nausea is associated with eating. The nausea is exacerbated by food, motion, activity and stress.   The diarrhea began yesterday. The diarrhea is watery. The diarrhea occurs 2 to 4 times per day.   Myalgias began yesterday. The myalgias have been gradually improving since their onset. The myalgias are generalized. The myalgias are cramping. The discomfort from the myalgias is mild. The myalgias are not associated with weakness, tenderness or swelling.   The illness is also significant for anorexia. The illness does not include chills, dysphagia, odynophagia, bloating, constipation, tenesmus, back pain or itching. Risk factors: sick contact,  and son with similar symptoms and onset.     Review of Systems   Constitutional: Positive for fatigue. Negative for activity change, appetite change, chills, diaphoresis, fever, unexpected weight change and weight loss.   Respiratory: Negative for chest tightness and shortness of breath.    Cardiovascular: Negative for chest pain and palpitations.   Gastrointestinal: Positive for anorexia, diarrhea and nausea. Negative for abdominal distention, abdominal pain, anal bleeding, bloating, blood in stool, constipation, dysphagia, hematemesis, hematochezia, jaundice, melena, rectal pain and vomiting.   Genitourinary: Negative for difficulty urinating and  dysuria.   Musculoskeletal: Positive for myalgias. Negative for arthralgias and back pain.   Skin: Negative for itching and rash.   Neurological: Negative for weakness and headaches.   All other systems reviewed and are negative.      Objective:      Physical Exam   Constitutional: She is oriented to person, place, and time. Vital signs are normal. She appears well-developed and well-nourished. No distress.   HENT:   Head: Normocephalic and atraumatic.   Right Ear: External ear normal.   Left Ear: External ear normal.   Nose: Nose normal.   Eyes: Conjunctivae and lids are normal. Right eye exhibits no discharge. Left eye exhibits no discharge. No scleral icterus.   Neck: Phonation normal. Neck supple.   Pulmonary/Chest: Effort normal. No accessory muscle usage. No respiratory distress.   Abdominal: Normal appearance. She exhibits no distension and no mass. Bowel sounds are increased. There is no tenderness. There is no rigidity, no rebound and no guarding.   Neurological: She is alert and oriented to person, place, and time. She has normal strength. She exhibits normal muscle tone. Gait normal.   Skin: Skin is warm, dry and intact. No rash noted.   Psychiatric: She has a normal mood and affect. Her speech is normal and behavior is normal. Judgment and thought content normal. Cognition and memory are normal.   Nursing note and vitals reviewed.      Assessment:       Problem List Items Addressed This Visit     None      Visit Diagnoses     Acute gastroenteritis    -  Primary    Relevant Medications    ondansetron (ZOFRAN-ODT) 8 MG TbDL            Plan:       1. Acute gastroenteritis  Dietary guidelines discussed.  Discussed the diagnosis with the patient. All questions answered.  Antiemetics per medication orders.  Educational material distributed.  Follow up if not improving.      Follow up if symptoms worsen or fail to improve.  STEVE Lees, FNP-C  Family/Internal Medicine  [facility]

## 2019-05-29 ENCOUNTER — INFUSION (OUTPATIENT)
Dept: INFUSION THERAPY | Facility: HOSPITAL | Age: 69
End: 2019-05-29
Attending: INTERNAL MEDICINE
Payer: MEDICARE

## 2019-05-29 VITALS
WEIGHT: 227 LBS | TEMPERATURE: 97 F | HEART RATE: 82 BPM | SYSTOLIC BLOOD PRESSURE: 123 MMHG | BODY MASS INDEX: 35.63 KG/M2 | DIASTOLIC BLOOD PRESSURE: 67 MMHG | HEIGHT: 67 IN | RESPIRATION RATE: 18 BRPM

## 2019-05-29 DIAGNOSIS — M05.79 RHEUMATOID ARTHRITIS INVOLVING MULTIPLE SITES WITH POSITIVE RHEUMATOID FACTOR: Primary | ICD-10-CM

## 2019-05-29 PROCEDURE — 96413 CHEMO IV INFUSION 1 HR: CPT

## 2019-05-29 PROCEDURE — 25000003 PHARM REV CODE 250: Performed by: INTERNAL MEDICINE

## 2019-05-29 PROCEDURE — 96365 THER/PROPH/DIAG IV INF INIT: CPT

## 2019-05-29 PROCEDURE — 63600175 PHARM REV CODE 636 W HCPCS: Performed by: INTERNAL MEDICINE

## 2019-05-29 RX ORDER — HEPARIN 100 UNIT/ML
500 SYRINGE INTRAVENOUS
Status: CANCELLED | OUTPATIENT
Start: 2019-06-24

## 2019-05-29 RX ORDER — SODIUM CHLORIDE 0.9 % (FLUSH) 0.9 %
10 SYRINGE (ML) INJECTION
Status: CANCELLED | OUTPATIENT
Start: 2019-06-24

## 2019-05-29 RX ORDER — ACETAMINOPHEN 325 MG/1
650 TABLET ORAL
Status: CANCELLED | OUTPATIENT
Start: 2019-06-24

## 2019-05-29 RX ADMIN — SODIUM CHLORIDE 1000 MG: 9 INJECTION, SOLUTION INTRAVENOUS at 08:05

## 2019-05-29 NOTE — PLAN OF CARE
Problem: Adult Inpatient Plan of Care  Goal: Plan of Care Review     05/29/19 0827   Plan of Care Review   Plan of Care Reviewed With patient;spouse   Voiced understanding.  AVS printed and given

## 2019-06-24 ENCOUNTER — INFUSION (OUTPATIENT)
Dept: INFUSION THERAPY | Facility: HOSPITAL | Age: 69
End: 2019-06-24
Attending: INTERNAL MEDICINE
Payer: MEDICARE

## 2019-06-24 VITALS
DIASTOLIC BLOOD PRESSURE: 68 MMHG | TEMPERATURE: 97 F | SYSTOLIC BLOOD PRESSURE: 123 MMHG | RESPIRATION RATE: 18 BRPM | HEART RATE: 86 BPM

## 2019-06-24 DIAGNOSIS — M05.79 RHEUMATOID ARTHRITIS INVOLVING MULTIPLE SITES WITH POSITIVE RHEUMATOID FACTOR: Primary | ICD-10-CM

## 2019-06-24 PROCEDURE — 63600175 PHARM REV CODE 636 W HCPCS: Performed by: INTERNAL MEDICINE

## 2019-06-24 PROCEDURE — 96365 THER/PROPH/DIAG IV INF INIT: CPT

## 2019-06-24 PROCEDURE — 25000003 PHARM REV CODE 250: Performed by: INTERNAL MEDICINE

## 2019-06-24 RX ORDER — SODIUM CHLORIDE 0.9 % (FLUSH) 0.9 %
10 SYRINGE (ML) INJECTION
Status: CANCELLED | OUTPATIENT
Start: 2019-07-22

## 2019-06-24 RX ORDER — ACETAMINOPHEN 325 MG/1
650 TABLET ORAL
Status: CANCELLED | OUTPATIENT
Start: 2019-07-22

## 2019-06-24 RX ORDER — HEPARIN 100 UNIT/ML
500 SYRINGE INTRAVENOUS
Status: CANCELLED | OUTPATIENT
Start: 2019-07-22

## 2019-06-24 RX ADMIN — SODIUM CHLORIDE 1000 MG: 9 INJECTION, SOLUTION INTRAVENOUS at 08:06

## 2019-07-08 ENCOUNTER — OFFICE VISIT (OUTPATIENT)
Dept: INTERNAL MEDICINE | Facility: CLINIC | Age: 69
End: 2019-07-08
Payer: MEDICARE

## 2019-07-08 VITALS
BODY MASS INDEX: 36.2 KG/M2 | HEIGHT: 67 IN | HEART RATE: 90 BPM | OXYGEN SATURATION: 95 % | DIASTOLIC BLOOD PRESSURE: 70 MMHG | WEIGHT: 230.63 LBS | RESPIRATION RATE: 20 BRPM | SYSTOLIC BLOOD PRESSURE: 126 MMHG

## 2019-07-08 DIAGNOSIS — B37.2 CANDIDAL INTERTRIGO: Primary | ICD-10-CM

## 2019-07-08 PROCEDURE — 3074F SYST BP LT 130 MM HG: CPT | Mod: CPTII,S$GLB,, | Performed by: NURSE PRACTITIONER

## 2019-07-08 PROCEDURE — 1101F PT FALLS ASSESS-DOCD LE1/YR: CPT | Mod: CPTII,S$GLB,, | Performed by: NURSE PRACTITIONER

## 2019-07-08 PROCEDURE — 3078F DIAST BP <80 MM HG: CPT | Mod: CPTII,S$GLB,, | Performed by: NURSE PRACTITIONER

## 2019-07-08 PROCEDURE — 99213 OFFICE O/P EST LOW 20 MIN: CPT | Mod: S$GLB,,, | Performed by: NURSE PRACTITIONER

## 2019-07-08 PROCEDURE — 99999 PR PBB SHADOW E&M-EST. PATIENT-LVL V: ICD-10-PCS | Mod: PBBFAC,,, | Performed by: NURSE PRACTITIONER

## 2019-07-08 PROCEDURE — 3074F PR MOST RECENT SYSTOLIC BLOOD PRESSURE < 130 MM HG: ICD-10-PCS | Mod: CPTII,S$GLB,, | Performed by: NURSE PRACTITIONER

## 2019-07-08 PROCEDURE — 1101F PR PT FALLS ASSESS DOC 0-1 FALLS W/OUT INJ PAST YR: ICD-10-PCS | Mod: CPTII,S$GLB,, | Performed by: NURSE PRACTITIONER

## 2019-07-08 PROCEDURE — 3078F PR MOST RECENT DIASTOLIC BLOOD PRESSURE < 80 MM HG: ICD-10-PCS | Mod: CPTII,S$GLB,, | Performed by: NURSE PRACTITIONER

## 2019-07-08 PROCEDURE — 99999 PR PBB SHADOW E&M-EST. PATIENT-LVL V: CPT | Mod: PBBFAC,,, | Performed by: NURSE PRACTITIONER

## 2019-07-08 PROCEDURE — 99213 PR OFFICE/OUTPT VISIT, EST, LEVL III, 20-29 MIN: ICD-10-PCS | Mod: S$GLB,,, | Performed by: NURSE PRACTITIONER

## 2019-07-08 RX ORDER — CLOTRIMAZOLE AND BETAMETHASONE DIPROPIONATE 10; .64 MG/G; MG/G
CREAM TOPICAL 2 TIMES DAILY
Qty: 45 G | Refills: 2 | Status: SHIPPED | OUTPATIENT
Start: 2019-07-08 | End: 2020-05-29

## 2019-07-08 RX ORDER — NYSTATIN 100000 [USP'U]/G
POWDER TOPICAL 3 TIMES DAILY
Qty: 60 G | Refills: 1 | Status: SHIPPED | OUTPATIENT
Start: 2019-07-08 | End: 2020-05-29

## 2019-07-08 RX ORDER — FLUCONAZOLE 150 MG/1
150 TABLET ORAL WEEKLY
Qty: 4 TABLET | Refills: 0 | Status: SHIPPED | OUTPATIENT
Start: 2019-07-08 | End: 2019-07-30

## 2019-07-08 RX ORDER — NYSTATIN 100000 U/G
CREAM TOPICAL 2 TIMES DAILY
Qty: 30 G | Refills: 1 | Status: SHIPPED | OUTPATIENT
Start: 2019-07-08 | End: 2020-05-29

## 2019-07-08 NOTE — PROGRESS NOTES
Subjective:       Patient ID: Maru Shelby is a 69 y.o. female.    Chief Complaint: Rash (located under breast )    New to me but seen previously in clinic by a fellow provider. Pt presents with recurrent rash underneath left breast, now extending to right breast. She is applying nystatin powder during the day and cream at night without relief. Of note she recently completed abatacept infusion and is immunosuppressed.     Rash   This is a recurrent problem. The current episode started 1 to 4 weeks ago. The problem has been gradually worsening since onset. The affected locations include the chest and torso. The rash is characterized by redness and itchiness. She was exposed to nothing. Pertinent negatives include no anorexia, congestion, cough, diarrhea, eye pain, facial edema, fatigue, fever, joint pain, nail changes, rhinorrhea, shortness of breath, sore throat or vomiting. Past treatments include anti-itch cream and topical steroids. The treatment provided no relief.     Review of Systems   Constitutional: Negative for activity change, appetite change, fatigue, fever and unexpected weight change.   HENT: Negative for congestion, rhinorrhea and sore throat.    Eyes: Negative for pain.   Respiratory: Negative for cough, chest tightness and shortness of breath.    Cardiovascular: Negative for chest pain and palpitations.   Gastrointestinal: Negative for abdominal pain, anorexia, constipation, diarrhea, nausea and vomiting.   Genitourinary: Negative for difficulty urinating and dysuria.   Musculoskeletal: Negative for joint pain.   Skin: Positive for rash. Negative for nail changes.   Neurological: Negative for headaches.   All other systems reviewed and are negative.      Objective:      Physical Exam   Constitutional: She is oriented to person, place, and time. Vital signs are normal. She appears well-developed and well-nourished. No distress.   HENT:   Head: Normocephalic and atraumatic.   Right Ear: External  ear normal.   Left Ear: External ear normal.   Nose: Nose normal.   Eyes: Conjunctivae and lids are normal. Right eye exhibits no discharge. Left eye exhibits no discharge. No scleral icterus.   Neck: Phonation normal. Neck supple.   Pulmonary/Chest: Effort normal. No accessory muscle usage. No respiratory distress.   Abdominal: Normal appearance.   Neurological: She is alert and oriented to person, place, and time. She has normal strength. She exhibits normal muscle tone. Gait normal.   Skin: Skin is warm and intact. Rash noted. There is erythema.   Moist, erythematous patch in the deep inframammary folds with satellite papules and pustules.   Psychiatric: She has a normal mood and affect. Her speech is normal and behavior is normal. Judgment and thought content normal. Cognition and memory are normal.   Nursing note and vitals reviewed.      Assessment:       1. Candidal intertrigo        Plan:       1. Candidal intertrigo  Benadryl prn for itching.  Follow up prn  Information on the above diagnosis was given to the patient.  Observe for signs of superimposed infection and systemic symptoms.  Reassurance was given to the patient.  Referral to Dermatology if unimproved.  Watch for signs of fever or worsening of the rash.    - fluconazole (DIFLUCAN) 150 MG Tab; Take 1 tablet (150 mg total) by mouth once a week. for 4 doses  Dispense: 4 tablet; Refill: 0  - nystatin (MYCOSTATIN) cream; Apply topically 2 (two) times daily.  Dispense: 30 g; Refill: 1  - nystatin (MYCOSTATIN) powder; Apply topically 3 (three) times daily.  Dispense: 60 g; Refill: 1  - clotrimazole-betamethasone 1-0.05% (LOTRISONE) cream; Apply topically 2 (two) times daily.  Dispense: 45 g; Refill: 2           STEVE Lees, FNP-C  Family/Internal Medicine  [facility]

## 2019-07-08 NOTE — PATIENT INSTRUCTIONS
Candida Skin Infection (Adult)  Candida is type of yeast. It grows naturally on the skin and in the mouth. If it grows out of control, it can cause an infection. Candida can cause infections in the genital area, skin folds, in the mouth, and under the breasts. Anyone can get this infection. It is more common in a person with a weak immune system, such as from diabetes, HIV, or cancer. Its also more common in someone who has been on antibiotic therapy. And its more common people who are overweight or who have incontinence. Wearing tight-fitting clothing and taking part in activities with lots of skin-to-skin contact can also put you at risk.  Candida causes the skin to become bright red and inflamed. The border of the infected part of the skin is often raised. The infection causes pain and itching. Sometimes the skin peels and bleeds. In the mouth, candida is called thrush, and may cause white thickened areas.  A Candida rash is most often treated with an antifungal cream or ointment. The rash will clear a few days after starting the medicine. Infections that dont go away may need a prescription medicine. In rare cases, a bacterial infection can also occur.  Home care  Your healthcare provider will recommend an antifungal cream or ointment for the rash. He or she may also prescribe a medicine for the itch. Follow all instructions for using these medicines. Dont use cornstarch powder. Cornstarch can cause the Candida infection to get worse.  General care:  · Keep your skin clean by washing the area twice a day.  · Use the cream as directed until your rash is gone. Once the skin has healed, keep it dry to prevent another infection.   · If you are overweight, talk with your healthcare provider about a plan to lose excess weight.  · Avoid clothes that fit tightly.  Follow-up care  Follow up with your healthcare provider, or as advised. Your rash will clear in 7 to 14 days. Call your healthcare provider if the rash  is not gone after 14 days.  When to seek medical advice  Call your healthcare provider right away if any of these occur:  · Pain or redness that gets worse or spreads  · Fluid coming from the skin  · Yellow crusts on the skin  · Fever of 100.4°F (38°C) or higher, or as directed by your healthcare provider  Date Last Reviewed: 9/1/2016  © 0699-5680 [employer]. [address] [place] [address], [place] [address]. All rights reserved. This information is not intended as a substitute for professional medical care. Always follow your healthcare professional's instructions.

## 2019-07-21 ENCOUNTER — HOSPITAL ENCOUNTER (EMERGENCY)
Facility: HOSPITAL | Age: 69
Discharge: HOME | End: 2019-07-21
Attending: EMERGENCY MEDICINE
Payer: MEDICARE

## 2019-07-21 VITALS
TEMPERATURE: 98 F | BODY MASS INDEX: 36.64 KG/M2 | WEIGHT: 233.94 LBS | HEART RATE: 90 BPM | OXYGEN SATURATION: 97 % | RESPIRATION RATE: 20 BRPM | SYSTOLIC BLOOD PRESSURE: 148 MMHG | DIASTOLIC BLOOD PRESSURE: 73 MMHG

## 2019-07-21 DIAGNOSIS — S70.02XA CONTUSION OF LEFT HIP, INITIAL ENCOUNTER: Primary | ICD-10-CM

## 2019-07-21 PROCEDURE — 99283 EMERGENCY DEPT VISIT LOW MDM: CPT

## 2019-07-21 RX ORDER — TRAMADOL HYDROCHLORIDE 50 MG/1
50 TABLET ORAL EVERY 6 HOURS PRN
Qty: 12 TABLET | Refills: 0 | Status: SHIPPED | OUTPATIENT
Start: 2019-07-21 | End: 2019-10-28

## 2019-07-21 NOTE — ED PROVIDER NOTES
Encounter Date: 7/21/2019       History     Chief Complaint   Patient presents with    Fall     Patient reports losing balance while outside and fell onto cement.    She fell onto her left buttock area.  She also had left breast bruising.  This is her first MD visit for this.  She can walk on it, but it still hurts.  Tylenol extra strength did not help.        Review of patient's allergies indicates:  No Known Allergies  Past Medical History:   Diagnosis Date    Anxiety     Arthritis     rheumatoid and osteoarthritis    Depression     Hyperlipidemia     Hypertension     Neuromuscular disorder     Obesity      Past Surgical History:   Procedure Laterality Date    BREAST BIOPSY Right     2016    CHOLECYSTECTOMY  2009    COLONOSCOPY  2005 2008, 2013    COLONOSCOPY N/A 8/14/2018    Performed by Kimani Shelby MD at [facility]    HAND SURGERY      LUMBAR SPINE SURGERY      TUBAL LIGATION       Family History   Problem Relation Age of Onset    Pancreatitis Mother 61    Cancer Father         amedistatic    Breast cancer Sister     Breast cancer Sister      Social History     Tobacco Use    Smoking status: Never Smoker    Smokeless tobacco: Never Used   Substance Use Topics    Alcohol use: Yes     Alcohol/week: 1.2 oz     Types: 2 Glasses of wine per week    Drug use: No     Review of Systems   Constitutional: Negative for chills and fever.   HENT: Negative for congestion, ear pain, rhinorrhea and sore throat.    Eyes: Negative.    Respiratory: Negative for shortness of breath.    Gastrointestinal: Negative for diarrhea, nausea and vomiting.   Musculoskeletal: Positive for back pain.        Lumbar and left hip pain   Skin: Negative for rash.        Bruising over left breast and buttock area.   Neurological: Negative for weakness and headaches.       Physical Exam     Initial Vitals [07/21/19 0947]   BP Pulse Resp Temp SpO2   (!) 172/88 109 20 97.9 °F (36.6 °C) 96 %      MAP       --          Physical Exam    Nursing note and vitals reviewed.  Constitutional: She appears well-developed and well-nourished. She is not diaphoretic. No distress.   HENT:   Right Ear: External ear normal.   Left Ear: External ear normal.   Nose: Nose normal.   Mouth/Throat: Oropharynx is clear and moist. No oropharyngeal exudate.   Eyes: Conjunctivae and EOM are normal. Right eye exhibits no discharge. Left eye exhibits no discharge.   Neck: Normal range of motion.   Cardiovascular: Normal rate, regular rhythm and intact distal pulses.   No murmur heard.  Pulmonary/Chest: Breath sounds normal. No respiratory distress. She has no wheezes. She exhibits no tenderness.   Abdominal: Soft. Bowel sounds are normal. She exhibits no distension. There is no tenderness.   Musculoskeletal:   Discolored left hip, buttock area, tender pain on motion, nv intact  No coccygeal tenderness  Discoloration over left breast.   Lumbar spine mildly tender  No lesions   Neurological: She is alert and oriented to person, place, and time. She has normal strength. GCS score is 15. GCS eye subscore is 4. GCS verbal subscore is 5. GCS motor subscore is 6.   Skin: Skin is warm and dry. No rash noted.   Bruising left breast and hip         ED Course   Procedures  Labs Reviewed - No data to display       Imaging Results    None          Medical Decision Making:   Clinical Tests:   Radiological Study: Ordered and Reviewed                      Clinical Impression:       ICD-10-CM ICD-9-CM   1. Contusion of left hip, initial encounter S70.02XA 924.01                                Cristine Henriquez MD  07/21/19 3586

## 2019-07-22 ENCOUNTER — LAB VISIT (OUTPATIENT)
Dept: LAB | Facility: HOSPITAL | Age: 69
End: 2019-07-22
Attending: INTERNAL MEDICINE
Payer: MEDICARE

## 2019-07-22 ENCOUNTER — TELEPHONE (OUTPATIENT)
Dept: RHEUMATOLOGY | Facility: CLINIC | Age: 69
End: 2019-07-22

## 2019-07-22 DIAGNOSIS — M06.9 RHEUMATOID ARTHRITIS OF HAND, UNSPECIFIED LATERALITY, UNSPECIFIED RHEUMATOID FACTOR PRESENCE: ICD-10-CM

## 2019-07-22 LAB
ALBUMIN SERPL BCP-MCNC: 3.5 G/DL (ref 3.5–5.2)
ALP SERPL-CCNC: 126 U/L (ref 55–135)
ALT SERPL W/O P-5'-P-CCNC: 22 U/L (ref 10–44)
ANION GAP SERPL CALC-SCNC: 9 MMOL/L (ref 8–16)
AST SERPL-CCNC: 18 U/L (ref 10–40)
BILIRUB SERPL-MCNC: 0.3 MG/DL (ref 0.1–1)
BUN SERPL-MCNC: 13 MG/DL (ref 8–23)
CALCIUM SERPL-MCNC: 9 MG/DL (ref 8.7–10.5)
CHLORIDE SERPL-SCNC: 105 MMOL/L (ref 95–110)
CO2 SERPL-SCNC: 27 MMOL/L (ref 23–29)
CREAT SERPL-MCNC: 0.8 MG/DL (ref 0.5–1.4)
CRP SERPL-MCNC: 17.6 MG/L (ref 0–8.2)
EST. GFR  (AFRICAN AMERICAN): >60 ML/MIN/1.73 M^2
EST. GFR  (NON AFRICAN AMERICAN): >60 ML/MIN/1.73 M^2
GLUCOSE SERPL-MCNC: 148 MG/DL (ref 70–110)
POTASSIUM SERPL-SCNC: 4.2 MMOL/L (ref 3.5–5.1)
PROT SERPL-MCNC: 6.5 G/DL (ref 6–8.4)
SODIUM SERPL-SCNC: 141 MMOL/L (ref 136–145)

## 2019-07-22 PROCEDURE — 80053 COMPREHEN METABOLIC PANEL: CPT

## 2019-07-22 PROCEDURE — 36415 COLL VENOUS BLD VENIPUNCTURE: CPT

## 2019-07-22 PROCEDURE — 86140 C-REACTIVE PROTEIN: CPT

## 2019-07-26 ENCOUNTER — CLINICAL SUPPORT (OUTPATIENT)
Dept: INFECTIOUS DISEASES | Facility: CLINIC | Age: 69
End: 2019-07-26
Payer: MEDICARE

## 2019-07-26 ENCOUNTER — OFFICE VISIT (OUTPATIENT)
Dept: RHEUMATOLOGY | Facility: CLINIC | Age: 69
End: 2019-07-26
Payer: MEDICARE

## 2019-07-26 VITALS
SYSTOLIC BLOOD PRESSURE: 135 MMHG | HEIGHT: 64 IN | WEIGHT: 233.69 LBS | BODY MASS INDEX: 39.9 KG/M2 | HEART RATE: 102 BPM | DIASTOLIC BLOOD PRESSURE: 80 MMHG

## 2019-07-26 DIAGNOSIS — I10 BENIGN ESSENTIAL HTN: ICD-10-CM

## 2019-07-26 DIAGNOSIS — M05.772 RHEUMATOID ARTHRITIS INVOLVING BOTH ANKLES WITH POSITIVE RHEUMATOID FACTOR: ICD-10-CM

## 2019-07-26 DIAGNOSIS — M70.62 GREATER TROCHANTERIC BURSITIS OF LEFT HIP: ICD-10-CM

## 2019-07-26 DIAGNOSIS — G47.00 INSOMNIA, UNSPECIFIED TYPE: ICD-10-CM

## 2019-07-26 DIAGNOSIS — E66.01 MORBID OBESITY: ICD-10-CM

## 2019-07-26 DIAGNOSIS — M17.0 PRIMARY OSTEOARTHRITIS OF BOTH KNEES: ICD-10-CM

## 2019-07-26 DIAGNOSIS — E78.00 PURE HYPERCHOLESTEROLEMIA: ICD-10-CM

## 2019-07-26 DIAGNOSIS — M85.80 OSTEOPENIA, UNSPECIFIED LOCATION: ICD-10-CM

## 2019-07-26 DIAGNOSIS — Z78.0 MENOPAUSE: ICD-10-CM

## 2019-07-26 DIAGNOSIS — M06.9 RHEUMATOID ARTHRITIS INVOLVING MULTIPLE SITES, UNSPECIFIED RHEUMATOID FACTOR PRESENCE: Primary | ICD-10-CM

## 2019-07-26 DIAGNOSIS — M05.771 RHEUMATOID ARTHRITIS INVOLVING BOTH ANKLES WITH POSITIVE RHEUMATOID FACTOR: ICD-10-CM

## 2019-07-26 PROCEDURE — 3079F DIAST BP 80-89 MM HG: CPT | Mod: CPTII,S$GLB,, | Performed by: INTERNAL MEDICINE

## 2019-07-26 PROCEDURE — G0009 ADMIN PNEUMOCOCCAL VACCINE: HCPCS | Mod: S$GLB,,, | Performed by: INTERNAL MEDICINE

## 2019-07-26 PROCEDURE — 1101F PR PT FALLS ASSESS DOC 0-1 FALLS W/OUT INJ PAST YR: ICD-10-PCS | Mod: CPTII,S$GLB,, | Performed by: INTERNAL MEDICINE

## 2019-07-26 PROCEDURE — 90732 PPSV23 VACC 2 YRS+ SUBQ/IM: CPT | Mod: S$GLB,,, | Performed by: INTERNAL MEDICINE

## 2019-07-26 PROCEDURE — G0009 PNEUMOCOCCAL POLYSACCHARIDE VACCINE 23-VALENT =>2YO SQ IM: ICD-10-PCS | Mod: S$GLB,,, | Performed by: INTERNAL MEDICINE

## 2019-07-26 PROCEDURE — 3075F PR MOST RECENT SYSTOLIC BLOOD PRESS GE 130-139MM HG: ICD-10-PCS | Mod: CPTII,S$GLB,, | Performed by: INTERNAL MEDICINE

## 2019-07-26 PROCEDURE — 3075F SYST BP GE 130 - 139MM HG: CPT | Mod: CPTII,S$GLB,, | Performed by: INTERNAL MEDICINE

## 2019-07-26 PROCEDURE — 90732 PNEUMOCOCCAL POLYSACCHARIDE VACCINE 23-VALENT =>2YO SQ IM: ICD-10-PCS | Mod: S$GLB,,, | Performed by: INTERNAL MEDICINE

## 2019-07-26 PROCEDURE — 99214 PR OFFICE/OUTPT VISIT, EST, LEVL IV, 30-39 MIN: ICD-10-PCS | Mod: 25,S$GLB,, | Performed by: INTERNAL MEDICINE

## 2019-07-26 PROCEDURE — 99999 PR PBB SHADOW E&M-EST. PATIENT-LVL V: CPT | Mod: PBBFAC,,, | Performed by: INTERNAL MEDICINE

## 2019-07-26 PROCEDURE — 3079F PR MOST RECENT DIASTOLIC BLOOD PRESSURE 80-89 MM HG: ICD-10-PCS | Mod: CPTII,S$GLB,, | Performed by: INTERNAL MEDICINE

## 2019-07-26 PROCEDURE — 99214 OFFICE O/P EST MOD 30 MIN: CPT | Mod: 25,S$GLB,, | Performed by: INTERNAL MEDICINE

## 2019-07-26 PROCEDURE — 20610 DRAIN/INJ JOINT/BURSA W/O US: CPT | Mod: LT,S$GLB,, | Performed by: INTERNAL MEDICINE

## 2019-07-26 PROCEDURE — 20610 LARGE JOINT ASPIRATION/INJECTION: ICD-10-PCS | Mod: LT,S$GLB,, | Performed by: INTERNAL MEDICINE

## 2019-07-26 PROCEDURE — 99999 PR PBB SHADOW E&M-EST. PATIENT-LVL V: ICD-10-PCS | Mod: PBBFAC,,, | Performed by: INTERNAL MEDICINE

## 2019-07-26 PROCEDURE — 1101F PT FALLS ASSESS-DOCD LE1/YR: CPT | Mod: CPTII,S$GLB,, | Performed by: INTERNAL MEDICINE

## 2019-07-26 RX ORDER — SODIUM CHLORIDE 0.9 % (FLUSH) 0.9 %
10 SYRINGE (ML) INJECTION
Status: CANCELLED | OUTPATIENT
Start: 2019-07-26

## 2019-07-26 RX ORDER — NORTRIPTYLINE HYDROCHLORIDE 25 MG/1
CAPSULE ORAL
Qty: 270 CAPSULE | Refills: 1 | Status: SHIPPED | OUTPATIENT
Start: 2019-07-26 | End: 2020-02-18 | Stop reason: SDUPTHER

## 2019-07-26 RX ORDER — ACETAMINOPHEN 325 MG/1
650 TABLET ORAL
Status: CANCELLED | OUTPATIENT
Start: 2019-07-26

## 2019-07-26 RX ORDER — TRIAMCINOLONE ACETONIDE 40 MG/ML
40 INJECTION, SUSPENSION INTRA-ARTICULAR; INTRAMUSCULAR
Status: DISCONTINUED | OUTPATIENT
Start: 2019-07-26 | End: 2019-07-26 | Stop reason: HOSPADM

## 2019-07-26 RX ORDER — METHOTREXATE 25 MG/ML
25 INJECTION, SOLUTION INTRA-ARTERIAL; INTRAMUSCULAR; INTRAVENOUS
Qty: 10 ML | Refills: 0 | Status: SHIPPED | OUTPATIENT
Start: 2019-07-26 | End: 2019-11-11 | Stop reason: SDUPTHER

## 2019-07-26 RX ORDER — HEPARIN 100 UNIT/ML
500 SYRINGE INTRAVENOUS
Status: CANCELLED | OUTPATIENT
Start: 2019-07-26

## 2019-07-26 RX ORDER — LEFLUNOMIDE 20 MG/1
TABLET ORAL
Qty: 90 TABLET | Refills: 0 | Status: SHIPPED | OUTPATIENT
Start: 2019-07-26 | End: 2019-10-28 | Stop reason: SDUPTHER

## 2019-07-26 RX ADMIN — TRIAMCINOLONE ACETONIDE 40 MG: 40 INJECTION, SUSPENSION INTRA-ARTICULAR; INTRAMUSCULAR at 08:07

## 2019-07-26 ASSESSMENT — ROUTINE ASSESSMENT OF PATIENT INDEX DATA (RAPID3)
TOTAL RAPID3 SCORE: 3.44
PSYCHOLOGICAL DISTRESS SCORE: 0
FATIGUE SCORE: 4.5
PAIN SCORE: 2
AM STIFFNESS SCORE: 0, NO
MDHAQ FUNCTION SCORE: .4
PATIENT GLOBAL ASSESSMENT SCORE: 7

## 2019-07-26 ASSESSMENT — DISEASE ACTIVITY SCORE (DAS28)
TOTAL_SCORE_CRP: 2.99
TENDER_JOINTS_COUNT: 0
GLOBAL_HEALTH_SCORE: 70
SWOLLEN_JOINTS_COUNT: 0
CRP_MG_PER_LITER: 17.6
TOTAL_SCORE_ESR: 3.31
ESR_MM_PER_HR: 28

## 2019-07-26 NOTE — ASSESSMENT & PLAN NOTE
TJ 0 SJ 0  Pt global 70 ESR 28  CRP 17.6   DAS28 3.31(MDA) [id number] 2.99(LDA)      *Pneumovax  * After verbal consent and cleansing with Chloraprep the left gr. Trochanteric bursa injected with Kenalog 40mg with 1 ml 1 % lidocaine. Patient tolerated procedure well.    Cont methotrexate 25mg sc once a week with folic acid 1mg daily  Cont leflunomide 20mg daily  Cont abatacept 1000mg IV q 28 days  RTC  3  Months with standing labs

## 2019-07-26 NOTE — PATIENT INSTRUCTIONS
CBC, ESR today  Pneumovax today  Weight watchers  Mediterranean diet  Physical therapy if left hip still painful, orders provided  Return in 3 months, with bone density

## 2019-07-26 NOTE — PROCEDURES
Large Joint Aspiration/Injection  Date/Time: 7/26/2019 8:38 AM  Performed by: Timi Royal MD  Authorized by: Timi Royal MD     Consent Done?:  Yes (Verbal)  Indications:  Pain  Procedure site marked: Yes    Timeout: Prior to procedure the correct patient, procedure, and site was verified      Location:  Hip  Needle size:  25 G  Approach:  Lateral  Medications:  40 mg triamcinolone acetonide 40 mg/mL  Aspirate amount (ml):  0  Patient tolerance:  Patient tolerated the procedure well with no immediate complications

## 2019-07-26 NOTE — PROGRESS NOTES
"Subjective:       Patient ID: Maru Shelby is a 69 y.o. female.    Chief Complaint: RA RF+ ACPA+GUSTABO-; OA  HPI no am stiffness. Was turning around quickly, vertigo and fell onto left buttock, ischium 7/17 went to ED 7/21/19 x-ray negative for fracture. Had hematoma ecchymosis at site, now pain over left greater trochanter. Pain worsens as day goes on. Other joints are fine.   Review of Systems   Constitutional: Positive for fatigue (4.5/10). Negative for appetite change, fever and unexpected weight change.   HENT: Negative for mouth sores.    Eyes: Negative for visual disturbance.   Respiratory: Negative for cough, shortness of breath and wheezing.    Cardiovascular: Negative for chest pain and palpitations.   Gastrointestinal: Negative for abdominal pain, anal bleeding, blood in stool, constipation, diarrhea, nausea and vomiting.   Genitourinary: Negative for dysuria, frequency and urgency.   Musculoskeletal: Negative for arthralgias, back pain, gait problem, joint swelling, myalgias, neck pain and neck stiffness.   Skin: Negative for rash.   Neurological: Negative for weakness, numbness and headaches.   Hematological: Negative for adenopathy. Does not bruise/bleed easily.   Psychiatric/Behavioral: Negative for sleep disturbance. The patient is not nervous/anxious.          Objective:   /80   Pulse 102   Ht 5' 3.6" (1.615 m)   Wt 106 kg (233 lb 11.2 oz)   BMI 40.62 kg/m²      Physical Exam   Constitutional: She is oriented to person, place, and time and well-developed, well-nourished, and in no distress.   HENT:   Head: Normocephalic and atraumatic.   Mouth/Throat: Oropharynx is clear and moist.   Eyes: Conjunctivae and EOM are normal.   Neck: Normal range of motion. Neck supple. No thyromegaly present.   Cardiovascular: Normal rate, regular rhythm, normal heart sounds and intact distal pulses.  Exam reveals no gallop and no friction rub.    No murmur heard.  Pulmonary/Chest: Breath sounds normal. " She has no wheezes. She has no rales. She exhibits no tenderness.   Abdominal: Soft. She exhibits no distension and no mass. There is no tenderness.       Right Side Rheumatological Exam     Examination finds the shoulder, elbow, wrist, knee, 1st PIP, 1st MCP, 2nd PIP, 2nd MCP, 3rd PIP, 3rd MCP, 4th PIP, 4th MCP, 5th PIP and 5th MCP normal.    Knee Exam   Patellofemoral Crepitus: positive    Left Side Rheumatological Exam     Examination finds the shoulder, elbow, wrist, knee, 1st PIP, 1st MCP, 2nd PIP, 2nd MCP, 3rd PIP, 3rd MCP, 4th PIP, 4th MCP, 5th PIP and 5th MCP normal.    Knee Exam     Patellofemoral Crepitus: positive    Hip Exam   Tenderness Location: greater trochanter    Range of Motion   The patient has normal left hip ROM.      Lymphadenopathy:     She has no cervical adenopathy.   Neurological: She is alert and oriented to person, place, and time. She displays normal reflexes. Gait normal.   Nl motor strength UE and LE bilateral   Musculoskeletal: She exhibits no edema.           Assessment/Plan         Problem List Items Addressed This Visit     RA (rheumatoid arthritis) - Primary (Chronic)    Overview     Results for MECCA DUONG (MRN [id number]) as of 1/9/2018 07:13   Ref. Range 8/1/2008 07:11 7/6/2009 08:26 8/15/2013 08:25   ds DNA Ab Latest Ref Range: Negative Titer Negative     Protein, Serum Latest Ref Range: 6.0 - 8.4 g/dL   6.4   Albumin grams/dl Latest Ref Range: 3.35 - 5.55 gm/dL   3.88   Alpha-1 grams/dl Latest Ref Range: 0.17 - 0.41 gm/dL   0.29   Alpha-2 grams/dl Latest Ref Range: 0.43 - 0.99 gm/dL   0.71   Beta grams/dl Latest Ref Range: 0.50 - 1.10 gm/dL   0.82   Gamma grams/dl Latest Ref Range: 0.67 - 1.58 gm/dL   0.70   CCP Antibodies Latest Ref Range: <5.0 U/mL  119.9      Please tell pt the joint x-rays show stable mild slippage C4 on C5 on degenerative basis, no RA changes. The hands show mild narrowing at the wrists likely from RA. There are moderate degenerative changes thumb  bases. There is mild degenerative change inner aspect of the knees and also of the great toes. No significant progression of the RA compared with previous. RJQ          PACS Images     Show images for XR Arthritis Survey   Reviewed By Brooks Royal MD on 4/3/2018 14:30   External Result Report     External Result Report   Narrative     EXAMINATION:  XR ARTHRITIS SURVEY    CLINICAL HISTORY:  suspected systemic arthritis;  Rheumatoid arthritis, unspecified    TECHNIQUE:  A lateral flexion view of the cervical spine was performed.  AP standing views of both knees were performed.  AP standing views of both feet and PA views of both hands were performed.    COMPARISON:  03/02/2017    FINDINGS:  Bilateral hands: Bone mineralization is within normal limits.  There is bilateral radiocarpal joint space narrowing as well as mild-to-moderate degenerative changes of the bilateral carpometacarpal joint spaces, left greater than right.  No osseous erosions are identified.    Bilateral knees: There is bilateral medial compartment joint space narrowing, left greater than right.  There is a small marginal osteophyte of the medial tibial plateau on the left.  No osseous erosions are seen.    Flexion image of the cervical spine: There is mild anterior listhesis of C4 in respect to C5 by approximately 3 mm.  This is stable.  Atlantodental interval is maintained.  There is facet arthropathy in the midcervical spine.    Bilateral feet: There is bilateral hallux valgus deformity with bunion formation with mild to moderate degenerative changes of the 1st metatarsophalangeal joints bilaterally.  No osseous erosions are detected.   Impression       As above.      Electronically signed by: Farida Llanes MD  Date: 04/03/2018                 Current Assessment & Plan     TJ 0 SJ 0  Pt global 70 ESR   CRP 17.6   DAS28  [id number] 2.99(LDA)      *Pneumovax  * After verbal consent and cleansing with Chloraprep the left gr.  Trochanteric bursa injected with Kenalog 40mg with 1 ml 1 % lidocaine. Patient tolerated procedure well.    Cont methotrexate 25mg sc once a week with folic acid 1mg daily  Cont leflunomide 20mg daily  Cont abatacept 1000mg IV q 28 days  RTC  3  Months with standing labs         Relevant Medications    methotrexate 25 mg/mL injection    leflunomide (ARAVA) 20 MG Tab    Other Relevant Orders    Sedimentation rate    C-reactive protein    CBC auto differential    Comprehensive metabolic panel    Sedimentation rate    C-reactive protein    CBC auto differential    Comprehensive metabolic panel    Ambulatory Referral to Physical/Occupational Therapy    Morbid obesity    Current Assessment & Plan     Weight unchanged    Ref to Bariatric Medicine         Relevant Orders    Ambulatory Referral to Bariatric Medicine    Osteoarthritis (Chronic)    Relevant Orders    Ambulatory Referral to Physical/Occupational Therapy    Benign essential HTN    Current Assessment & Plan     135/80         Hyperlipidemia    Overview     Results for MECCA DUONG (MRN [id number]) as of 7/26/2019 08:11   Ref. Range 10/23/2018 07:36   Cholesterol Latest Ref Range: 120 - 199 mg/dL 163   HDL Latest Ref Range: 40 - 75 mg/dL 40   Hdl/Cholesterol Ratio Latest Ref Range: 20.0 - 50.0 % 24.5   LDL Cholesterol External Latest Ref Range: 63.0 - 159.0 mg/dL 70.4   Non-HDL Cholesterol Latest Units: mg/dL 123   Total Cholesterol/HDL Ratio Latest Ref Range: 2.0 - 5.0  4.1   Triglycerides Latest Ref Range: 30 - 150 mg/dL 263 (H)            Current Assessment & Plan     Cont pravastatin 40mg q pm         Osteopenia    Overview     Please tell pt the bone density shows osteopenia but not low enough to require continued alendronate(Fosamax) She can finish whatever Fosamax she has left and then can stop and not refill. Will continue vitamin D2 50,000 units twice a week, and repeat bone density in 2 years. Q          PACS Images     Show images for DXA Bone  Density Spine And Hip   Reviewed By Brooks Royal MD on 9/7/2017 14:36   External Result Report     External Result Report   Narrative     Bone density examination was performed dated 09/07/2017.    Prior study from 09/16/2015.    Bone density examination was performed with measurements obtained of the spine and the femoral necks.    The T score value of the lumbar spine from L1-L4 is 0.7.  The T score value of the left femoral neck is -1.7.  The T score value of the right femoral neck is -2.0.    Findings are compatible with osteopenia.  The patient is at increased risk of fracture compared to the young adult reference group.   Impression       Osteopenia.    The probability of a major osteoporotic fracture is 13.5% within the next ten years.    The probability of a hip fracture is 2.6% within the next ten years.      Electronically signed by: ASPEN DOWNING MD  Date: 09/07/17  Time: 14:05                  Current Assessment & Plan     DXA > 9/17/19           Other Visit Diagnoses     Menopause        Relevant Orders    DXA Bone Density Spine And Hip    Greater trochanteric bursitis of left hip        Relevant Orders    Large Joint Aspiration/Injection    Ambulatory Referral to Physical/Occupational Therapy    Insomnia, unspecified type        Relevant Medications    nortriptyline (PAMELOR) 25 MG capsule

## 2019-07-29 ENCOUNTER — INFUSION (OUTPATIENT)
Dept: INFUSION THERAPY | Facility: HOSPITAL | Age: 69
End: 2019-07-29
Attending: INTERNAL MEDICINE
Payer: MEDICARE

## 2019-07-29 VITALS
RESPIRATION RATE: 20 BRPM | WEIGHT: 234 LBS | HEIGHT: 67 IN | HEART RATE: 80 BPM | BODY MASS INDEX: 36.73 KG/M2 | SYSTOLIC BLOOD PRESSURE: 140 MMHG | DIASTOLIC BLOOD PRESSURE: 72 MMHG

## 2019-07-29 DIAGNOSIS — M05.79 RHEUMATOID ARTHRITIS INVOLVING MULTIPLE SITES WITH POSITIVE RHEUMATOID FACTOR: Primary | ICD-10-CM

## 2019-07-29 PROCEDURE — 96365 THER/PROPH/DIAG IV INF INIT: CPT

## 2019-07-29 PROCEDURE — 63600175 PHARM REV CODE 636 W HCPCS: Performed by: INTERNAL MEDICINE

## 2019-07-29 RX ORDER — SODIUM CHLORIDE 0.9 % (FLUSH) 0.9 %
10 SYRINGE (ML) INJECTION
Status: CANCELLED | OUTPATIENT
Start: 2019-08-26

## 2019-07-29 RX ORDER — ACETAMINOPHEN 325 MG/1
650 TABLET ORAL
Status: CANCELLED | OUTPATIENT
Start: 2019-08-26

## 2019-07-29 RX ORDER — HEPARIN 100 UNIT/ML
500 SYRINGE INTRAVENOUS
Status: CANCELLED | OUTPATIENT
Start: 2019-08-26

## 2019-07-29 RX ADMIN — SODIUM CHLORIDE 1000 MG: 9 INJECTION, SOLUTION INTRAVENOUS at 08:07

## 2019-08-06 ENCOUNTER — OFFICE VISIT (OUTPATIENT)
Dept: INTERNAL MEDICINE | Facility: CLINIC | Age: 69
End: 2019-08-06
Payer: MEDICARE

## 2019-08-06 VITALS
HEART RATE: 97 BPM | WEIGHT: 228.38 LBS | DIASTOLIC BLOOD PRESSURE: 80 MMHG | HEIGHT: 67 IN | RESPIRATION RATE: 18 BRPM | BODY MASS INDEX: 35.84 KG/M2 | SYSTOLIC BLOOD PRESSURE: 124 MMHG

## 2019-08-06 DIAGNOSIS — M25.552 LEFT HIP PAIN: Primary | ICD-10-CM

## 2019-08-06 DIAGNOSIS — M85.80 OSTEOPENIA, UNSPECIFIED LOCATION: ICD-10-CM

## 2019-08-06 DIAGNOSIS — M06.9 RHEUMATOID ARTHRITIS INVOLVING MULTIPLE SITES, UNSPECIFIED RHEUMATOID FACTOR PRESENCE: Chronic | ICD-10-CM

## 2019-08-06 DIAGNOSIS — M79.18 LEFT BUTTOCK PAIN: ICD-10-CM

## 2019-08-06 PROCEDURE — 99999 PR PBB SHADOW E&M-EST. PATIENT-LVL IV: ICD-10-PCS | Mod: PBBFAC,,, | Performed by: NURSE PRACTITIONER

## 2019-08-06 PROCEDURE — 1100F PR PT FALLS ASSESS DOC 2+ FALLS/FALL W/INJURY/YR: ICD-10-PCS | Mod: CPTII,S$GLB,, | Performed by: NURSE PRACTITIONER

## 2019-08-06 PROCEDURE — 96372 PR INJECTION,THERAP/PROPH/DIAG2ST, IM OR SUBCUT: ICD-10-PCS | Mod: S$GLB,,, | Performed by: NURSE PRACTITIONER

## 2019-08-06 PROCEDURE — 96372 THER/PROPH/DIAG INJ SC/IM: CPT | Mod: S$GLB,,, | Performed by: NURSE PRACTITIONER

## 2019-08-06 PROCEDURE — 3288F PR FALLS RISK ASSESSMENT DOCUMENTED: ICD-10-PCS | Mod: CPTII,S$GLB,, | Performed by: NURSE PRACTITIONER

## 2019-08-06 PROCEDURE — 99214 PR OFFICE/OUTPT VISIT, EST, LEVL IV, 30-39 MIN: ICD-10-PCS | Mod: 25,S$GLB,, | Performed by: NURSE PRACTITIONER

## 2019-08-06 PROCEDURE — 3079F PR MOST RECENT DIASTOLIC BLOOD PRESSURE 80-89 MM HG: ICD-10-PCS | Mod: CPTII,S$GLB,, | Performed by: NURSE PRACTITIONER

## 2019-08-06 PROCEDURE — 99214 OFFICE O/P EST MOD 30 MIN: CPT | Mod: 25,S$GLB,, | Performed by: NURSE PRACTITIONER

## 2019-08-06 PROCEDURE — 3288F FALL RISK ASSESSMENT DOCD: CPT | Mod: CPTII,S$GLB,, | Performed by: NURSE PRACTITIONER

## 2019-08-06 PROCEDURE — 3074F SYST BP LT 130 MM HG: CPT | Mod: CPTII,S$GLB,, | Performed by: NURSE PRACTITIONER

## 2019-08-06 PROCEDURE — 1100F PTFALLS ASSESS-DOCD GE2>/YR: CPT | Mod: CPTII,S$GLB,, | Performed by: NURSE PRACTITIONER

## 2019-08-06 PROCEDURE — 99999 PR PBB SHADOW E&M-EST. PATIENT-LVL IV: CPT | Mod: PBBFAC,,, | Performed by: NURSE PRACTITIONER

## 2019-08-06 PROCEDURE — 3079F DIAST BP 80-89 MM HG: CPT | Mod: CPTII,S$GLB,, | Performed by: NURSE PRACTITIONER

## 2019-08-06 PROCEDURE — 3074F PR MOST RECENT SYSTOLIC BLOOD PRESSURE < 130 MM HG: ICD-10-PCS | Mod: CPTII,S$GLB,, | Performed by: NURSE PRACTITIONER

## 2019-08-06 RX ORDER — KETOROLAC TROMETHAMINE 30 MG/ML
30 INJECTION, SOLUTION INTRAMUSCULAR; INTRAVENOUS
Status: COMPLETED | OUTPATIENT
Start: 2019-08-06 | End: 2019-08-06

## 2019-08-06 RX ORDER — TIZANIDINE 4 MG/1
TABLET ORAL
Qty: 30 TABLET | Refills: 0 | Status: SHIPPED | OUTPATIENT
Start: 2019-08-06 | End: 2019-10-28

## 2019-08-06 RX ADMIN — KETOROLAC TROMETHAMINE 30 MG: 30 INJECTION, SOLUTION INTRAMUSCULAR; INTRAVENOUS at 02:08

## 2019-08-06 NOTE — PROGRESS NOTES
Subjective:           Patient ID: Maru Shelby is a 69 y.o. female.    Chief Complaint: ER follow up and Hip Pain (left hip)    Maru Shelby is a 69 y.o. Female known to me from an acute visit but usually follows with fellow provider; PMHX of HTN, HLD, Osteoarthritis, RA, and osteopenia here with c/o left hip and left buttock pain that travels down the back of her left thigh.   She  losing balance while outside and fell onto cement ashtray ~ 3weeks ago. She was seen by rheumatologist who gave her an injection in left hip for possible bursitis. She was seen 7/21/19 in ER for continued pain and had subsequent xrays. She was given tramadol prn but has not been really taking    Xray sacrum,   There is irregularity of the distal coccyx on the lateral view which could represent nondisplaced fracture but this is age indeterminate.  There is no prior study for comparison.  2. Osteopenia.  3. Extensive degenerative change is present in the lower lumbar spine.    Xray Lumbar spine- The bones are diffusely osteopenic and there is multilevel degenerative disc and facet disease.  2. There is minimal superior endplate depression of L5 which is age indeterminate.  There is no prior study for comparison.    She notes difficulty with ADLs; unable to flex leg; unable to lay on left side at night.   Pain is to mid buttock - below SI joint and runs down post thigh- tightness noted   Continued pain ~ 2.5 weeks affecting adls, limited ROM.     Review of Systems   Constitutional: Negative for chills, fatigue, fever and unexpected weight change.   HENT: Negative for congestion, ear pain, sore throat and trouble swallowing.    Eyes: Negative for pain and visual disturbance.   Respiratory: Negative for cough, chest tightness and shortness of breath.    Cardiovascular: Negative for chest pain, palpitations and leg swelling.   Gastrointestinal: Negative for abdominal distention, abdominal pain, constipation, diarrhea and vomiting.    Genitourinary: Negative for difficulty urinating, dysuria, flank pain, frequency and hematuria.   Musculoskeletal: Negative for back pain, gait problem, joint swelling, neck pain and neck stiffness.   Skin: Negative for rash and wound.   Neurological: Negative for dizziness, seizures, speech difficulty, light-headedness and headaches.       Objective:      Physical Exam   Constitutional: She is oriented to person, place, and time. She appears well-developed and well-nourished.   HENT:   Head: Normocephalic and atraumatic.   Right Ear: External ear normal.   Left Ear: External ear normal.   Nose: Nose normal.   Mouth/Throat: Oropharynx is clear and moist. No oropharyngeal exudate.   Eyes: Pupils are equal, round, and reactive to light. Conjunctivae and EOM are normal.   Neck: Normal range of motion. Neck supple. No thyromegaly present.   Cardiovascular: Normal rate, regular rhythm, normal heart sounds and intact distal pulses.   No murmur heard.  Pulmonary/Chest: Effort normal and breath sounds normal. No respiratory distress. She has no wheezes. She has no rales.   Abdominal: Soft. Bowel sounds are normal. She exhibits no distension and no mass. There is no tenderness. There is no guarding.   Musculoskeletal: Normal range of motion. She exhibits tenderness.   Left lower buttock tenderness, radiation to post thigh; limited ROM   Sitting on right buttock in chair   Lymphadenopathy:     She has no cervical adenopathy.   Neurological: She is alert and oriented to person, place, and time.   Skin: Skin is warm and dry. Capillary refill takes less than 2 seconds.   Psychiatric: She has a normal mood and affect. Her behavior is normal. Judgment and thought content normal.   Nursing note and vitals reviewed.      Assessment:       1. Left hip pain    2. Left buttock pain    3. Rheumatoid arthritis involving multiple sites, unspecified rheumatoid factor presence    4. Osteopenia, unspecified location        Plan:   Maru  was seen today for er follow up and hip pain.    Diagnoses and all orders for this visit:    Left hip pain  -     CT Pelvis Without Contrast; Future  -     tiZANidine (ZANAFLEX) 4 MG tablet; Take one tablet by oral route once daily in the evening as needed for hip pain , spasm  -     ketorolac injection 30 mg    Left buttock pain  -     CT Pelvis Without Contrast; Future  -     tiZANidine (ZANAFLEX) 4 MG tablet; Take one tablet by oral route once daily in the evening as needed for hip pain , spasm  -     ketorolac injection 30 mg    Rheumatoid arthritis involving multiple sites, unspecified rheumatoid factor presence    Osteopenia, unspecified location    Other orders  -     Cancel: CT Hip Without Contrast Left; Future      Problem List Items Addressed This Visit        Orthopedic    RA (rheumatoid arthritis) (Chronic)    Overview     Results for MECCA DUONG (MRN [id number]) as of 1/9/2018 07:13   Ref. Range 8/1/2008 07:11 7/6/2009 08:26 8/15/2013 08:25   ds DNA Ab Latest Ref Range: Negative Titer Negative     Protein, Serum Latest Ref Range: 6.0 - 8.4 g/dL   6.4   Albumin grams/dl Latest Ref Range: 3.35 - 5.55 gm/dL   3.88   Alpha-1 grams/dl Latest Ref Range: 0.17 - 0.41 gm/dL   0.29   Alpha-2 grams/dl Latest Ref Range: 0.43 - 0.99 gm/dL   0.71   Beta grams/dl Latest Ref Range: 0.50 - 1.10 gm/dL   0.82   Gamma grams/dl Latest Ref Range: 0.67 - 1.58 gm/dL   0.70   CCP Antibodies Latest Ref Range: <5.0 U/mL  119.9      Please tell pt the joint x-rays show stable mild slippage C4 on C5 on degenerative basis, no RA changes. The hands show mild narrowing at the wrists likely from RA. There are moderate degenerative changes thumb bases. There is mild degenerative change inner aspect of the knees and also of the great toes. No significant progression of the RA compared with previous. [facility]          PACS Images     Show images for XR Arthritis Survey   Reviewed By Brooks Royal MD on 4/3/2018 14:30    External Result Report     External Result Report   Narrative     EXAMINATION:  XR ARTHRITIS SURVEY    CLINICAL HISTORY:  suspected systemic arthritis;  Rheumatoid arthritis, unspecified    TECHNIQUE:  A lateral flexion view of the cervical spine was performed.  AP standing views of both knees were performed.  AP standing views of both feet and PA views of both hands were performed.    COMPARISON:  03/02/2017    FINDINGS:  Bilateral hands: Bone mineralization is within normal limits.  There is bilateral radiocarpal joint space narrowing as well as mild-to-moderate degenerative changes of the bilateral carpometacarpal joint spaces, left greater than right.  No osseous erosions are identified.    Bilateral knees: There is bilateral medial compartment joint space narrowing, left greater than right.  There is a small marginal osteophyte of the medial tibial plateau on the left.  No osseous erosions are seen.    Flexion image of the cervical spine: There is mild anterior listhesis of C4 in respect to C5 by approximately 3 mm.  This is stable.  Atlantodental interval is maintained.  There is facet arthropathy in the midcervical spine.    Bilateral feet: There is bilateral hallux valgus deformity with bunion formation with mild to moderate degenerative changes of the 1st metatarsophalangeal joints bilaterally.  No osseous erosions are detected.   Impression       As above.      Electronically signed by: Farida Llanes MD  Date: 04/03/2018                 Osteopenia    Overview     Please tell pt the bone density shows osteopenia but not low enough to require continued alendronate(Fosamax) She can finish whatever Fosamax she has left and then can stop and not refill. Will continue vitamin D2 50,000 units twice a week, and repeat bone density in 2 years. [facility]          PACS Images     Show images for DXA Bone Density Spine And Hip   Reviewed By Brooks Royal MD on 9/7/2017 14:36   External Result Report      External Result Report   Narrative     Bone density examination was performed dated 09/07/2017.    Prior study from 09/16/2015.    Bone density examination was performed with measurements obtained of the spine and the femoral necks.    The T score value of the lumbar spine from L1-L4 is 0.7.  The T score value of the left femoral neck is -1.7.  The T score value of the right femoral neck is -2.0.    Findings are compatible with osteopenia.  The patient is at increased risk of fracture compared to the young adult reference group.   Impression       Osteopenia.    The probability of a major osteoporotic fracture is 13.5% within the next ten years.    The probability of a hip fracture is 2.6% within the next ten years.      Electronically signed by: ASPEN DOWNING MD  Date: 09/07/17  Time: 14:05                    Other Visit Diagnoses     Left hip pain    -  Primary    Relevant Medications    tiZANidine (ZANAFLEX) 4 MG tablet    ketorolac injection 30 mg (Completed)    Other Relevant Orders    CT Pelvis Without Contrast    Left buttock pain        Relevant Medications    tiZANidine (ZANAFLEX) 4 MG tablet    ketorolac injection 30 mg (Completed)    Other Relevant Orders    CT Pelvis Without Contrast      discussed tramadol with Tylenol prn  ketoradol 30mg IM today x 1  Tizanidine at night   Await CT scan   F/U with PCP in 7-10d   May need PT

## 2019-08-09 ENCOUNTER — HOSPITAL ENCOUNTER (OUTPATIENT)
Dept: RADIOLOGY | Facility: HOSPITAL | Age: 69
Discharge: HOME | End: 2019-08-09
Attending: NURSE PRACTITIONER
Payer: MEDICARE

## 2019-08-09 DIAGNOSIS — M79.18 LEFT BUTTOCK PAIN: ICD-10-CM

## 2019-08-09 DIAGNOSIS — M25.552 LEFT HIP PAIN: ICD-10-CM

## 2019-08-09 PROCEDURE — 72192 CT PELVIS W/O DYE: CPT | Mod: 26,,, | Performed by: RADIOLOGY

## 2019-08-09 PROCEDURE — 72192 CT PELVIS W/O DYE: CPT | Mod: TC

## 2019-08-09 PROCEDURE — 72192 CT PELVIS WITHOUT CONTRAST: ICD-10-PCS | Mod: 26,,, | Performed by: RADIOLOGY

## 2019-08-26 ENCOUNTER — INFUSION (OUTPATIENT)
Dept: INFUSION THERAPY | Facility: HOSPITAL | Age: 69
End: 2019-08-26
Attending: INTERNAL MEDICINE
Payer: MEDICARE

## 2019-08-26 VITALS
SYSTOLIC BLOOD PRESSURE: 124 MMHG | BODY MASS INDEX: 35.84 KG/M2 | RESPIRATION RATE: 18 BRPM | WEIGHT: 228.38 LBS | HEIGHT: 67 IN | HEART RATE: 86 BPM | DIASTOLIC BLOOD PRESSURE: 64 MMHG

## 2019-08-26 DIAGNOSIS — M05.79 RHEUMATOID ARTHRITIS INVOLVING MULTIPLE SITES WITH POSITIVE RHEUMATOID FACTOR: Primary | ICD-10-CM

## 2019-08-26 PROCEDURE — 63600175 PHARM REV CODE 636 W HCPCS: Performed by: INTERNAL MEDICINE

## 2019-08-26 PROCEDURE — 96365 THER/PROPH/DIAG IV INF INIT: CPT

## 2019-08-26 RX ORDER — ACETAMINOPHEN 325 MG/1
650 TABLET ORAL
Status: CANCELLED | OUTPATIENT
Start: 2019-09-23

## 2019-08-26 RX ORDER — HEPARIN 100 UNIT/ML
500 SYRINGE INTRAVENOUS
Status: CANCELLED | OUTPATIENT
Start: 2019-09-23

## 2019-08-26 RX ORDER — SODIUM CHLORIDE 0.9 % (FLUSH) 0.9 %
10 SYRINGE (ML) INJECTION
Status: CANCELLED | OUTPATIENT
Start: 2019-09-23

## 2019-08-26 RX ADMIN — SODIUM CHLORIDE 1000 MG: 9 INJECTION, SOLUTION INTRAVENOUS at 08:08

## 2019-08-27 DIAGNOSIS — M05.772 RHEUMATOID ARTHRITIS INVOLVING BOTH ANKLES WITH POSITIVE RHEUMATOID FACTOR: ICD-10-CM

## 2019-08-27 DIAGNOSIS — M05.771 RHEUMATOID ARTHRITIS INVOLVING BOTH ANKLES WITH POSITIVE RHEUMATOID FACTOR: ICD-10-CM

## 2019-08-27 RX ORDER — METHOTREXATE 25 MG/ML
25 INJECTION, SOLUTION INTRA-ARTERIAL; INTRAMUSCULAR; INTRAVENOUS
Qty: 4 ML | Refills: 1 | Status: SHIPPED | OUTPATIENT
Start: 2019-08-27 | End: 2019-10-28 | Stop reason: SDUPTHER

## 2019-09-03 DIAGNOSIS — M25.552 LEFT HIP PAIN: ICD-10-CM

## 2019-09-03 DIAGNOSIS — M79.18 LEFT BUTTOCK PAIN: ICD-10-CM

## 2019-09-03 RX ORDER — TIZANIDINE 4 MG/1
TABLET ORAL
Qty: 30 TABLET | Refills: 0 | OUTPATIENT
Start: 2019-09-03

## 2019-09-10 ENCOUNTER — OFFICE VISIT (OUTPATIENT)
Dept: URGENT CARE | Facility: CLINIC | Age: 69
End: 2019-09-10
Payer: MEDICARE

## 2019-09-10 ENCOUNTER — TELEPHONE (OUTPATIENT)
Dept: INTERNAL MEDICINE | Facility: CLINIC | Age: 69
End: 2019-09-10

## 2019-09-10 VITALS
TEMPERATURE: 98 F | DIASTOLIC BLOOD PRESSURE: 74 MMHG | WEIGHT: 228 LBS | HEIGHT: 67 IN | SYSTOLIC BLOOD PRESSURE: 130 MMHG | OXYGEN SATURATION: 96 % | BODY MASS INDEX: 35.79 KG/M2 | HEART RATE: 99 BPM

## 2019-09-10 DIAGNOSIS — B00.1 COLD SORE: Primary | ICD-10-CM

## 2019-09-10 PROCEDURE — 1100F PR PT FALLS ASSESS DOC 2+ FALLS/FALL W/INJURY/YR: ICD-10-PCS | Mod: CPTII,S$GLB,, | Performed by: PHYSICIAN ASSISTANT

## 2019-09-10 PROCEDURE — 3075F SYST BP GE 130 - 139MM HG: CPT | Mod: CPTII,S$GLB,, | Performed by: PHYSICIAN ASSISTANT

## 2019-09-10 PROCEDURE — 3075F PR MOST RECENT SYSTOLIC BLOOD PRESS GE 130-139MM HG: ICD-10-PCS | Mod: CPTII,S$GLB,, | Performed by: PHYSICIAN ASSISTANT

## 2019-09-10 PROCEDURE — 3078F DIAST BP <80 MM HG: CPT | Mod: CPTII,S$GLB,, | Performed by: PHYSICIAN ASSISTANT

## 2019-09-10 PROCEDURE — 3288F PR FALLS RISK ASSESSMENT DOCUMENTED: ICD-10-PCS | Mod: CPTII,S$GLB,, | Performed by: PHYSICIAN ASSISTANT

## 2019-09-10 PROCEDURE — 3078F PR MOST RECENT DIASTOLIC BLOOD PRESSURE < 80 MM HG: ICD-10-PCS | Mod: CPTII,S$GLB,, | Performed by: PHYSICIAN ASSISTANT

## 2019-09-10 PROCEDURE — 3288F FALL RISK ASSESSMENT DOCD: CPT | Mod: CPTII,S$GLB,, | Performed by: PHYSICIAN ASSISTANT

## 2019-09-10 PROCEDURE — 99214 OFFICE O/P EST MOD 30 MIN: CPT | Mod: S$GLB,,, | Performed by: PHYSICIAN ASSISTANT

## 2019-09-10 PROCEDURE — 1100F PTFALLS ASSESS-DOCD GE2>/YR: CPT | Mod: CPTII,S$GLB,, | Performed by: PHYSICIAN ASSISTANT

## 2019-09-10 PROCEDURE — 99214 PR OFFICE/OUTPT VISIT, EST, LEVL IV, 30-39 MIN: ICD-10-PCS | Mod: S$GLB,,, | Performed by: PHYSICIAN ASSISTANT

## 2019-09-10 RX ORDER — VALACYCLOVIR HYDROCHLORIDE 1 G/1
1000 TABLET, FILM COATED ORAL 3 TIMES DAILY
Qty: 21 TABLET | Refills: 0 | Status: SHIPPED | OUTPATIENT
Start: 2019-09-10 | End: 2019-10-28

## 2019-09-10 NOTE — PATIENT INSTRUCTIONS
· Follow up with your primary care if symptoms do not improve, or you may return here at any time.  · If you were referred to a specialist, please follow up with that specialty.  · If you were prescribed antibiotics, please take them to completion.  · If you were prescribed a narcotic or any medication with sedative effects, do not drive or operate heavy equipment or machinery while taking these medications.  · You must understand that you have received treatment at an Urgent Care facility only, and that you may be released before all of your medical problems are known or treated. Urgent Care facilities are not equipped to handle life threatening emergencies. It is recommended that you seek care at an Emergency Department for further evaluation of worsening or concerning symptoms, or possibly life threatening conditions as discussed.                                        If you  smoke, please stop smoking        Understanding Cold Sores  Cold sores are small blisters or sores on the lip or sometimes inside the mouth. Many people get them from time to time. Cold sores usually are not serious, and they usually heal in a week or two. They are caused by 2 related viruses, herpes simplex type 1 and 2. These viruses spread very easily. Many people have one or both of these viruses in their body. More than 4 in every 5 people are infected with herpes simplex type 1. Once you have the virus that causes cold sores, it stays in your body for the rest of your life. But it can be inactive for long periods.  What causes a cold sore?  Cold sores are usually caused by herpes simplex virus type 1. Less often, they are caused by herpes simplex virus type 2. Herpes simplex virus type 2 is the more common cause of genital sores. The herpes viruses can enter the body through a break in the skin such as a scrape. Or they may enter through mucous membranes such as the lips or mouth. Some ways to get the viruses include:  · Kissing  someone who has a cold sore  · Sharing a drinking glass, eating utensils, or lip balm with someone who has a cold sore  · Having oral sex with someone who has a cold sore  A  baby can also get the infection at birth.  If you have a herpes virus, you can to pass it along even when you dont have a sore.  Cold sores flare up occasionally. Things that can cause an outbreak include:  · Sun exposure  · Fever  · Stress or exhaustion  · Menstruation  · Skin irritation  · Another unrelated Illness such as pneumonia, urinary infection, or cancer  What are the symptoms of a cold sore?  Symptoms can include:  · A blister-like sore or cluster of sores. These often occur at the edge of the lips but may appear inside the mouth.  · Skin redness around the sores.  · Pain or itching in the area of the outbreak. Often the pain or itching develops 12 to 24 hours before the sore become visible.  · Flu-like symptoms, including swollen glands, headache, body ache, or fever. These typically occur only at the time of the first infection.  Cold sores may also occur on fingers. They may rarely infect the eyes, a serious possible complication.  Some people have symptoms a day or two before an outbreak. They may feel tenderness, burning, itching, or tingling before a cold sore appears. Cold sores tend to come back in the same area that they first appeared.  How are cold sores treated?  Treatment for cold sores focuses on relieving and shortening symptoms. For people with frequent outbreaks, treatment works to decrease how often future episodes.  Treatments may include:  · Prescription or over-the-counter pain medicines. These can help with discomfort, especially if sores are inside the mouth.  · Antiviral medicines. These may be pills that are taken by mouth or a cream to apply to sores. They may help shorten an outbreak and reduce the severity of symptoms.  They may be used to help prevent future outbreaks if you have disabling  recurrent infections.  · Self-care such as extra rest and drinking more fluids. These may help relieve the flu-like symptoms of a first outbreak.  How are cold sores diagnosed?  Your healthcare provider makes the diagnosis mainly by looking at the sores and doing a clinical exam.  This may be confirmed by swab tests or blood tests.  How can I prevent cold sores?  You can help reduce the spread of the herpes viruses that cause cold sores. This can help both you and others avoid getting cold sores. Follow these tips:  · Do not kiss others if you have a cold sore. Also avoid kissing someone with a cold sore.  · Do not share eating utensils, lip balm, razors, or towels with someone who has a cold sore.  · Wash your hands after touching the area of a cold sore. The herpes virus can be carried from your face to your hands when you touch the area of a cold sore. When this happens, wash your hands thoroughly, for at least 20 seconds. When you cant wash with soap and water, use an alcohol-based hand .  · Disinfect things you touch often, such as phones and keyboards.    · If you feel a cold sore coming on, do the same things you would do when a cold sore is present to avoid spreading the virus.  · Use condoms to help prevent passing on the viruses through sex.  What are the possible complications of a cold sore?  Cold sores usually go away by themselves within 2 weeks. For most people cold sores are not serious. The viruses that cause cold sores can cause more serious illness, though. People who have a weak immune system may get more serious infections from herpes viruses. These include people being treated for cancer or who have HIV disease. Babies may also become very ill from a herpes infection.      When should I call my healthcare provider?  Call your healthcare provider right away if you have any of these:  · Fever of 100.4°F (38°C) or higher, or as directed  · Pain that gets worse  · You cannot eat or drink  because of painful sores  · Symptoms dont get better within 5 to 7 days  · Blisters spread beyond the mouth or lip to areas on the chest, arms, face, or legs   Date Last Reviewed: 3/28/2016  © 5429-3893 [employer]. [address] [place] [address], [place] [address]. All rights reserved. This information is not intended as a substitute for professional medical care. Always follow your healthcare professional's instructions.

## 2019-09-10 NOTE — PROGRESS NOTES
"Subjective:       Patient ID: Maru Shelby is a 69 y.o. female.    Vitals:  height is 5' 7" (1.702 m) and weight is 103.4 kg (228 lb). Her oral temperature is 97.6 °F (36.4 °C). Her blood pressure is 130/74 and her pulse is 99. Her oxygen saturation is 96%.     Chief Complaint: Oral Pain    Oral Pain    This is a new problem. Episode onset: 3 days ago  The problem occurs constantly. The problem has been gradually worsening. The pain is at a severity of 4/10. The pain is moderate. Pertinent negatives include no facial pain, fever, oral bleeding or thermal sensitivity. Treatments tried: oragel  The treatment provided no relief.       Constitution: Negative for chills, sweating, fatigue and fever.   HENT: Positive for mouth sores. Negative for congestion and sore throat.    Neck: Negative for painful lymph nodes.   Cardiovascular: Negative for chest pain and leg swelling.   Eyes: Negative for double vision and blurred vision.   Respiratory: Negative for cough and shortness of breath.    Gastrointestinal: Negative for nausea, vomiting and diarrhea.   Genitourinary: Negative for dysuria, frequency, urgency and history of kidney stones.   Musculoskeletal: Negative for joint pain, joint swelling, muscle cramps and muscle ache.   Skin: Negative for color change, pale, rash, erythema and bruising.   Allergic/Immunologic: Negative for seasonal allergies.   Neurological: Negative for dizziness, history of vertigo, light-headedness, passing out and headaches.   Hematologic/Lymphatic: Negative for swollen lymph nodes.   Psychiatric/Behavioral: Negative for nervous/anxious, sleep disturbance and depression. The patient is not nervous/anxious.        Objective:      Physical Exam   Constitutional: She is oriented to person, place, and time. She appears well-developed and well-nourished.   HENT:   Head: Normocephalic and atraumatic. Head is without abrasion, without contusion and without laceration.   Right Ear: External ear " normal.   Left Ear: External ear normal.   Nose: Nose normal.   Mouth/Throat: Oropharynx is clear and moist and mucous membranes are normal.       Eyes: Pupils are equal, round, and reactive to light. Conjunctivae, EOM and lids are normal.   Neck: Trachea normal, full passive range of motion without pain and phonation normal. Neck supple.   Cardiovascular: Normal rate, regular rhythm and normal heart sounds.   Pulmonary/Chest: Effort normal and breath sounds normal. No stridor. No respiratory distress.   Musculoskeletal: Normal range of motion.   Neurological: She is alert and oriented to person, place, and time.   Skin: Skin is warm, dry and intact. Capillary refill takes less than 2 seconds. No abrasion, no bruising, no burn, no ecchymosis, no laceration, no lesion and no rash noted. No erythema.   Psychiatric: She has a normal mood and affect. Her speech is normal and behavior is normal. Judgment and thought content normal. Cognition and memory are normal.   Nursing note and vitals reviewed.      Assessment:       1. Cold sore        Plan:         Cold sore  -     valACYclovir (VALTREX) 1000 MG tablet; Take 1 tablet (1,000 mg total) by mouth 3 (three) times daily. for 7 days  Dispense: 21 tablet; Refill: 0      Patient Instructions     · Follow up with your primary care if symptoms do not improve, or you may return here at any time.  · If you were referred to a specialist, please follow up with that specialty.  · If you were prescribed antibiotics, please take them to completion.  · If you were prescribed a narcotic or any medication with sedative effects, do not drive or operate heavy equipment or machinery while taking these medications.  · You must understand that you have received treatment at an Urgent Care facility only, and that you may be released before all of your medical problems are known or treated. Urgent Care facilities are not equipped to handle life threatening emergencies. It is recommended that  you seek care at an Emergency Department for further evaluation of worsening or concerning symptoms, or possibly life threatening conditions as discussed.                                        If you  smoke, please stop smoking        Understanding Cold Sores  Cold sores are small blisters or sores on the lip or sometimes inside the mouth. Many people get them from time to time. Cold sores usually are not serious, and they usually heal in a week or two. They are caused by 2 related viruses, herpes simplex type 1 and 2. These viruses spread very easily. Many people have one or both of these viruses in their body. More than 4 in every 5 people are infected with herpes simplex type 1. Once you have the virus that causes cold sores, it stays in your body for the rest of your life. But it can be inactive for long periods.  What causes a cold sore?  Cold sores are usually caused by herpes simplex virus type 1. Less often, they are caused by herpes simplex virus type 2. Herpes simplex virus type 2 is the more common cause of genital sores. The herpes viruses can enter the body through a break in the skin such as a scrape. Or they may enter through mucous membranes such as the lips or mouth. Some ways to get the viruses include:  · Kissing someone who has a cold sore  · Sharing a drinking glass, eating utensils, or lip balm with someone who has a cold sore  · Having oral sex with someone who has a cold sore  A  baby can also get the infection at birth.  If you have a herpes virus, you can to pass it along even when you dont have a sore.  Cold sores flare up occasionally. Things that can cause an outbreak include:  · Sun exposure  · Fever  · Stress or exhaustion  · Menstruation  · Skin irritation  · Another unrelated Illness such as pneumonia, urinary infection, or cancer  What are the symptoms of a cold sore?  Symptoms can include:  · A blister-like sore or cluster of sores. These often occur at the edge of the  lips but may appear inside the mouth.  · Skin redness around the sores.  · Pain or itching in the area of the outbreak. Often the pain or itching develops 12 to 24 hours before the sore become visible.  · Flu-like symptoms, including swollen glands, headache, body ache, or fever. These typically occur only at the time of the first infection.  Cold sores may also occur on fingers. They may rarely infect the eyes, a serious possible complication.  Some people have symptoms a day or two before an outbreak. They may feel tenderness, burning, itching, or tingling before a cold sore appears. Cold sores tend to come back in the same area that they first appeared.  How are cold sores treated?  Treatment for cold sores focuses on relieving and shortening symptoms. For people with frequent outbreaks, treatment works to decrease how often future episodes.  Treatments may include:  · Prescription or over-the-counter pain medicines. These can help with discomfort, especially if sores are inside the mouth.  · Antiviral medicines. These may be pills that are taken by mouth or a cream to apply to sores. They may help shorten an outbreak and reduce the severity of symptoms.  They may be used to help prevent future outbreaks if you have disabling recurrent infections.  · Self-care such as extra rest and drinking more fluids. These may help relieve the flu-like symptoms of a first outbreak.  How are cold sores diagnosed?  Your healthcare provider makes the diagnosis mainly by looking at the sores and doing a clinical exam.  This may be confirmed by swab tests or blood tests.  How can I prevent cold sores?  You can help reduce the spread of the herpes viruses that cause cold sores. This can help both you and others avoid getting cold sores. Follow these tips:  · Do not kiss others if you have a cold sore. Also avoid kissing someone with a cold sore.  · Do not share eating utensils, lip balm, razors, or towels with someone who has a  cold sore.  · Wash your hands after touching the area of a cold sore. The herpes virus can be carried from your face to your hands when you touch the area of a cold sore. When this happens, wash your hands thoroughly, for at least 20 seconds. When you cant wash with soap and water, use an alcohol-based hand .  · Disinfect things you touch often, such as phones and keyboards.    · If you feel a cold sore coming on, do the same things you would do when a cold sore is present to avoid spreading the virus.  · Use condoms to help prevent passing on the viruses through sex.  What are the possible complications of a cold sore?  Cold sores usually go away by themselves within 2 weeks. For most people cold sores are not serious. The viruses that cause cold sores can cause more serious illness, though. People who have a weak immune system may get more serious infections from herpes viruses. These include people being treated for cancer or who have HIV disease. Babies may also become very ill from a herpes infection.      When should I call my healthcare provider?  Call your healthcare provider right away if you have any of these:  · Fever of 100.4°F (38°C) or higher, or as directed  · Pain that gets worse  · You cannot eat or drink because of painful sores  · Symptoms dont get better within 5 to 7 days  · Blisters spread beyond the mouth or lip to areas on the chest, arms, face, or legs   Date Last Reviewed: 3/28/2016  © 1763-7146 [employer]. [address] [place] [address], [place] [address]. All rights reserved. This information is not intended as a substitute for professional medical care. Always follow your healthcare professional's instructions.

## 2019-09-10 NOTE — TELEPHONE ENCOUNTER
----- Message from Homa Hernandez sent at 9/10/2019  8:19 AM CDT -----  Contact: Self  Maru Shelby  MRN: [id number]  : 1950  PCP: Florence Mark  Home Phone      [phone]  Work Phone      Not on file.  Mobile          [phone]    MESSAGE:     Requesting appointment to see any provider for a blister that appeared in mouth area a few days ago that burns.  She would like to have either antibiotics called in or an appointment for today. Please call to advise.    Phone : [phone] or [phone]

## 2019-09-23 ENCOUNTER — INFUSION (OUTPATIENT)
Dept: INFUSION THERAPY | Facility: HOSPITAL | Age: 69
End: 2019-09-23
Attending: INTERNAL MEDICINE
Payer: MEDICARE

## 2019-09-23 VITALS
RESPIRATION RATE: 20 BRPM | DIASTOLIC BLOOD PRESSURE: 72 MMHG | SYSTOLIC BLOOD PRESSURE: 119 MMHG | HEIGHT: 67 IN | TEMPERATURE: 96 F | WEIGHT: 228 LBS | BODY MASS INDEX: 35.79 KG/M2 | HEART RATE: 84 BPM

## 2019-09-23 DIAGNOSIS — M05.79 RHEUMATOID ARTHRITIS INVOLVING MULTIPLE SITES WITH POSITIVE RHEUMATOID FACTOR: Primary | ICD-10-CM

## 2019-09-23 PROCEDURE — 63600175 PHARM REV CODE 636 W HCPCS: Performed by: INTERNAL MEDICINE

## 2019-09-23 PROCEDURE — 96365 THER/PROPH/DIAG IV INF INIT: CPT

## 2019-09-23 RX ORDER — ACETAMINOPHEN 325 MG/1
650 TABLET ORAL
Status: CANCELLED | OUTPATIENT
Start: 2019-09-30

## 2019-09-23 RX ORDER — HEPARIN 100 UNIT/ML
500 SYRINGE INTRAVENOUS
Status: CANCELLED | OUTPATIENT
Start: 2019-09-30

## 2019-09-23 RX ORDER — SODIUM CHLORIDE 0.9 % (FLUSH) 0.9 %
10 SYRINGE (ML) INJECTION
Status: CANCELLED | OUTPATIENT
Start: 2019-09-30

## 2019-09-23 RX ADMIN — SODIUM CHLORIDE 1000 MG: 9 INJECTION, SOLUTION INTRAVENOUS at 08:09

## 2019-10-21 ENCOUNTER — INFUSION (OUTPATIENT)
Dept: INFUSION THERAPY | Facility: HOSPITAL | Age: 69
End: 2019-10-21
Attending: INTERNAL MEDICINE
Payer: MEDICARE

## 2019-10-21 VITALS
SYSTOLIC BLOOD PRESSURE: 134 MMHG | TEMPERATURE: 96 F | DIASTOLIC BLOOD PRESSURE: 72 MMHG | RESPIRATION RATE: 20 BRPM | HEART RATE: 87 BPM

## 2019-10-21 DIAGNOSIS — M05.79 RHEUMATOID ARTHRITIS INVOLVING MULTIPLE SITES WITH POSITIVE RHEUMATOID FACTOR: Primary | ICD-10-CM

## 2019-10-21 PROCEDURE — 63600175 PHARM REV CODE 636 W HCPCS: Performed by: INTERNAL MEDICINE

## 2019-10-21 PROCEDURE — 96365 THER/PROPH/DIAG IV INF INIT: CPT

## 2019-10-21 RX ORDER — ACETAMINOPHEN 325 MG/1
650 TABLET ORAL
Status: CANCELLED | OUTPATIENT
Start: 2019-10-28

## 2019-10-21 RX ORDER — HEPARIN 100 UNIT/ML
500 SYRINGE INTRAVENOUS
Status: CANCELLED | OUTPATIENT
Start: 2019-10-28

## 2019-10-21 RX ORDER — SODIUM CHLORIDE 0.9 % (FLUSH) 0.9 %
10 SYRINGE (ML) INJECTION
Status: CANCELLED | OUTPATIENT
Start: 2019-10-28

## 2019-10-21 RX ADMIN — SODIUM CHLORIDE 1000 MG: 9 INJECTION, SOLUTION INTRAVENOUS at 08:10

## 2019-10-24 ENCOUNTER — LAB VISIT (OUTPATIENT)
Dept: LAB | Facility: HOSPITAL | Age: 69
End: 2019-10-24
Attending: INTERNAL MEDICINE
Payer: MEDICARE

## 2019-10-24 DIAGNOSIS — M06.9 RHEUMATOID ARTHRITIS INVOLVING MULTIPLE SITES, UNSPECIFIED RHEUMATOID FACTOR PRESENCE: ICD-10-CM

## 2019-10-24 LAB
ALBUMIN SERPL BCP-MCNC: 3.6 G/DL (ref 3.5–5.2)
ALP SERPL-CCNC: 137 U/L (ref 55–135)
ALT SERPL W/O P-5'-P-CCNC: 22 U/L (ref 10–44)
ANION GAP SERPL CALC-SCNC: 9 MMOL/L (ref 8–16)
AST SERPL-CCNC: 17 U/L (ref 10–40)
BASOPHILS # BLD AUTO: 0.06 K/UL (ref 0–0.2)
BASOPHILS NFR BLD: 0.5 % (ref 0–1.9)
BILIRUB SERPL-MCNC: 0.5 MG/DL (ref 0.1–1)
BUN SERPL-MCNC: 14 MG/DL (ref 8–23)
CALCIUM SERPL-MCNC: 9.3 MG/DL (ref 8.7–10.5)
CHLORIDE SERPL-SCNC: 105 MMOL/L (ref 95–110)
CO2 SERPL-SCNC: 29 MMOL/L (ref 23–29)
CREAT SERPL-MCNC: 0.8 MG/DL (ref 0.5–1.4)
CRP SERPL-MCNC: 14.7 MG/L (ref 0–8.2)
DIFFERENTIAL METHOD: ABNORMAL
EOSINOPHIL # BLD AUTO: 0.6 K/UL (ref 0–0.5)
EOSINOPHIL NFR BLD: 5.2 % (ref 0–8)
ERYTHROCYTE [DISTWIDTH] IN BLOOD BY AUTOMATED COUNT: 15.6 % (ref 11.5–14.5)
ERYTHROCYTE [SEDIMENTATION RATE] IN BLOOD BY WESTERGREN METHOD: 32 MM/HR (ref 0–20)
EST. GFR  (AFRICAN AMERICAN): >60 ML/MIN/1.73 M^2
EST. GFR  (NON AFRICAN AMERICAN): >60 ML/MIN/1.73 M^2
GLUCOSE SERPL-MCNC: 146 MG/DL (ref 70–110)
HCT VFR BLD AUTO: 45 % (ref 37–48.5)
HGB BLD-MCNC: 14.6 G/DL (ref 12–16)
IMM GRANULOCYTES # BLD AUTO: 0.11 K/UL (ref 0–0.04)
IMM GRANULOCYTES NFR BLD AUTO: 1 % (ref 0–0.5)
LYMPHOCYTES # BLD AUTO: 3.4 K/UL (ref 1–4.8)
LYMPHOCYTES NFR BLD: 30.5 % (ref 18–48)
MCH RBC QN AUTO: 31.1 PG (ref 27–31)
MCHC RBC AUTO-ENTMCNC: 32.4 G/DL (ref 32–36)
MCV RBC AUTO: 96 FL (ref 82–98)
MONOCYTES # BLD AUTO: 0.8 K/UL (ref 0.3–1)
MONOCYTES NFR BLD: 7.6 % (ref 4–15)
NEUTROPHILS # BLD AUTO: 6.1 K/UL (ref 1.8–7.7)
NEUTROPHILS NFR BLD: 55.2 % (ref 38–73)
NRBC BLD-RTO: 0 /100 WBC
PLATELET # BLD AUTO: 243 K/UL (ref 150–350)
PMV BLD AUTO: 9.3 FL (ref 9.2–12.9)
POTASSIUM SERPL-SCNC: 4.2 MMOL/L (ref 3.5–5.1)
PROT SERPL-MCNC: 6.8 G/DL (ref 6–8.4)
RBC # BLD AUTO: 4.69 M/UL (ref 4–5.4)
SODIUM SERPL-SCNC: 143 MMOL/L (ref 136–145)
WBC # BLD AUTO: 11.06 K/UL (ref 3.9–12.7)

## 2019-10-24 PROCEDURE — 36415 COLL VENOUS BLD VENIPUNCTURE: CPT

## 2019-10-24 PROCEDURE — 85651 RBC SED RATE NONAUTOMATED: CPT

## 2019-10-24 PROCEDURE — 80053 COMPREHEN METABOLIC PANEL: CPT

## 2019-10-24 PROCEDURE — 85025 COMPLETE CBC W/AUTO DIFF WBC: CPT

## 2019-10-24 PROCEDURE — 86140 C-REACTIVE PROTEIN: CPT

## 2019-10-28 ENCOUNTER — OFFICE VISIT (OUTPATIENT)
Dept: RHEUMATOLOGY | Facility: CLINIC | Age: 69
End: 2019-10-28
Payer: MEDICARE

## 2019-10-28 ENCOUNTER — HOSPITAL ENCOUNTER (OUTPATIENT)
Dept: RADIOLOGY | Facility: CLINIC | Age: 69
Discharge: HOME | End: 2019-10-28
Attending: INTERNAL MEDICINE
Payer: MEDICARE

## 2019-10-28 VITALS
DIASTOLIC BLOOD PRESSURE: 86 MMHG | HEIGHT: 67 IN | SYSTOLIC BLOOD PRESSURE: 138 MMHG | WEIGHT: 238.13 LBS | BODY MASS INDEX: 37.37 KG/M2 | HEART RATE: 99 BPM

## 2019-10-28 DIAGNOSIS — Z78.0 MENOPAUSE: ICD-10-CM

## 2019-10-28 DIAGNOSIS — M81.0 AGE RELATED OSTEOPOROSIS, UNSPECIFIED PATHOLOGICAL FRACTURE PRESENCE: Primary | ICD-10-CM

## 2019-10-28 DIAGNOSIS — M05.771 RHEUMATOID ARTHRITIS INVOLVING BOTH ANKLES WITH POSITIVE RHEUMATOID FACTOR: ICD-10-CM

## 2019-10-28 DIAGNOSIS — E78.00 PURE HYPERCHOLESTEROLEMIA: ICD-10-CM

## 2019-10-28 DIAGNOSIS — M05.772 RHEUMATOID ARTHRITIS INVOLVING BOTH ANKLES WITH POSITIVE RHEUMATOID FACTOR: ICD-10-CM

## 2019-10-28 PROCEDURE — 1101F PR PT FALLS ASSESS DOC 0-1 FALLS W/OUT INJ PAST YR: ICD-10-PCS | Mod: CPTII,S$GLB,, | Performed by: INTERNAL MEDICINE

## 2019-10-28 PROCEDURE — 3079F DIAST BP 80-89 MM HG: CPT | Mod: CPTII,S$GLB,, | Performed by: INTERNAL MEDICINE

## 2019-10-28 PROCEDURE — 1101F PT FALLS ASSESS-DOCD LE1/YR: CPT | Mod: CPTII,S$GLB,, | Performed by: INTERNAL MEDICINE

## 2019-10-28 PROCEDURE — 99999 PR PBB SHADOW E&M-EST. PATIENT-LVL IV: ICD-10-PCS | Mod: PBBFAC,,, | Performed by: INTERNAL MEDICINE

## 2019-10-28 PROCEDURE — 77080 DXA BONE DENSITY AXIAL: CPT | Mod: 26,,, | Performed by: INTERNAL MEDICINE

## 2019-10-28 PROCEDURE — 77080 DEXA BONE DENSITY SPINE HIP: ICD-10-PCS | Mod: 26,,, | Performed by: INTERNAL MEDICINE

## 2019-10-28 PROCEDURE — 3075F SYST BP GE 130 - 139MM HG: CPT | Mod: CPTII,S$GLB,, | Performed by: INTERNAL MEDICINE

## 2019-10-28 PROCEDURE — 99999 PR PBB SHADOW E&M-EST. PATIENT-LVL IV: CPT | Mod: PBBFAC,,, | Performed by: INTERNAL MEDICINE

## 2019-10-28 PROCEDURE — 99214 PR OFFICE/OUTPT VISIT, EST, LEVL IV, 30-39 MIN: ICD-10-PCS | Mod: S$GLB,,, | Performed by: INTERNAL MEDICINE

## 2019-10-28 PROCEDURE — 3075F PR MOST RECENT SYSTOLIC BLOOD PRESS GE 130-139MM HG: ICD-10-PCS | Mod: CPTII,S$GLB,, | Performed by: INTERNAL MEDICINE

## 2019-10-28 PROCEDURE — 3079F PR MOST RECENT DIASTOLIC BLOOD PRESSURE 80-89 MM HG: ICD-10-PCS | Mod: CPTII,S$GLB,, | Performed by: INTERNAL MEDICINE

## 2019-10-28 PROCEDURE — 77080 DXA BONE DENSITY AXIAL: CPT | Mod: TC

## 2019-10-28 PROCEDURE — 99214 OFFICE O/P EST MOD 30 MIN: CPT | Mod: S$GLB,,, | Performed by: INTERNAL MEDICINE

## 2019-10-28 RX ORDER — LEFLUNOMIDE 20 MG/1
TABLET ORAL
Qty: 90 TABLET | Refills: 0 | Status: SHIPPED | OUTPATIENT
Start: 2019-10-28 | End: 2020-01-23

## 2019-10-28 RX ORDER — METHOTREXATE 25 MG/ML
25 INJECTION, SOLUTION INTRA-ARTERIAL; INTRAMUSCULAR; INTRAVENOUS
Qty: 4 ML | Refills: 2 | Status: SHIPPED | OUTPATIENT
Start: 2019-10-28 | End: 2019-10-28

## 2019-10-28 RX ORDER — ALENDRONATE SODIUM 70 MG/1
70 TABLET ORAL
Qty: 12 TABLET | Refills: 3 | Status: SHIPPED | OUTPATIENT
Start: 2019-10-28 | End: 2020-11-17 | Stop reason: SDUPTHER

## 2019-10-28 ASSESSMENT — ROUTINE ASSESSMENT OF PATIENT INDEX DATA (RAPID3)
FATIGUE SCORE: 2
TOTAL RAPID3 SCORE: 1.5
MDHAQ FUNCTION SCORE: .3
PATIENT GLOBAL ASSESSMENT SCORE: 1.5
AM STIFFNESS SCORE: 0, NO
PSYCHOLOGICAL DISTRESS SCORE: 0
PAIN SCORE: 2

## 2019-10-28 NOTE — PATIENT INSTRUCTIONS
-Restarting Fosamax for osteoporosis (70mg once per week)    This medication should be taken on an empty stomach in the morning. No food or drink for 30 minutes after taking.  -Please schedule visit with bariatric medicine

## 2019-10-28 NOTE — PROGRESS NOTES
"Subjective:       Patient ID: Maru Shelby is a 69 y.o. female.    Chief Complaint: A RF+ ACPA+GUSTABO-; OA    HPI   69F here for follow-up of RA. She has been doing well with no RA related swelling or pain and no morning stiffness.  She had steroid injection at last visit after contusion of left hip secondary to fall and was referred to PT. The injection resolved her pain and she didn't got to PT. She was prescribed xanaflex and tramadol after the incident by different providers but never took either medication. She has had another fall in the interim and landed on her elbow which was swollen for about 1 week but then resolved and is not causing her any problems or pain at this time. She reports one episode in which she broke out in pimples for a few days but otherwise has had no rashes and denies any ulcers, headaches, fatigue. She didn't schedule an appointment with bariatrics and has gained 5 lbs since last visit. She did get her pneumovax and flu vaccines. She had BMD this morning.    Review of Systems   Constitutional: Negative for chills, fatigue, fever and unexpected weight change.   HENT: Negative for congestion, mouth sores, sinus pressure and sinus pain.    Eyes: Negative for pain and redness.   Respiratory: Negative for chest tightness and shortness of breath.    Cardiovascular: Negative for chest pain.   Gastrointestinal: Negative for abdominal pain, constipation and diarrhea.   Genitourinary: Negative for dysuria.         Objective:   /86   Pulse 99   Ht 5' 3.6" (1.615 m)   Wt 108 kg (238 lb 1.6 oz)   BMI 41.39 kg/m²      Physical Exam   Constitutional: She is well-developed, well-nourished, and in no distress.   HENT:   Head: Normocephalic and atraumatic.   Eyes: EOM are normal. Pupils are equal, round, and reactive to light.   Cardiovascular: Normal rate and regular rhythm.    Pulmonary/Chest: Effort normal and breath sounds normal.       Right Side Rheumatological Exam     Examination " finds the shoulder, elbow, wrist, knee, 1st PIP, 1st MCP, 2nd PIP, 3rd PIP, 4th PIP, 4th MCP, 5th PIP and 5th MCP normal.    The patient has an enlarged 2nd MCP and 3rd MCP    Left Side Rheumatological Exam     Examination finds the shoulder, elbow, wrist, knee, 1st PIP, 1st MCP, 2nd PIP, 3rd PIP, 4th PIP, 4th MCP, 5th PIP and 5th MCP normal.    She has swelling of the 2nd MCP and 3rd MCP           Results for RHOAD MECCA ECKERT (MRN [id number]) as of 10/28/2019 08:19   Ref. Range 10/24/2019 07:20   WBC Latest Ref Range: 3.90 - 12.70 K/uL 11.06   RBC Latest Ref Range: 4.00 - 5.40 M/uL 4.69   Hemoglobin Latest Ref Range: 12.0 - 16.0 g/dL 14.6   Hematocrit Latest Ref Range: 37.0 - 48.5 % 45.0   MCV Latest Ref Range: 82 - 98 fL 96   MCH Latest Ref Range: 27.0 - 31.0 pg 31.1 (H)   MCHC Latest Ref Range: 32.0 - 36.0 g/dL 32.4   RDW Latest Ref Range: 11.5 - 14.5 % 15.6 (H)   Platelets Latest Ref Range: 150 - 350 K/uL 243   MPV Latest Ref Range: 9.2 - 12.9 fL 9.3   Gran% Latest Ref Range: 38.0 - 73.0 % 55.2   Gran # (ANC) Latest Ref Range: 1.8 - 7.7 K/uL 6.1   Lymph% Latest Ref Range: 18.0 - 48.0 % 30.5   Lymph # Latest Ref Range: 1.0 - 4.8 K/uL 3.4   Mono% Latest Ref Range: 4.0 - 15.0 % 7.6   Mono # Latest Ref Range: 0.3 - 1.0 K/uL 0.8   Eosinophil% Latest Ref Range: 0.0 - 8.0 % 5.2   Eos # Latest Ref Range: 0.0 - 0.5 K/uL 0.6 (H)   Basophil% Latest Ref Range: 0.0 - 1.9 % 0.5   Baso # Latest Ref Range: 0.00 - 0.20 K/uL 0.06   nRBC Latest Ref Range: 0 /100 WBC 0   Differential Method Unknown Automated   Immature Grans (Abs) Latest Ref Range: 0.00 - 0.04 K/uL 0.11 (H)   Immature Granulocytes Latest Ref Range: 0.0 - 0.5 % 1.0 (H)   Sed Rate Latest Ref Range: 0 - 20 mm/Hr 32 (H)   Sodium Latest Ref Range: 136 - 145 mmol/L 143   Potassium Latest Ref Range: 3.5 - 5.1 mmol/L 4.2   Chloride Latest Ref Range: 95 - 110 mmol/L 105   CO2 Latest Ref Range: 23 - 29 mmol/L 29   Anion Gap Latest Ref Range: 8 - 16 mmol/L 9   BUN, Bld Latest  Ref Range: 8 - 23 mg/dL 14   Creatinine Latest Ref Range: 0.5 - 1.4 mg/dL 0.8   eGFR if non African American Latest Ref Range: >60 mL/min/1.73 m^2 >60   eGFR if  Latest Ref Range: >60 mL/min/1.73 m^2 >60   Glucose Latest Ref Range: 70 - 110 mg/dL 146 (H)   Calcium Latest Ref Range: 8.7 - 10.5 mg/dL 9.3   Alkaline Phosphatase Latest Ref Range: 55 - 135 U/L 137 (H)   PROTEIN TOTAL Latest Ref Range: 6.0 - 8.4 g/dL 6.8   Albumin Latest Ref Range: 3.5 - 5.2 g/dL 3.6   BILIRUBIN TOTAL Latest Ref Range: 0.1 - 1.0 mg/dL 0.5   AST Latest Ref Range: 10 - 40 U/L 17   ALT Latest Ref Range: 10 - 44 U/L 22   CRP Latest Ref Range: 0.0 - 8.2 mg/L 14.7 (H)     BMD 10/28/19  Full report pending  FRAX 17% total, 4.3% hip  Assessment:       RA, TJ=0 SJ=0, LEES-28 ESR=2.64, LEES-28 CRP=2.16  Osteoporosis      Plan:       -Continue methotrexate 25mg weekly and folic acid 1mg daily  -Continue leflunomide 20mg daily  -Continue abatacept 1000mg q 4 weeks  -Continue nortriptyline 75mg each night for sleep  -Referred again to bariatric medicine, scheduled appointment for this Thursday  -Restart Fosamax 70mg weekly, had stopped after last BMD 2 years ago  -Continue Vit D2 02333h weekly  -Continue pravastatin 40mg daily  -RTC in 3 months with standing labs and lipid panel

## 2019-10-28 NOTE — PROGRESS NOTES
"I have personally taken the history and examined the patient and agree with the resident,s note as stated above       RA   TJ 0 SJ 0 Pt global 15  DAS28 2.64(LDA)  IEN33-EAY 2.16(remission)  Osteoporosis, recurrent after 2 year "drug holiday" off alendronate  Morbid obesity, gained 5#, never got appt with Bariatric Medicine    Cont abatacept 1000mg IV q 4 wks, reordered  Cont methotrexate 25mg sc q 7 days with folic acid 1mg daily  Cont leflunomide 20mg daily  Resume alendronate 70mg po every 7 days  Cont vitamin D2 50,000 units twice weekly  Cont pravastatin 40mg daily  Cont nortriptyline 75mg hs  RTC 3 months with standing labs and lipid panel  "

## 2019-10-29 NOTE — PROGRESS NOTES
Subjective:       Patient ID: Maru Shelby is a 69 y.o. female.    Chief Complaint: Consult    CC:    Current attempts at weight loss: New pt to me, referred by Timi Royal MD  [address] [place] [address] , with Patient Active Problem List:     RA (rheumatoid arthritis)     Osteoarthritis     Vitamin D deficiency     Morbid obesity     Vertigo     Long term methotrexate user     Benign essential HTN     Hyperlipidemia     Osteoarthritis of left knee     Osteoarthritis of hands, bilateral     CMC arthritis, thumb, degenerative     Osteopenia     Hyperglycemia     Colon cancer screening       Lab Results       Component                Value               Date                       HGBA1C                   6.1 (H)             10/23/2018                 HGBA1C                   5.8 (H)             04/03/2018            Lab Results       Component                Value               Date                       GLUF                     122 (H)             04/03/2018                 LDLCALC                  70.4                10/23/2018                 CREATININE               0.8                 10/24/2019                 States eating less rice. Was going to a gym to walk and use bike, but she stopped.     Previous diet attempts: Denies.  OTC supplements made her jittery.     History of medication for loss: Denies .  checked today     Heaviest weight: 229#    Lightest weight: 130#    Goal weight: 150#      Last eye exam:     Tuesday. + glaucoma      Provider:    Typical eating patterns:  Retired. Lives with  and son. Pt does most of the cooking.     Breakfast:  Cornflakes, banana and canteloupe. Weekends: eggs, hash browns, wagner and toast    Lunch: sandwich and chips.     Dinner: roast or chicken, fish with veg over rice. Seldom eats out.     Snacks: cookie, candy.     Beverages: milk, water, coke zero (1/day). Once a week- wine 1 bottle.     Willingness to change: 8/10    EKG:  "  Normal sinus rhythm  Normal ECG  When compared with ECG of 06-AUG-2017 08:54,  No significant change was found    BMR:  1595 (inbody)    Review of Systems   Constitutional: Negative for chills and fever.   Respiratory: Negative for shortness of breath.         + snores   Cardiovascular: Negative for chest pain and leg swelling.   Gastrointestinal: Negative for constipation and diarrhea.        Denies GERD   Genitourinary: Negative for difficulty urinating and dysuria.   Musculoskeletal: Negative for arthralgias and back pain.   Neurological: Positive for dizziness. Negative for light-headedness.        H/o vertigo   Psychiatric/Behavioral: Negative for dysphoric mood. The patient is not nervous/anxious.        Objective:     BP (!) 142/82   Pulse 99   Ht 5' 7" (1.702 m)   Wt 104.2 kg (229 lb 11.2 oz)   BMI 35.98 kg/m²    Physical Exam   Constitutional: She is oriented to person, place, and time. She appears well-developed. No distress.   Obesity.    HENT:   Head: Normocephalic and atraumatic.   Eyes: Pupils are equal, round, and reactive to light. EOM are normal. No scleral icterus.   Neck: Normal range of motion. Neck supple. No thyromegaly present.   Cardiovascular: Normal rate and normal heart sounds. Exam reveals no gallop and no friction rub.   No murmur heard.  Pulmonary/Chest: Effort normal and breath sounds normal. No respiratory distress. She has no wheezes.   Abdominal: Soft. Bowel sounds are normal. She exhibits no distension. There is no tenderness.   Musculoskeletal: Normal range of motion. She exhibits no edema.   Neurological: She is alert and oriented to person, place, and time. No cranial nerve deficit.   Skin: Skin is warm and dry. No erythema.   Psychiatric: She has a normal mood and affect. Her behavior is normal. Judgment normal.   Vitals reviewed.      Assessment:       1. Class 2 severe obesity with body mass index (BMI) of 35 to 39.9 with serious comorbidity    2. Other specified " "glaucoma, unspecified laterality    3. Prediabetes        Plan:         1. Class 2 severe obesity with body mass index (BMI) of 35 to 39.9 with serious comorbidity    Exercise- Exercise is one of those "bite the bullet" propositions. Sometimes you just have to get started. What I suggest is setting a timer for 10 minutes. Do whatever activity you plan to start for the 10 minutes. If the timer goes off and you want to stop, fine. You can stop. If you feel like you want to go on a little longer, you can go on. Do this a few times a week. Eventually you will likely decide to keep going. Every 2 weeks, add 5 more minutes before you give yourself permission to stop.           Avoid/ limit sweets and starchy carbohydrates (bread, rice, pasta, potatoes, corn, peas, oatmeal, grits, tortillas, crackers, chips)            2. Other specified glaucoma, unspecified laterality  Avoid stimulant anorectics, topiramate and contrave.     3. Prediabetes  Discussed decreasing the risks of diabetes with changes in diet, routine exercise and losing weight.  Start metformin with dinner for 2 weeks, then breakfast and dinner.  Always take with food.  No Alcohol.       - metFORMIN (GLUCOPHAGE) 500 MG tablet; Take 1 tablet (500 mg total) by mouth 2 (two) times daily with meals.  Dispense: 180 tablet; Refill: 1   "

## 2019-10-31 ENCOUNTER — OFFICE VISIT (OUTPATIENT)
Dept: BARIATRICS | Facility: CLINIC | Age: 69
End: 2019-10-31
Payer: MEDICARE

## 2019-10-31 VITALS
HEART RATE: 99 BPM | BODY MASS INDEX: 36.05 KG/M2 | WEIGHT: 229.69 LBS | SYSTOLIC BLOOD PRESSURE: 142 MMHG | HEIGHT: 67 IN | DIASTOLIC BLOOD PRESSURE: 82 MMHG

## 2019-10-31 DIAGNOSIS — H40.89 OTHER SPECIFIED GLAUCOMA, UNSPECIFIED LATERALITY: ICD-10-CM

## 2019-10-31 DIAGNOSIS — R73.03 PREDIABETES: ICD-10-CM

## 2019-10-31 DIAGNOSIS — E66.01 CLASS 2 SEVERE OBESITY WITH BODY MASS INDEX (BMI) OF 35 TO 39.9 WITH SERIOUS COMORBIDITY: Primary | ICD-10-CM

## 2019-10-31 PROCEDURE — 1101F PT FALLS ASSESS-DOCD LE1/YR: CPT | Mod: CPTII,S$GLB,, | Performed by: INTERNAL MEDICINE

## 2019-10-31 PROCEDURE — 99215 PR OFFICE/OUTPT VISIT, EST, LEVL V, 40-54 MIN: ICD-10-PCS | Mod: S$GLB,,, | Performed by: INTERNAL MEDICINE

## 2019-10-31 PROCEDURE — 3079F PR MOST RECENT DIASTOLIC BLOOD PRESSURE 80-89 MM HG: ICD-10-PCS | Mod: CPTII,S$GLB,, | Performed by: INTERNAL MEDICINE

## 2019-10-31 PROCEDURE — 99999 PR PBB SHADOW E&M-EST. PATIENT-LVL III: ICD-10-PCS | Mod: PBBFAC,,, | Performed by: INTERNAL MEDICINE

## 2019-10-31 PROCEDURE — 99215 OFFICE O/P EST HI 40 MIN: CPT | Mod: S$GLB,,, | Performed by: INTERNAL MEDICINE

## 2019-10-31 PROCEDURE — 3079F DIAST BP 80-89 MM HG: CPT | Mod: CPTII,S$GLB,, | Performed by: INTERNAL MEDICINE

## 2019-10-31 PROCEDURE — 1101F PR PT FALLS ASSESS DOC 0-1 FALLS W/OUT INJ PAST YR: ICD-10-PCS | Mod: CPTII,S$GLB,, | Performed by: INTERNAL MEDICINE

## 2019-10-31 PROCEDURE — 99999 PR PBB SHADOW E&M-EST. PATIENT-LVL III: CPT | Mod: PBBFAC,,, | Performed by: INTERNAL MEDICINE

## 2019-10-31 PROCEDURE — 3077F SYST BP >= 140 MM HG: CPT | Mod: CPTII,S$GLB,, | Performed by: INTERNAL MEDICINE

## 2019-10-31 PROCEDURE — 3077F PR MOST RECENT SYSTOLIC BLOOD PRESSURE >= 140 MM HG: ICD-10-PCS | Mod: CPTII,S$GLB,, | Performed by: INTERNAL MEDICINE

## 2019-10-31 RX ORDER — METFORMIN HYDROCHLORIDE 500 MG/1
500 TABLET ORAL 2 TIMES DAILY WITH MEALS
Qty: 180 TABLET | Refills: 1 | Status: SHIPPED | OUTPATIENT
Start: 2019-10-31 | End: 2020-05-05

## 2019-10-31 NOTE — PATIENT INSTRUCTIONS
"Start metformin with dinner for 2 weeks, then breakfast and dinner.  Always take with food.  No Alcohol.     Exercise- Exercise is one of those "bite the bullet" propositions. Sometimes you just have to get started. What I suggest is setting a timer for 10 minutes. Do whatever activity you plan to start for the 10 minutes. If the timer goes off and you want to stop, fine. You can stop. If you feel like you want to go on a little longer, you can go on. Do this a few times a week. Eventually you will likely decide to keep going. Every 2 weeks, add 5 more minutes before you give yourself permission to stop.           Avoid/ limit sweets and starchy carbohydrates (bread, rice, pasta, potatoes, corn, peas, oatmeal, grits, tortillas, crackers, chips)        Sample meal plan  80-120g protein; 5412-5997 calories  Protein drinks and bars: 0-4 grams sugar  Drink lots of water throughout the day and exercise!  MENU # 1  Breakfast: 2 eggs, 1 turkey sausage juan m  Lunch: 2-3 roll-ups (sliced turkey, low-fat slice cheese), baby carrots dipped in 1 Tbsp natural peanut butter  Mid-Day Snack: ¼ cup unsalted almonds, ½ cup fruit  Supper: 1 chicken thigh simmered in tomato sauce + 2 Tbsp mozzarella cheese, ½ cup black beans, 1/2 cup steamed veggies  Evening Snack: 1/2 cup grapes with 4 cubes low-fat cheddar cheese   ___________________________________________________  MENU # 2  Breakfast: protein drink  Mid-morning snack : ¼ cup unsalted nuts  Lunch: 1 cup tuna or chicken salad made with light alvarado, over salad.   Supper: hamburger juan m, slice of cheese, 1 cup steamed veggies.   Snack: light yogurt      Menu #3  Breakfast: 6oz plain Greek yogurt + fruit (½ banana, ½ cup fruit - pineapple, blueberries, strawberries, peach), may add Splenda to apolonia.  Lunch: ½  chicken breast w/ slice pepper jermaine cheese, 1/2 cup steamed veggies and small salad with Salad Spritzer.    Mid-Day snack: protein drink   Supper: 4oz Grilled fish, grilled " veggie kabob ( mushrooms, onion, bell pepper, yellow squash, zucchini, cherry tomatoes)  Evening Snack: fudgsicle no-sugar-added    Menu # 4  Breakfast: vanilla iced coffee: 1 scoop vanilla protein powder + 4oz skim milk + 4oz coffee   Snack: protein bar  Lunch: 2 Lettuce tacos: ¼ cup seasoned ground turkey wrapped in a Neftaly lettuce leaf with 1 Tbsp shredded cheese and dollop low-fat sour cream  Dinner: Shrimp omelet: 2 eggs, ½ cup shrimp, green onions, and shredded cheese        Menu #5  Breakfast: 1 cup low-fat cottage cheese, ½ cup peaches (no sugar added)  Lunch: 2 oz baked pork chop, 1 cup okra and tomato stew  Snack: 1 cup black beans + salsa + dollop sour cream  Dinner: Caprese chicken salad: 2 oz chicken, 1oz fresh mozzarella, sliced tomato, 1 Tbsp olive oil, basil  Snack: sugar-free pudding cup      Menu #6  Breakfast: ½ cup part-skim ricotta cheese, 2 Tbsp sugar-free strawberry preserves, ¼ cup slivered almonds  Lunch: 2 oz canned chicken, 1oz shredded cheddar cheese, ½ cup black beans, 2 Tbsp salsa  Snack: Protein drink  Dinner: 4 oz grilled salmon steak, over mixed green salad with light dressing        Meal Ideas for Regular Bariatric Diet  *Recipes and products available at [email or web address].com      Breakfast: (15-20g protein)    - Egg white omelet: 2 egg whites or ½ cup Egg Beaters. (Optional proteins: cheese, shrimp, black beans, chicken, sliced turkey) (Optional veggies: tomatoes, salsa, spinach, mushrooms, onions, green peppers, or small slice avocado)     - Egg and sausage: 1 egg or ¼ cup Egg Beaters (any variety), with 1 juan m or 2 links of Turkey sausage or Veggie breakfast sausage (Aj Farms or Boca)    - Crust-less breakfast quiche: To make a glass pie dish, mix 4oz part skim Ricotta, 1 cup skim milk, and 2 eggs as your base. Add protein: shredded cheese, sliced lean ham or turkey, turkey wagner/sausage. Add veggies: tomato, onion, green onion, mushroom, green pepper, spinach,  etc.    - Yogurt parfait: Mix 1 - 6oz container Dannon Light N Fit vanilla yogurt, with ¼ cup Kashi Go Lean cereal    - Cottage cheese and fruit: ½ cup part-skim cottage cheese or ricotta cheese topped with fresh fruit or sugar free preserves     - Xochitl Jones's Vanilla Egg custard* (add 2 Tbsp instant coffee granules to make Cappuccino Custard*)    - Hi-Protein café latte (skim milk, decaf coffee, 1 scoop protein powder). Optional to add Sugar free syrup or extract flavoring.    Lunch: (20-30g protein)    - ½ cup Black bean soup (Homemade or Progresso), with ¼ cup shredded low-fat cheese. Top with chopped tomato or fresh salsa.     - Lean deli turkey breast and low-fat sliced cheese, mustard or light alvarado to moisten, rolled up together, or wrapped in a Neftaly lettuce leaf    - Chicken salad made from dinner leftovers, moisten with low-fat salad dressing or light alvarado. Also try leftover salmon, shrimp, tuna or boiled eggs. Serve ½ cup over dark green salad    - Fat-free canned refried beans, topped with ¼ cup shredded low-fat cheese. Top with chopped tomato or fresh salsa.     - Greek salad: Top mixed greens with 1-2oz grilled chicken, tomatoes, red onions, 2-3 kalamata olives, and sprinkle lightly with feta cheese. Spritz with Balsamic vinegar to taste.     - Crust-less lunch quiche: To make a glass pie dish, mix 4oz part skim Ricotta, 1 cup skim milk, and 2 eggs as your base. Add protein: shredded cheese, sliced lean ham or turkey, shrimp, chicken. Add veggies: tomato, onion, green onion, mushroom, green pepper, spinach, artichoke, broccoli, etc.    - Pizza bake: tomato sauce, low-fat shredded mozzarella and turkey pepperoni or [place] wagner. Add any veggies.    - Cucumber crab bites: Spread ¼ cup crab dip (lump crabmeat + light cream cheese and green onions) over sliced cucumber.     - Chicken with light spinach and artichoke dip*: Puree in : 6oz cooked and drained spinach, 2 cloves garlic, 1 can  cannelloni beans, ½ cup chopped green onions, 1 can drained artichoke hearts (not marinated in oil), lemon juice and basil. Mix in 2oz chopped up chicken.    Supper: (20-30g protein)    - Serve grilled fish over dark green salad tossed with low-fat dressing, served with grilled asparagus gayle     - Rotisserie chicken salad: served with sliced strawberries, walnuts, fat-free feta cheese crumbles and 1 tbsp Szymanskis Own Light Raspberry [place] Vinaigrette    - Shrimp cocktail: Dip cold boiled shrimp in homemade low-sugar cocktail sauce (1/2 cup Adán One Carb ketchup, 2 tbsp horseradish, 1/4 tsp hot sauce, 1 tsp Worcestershire sauce, 1 tbsp freshly-squeezed lemon juice). Serve with dark green salad, walnuts, and crumbled blue cheese drizzled with olive oil and Balsamic vinegar    - Tuna Melt: Spread tuna salad onto 2 thick slices of tomato. Top with low-fat cheese and broil until cheese is melted. May also be made with chicken salad of shrimp salad. [place] with different types of cheeses.    - Homemade low-fat Chili using extra lean ground beef or ground turkey. Top with shredded cheese and salsa as desired. May add dollop fat-free sour cream if desired    - Dinner Omelet with shrimp or chicken and onion, green peppers and chives.    - No noodle lasagna: Use sliced zucchini or eggplant in place of noodles.  Layer with part skim ricotta cheese and low sugar meat sauce (use very lean ground beef or ground turkey).    - Mexican chicken bake: Bake chunks of chicken breast or thigh with taco seasoning, Pace brand enchilada sauce, green onions and low-fat cheese. Serve with ¼ cup black beans or fat free refried beans topped with chopped tomatoes or salsa.    - Ani frozen meatballs, simmered in Classico Marinara sauce. Different flavors of salsa or spaghetti sauce create different dishes! Sprinkle with parmesan cheese. Serve with grilled or steamed veggies, or a dark green salad.    - Simmer boneless skinless  chicken thigh chunks in Classico Marinara sauce or roasted salsa until tender with chopped onion, bell pepper, garlic, mushrooms, spinach, etc.     - Hamburger, without the bun, dressed the way you like. Served with grilled or steamed veggies.    - Eggplant parmesan: Bake slices of eggplant at 350 degrees for 15 minutes. Layer tomato sauce, sliced eggplant and low-fat mozzarella cheese in a baking dish and cover with foil. Bake 30-40 more minutes or until bubbly. Uncover and bake at 400 degrees for about 15 more minutes, or until top is slightly crisp.    - Fish tacos: grilled/baked white fish, wrapped in Neftaly lettuce leaf, topped with salsa, shredded low-fat cheese, and light coleslaw.    Snacks: (100-200 calories; >5g protein)    - 1 low-fat cheese stick with 8 cherry tomatoes or 1 serving fresh fruit  - 4 thin slices fat-free turkey breast and 1 slice low-fat cheese  - 4 thin slices fat-free honey ham with wedge of melon  - 1/4 cup unsalted nuts with ½ cup fruit  - 6-oz container Dannon Light n Fit vanilla yogurt, topped with 1oz unsalted nuts         - apple, celery or baby carrots spread with 2 Tbsp natural peanut butter or almond butter   - apple slices with 1 oz slice low-fat cheese  - celery, cucumber, bell pepper or baby carrots dipped in ¼ cup hummus bean spread or light spinach and artichoke dip (*recipe in lunch section)  - 100 calorie bag microwave light popcorn with 3 tbsp grated parmesan cheese  - Ronaldo Links Beef Steak - 14g protein! (similar to beef jerky)  - 2 wedges Laughing Cow - Light Herb & Garlic Cheese with sliced cucumber or green bell pepper  - 1/2 cup low-fat cottage cheese with ¼ cup fruit or ¼ cup salsa  - RTD Protein drinks: Atkins, Low Carb Slim Fast, EAS light, Muscle Milk Light, etc.  - Homemade Protein drinks: GNC Soy95, Isopure, Nectar, UNJURY, Whey Gourmet, etc. Mix 1 scoop powder with 8oz skim/1% milk or light soymilk.  - Protein bars: Atkins, EAS, Pure Protein, Think Thin,  Detour, etc. Must have 0-4 grams sugar - Read the label.    Takeout Options: No more than twice/week  Deli - Salads (no pasta or rice), meats, cheeses. Roasted chicken. Lox (salmon)    Mexican - Platters which don't include tortillas, chips, or rice. Go easy on the beans. Example: Fajitas without the tortillas. Ask the  not to bring chips to the table if they are too tempting.    Greek - Meat or fish and vegetable, but no bread or rice. Including hummus, baba ganoush, etc, is OK. Most sit-down Greek restaurants can provide you with cucumber slices for dipping instead of caroline bread.    Fast Food (Avoid as much as possible) - Salads (no croutons and limit salad dressing to 2 tbsp), grilled chicken sandwich without the bun and ask for no alvarado. Kasies low fat chili or Taco Bell pintos and cheese.    BBQ - The meats are fine if you ask for sauces on the side, but most of the traditional side dishes are loaded with carbs. Wilmer slaw, baked beans and BBQ sauce are typically made with sugar.    Chinese - Nothing deep-fried, no rice or noodles. Many Chinese sauces have starch and sugar in them, so you'll have to use your judgement. If you find that these sauces trigger cravings, or cause Dumping, you can ask for the sauce to be made without sugar or just use soy sauce.

## 2019-10-31 NOTE — LETTER
"  [facility]  [address]  [facility] [address]  Phone: [phone]  Fax: [phone] November 4, 2019      Timi Royal MD  [address]  [facility] [address]    Patient: Maru Shelby   MR Number: [id number]   YOB: 1950   Date of Visit: 10/31/2019     Dear Dr. Royal:    Thank you for referring Maru Shelby to me for evaluation. Below are the relevant portions of my assessment and plan of care.    Ms. Shelby's assessment is as follows:    1. Class 2 severe obesity with body mass index (BMI) of 35 to 39.9 with serious comorbidity    2. Other specified glaucoma, unspecified laterality    3. Prediabetes        PLAN:  1. Class 2 severe obesity with body mass index (BMI) of 35 to 39.9 with serious comorbidity     Exercise- Exercise is one of those "bite the bullet" propositions. Sometimes you just have to get started. What I suggest is setting a timer for 10 minutes. Do whatever activity you plan to start for the 10 minutes. If the timer goes off and you want to stop, fine. You can stop. If you feel like you want to go on a little longer, you can go on. Do this a few times a week. Eventually you will likely decide to keep going. Every 2 weeks, add 5 more minutes before you give yourself permission to stop.     Avoid/ limit sweets and starchy carbohydrates (bread, rice, pasta, potatoes, corn, peas, oatmeal, grits, tortillas, crackers, chips)     2. Other specified glaucoma, unspecified laterality  Avoid stimulant anorectics, topiramate and contrave.      3. Prediabetes  Discussed decreasing the risks of diabetes with changes in diet, routine exercise and losing weight.  Start metformin with dinner for 2 weeks, then breakfast and dinner.    Always take with food.  No Alcohol.       - metFORMIN (GLUCOPHAGE) 500 MG tablet; Take 1 tablet (500 mg total) by mouth 2 (two) times daily with meals.  Dispense: 180 tablet; Refill: 1     If you have questions, please " do not hesitate to call me. I look forward to following Maru along with you.    Sincerely,      Lyndsey Headley MD   Section of Bariatric Surgery   Department of Surgery   [facility]    [email or web address]

## 2019-10-31 NOTE — LETTER
November 4, 2019      Timi Royal MD  [address]  [facility] [address]           [facility]  [address]  [facility] [address]  Phone: [phone]  Fax: [phone]          Patient: Maru Shelby   MR Number: [id number]   YOB: 1950   Date of Visit: 10/31/2019       Dear Dr. Timi Royal:    Thank you for referring Maru Shelby to me for evaluation. Attached you will find relevant portions of my assessment and plan of care.    If you have questions, please do not hesitate to call me. I look forward to following Maru Shelby along with you.    Sincerely,    Lyndsey Headley MD    Enclosure  CC:  No Recipients    If you would like to receive this communication electronically, please contact [email or web address] or [phone] to request more information on USB [employer] Link access.    For providers and/or their staff who would like to refer a patient to [facility], please contact us through our one-stop-shop provider referral line, [facility] [place], at [phone].    If you feel you have received this communication in error or would no longer like to receive these types of communications, please e-mail [email or web address]

## 2019-11-08 ENCOUNTER — TELEPHONE (OUTPATIENT)
Dept: INTERNAL MEDICINE | Facility: CLINIC | Age: 69
End: 2019-11-08

## 2019-11-08 ENCOUNTER — OFFICE VISIT (OUTPATIENT)
Dept: INTERNAL MEDICINE | Facility: CLINIC | Age: 69
End: 2019-11-08
Payer: MEDICARE

## 2019-11-08 VITALS
WEIGHT: 231.25 LBS | BODY MASS INDEX: 36.29 KG/M2 | SYSTOLIC BLOOD PRESSURE: 122 MMHG | RESPIRATION RATE: 18 BRPM | OXYGEN SATURATION: 95 % | HEART RATE: 97 BPM | DIASTOLIC BLOOD PRESSURE: 74 MMHG | HEIGHT: 67 IN

## 2019-11-08 DIAGNOSIS — R29.6 FREQUENT FALLS: ICD-10-CM

## 2019-11-08 DIAGNOSIS — M25.362 KNEE INSTABILITY, LEFT: Primary | ICD-10-CM

## 2019-11-08 PROCEDURE — 3074F SYST BP LT 130 MM HG: CPT | Mod: CPTII,S$GLB,, | Performed by: INTERNAL MEDICINE

## 2019-11-08 PROCEDURE — 1101F PR PT FALLS ASSESS DOC 0-1 FALLS W/OUT INJ PAST YR: ICD-10-PCS | Mod: CPTII,S$GLB,, | Performed by: INTERNAL MEDICINE

## 2019-11-08 PROCEDURE — 99999 PR PBB SHADOW E&M-EST. PATIENT-LVL IV: ICD-10-PCS | Mod: PBBFAC,,, | Performed by: INTERNAL MEDICINE

## 2019-11-08 PROCEDURE — 99999 PR PBB SHADOW E&M-EST. PATIENT-LVL IV: CPT | Mod: PBBFAC,,, | Performed by: INTERNAL MEDICINE

## 2019-11-08 PROCEDURE — 3078F DIAST BP <80 MM HG: CPT | Mod: CPTII,S$GLB,, | Performed by: INTERNAL MEDICINE

## 2019-11-08 PROCEDURE — 1101F PT FALLS ASSESS-DOCD LE1/YR: CPT | Mod: CPTII,S$GLB,, | Performed by: INTERNAL MEDICINE

## 2019-11-08 PROCEDURE — 3078F PR MOST RECENT DIASTOLIC BLOOD PRESSURE < 80 MM HG: ICD-10-PCS | Mod: CPTII,S$GLB,, | Performed by: INTERNAL MEDICINE

## 2019-11-08 PROCEDURE — 99213 OFFICE O/P EST LOW 20 MIN: CPT | Mod: S$GLB,,, | Performed by: INTERNAL MEDICINE

## 2019-11-08 PROCEDURE — 3074F PR MOST RECENT SYSTOLIC BLOOD PRESSURE < 130 MM HG: ICD-10-PCS | Mod: CPTII,S$GLB,, | Performed by: INTERNAL MEDICINE

## 2019-11-08 PROCEDURE — 99213 PR OFFICE/OUTPT VISIT, EST, LEVL III, 20-29 MIN: ICD-10-PCS | Mod: S$GLB,,, | Performed by: INTERNAL MEDICINE

## 2019-11-08 NOTE — TELEPHONE ENCOUNTER
----- Message from Mely Matthews sent at 2019  1:26 PM CST -----  Contact: SELF  Maru Shelby  MRN: [id number]  : 1950  PCP: Florence Mark  Home Phone      [phone]  Work Phone      Not on file.  Mobile          [phone]      MESSAGE:  States she talked with [employer] and they told her paperwork can be sent in. Pt isn't sure what paperwork.   Phone: [phone]

## 2019-11-08 NOTE — PROGRESS NOTES
"Subjective:       Patient ID: Maru Shelby is a 69 y.o. female.    Chief Complaint: knee weakness      HPI:  Patient is new to me but known to clinic and presents with "knee weakness" and frequent falls. She had a fall at home 2 days ago. Went to ER, laceration of the left eye. She is having left knee pain and swelling and she reports it is "giving out on me" causing her falls. She also had a fall about 4 days ago. Pain is posterior and just above the knee. Feels like "stretching" in the knee. Pain is constant and worsening.     Past Medical History:   Diagnosis Date    Anxiety     Arthritis     rheumatoid and osteoarthritis    Depression     Hyperlipidemia     Hypertension     Neuromuscular disorder     Obesity     Other specified glaucoma 10/31/2019       Family History   Problem Relation Age of Onset    Pancreatitis Mother 61    Cancer Father         amedistatic    Breast cancer Sister     Breast cancer Sister        Social History     Socioeconomic History    Marital status:      Spouse name: Not on file    Number of children: Not on file    Years of education: Not on file    Highest education level: Not on file   Occupational History    Not on file   Social Needs    Financial resource strain: Not on file    Food insecurity:     Worry: Not on file     Inability: Not on file    Transportation needs:     Medical: Not on file     Non-medical: Not on file   Tobacco Use    Smoking status: Never Smoker    Smokeless tobacco: Never Used   Substance and Sexual Activity    Alcohol use: Yes     Alcohol/week: 2.0 standard drinks     Types: 2 Glasses of wine per week    Drug use: No    Sexual activity: Not on file   Lifestyle    Physical activity:     Days per week: Not on file     Minutes per session: Not on file    Stress: Not on file   Relationships    Social connections:     Talks on phone: Not on file     Gets together: Not on file     Attends Hinduism service: Not on file     " Active member of club or organization: Not on file     Attends meetings of clubs or organizations: Not on file     Relationship status: Not on file   Other Topics Concern    Not on file   Social History Narrative    Not on file       Review of Systems   Constitutional: Negative for activity change, fatigue, fever and unexpected weight change.   HENT: Negative for congestion, ear pain, hearing loss, rhinorrhea and sore throat.    Eyes: Negative for pain, redness and visual disturbance.   Respiratory: Negative for cough, shortness of breath and wheezing.    Cardiovascular: Negative for chest pain, palpitations and leg swelling.   Gastrointestinal: Negative for blood in stool, constipation, diarrhea, nausea and vomiting.   Genitourinary: Negative for dysuria, frequency, pelvic pain and urgency.   Musculoskeletal: Positive for arthralgias (left knee). Negative for back pain, joint swelling and neck pain.   Skin: Negative for color change, rash and wound.   Neurological: Positive for dizziness. Negative for tremors, weakness, light-headedness and headaches.         Objective:      Physical Exam   Constitutional: She is oriented to person, place, and time. She appears well-developed and well-nourished. No distress.   HENT:   Head: Normocephalic and atraumatic.   Right Ear: External ear normal.   Left Ear: External ear normal.   Eyes: Pupils are equal, round, and reactive to light. Conjunctivae and EOM are normal. Right eye exhibits no discharge. Left eye exhibits no discharge.   Neck: Neck supple. No tracheal deviation present.   Cardiovascular: Normal rate and regular rhythm.   No murmur heard.  Pulmonary/Chest: Effort normal and breath sounds normal. No respiratory distress. She has no wheezes.   Abdominal: Soft. Bowel sounds are normal. She exhibits no distension. There is no tenderness.   Musculoskeletal:   Swelling left knee joint   Neurological: She is alert and oriented to person, place, and time. No cranial  nerve deficit.   Skin: Skin is warm and dry.   Psychiatric: She has a normal mood and affect. Her behavior is normal.   Vitals reviewed.      Assessment:       1. Knee instability, left    2. Frequent falls        Plan:       Maru was seen today for knee weakness.    Diagnoses and all orders for this visit:    Knee instability, left  -     Ambulatory Referral to Physical/Occupational Therapy  -     MRI Knee W WO Contrast Left; Future    Frequent falls  -     Ambulatory Referral to Physical/Occupational Therapy  -     MRI Knee W WO Contrast Left; Future      She is having instability and swelling within the knee  Falling frequently due to this  Will start PT for frequent falls, pain and gait management  Needs MRI knee to r/o if from worsening arthritis sx for soft tissue injury causing the instability

## 2019-11-11 DIAGNOSIS — M05.772 RHEUMATOID ARTHRITIS INVOLVING BOTH ANKLES WITH POSITIVE RHEUMATOID FACTOR: ICD-10-CM

## 2019-11-11 DIAGNOSIS — M05.771 RHEUMATOID ARTHRITIS INVOLVING BOTH ANKLES WITH POSITIVE RHEUMATOID FACTOR: ICD-10-CM

## 2019-11-11 RX ORDER — METHOTREXATE 25 MG/ML
25 INJECTION, SOLUTION INTRA-ARTERIAL; INTRAMUSCULAR; INTRAVENOUS
Qty: 4 ML | Refills: 1 | Status: SHIPPED | OUTPATIENT
Start: 2019-11-11 | End: 2020-02-18 | Stop reason: SDUPTHER

## 2019-11-15 ENCOUNTER — HOSPITAL ENCOUNTER (OUTPATIENT)
Dept: RADIOLOGY | Facility: HOSPITAL | Age: 69
Discharge: HOME | End: 2019-11-15
Attending: INTERNAL MEDICINE
Payer: MEDICARE

## 2019-11-15 DIAGNOSIS — M25.362 KNEE INSTABILITY, LEFT: ICD-10-CM

## 2019-11-15 DIAGNOSIS — R29.6 FREQUENT FALLS: ICD-10-CM

## 2019-11-15 PROCEDURE — 73721 MRI JNT OF LWR EXTRE W/O DYE: CPT | Mod: TC,LT

## 2019-11-18 DIAGNOSIS — M17.12 PRIMARY OSTEOARTHRITIS OF LEFT KNEE: Primary | ICD-10-CM

## 2019-11-18 DIAGNOSIS — M71.22 BAKER'S CYST OF KNEE, LEFT: ICD-10-CM

## 2019-11-18 DIAGNOSIS — S83.207A TEAR OF MENISCUS OF LEFT KNEE, UNSPECIFIED MENISCUS, UNSPECIFIED TEAR TYPE, UNSPECIFIED WHETHER OLD OR CURRENT TEAR: ICD-10-CM

## 2019-11-19 DIAGNOSIS — M05.771 RHEUMATOID ARTHRITIS INVOLVING BOTH ANKLES WITH POSITIVE RHEUMATOID FACTOR: ICD-10-CM

## 2019-11-19 DIAGNOSIS — M05.772 RHEUMATOID ARTHRITIS INVOLVING BOTH ANKLES WITH POSITIVE RHEUMATOID FACTOR: ICD-10-CM

## 2019-11-19 RX ORDER — METHOTREXATE 25 MG/ML
25 INJECTION, SOLUTION INTRA-ARTERIAL; INTRAMUSCULAR; INTRAVENOUS
Qty: 4 ML | Refills: 1 | Status: SHIPPED | OUTPATIENT
Start: 2019-11-19 | End: 2020-05-27

## 2019-11-20 ENCOUNTER — HOSPITAL ENCOUNTER (OUTPATIENT)
Dept: RADIOLOGY | Facility: HOSPITAL | Age: 69
Discharge: HOME | End: 2019-11-20
Attending: PHYSICIAN ASSISTANT
Payer: MEDICARE

## 2019-11-20 ENCOUNTER — OFFICE VISIT (OUTPATIENT)
Dept: ORTHOPEDICS | Facility: CLINIC | Age: 69
End: 2019-11-20
Payer: MEDICARE

## 2019-11-20 VITALS
HEIGHT: 67 IN | SYSTOLIC BLOOD PRESSURE: 132 MMHG | DIASTOLIC BLOOD PRESSURE: 78 MMHG | BODY MASS INDEX: 35.16 KG/M2 | WEIGHT: 224 LBS | HEART RATE: 98 BPM | RESPIRATION RATE: 16 BRPM

## 2019-11-20 DIAGNOSIS — M17.12 PRIMARY OSTEOARTHRITIS OF LEFT KNEE: ICD-10-CM

## 2019-11-20 DIAGNOSIS — M25.562 LEFT KNEE PAIN, UNSPECIFIED CHRONICITY: ICD-10-CM

## 2019-11-20 DIAGNOSIS — S83.242A TEAR OF MEDIAL MENISCUS OF LEFT KNEE, UNSPECIFIED TEAR TYPE, UNSPECIFIED WHETHER OLD OR CURRENT TEAR, INITIAL ENCOUNTER: Primary | ICD-10-CM

## 2019-11-20 DIAGNOSIS — M71.22 BAKER'S CYST OF KNEE, LEFT: ICD-10-CM

## 2019-11-20 DIAGNOSIS — M25.562 LEFT KNEE PAIN, UNSPECIFIED CHRONICITY: Primary | ICD-10-CM

## 2019-11-20 PROCEDURE — 1159F PR MEDICATION LIST DOCUMENTED IN MEDICAL RECORD: ICD-10-PCS | Mod: S$GLB,,, | Performed by: PHYSICIAN ASSISTANT

## 2019-11-20 PROCEDURE — 99999 PR PBB SHADOW E&M-EST. PATIENT-LVL V: CPT | Mod: PBBFAC,,, | Performed by: PHYSICIAN ASSISTANT

## 2019-11-20 PROCEDURE — 73564 X-RAY EXAM KNEE 4 OR MORE: CPT | Mod: 26,50,, | Performed by: RADIOLOGY

## 2019-11-20 PROCEDURE — 3075F SYST BP GE 130 - 139MM HG: CPT | Mod: CPTII,S$GLB,, | Performed by: PHYSICIAN ASSISTANT

## 2019-11-20 PROCEDURE — 99203 PR OFFICE/OUTPT VISIT, NEW, LEVL III, 30-44 MIN: ICD-10-PCS | Mod: S$GLB,,, | Performed by: PHYSICIAN ASSISTANT

## 2019-11-20 PROCEDURE — 1125F PR PAIN SEVERITY QUANTIFIED, PAIN PRESENT: ICD-10-PCS | Mod: S$GLB,,, | Performed by: PHYSICIAN ASSISTANT

## 2019-11-20 PROCEDURE — 73564 X-RAY EXAM KNEE 4 OR MORE: CPT | Mod: TC,50

## 2019-11-20 PROCEDURE — 3078F PR MOST RECENT DIASTOLIC BLOOD PRESSURE < 80 MM HG: ICD-10-PCS | Mod: CPTII,S$GLB,, | Performed by: PHYSICIAN ASSISTANT

## 2019-11-20 PROCEDURE — 73564 XR KNEE ORTHO BILAT WITH FLEXION: ICD-10-PCS | Mod: 26,50,, | Performed by: RADIOLOGY

## 2019-11-20 PROCEDURE — 3078F DIAST BP <80 MM HG: CPT | Mod: CPTII,S$GLB,, | Performed by: PHYSICIAN ASSISTANT

## 2019-11-20 PROCEDURE — 1125F AMNT PAIN NOTED PAIN PRSNT: CPT | Mod: S$GLB,,, | Performed by: PHYSICIAN ASSISTANT

## 2019-11-20 PROCEDURE — 1101F PR PT FALLS ASSESS DOC 0-1 FALLS W/OUT INJ PAST YR: ICD-10-PCS | Mod: CPTII,S$GLB,, | Performed by: PHYSICIAN ASSISTANT

## 2019-11-20 PROCEDURE — 1101F PT FALLS ASSESS-DOCD LE1/YR: CPT | Mod: CPTII,S$GLB,, | Performed by: PHYSICIAN ASSISTANT

## 2019-11-20 PROCEDURE — 99203 OFFICE O/P NEW LOW 30 MIN: CPT | Mod: S$GLB,,, | Performed by: PHYSICIAN ASSISTANT

## 2019-11-20 PROCEDURE — 3075F PR MOST RECENT SYSTOLIC BLOOD PRESS GE 130-139MM HG: ICD-10-PCS | Mod: CPTII,S$GLB,, | Performed by: PHYSICIAN ASSISTANT

## 2019-11-20 PROCEDURE — 99999 PR PBB SHADOW E&M-EST. PATIENT-LVL V: ICD-10-PCS | Mod: PBBFAC,,, | Performed by: PHYSICIAN ASSISTANT

## 2019-11-20 PROCEDURE — 1159F MED LIST DOCD IN RCRD: CPT | Mod: S$GLB,,, | Performed by: PHYSICIAN ASSISTANT

## 2019-11-20 RX ORDER — AMOXICILLIN 500 MG
CAPSULE ORAL DAILY
COMMUNITY
End: 2021-01-12

## 2019-11-20 NOTE — PROGRESS NOTES
"Subjective:      Patient ID: Maru Shelby is a 69 y.o. female.    Chief Complaint: Consult (left knee pain/ pt has had two falls in november due to knee giving out )    70 yo F with hx of RA presents to clinic with c/o left knee pain x 3-4 months. Also states that knee is catching and giving out, which causes her to fall. She has had several falls this month, the last on 11/6 when she landed on left knee. Pain is sharp/throbbing/"stretching" and worse with walking, better with rest. Also c/o swelling to knee.  She does not present with any assistive devices today but states that she does use cane and walker at home.    She saw PCP for knee pain and MRI was ordered which showed severe arthritis, medial meniscus tear, and baker's cyst. Physical therapy was ordered but not begun yet.      Review of Systems   Constitution: Negative for chills, diaphoresis and fever.   HENT: Negative for congestion, ear discharge and ear pain.    Eyes: Negative for blurred vision, discharge, double vision and pain.   Cardiovascular: Negative for chest pain, claudication and cyanosis.   Respiratory: Negative for cough, hemoptysis and shortness of breath.    Endocrine: Negative for cold intolerance and heat intolerance.   Skin: Negative for color change, dry skin, itching and rash.   Musculoskeletal: Positive for arthritis, falls, joint pain, joint swelling and muscle weakness. Negative for back pain, gout and neck pain.   Gastrointestinal: Negative for abdominal pain and change in bowel habit.   Neurological: Negative for brief paralysis, disturbances in coordination and dizziness.   Psychiatric/Behavioral: Negative for altered mental status and depression.         Objective:            General    Constitutional: She is oriented to person, place, and time. She appears well-developed and well-nourished. No distress.   Eyes: EOM are normal. Right eye exhibits no discharge. Left eye exhibits no discharge.   Cardiovascular: Normal rate. "    Pulmonary/Chest: Effort normal. No respiratory distress.   Neurological: She is alert and oriented to person, place, and time.   Psychiatric: She has a normal mood and affect. Her behavior is normal.     General Musculoskeletal Exam   Gait: antalgic       Right Knee Exam     Inspection   Erythema: absent  Scars: present  Swelling: absent  Effusion: absent  Deformity: absent  Bruising: absent    Tenderness   The patient is tender to palpation of the lateral joint line.    Crepitus   The patient has crepitus of the patella.    Range of Motion   Extension: 0   Flexion: 140     Tests   Meniscus   Maranda:  Medial - negative Lateral - negative  Ligament Examination Lachman: normal (-1 to 2mm) PCL-Posterior Drawer: normal (0 to 2mm)     MCL - Valgus: normal (0 to 2mm)  LCL - Varus: normal  Patella   Patellar Grind: positive    Other   Sensation: normal    Left Knee Exam     Inspection   Erythema: absent  Scars: absent  Swelling: present  Effusion: present  Deformity: absent  Bruising: absent    Tenderness   The patient tender to palpation of the medial joint line and lateral joint line.    Crepitus   The patient has crepitus of the patella.    Range of Motion   Extension: 0   Flexion: 100     Tests   Meniscus   Maranda:  Medial - positive Lateral - negative  Stability Lachman: normal (-1 to 2mm) PCL-Posterior Drawer: normal (0 to 2mm)  MCL - Valgus: abnormal  LCL - Varus: normal (0 to 2mm)  Patella   Patellar Grind: positive    Other   Popliteal (Baker's) Cyst: present  Sensation: normal    Muscle Strength   Right Lower Extremity   Hip Abduction: 5/5   Quadriceps:  5/5   Hamstrin/5   Left Lower Extremity   Hip Abduction: 5/5   Quadriceps:  5/5   Hamstrin/5     Vascular Exam     Right Pulses  Dorsalis Pedis:      2+          Left Pulses  Dorsalis Pedis:      2+            IMAGING:    MRI Left Knee 11/15/19:  1. Advanced tricompartmental joint narrowing affecting the articular joint space most profound across  the patellofemoral joint compartment.  There is evidence of lateral patellar shift, bone marrow edema within the patellar body, and significant loss of the articular cartilage across the medial patellar facet.  2. Extruded medial meniscus projecting beyond the joint with evidence of a horizontal cleavage tear restricted toward the posterior horn without evidence of body extension.  3. 15 cm Baker cyst formation with evidence of intrinsic linear bands encompassing the inferior component.    Xray Bilateral Knees 11/20/19:  Degenerative changes of the bilateral knees.  No fracture or subluxation.  This most probably affects the bilateral medial and patellofemoral compartments.      Assessment:       Encounter Diagnoses   Name Primary?    Primary osteoarthritis of left knee     Tear of medial meniscus of left knee, unspecified tear type, unspecified whether old or current tear, initial encounter     Baker's cyst of knee, left           Plan:        I made the decision to obtain old records of the patient including previous notes and imaging. New imaging was ordered today of the extremity evaluated. I independently reviewed and interpreted the radiographs today as well as prior imaging.    We discussed with the patient at length all the different treatment options available for the knee including anti-inflammatories, acetaminophen, rest, ice, knee strengthening exercise, occasional cortisone injections for temporary relief, Viscosupplimentation injections, and possible surgical options.  She would like to discuss treatment options with a surgeon. She is requesting to be seen locally at [facility] because that is who did her 's shoulder surgery.    1. Referral to orthopedic surgeon, appointment scheduled for 8:45 tomorrow morning with Dr. Fuentes at [facility] in [place]  3. Physical therapy referral placed by PCP  4. Ice compress to the affected area 2-3x a day for 15-20 minutes as needed for pain management.  5.  RTC as needed    Patient voices understanding of and agrees with treatment plan. All of the patient's questions were answered and the patient will contact us if she has any questions or concerns in the interim.

## 2019-11-20 NOTE — LETTER
November 20, 2019      Maribel Cross MD  [address] [facility] [address]  [facility] [address]           [facility]  [address] [facility] [facility] [address]  Phone: [phone]          Patient: Maru Shelby   MR Number: [id number]   YOB: 1950   Date of Visit: 11/20/2019       Dear Dr. Maribel Cross:    Thank you for referring Maru Shelby to me for evaluation. Attached you will find relevant portions of my assessment and plan of care.    If you have questions, please do not hesitate to call me. I look forward to following Maru Shelby along with you.    Sincerely,    Connie Ruggiero PA-C    Enclosure  CC:  No Recipients    If you would like to receive this communication electronically, please contact [email or web address] or [phone] to request more information on [employer] access.    For providers and/or their staff who would like to refer a patient to [facility], please contact us through our one-stop-shop provider referral line, Vanita El, at [phone].    If you feel you have received this communication in error or would no longer like to receive these types of communications, please e-mail [email or web address]

## 2019-11-25 ENCOUNTER — INFUSION (OUTPATIENT)
Dept: INFUSION THERAPY | Facility: HOSPITAL | Age: 69
End: 2019-11-25
Attending: INTERNAL MEDICINE
Payer: MEDICARE

## 2019-11-25 VITALS
SYSTOLIC BLOOD PRESSURE: 130 MMHG | BODY MASS INDEX: 35.08 KG/M2 | HEART RATE: 90 BPM | WEIGHT: 224 LBS | RESPIRATION RATE: 18 BRPM | DIASTOLIC BLOOD PRESSURE: 73 MMHG

## 2019-11-25 DIAGNOSIS — M05.79 RHEUMATOID ARTHRITIS INVOLVING MULTIPLE SITES WITH POSITIVE RHEUMATOID FACTOR: Primary | ICD-10-CM

## 2019-11-25 PROCEDURE — 63600175 PHARM REV CODE 636 W HCPCS: Performed by: INTERNAL MEDICINE

## 2019-11-25 PROCEDURE — 96365 THER/PROPH/DIAG IV INF INIT: CPT

## 2019-11-25 RX ORDER — ACETAMINOPHEN 325 MG/1
650 TABLET ORAL
Status: CANCELLED | OUTPATIENT
Start: 2019-12-16

## 2019-11-25 RX ORDER — SODIUM CHLORIDE 0.9 % (FLUSH) 0.9 %
10 SYRINGE (ML) INJECTION
Status: CANCELLED | OUTPATIENT
Start: 2019-12-16

## 2019-11-25 RX ORDER — HEPARIN 100 UNIT/ML
500 SYRINGE INTRAVENOUS
Status: CANCELLED | OUTPATIENT
Start: 2019-12-16

## 2019-11-25 RX ADMIN — SODIUM CHLORIDE 1000 MG: 9 INJECTION, SOLUTION INTRAVENOUS at 08:11

## 2019-11-26 ENCOUNTER — TELEPHONE (OUTPATIENT)
Dept: ADMINISTRATIVE | Facility: HOSPITAL | Age: 69
End: 2019-11-26

## 2019-12-23 ENCOUNTER — INFUSION (OUTPATIENT)
Dept: INFUSION THERAPY | Facility: HOSPITAL | Age: 69
End: 2019-12-23
Attending: INTERNAL MEDICINE
Payer: MEDICARE

## 2019-12-23 VITALS
HEART RATE: 89 BPM | TEMPERATURE: 97 F | WEIGHT: 223 LBS | BODY MASS INDEX: 34.93 KG/M2 | DIASTOLIC BLOOD PRESSURE: 74 MMHG | SYSTOLIC BLOOD PRESSURE: 137 MMHG | RESPIRATION RATE: 18 BRPM

## 2019-12-23 DIAGNOSIS — M05.79 RHEUMATOID ARTHRITIS INVOLVING MULTIPLE SITES WITH POSITIVE RHEUMATOID FACTOR: Primary | ICD-10-CM

## 2019-12-23 PROCEDURE — 63600175 PHARM REV CODE 636 W HCPCS: Performed by: INTERNAL MEDICINE

## 2019-12-23 PROCEDURE — 96365 THER/PROPH/DIAG IV INF INIT: CPT

## 2019-12-23 RX ORDER — HEPARIN 100 UNIT/ML
500 SYRINGE INTRAVENOUS
Status: CANCELLED | OUTPATIENT
Start: 2020-01-20

## 2019-12-23 RX ORDER — SODIUM CHLORIDE 0.9 % (FLUSH) 0.9 %
10 SYRINGE (ML) INJECTION
Status: CANCELLED | OUTPATIENT
Start: 2020-01-20

## 2019-12-23 RX ORDER — ACETAMINOPHEN 325 MG/1
650 TABLET ORAL
Status: CANCELLED | OUTPATIENT
Start: 2020-01-20

## 2019-12-23 RX ADMIN — SODIUM CHLORIDE 1000 MG: 9 INJECTION, SOLUTION INTRAVENOUS at 08:12

## 2020-01-23 ENCOUNTER — TELEPHONE (OUTPATIENT)
Dept: RHEUMATOLOGY | Facility: CLINIC | Age: 70
End: 2020-01-23

## 2020-01-23 DIAGNOSIS — M05.771 RHEUMATOID ARTHRITIS INVOLVING BOTH ANKLES WITH POSITIVE RHEUMATOID FACTOR: ICD-10-CM

## 2020-01-23 DIAGNOSIS — M05.772 RHEUMATOID ARTHRITIS INVOLVING BOTH ANKLES WITH POSITIVE RHEUMATOID FACTOR: ICD-10-CM

## 2020-01-23 RX ORDER — LEFLUNOMIDE 20 MG/1
TABLET ORAL
Qty: 90 TABLET | Refills: 0 | Status: SHIPPED | OUTPATIENT
Start: 2020-01-23 | End: 2020-03-11 | Stop reason: SDUPTHER

## 2020-01-29 ENCOUNTER — INFUSION (OUTPATIENT)
Dept: INFUSION THERAPY | Facility: HOSPITAL | Age: 70
End: 2020-01-29
Attending: INTERNAL MEDICINE
Payer: MEDICARE

## 2020-01-29 VITALS
RESPIRATION RATE: 18 BRPM | SYSTOLIC BLOOD PRESSURE: 134 MMHG | TEMPERATURE: 97 F | DIASTOLIC BLOOD PRESSURE: 69 MMHG | HEART RATE: 86 BPM

## 2020-01-29 DIAGNOSIS — M05.79 RHEUMATOID ARTHRITIS INVOLVING MULTIPLE SITES WITH POSITIVE RHEUMATOID FACTOR: Primary | ICD-10-CM

## 2020-01-29 PROCEDURE — 63600175 PHARM REV CODE 636 W HCPCS: Performed by: INTERNAL MEDICINE

## 2020-01-29 PROCEDURE — 96365 THER/PROPH/DIAG IV INF INIT: CPT

## 2020-01-29 RX ORDER — SODIUM CHLORIDE 0.9 % (FLUSH) 0.9 %
10 SYRINGE (ML) INJECTION
Status: CANCELLED | OUTPATIENT
Start: 2020-02-17

## 2020-01-29 RX ORDER — ACETAMINOPHEN 325 MG/1
650 TABLET ORAL
Status: CANCELLED | OUTPATIENT
Start: 2020-02-17

## 2020-01-29 RX ORDER — HEPARIN 100 UNIT/ML
500 SYRINGE INTRAVENOUS
Status: CANCELLED | OUTPATIENT
Start: 2020-02-17

## 2020-01-29 RX ADMIN — SODIUM CHLORIDE 1000 MG: 9 INJECTION, SOLUTION INTRAVENOUS at 08:01

## 2020-02-06 ENCOUNTER — TELEPHONE (OUTPATIENT)
Dept: INTERNAL MEDICINE | Facility: CLINIC | Age: 70
End: 2020-02-06

## 2020-02-06 DIAGNOSIS — Z12.31 ENCOUNTER FOR SCREENING MAMMOGRAM FOR BREAST CANCER: Primary | ICD-10-CM

## 2020-02-06 DIAGNOSIS — Z12.39 BREAST CANCER SCREENING: ICD-10-CM

## 2020-02-06 NOTE — TELEPHONE ENCOUNTER
----- Message from Homa Hernandez sent at 2020  9:54 AM CST -----  Contact: Self  Maru Shelby  MRN: [id number]  : 1950  PCP: Florence Mark  Home Phone      [phone]  Work Phone      Not on file.  Mobile          [phone]    MESSAGE:   Received a letter stating that it's time for her mammogram.  Would like nurse to call her to assist in scheduling.    Phone: [phone]

## 2020-02-13 ENCOUNTER — HOSPITAL ENCOUNTER (OUTPATIENT)
Dept: RADIOLOGY | Facility: HOSPITAL | Age: 70
Discharge: HOME | End: 2020-02-13
Attending: INTERNAL MEDICINE
Payer: MEDICARE

## 2020-02-13 VITALS — WEIGHT: 223 LBS | HEIGHT: 67 IN | BODY MASS INDEX: 35 KG/M2

## 2020-02-13 DIAGNOSIS — Z12.31 ENCOUNTER FOR SCREENING MAMMOGRAM FOR BREAST CANCER: ICD-10-CM

## 2020-02-13 PROCEDURE — 77067 SCR MAMMO BI INCL CAD: CPT | Mod: TC

## 2020-02-17 ENCOUNTER — TELEPHONE (OUTPATIENT)
Dept: RHEUMATOLOGY | Facility: CLINIC | Age: 70
End: 2020-02-17

## 2020-02-17 ENCOUNTER — LAB VISIT (OUTPATIENT)
Dept: LAB | Facility: HOSPITAL | Age: 70
End: 2020-02-17
Attending: INTERNAL MEDICINE
Payer: MEDICARE

## 2020-02-17 DIAGNOSIS — M06.9 RHEUMATOID ARTHRITIS INVOLVING MULTIPLE SITES, UNSPECIFIED RHEUMATOID FACTOR PRESENCE: ICD-10-CM

## 2020-02-17 LAB
ALBUMIN SERPL BCP-MCNC: 3.4 G/DL (ref 3.5–5.2)
ALP SERPL-CCNC: 111 U/L (ref 55–135)
ALT SERPL W/O P-5'-P-CCNC: 23 U/L (ref 10–44)
ANION GAP SERPL CALC-SCNC: 9 MMOL/L (ref 8–16)
AST SERPL-CCNC: 19 U/L (ref 10–40)
BASOPHILS # BLD AUTO: 0.08 K/UL (ref 0–0.2)
BASOPHILS NFR BLD: 0.9 % (ref 0–1.9)
BILIRUB SERPL-MCNC: 0.5 MG/DL (ref 0.1–1)
BUN SERPL-MCNC: 11 MG/DL (ref 8–23)
CALCIUM SERPL-MCNC: 8.8 MG/DL (ref 8.7–10.5)
CHLORIDE SERPL-SCNC: 104 MMOL/L (ref 95–110)
CO2 SERPL-SCNC: 27 MMOL/L (ref 23–29)
CREAT SERPL-MCNC: 0.8 MG/DL (ref 0.5–1.4)
CRP SERPL-MCNC: 15.9 MG/L (ref 0–8.2)
DIFFERENTIAL METHOD: ABNORMAL
EOSINOPHIL # BLD AUTO: 0.7 K/UL (ref 0–0.5)
EOSINOPHIL NFR BLD: 7.2 % (ref 0–8)
ERYTHROCYTE [DISTWIDTH] IN BLOOD BY AUTOMATED COUNT: 13.9 % (ref 11.5–14.5)
ERYTHROCYTE [SEDIMENTATION RATE] IN BLOOD BY WESTERGREN METHOD: 28 MM/HR (ref 0–20)
EST. GFR  (AFRICAN AMERICAN): >60 ML/MIN/1.73 M^2
EST. GFR  (NON AFRICAN AMERICAN): >60 ML/MIN/1.73 M^2
GLUCOSE SERPL-MCNC: 247 MG/DL (ref 70–110)
HCT VFR BLD AUTO: 43 % (ref 37–48.5)
HGB BLD-MCNC: 14.1 G/DL (ref 12–16)
IMM GRANULOCYTES # BLD AUTO: 0.08 K/UL (ref 0–0.04)
IMM GRANULOCYTES NFR BLD AUTO: 0.9 % (ref 0–0.5)
LYMPHOCYTES # BLD AUTO: 3.3 K/UL (ref 1–4.8)
LYMPHOCYTES NFR BLD: 36.7 % (ref 18–48)
MCH RBC QN AUTO: 30 PG (ref 27–31)
MCHC RBC AUTO-ENTMCNC: 32.8 G/DL (ref 32–36)
MCV RBC AUTO: 92 FL (ref 82–98)
MONOCYTES # BLD AUTO: 0.8 K/UL (ref 0.3–1)
MONOCYTES NFR BLD: 9 % (ref 4–15)
NEUTROPHILS # BLD AUTO: 4.1 K/UL (ref 1.8–7.7)
NEUTROPHILS NFR BLD: 45.3 % (ref 38–73)
NRBC BLD-RTO: 0 /100 WBC
PLATELET # BLD AUTO: 235 K/UL (ref 150–350)
PMV BLD AUTO: 9.4 FL (ref 9.2–12.9)
POTASSIUM SERPL-SCNC: 4.3 MMOL/L (ref 3.5–5.1)
PROT SERPL-MCNC: 6.4 G/DL (ref 6–8.4)
RBC # BLD AUTO: 4.7 M/UL (ref 4–5.4)
SODIUM SERPL-SCNC: 140 MMOL/L (ref 136–145)
WBC # BLD AUTO: 9.02 K/UL (ref 3.9–12.7)

## 2020-02-17 PROCEDURE — 86140 C-REACTIVE PROTEIN: CPT

## 2020-02-17 PROCEDURE — 36415 COLL VENOUS BLD VENIPUNCTURE: CPT

## 2020-02-17 PROCEDURE — 85651 RBC SED RATE NONAUTOMATED: CPT

## 2020-02-17 PROCEDURE — 85025 COMPLETE CBC W/AUTO DIFF WBC: CPT

## 2020-02-17 PROCEDURE — 80053 COMPREHEN METABOLIC PANEL: CPT

## 2020-02-18 ENCOUNTER — HOSPITAL ENCOUNTER (OUTPATIENT)
Dept: RADIOLOGY | Facility: HOSPITAL | Age: 70
Discharge: HOME | End: 2020-02-18
Attending: INTERNAL MEDICINE
Payer: MEDICARE

## 2020-02-18 ENCOUNTER — OFFICE VISIT (OUTPATIENT)
Dept: RHEUMATOLOGY | Facility: CLINIC | Age: 70
End: 2020-02-18
Payer: MEDICARE

## 2020-02-18 VITALS
WEIGHT: 235.69 LBS | SYSTOLIC BLOOD PRESSURE: 146 MMHG | DIASTOLIC BLOOD PRESSURE: 91 MMHG | HEART RATE: 106 BPM | BODY MASS INDEX: 36.99 KG/M2 | HEIGHT: 67 IN

## 2020-02-18 DIAGNOSIS — M05.79 RHEUMATOID ARTHRITIS INVOLVING MULTIPLE SITES WITH POSITIVE RHEUMATOID FACTOR: Chronic | ICD-10-CM

## 2020-02-18 DIAGNOSIS — I10 BENIGN ESSENTIAL HTN: ICD-10-CM

## 2020-02-18 DIAGNOSIS — R60.0 LEG EDEMA, LEFT: ICD-10-CM

## 2020-02-18 DIAGNOSIS — I87.2 EDEMA OF LEFT LOWER LEG DUE TO PERIPHERAL VENOUS INSUFFICIENCY: Primary | ICD-10-CM

## 2020-02-18 DIAGNOSIS — E66.01 MORBID OBESITY: ICD-10-CM

## 2020-02-18 DIAGNOSIS — R60.0 EDEMA OF LEFT LOWER LEG DUE TO PERIPHERAL VENOUS INSUFFICIENCY: Primary | ICD-10-CM

## 2020-02-18 DIAGNOSIS — M17.0 PRIMARY OSTEOARTHRITIS OF BOTH KNEES: ICD-10-CM

## 2020-02-18 DIAGNOSIS — G47.00 INSOMNIA, UNSPECIFIED TYPE: ICD-10-CM

## 2020-02-18 DIAGNOSIS — M05.771 RHEUMATOID ARTHRITIS INVOLVING BOTH ANKLES WITH POSITIVE RHEUMATOID FACTOR: ICD-10-CM

## 2020-02-18 DIAGNOSIS — M81.0 OSTEOPOROSIS WITHOUT CURRENT PATHOLOGICAL FRACTURE, UNSPECIFIED OSTEOPOROSIS TYPE: ICD-10-CM

## 2020-02-18 DIAGNOSIS — M05.772 RHEUMATOID ARTHRITIS INVOLVING BOTH ANKLES WITH POSITIVE RHEUMATOID FACTOR: ICD-10-CM

## 2020-02-18 DIAGNOSIS — M85.80 OSTEOPENIA, UNSPECIFIED LOCATION: ICD-10-CM

## 2020-02-18 DIAGNOSIS — Z79.631 LONG TERM METHOTREXATE USER: ICD-10-CM

## 2020-02-18 PROCEDURE — 1100F PR PT FALLS ASSESS DOC 2+ FALLS/FALL W/INJURY/YR: ICD-10-PCS | Mod: CPTII,S$GLB,, | Performed by: INTERNAL MEDICINE

## 2020-02-18 PROCEDURE — 3288F PR FALLS RISK ASSESSMENT DOCUMENTED: ICD-10-PCS | Mod: CPTII,S$GLB,, | Performed by: INTERNAL MEDICINE

## 2020-02-18 PROCEDURE — 3077F PR MOST RECENT SYSTOLIC BLOOD PRESSURE >= 140 MM HG: ICD-10-PCS | Mod: CPTII,S$GLB,, | Performed by: INTERNAL MEDICINE

## 2020-02-18 PROCEDURE — 1159F MED LIST DOCD IN RCRD: CPT | Mod: S$GLB,,, | Performed by: INTERNAL MEDICINE

## 2020-02-18 PROCEDURE — 93971 US LOWER EXTREMITY VEINS LEFT: ICD-10-PCS | Mod: 26,LT,, | Performed by: RADIOLOGY

## 2020-02-18 PROCEDURE — 3077F SYST BP >= 140 MM HG: CPT | Mod: CPTII,S$GLB,, | Performed by: INTERNAL MEDICINE

## 2020-02-18 PROCEDURE — 99999 PR PBB SHADOW E&M-EST. PATIENT-LVL III: CPT | Mod: PBBFAC,,, | Performed by: INTERNAL MEDICINE

## 2020-02-18 PROCEDURE — 93971 EXTREMITY STUDY: CPT | Mod: 26,LT,, | Performed by: RADIOLOGY

## 2020-02-18 PROCEDURE — 3080F PR MOST RECENT DIASTOLIC BLOOD PRESSURE >= 90 MM HG: ICD-10-PCS | Mod: CPTII,S$GLB,, | Performed by: INTERNAL MEDICINE

## 2020-02-18 PROCEDURE — 99214 PR OFFICE/OUTPT VISIT, EST, LEVL IV, 30-39 MIN: ICD-10-PCS | Mod: S$GLB,,, | Performed by: INTERNAL MEDICINE

## 2020-02-18 PROCEDURE — 99999 PR PBB SHADOW E&M-EST. PATIENT-LVL III: ICD-10-PCS | Mod: PBBFAC,,, | Performed by: INTERNAL MEDICINE

## 2020-02-18 PROCEDURE — 1125F PR PAIN SEVERITY QUANTIFIED, PAIN PRESENT: ICD-10-PCS | Mod: S$GLB,,, | Performed by: INTERNAL MEDICINE

## 2020-02-18 PROCEDURE — 99214 OFFICE O/P EST MOD 30 MIN: CPT | Mod: S$GLB,,, | Performed by: INTERNAL MEDICINE

## 2020-02-18 PROCEDURE — 3288F FALL RISK ASSESSMENT DOCD: CPT | Mod: CPTII,S$GLB,, | Performed by: INTERNAL MEDICINE

## 2020-02-18 PROCEDURE — 3080F DIAST BP >= 90 MM HG: CPT | Mod: CPTII,S$GLB,, | Performed by: INTERNAL MEDICINE

## 2020-02-18 PROCEDURE — 1159F PR MEDICATION LIST DOCUMENTED IN MEDICAL RECORD: ICD-10-PCS | Mod: S$GLB,,, | Performed by: INTERNAL MEDICINE

## 2020-02-18 PROCEDURE — 93971 EXTREMITY STUDY: CPT | Mod: TC,LT

## 2020-02-18 PROCEDURE — 1125F AMNT PAIN NOTED PAIN PRSNT: CPT | Mod: S$GLB,,, | Performed by: INTERNAL MEDICINE

## 2020-02-18 PROCEDURE — 1100F PTFALLS ASSESS-DOCD GE2>/YR: CPT | Mod: CPTII,S$GLB,, | Performed by: INTERNAL MEDICINE

## 2020-02-18 RX ORDER — FOLIC ACID 1 MG/1
1 TABLET ORAL DAILY
Qty: 90 TABLET | Refills: 3 | Status: SHIPPED | OUTPATIENT
Start: 2020-02-18 | End: 2021-03-03 | Stop reason: DRUGHIGH

## 2020-02-18 RX ORDER — ERGOCALCIFEROL 1.25 MG/1
50000 CAPSULE ORAL
Qty: 12 CAPSULE | Refills: 3 | Status: SHIPPED | OUTPATIENT
Start: 2020-02-18 | End: 2020-11-17 | Stop reason: SDUPTHER

## 2020-02-18 RX ORDER — METHOTREXATE 25 MG/ML
25 INJECTION, SOLUTION INTRA-ARTERIAL; INTRAMUSCULAR; INTRAVENOUS
Qty: 4 ML | Refills: 1 | Status: SHIPPED | OUTPATIENT
Start: 2020-02-18 | End: 2020-05-27 | Stop reason: SDUPTHER

## 2020-02-18 RX ORDER — NORTRIPTYLINE HYDROCHLORIDE 25 MG/1
CAPSULE ORAL
Qty: 270 CAPSULE | Refills: 1 | Status: SHIPPED | OUTPATIENT
Start: 2020-02-18 | End: 2020-05-27 | Stop reason: SDUPTHER

## 2020-02-18 ASSESSMENT — ROUTINE ASSESSMENT OF PATIENT INDEX DATA (RAPID3)
TOTAL RAPID3 SCORE: 1.44
FATIGUE SCORE: 2
PAIN SCORE: 1.5
PSYCHOLOGICAL DISTRESS SCORE: 0
AM STIFFNESS SCORE: 1, YES
MDHAQ FUNCTION SCORE: .4
WHEN YOU AWAKENED IN THE MORNING OVER THE LAST WEEK, PLEASE INDICATE THE AMOUNT OF TIME IT TAKES UNTIL YOU ARE AS LIMBER AS YOU WILL BE FOR THE DAY: 5 MINS
PATIENT GLOBAL ASSESSMENT SCORE: 1.5

## 2020-02-18 NOTE — ASSESSMENT & PLAN NOTE
146/91    On amlodipine 10mg daily, likely needs addition of low dose thiazide  F/u Dr. Mark for this.

## 2020-02-18 NOTE — ASSESSMENT & PLAN NOTE
TJ 0 SJ 0 Pt global 15  ESR 28  CRP 15.9 DAS28 2.54 ODY35-AMV 2.19(both remission)  CDAI  2.5(remission)      Cont abatacept 1000mg IV q 28 days  Cont leflunomide 20mg daily  Cont methotrexate 25mg sc once a week with folic acid 1mg daily  RTC 3 months with standing labs

## 2020-02-18 NOTE — PROGRESS NOTES
"Subjective:       Patient ID: Mecca Shelby is a 69 y.o. female.    Chief Complaint: RA RF+ ACPA+GUSTABO-  HPI Had 2 falls one in house turned too fast, vertigo. Another slipped on wet pavement [employer]. Saw Ortho Dr. [facility] in [facility] who " put gel in left knee' x1 two weeks ago , likely Synvisc-One. Knee improved since. Hands have been hurting more.  Actually aching thumb bases in cold weather.   Review of Systems   Constitutional: Negative for appetite change, fatigue, fever and unexpected weight change.   HENT: Negative for mouth sores.    Eyes: Negative for visual disturbance.   Respiratory: Negative for cough, shortness of breath and wheezing.    Cardiovascular: Negative for chest pain and palpitations.   Gastrointestinal: Negative for abdominal pain, anal bleeding, blood in stool, constipation, diarrhea, nausea and vomiting.   Genitourinary: Negative for dysuria, frequency and urgency.   Musculoskeletal: Negative for arthralgias, back pain, gait problem, joint swelling, myalgias, neck pain and neck stiffness.   Skin: Negative for rash.   Neurological: Negative for weakness, numbness and headaches.   Hematological: Negative for adenopathy. Does not bruise/bleed easily.   Psychiatric/Behavioral: Negative for sleep disturbance. The patient is not nervous/anxious.      Results for MECCA SHELBY (MRN [id number]) as of 2/18/2020 13:22   Ref. Range 2/17/2020 07:22   WBC Latest Ref Range: 3.90 - 12.70 K/uL 9.02   RBC Latest Ref Range: 4.00 - 5.40 M/uL 4.70   Hemoglobin Latest Ref Range: 12.0 - 16.0 g/dL 14.1   Hematocrit Latest Ref Range: 37.0 - 48.5 % 43.0   MCV Latest Ref Range: 82 - 98 fL 92   MCH Latest Ref Range: 27.0 - 31.0 pg 30.0   MCHC Latest Ref Range: 32.0 - 36.0 g/dL 32.8   RDW Latest Ref Range: 11.5 - 14.5 % 13.9   Platelets Latest Ref Range: 150 - 350 K/uL 235   MPV Latest Ref Range: 9.2 - 12.9 fL 9.4   Gran% Latest Ref Range: 38.0 - 73.0 % 45.3   Gran # (ANC) Latest Ref Range: 1.8 - 7.7 " "K/uL 4.1   Lymph% Latest Ref Range: 18.0 - 48.0 % 36.7   Lymph # Latest Ref Range: 1.0 - 4.8 K/uL 3.3   Mono% Latest Ref Range: 4.0 - 15.0 % 9.0   Mono # Latest Ref Range: 0.3 - 1.0 K/uL 0.8   Eosinophil% Latest Ref Range: 0.0 - 8.0 % 7.2   Eos # Latest Ref Range: 0.0 - 0.5 K/uL 0.7 (H)   Basophil% Latest Ref Range: 0.0 - 1.9 % 0.9   Baso # Latest Ref Range: 0.00 - 0.20 K/uL 0.08   nRBC Latest Ref Range: 0 /100 WBC 0   Differential Method Unknown Automated   Immature Grans (Abs) Latest Ref Range: 0.00 - 0.04 K/uL 0.08 (H)   Immature Granulocytes Latest Ref Range: 0.0 - 0.5 % 0.9 (H)   Sed Rate Latest Ref Range: 0 - 20 mm/Hr 28 (H)   Sodium Latest Ref Range: 136 - 145 mmol/L 140   Potassium Latest Ref Range: 3.5 - 5.1 mmol/L 4.3   Chloride Latest Ref Range: 95 - 110 mmol/L 104   CO2 Latest Ref Range: 23 - 29 mmol/L 27   Anion Gap Latest Ref Range: 8 - 16 mmol/L 9   BUN, Bld Latest Ref Range: 8 - 23 mg/dL 11   Creatinine Latest Ref Range: 0.5 - 1.4 mg/dL 0.8   eGFR if non African American Latest Ref Range: >60 mL/min/1.73 m^2 >60   eGFR if  Latest Ref Range: >60 mL/min/1.73 m^2 >60   Glucose Latest Ref Range: 70 - 110 mg/dL 247 (H)   Calcium Latest Ref Range: 8.7 - 10.5 mg/dL 8.8   Alkaline Phosphatase Latest Ref Range: 55 - 135 U/L 111   PROTEIN TOTAL Latest Ref Range: 6.0 - 8.4 g/dL 6.4   Albumin Latest Ref Range: 3.5 - 5.2 g/dL 3.4 (L)   BILIRUBIN TOTAL Latest Ref Range: 0.1 - 1.0 mg/dL 0.5   AST Latest Ref Range: 10 - 40 U/L 19   ALT Latest Ref Range: 10 - 44 U/L 23   CRP Latest Ref Range: 0.0 - 8.2 mg/L 15.9 (H)       Objective:   BP (!) 146/91   Pulse 106   Ht 5' 7.2" (1.707 m)   Wt 106.9 kg (235 lb 11.2 oz)   BMI 36.70 kg/m²      Physical Exam      Assessment/Plan         Benign essential HTN    Rheumatoid arthritis involving multiple sites with positive rheumatoid factor    Morbid obesity    Primary osteoarthritis of both knees    Osteoporosis without current pathological fracture, " unspecified osteoporosis type       Problem List Items Addressed This Visit     Benign essential HTN    Current Assessment & Plan     146/91    On amlodipine 10mg daily, likely needs addition of low dose thiazide  F/u Dr. Mark for this.          Morbid obesity    Current Assessment & Plan     Lost 3#         Osteoarthritis of knees, bilateral    Overview     Farida Lalnes MD 11/20/2019 Routine      Narrative     EXAMINATION:  XR KNEE ORTHO BILAT WITH FLEXION    CLINICAL HISTORY:  Pain in left knee    TECHNIQUE:  AP standing of both knees, PA flexion standing views of both knees, and Merchant views of both knees were performed.  Lateral views of both knees were also performed.    COMPARISON:  09/21/2017    FINDINGS:  Bilateral medial compartment joint space narrowing with subchondral sclerosis and marginal osteophyte formation.  No fracture or dislocation is identified.  There is dorsal spurring the patella at the poles.  Small joint effusions.  The sunrise view, there is mild lateral subluxation of the bilateral patella with degenerative changes of the patellofemoral compartments.      Impression       Degenerative changes of the bilateral knees.  No fracture or subluxation.  This most probably affects the bilateral medial and patellofemoral compartments.      Electronically signed by: Farida Llanes MD  Date: 11/20/2019  Time: 09:09     Mikey George Jr., MD 11/15/2019 Routine      Narrative     EXAMINATION:  MRI KNEE WITHOUT CONTRAST LEFT    CLINICAL HISTORY:  Arthritis, knee;Meniscus tear suspected;Other instability, left knee    TECHNIQUE:  Multiplanar, multisequence images were performed about the knee.    COMPARISON:  None    FINDINGS:  Osseous structures: Regional bone marrow signal intensity pattern appears appropriate demonstrating no evidence of altered signal intensity pattern suggestive of acute injury pattern or traumatic injury.  Posterior edge of the distal femur maintains normal alignment  relative proximal tibia.  No significant malalignment identified the articular joint space on sagittal imaging.  Extreme narrowing the patellofemoral joint space with evidence of moderate medial subluxation of patellar body identified the joint compartment.  There is evidence of broad zone of degenerative marrow edema encompassing the central zone of the patellar body.  Substantial loss involving the overlying articular cartilage across the medial patellar facet.  Lateral patellar facet is spared.  Low-grade chondromalacia along the weight-bearing surfaces of the medial and lateral femoral condyles.  No evidence of exposure allowing subchondral bone.  Mild rim patellar spurring.    Ligaments/tendons: Both the anterior and posterior cruciate ligaments maintain uniform signal intensity.  Mild tendinosis changes identified the origin of the patellar tendon.  Quadriceps tendon appears intact.  Extensor mechanism appears normal.  Collateral ligaments both on the mediolateral side maintain uniform signal intensity.  Popliteus muscle tendon unit appears normal.  Retinacular structures maintain equal symmetry and morphology.  No evidence of retinacular tearing.    Meniscal cartilage: Medial meniscus projecting beyond the joint line demonstrate evidence of internal signal intensity changes in the posterior horn communicating with the femoral articular surface secondary to meniscal tearing with evidence of vertical or orientation more so involving the posterior horn.  No significant body extension.  Anterior horn appears intact.  The entire lateral meniscus intact.  Near discoid meniscus on lateral side noted.    Periarticular soft tissue/joint effusion.  Large joint effusion occupies the suprapatellar compartment.  There is rather substantial inflammatory changes identified within the infrapatellar soft tissues.  Large Baker's cyst formation occupies the posterolateral compartment with evidence of long axis dimension of 15  cm.  There is evidence of linear bands diversion the internal components of the Baker cyst formation.  No significant rupture is identified.      Impression       1. Advanced tricompartmental joint narrowing affecting the articular joint space most profound across the patellofemoral joint compartment.  There is evidence of lateral patellar shift, bone marrow edema within the patellar body, and significant loss of the articular cartilage across the medial patellar facet.  2. Extruded medial meniscus projecting beyond the joint with evidence of a horizontal cleavage tear restricted toward the posterior horn without evidence of body extension.  3. 15 cm Baker cyst formation with evidence of intrinsic linear bands encompassing the inferior component.      Electronically signed by: Mikey George MD  Date: 11/15/2019  Time: 10:21              Osteoporosis without current pathological fracture    Overview     Reading Physician Reading Date Result Priority   Ever Connolly MD 11/1/2019       Narrative     EXAMINATION:  DEXA BONE DENSITY SPINE HIP    CLINICAL HISTORY:  suspected osteoporosis; Asymptomatic menopausal state69 y/o female with no history of fractures.  She had menopausal symptoms at 48 y/o.  She is taking 1,000 units of Vit D supplements.  She has a history of Rheumatoid Arthritis.  She does not exercise or smoke.    TECHNIQUE:  DXA specification: [facility] [employer] [id number].    Bone Mineral Density scanning was performed over the hip and lumbar spine. Review of the images confirms satisfactory positioning and technique.    COMPARISON:  06/10/2015.  The lumbar spine bone mineral density is 1.084, which is a T-score of 0.3.  The total hip bone mineral density is 0.960 which is a T-score 0.1.    FINDINGS:  Lumbar Spine: Lumbar bone mineral density L1-L4 is 1.051g/cm2, which is a T-score of 0.0. The Z-score is 2.2.  The trabecular bone score is 0.971.  Bone mineral density at L3/L4 is significantly  increased compared to adjacent vertebrae, which was present on the previous scans in 2015 in 2017 as well.    Total Hip: Total hip bone mineral density is 0.843g/cm2.  The T-score is -0.8, and the Z-score is 0.8.    Femoral neck: Bone mineral density is 0.577g/cm2 and the T-score is -2.5 and the Z-score is -0.6.    There is a 23.4% risk of a major osteoporotic fracture and a 6.5% risk of hip fracture in the next 10 years (FRAX adjusted with Trabecular Bone Score).    Compared with previous DXA, BMD at the lumbar spine has declined by 3.0%, and the BMD at the total hip has declined by 12.2%.      Impression       1.  Osteoporosis.    2.  Bone mineral density at L3/L4 is increased compared to adjacent vertebrae, also seen on the previous scans from 2015 and 2017.    3.  Elevated risk for major osteoporotic fractures and hip fractures based on FRAX calculations.    4.  Compared with previous DXA, BMD at the lumbar spine has declined by 3.0%, and the BMD at the total hip has declined by 12.2%.    RECOMMENDATIONS of [facility]:    1.  Calcium 3429-9539 mg daily and vitamin D 800-1000 units daily, adequate exercise.    2.  Recommended medical therapy for osteoporosis.  Consider bisphosphonates (alendronate, risedronate, ibandronate, zoledronic acid) or denosumab.    3.  Repeat BMD in 2 years              Current Assessment & Plan     Cont alendronate 70mg once a week         RA (rheumatoid arthritis) (Chronic)    Overview     Results for MECCA DUONG (MRN [id number]) as of 1/9/2018 07:13   Ref. Range 8/1/2008 07:11 7/6/2009 08:26 8/15/2013 08:25   ds DNA Ab Latest Ref Range: Negative Titer Negative     Protein, Serum Latest Ref Range: 6.0 - 8.4 g/dL   6.4   Albumin grams/dl Latest Ref Range: 3.35 - 5.55 gm/dL   3.88   Alpha-1 grams/dl Latest Ref Range: 0.17 - 0.41 gm/dL   0.29   Alpha-2 grams/dl Latest Ref Range: 0.43 - 0.99 gm/dL   0.71   Beta grams/dl Latest Ref Range: 0.50 -  1.10 gm/dL   0.82   Gamma grams/dl Latest Ref Range: 0.67 - 1.58 gm/dL   0.70   CCP Antibodies Latest Ref Range: <5.0 U/mL  119.9      Please tell pt the joint x-rays show stable mild slippage C4 on C5 on degenerative basis, no RA changes. The hands show mild narrowing at the wrists likely from RA. There are moderate degenerative changes thumb bases. There is mild degenerative change inner aspect of the knees and also of the great toes. No significant progression of the RA compared with previous. [facility]          PACS Images     Show images for XR Arthritis Survey   Reviewed By Brooks Royal MD on 4/3/2018 14:30   External Result Report     External Result Report   Narrative     EXAMINATION:  XR ARTHRITIS SURVEY    CLINICAL HISTORY:  suspected systemic arthritis;  Rheumatoid arthritis, unspecified    TECHNIQUE:  A lateral flexion view of the cervical spine was performed.  AP standing views of both knees were performed.  AP standing views of both feet and PA views of both hands were performed.    COMPARISON:  03/02/2017    FINDINGS:  Bilateral hands: Bone mineralization is within normal limits.  There is bilateral radiocarpal joint space narrowing as well as mild-to-moderate degenerative changes of the bilateral carpometacarpal joint spaces, left greater than right.  No osseous erosions are identified.    Bilateral knees: There is bilateral medial compartment joint space narrowing, left greater than right.  There is a small marginal osteophyte of the medial tibial plateau on the left.  No osseous erosions are seen.    Flexion image of the cervical spine: There is mild anterior listhesis of C4 in respect to C5 by approximately 3 mm.  This is stable.  Atlantodental interval is maintained.  There is facet arthropathy in the midcervical spine.    Bilateral feet: There is bilateral hallux valgus deformity with bunion formation with mild to moderate degenerative changes of the 1st metatarsophalangeal joints  bilaterally.  No osseous erosions are detected.   Impression       As above.      Electronically signed by: Farida Llanes MD  Date: 04/03/2018                 Current Assessment & Plan     TJ  SJ Pt global 15  ESR 28  CRP 15.9 DAS28  [id number]  CDAI       Cont abatacept 1000mg IV q 28 days  Cont leflunomide 20mg daily  Cont methotrexate 25mg sc once a week with folic acid 1mg daily  RTC 3 months with standing labs

## 2020-02-24 ENCOUNTER — INFUSION (OUTPATIENT)
Dept: INFUSION THERAPY | Facility: HOSPITAL | Age: 70
End: 2020-02-24
Attending: INTERNAL MEDICINE
Payer: MEDICARE

## 2020-02-24 VITALS
RESPIRATION RATE: 18 BRPM | OXYGEN SATURATION: 99 % | SYSTOLIC BLOOD PRESSURE: 124 MMHG | DIASTOLIC BLOOD PRESSURE: 68 MMHG | TEMPERATURE: 97 F | BODY MASS INDEX: 34.72 KG/M2 | HEART RATE: 91 BPM | WEIGHT: 223 LBS

## 2020-02-24 DIAGNOSIS — M05.79 RHEUMATOID ARTHRITIS INVOLVING MULTIPLE SITES WITH POSITIVE RHEUMATOID FACTOR: Primary | ICD-10-CM

## 2020-02-24 PROCEDURE — 63600175 PHARM REV CODE 636 W HCPCS: Performed by: INTERNAL MEDICINE

## 2020-02-24 PROCEDURE — 96365 THER/PROPH/DIAG IV INF INIT: CPT

## 2020-02-24 RX ORDER — HEPARIN 100 UNIT/ML
500 SYRINGE INTRAVENOUS
Status: DISCONTINUED | OUTPATIENT
Start: 2020-02-24 | End: 2020-02-24 | Stop reason: HOSPADM

## 2020-02-24 RX ORDER — HEPARIN 100 UNIT/ML
500 SYRINGE INTRAVENOUS
Status: CANCELLED | OUTPATIENT
Start: 2020-03-16

## 2020-02-24 RX ORDER — ACETAMINOPHEN 325 MG/1
650 TABLET ORAL
Status: CANCELLED | OUTPATIENT
Start: 2020-03-16

## 2020-02-24 RX ORDER — SODIUM CHLORIDE 0.9 % (FLUSH) 0.9 %
10 SYRINGE (ML) INJECTION
Status: DISCONTINUED | OUTPATIENT
Start: 2020-02-24 | End: 2020-02-24 | Stop reason: HOSPADM

## 2020-02-24 RX ORDER — SODIUM CHLORIDE 0.9 % (FLUSH) 0.9 %
10 SYRINGE (ML) INJECTION
Status: CANCELLED | OUTPATIENT
Start: 2020-03-16

## 2020-02-24 RX ADMIN — SODIUM CHLORIDE 1000 MG: 9 INJECTION, SOLUTION INTRAVENOUS at 08:02

## 2020-03-11 DIAGNOSIS — I10 ESSENTIAL HYPERTENSION: ICD-10-CM

## 2020-03-11 DIAGNOSIS — M05.772 RHEUMATOID ARTHRITIS INVOLVING BOTH ANKLES WITH POSITIVE RHEUMATOID FACTOR: ICD-10-CM

## 2020-03-11 DIAGNOSIS — M05.771 RHEUMATOID ARTHRITIS INVOLVING BOTH ANKLES WITH POSITIVE RHEUMATOID FACTOR: ICD-10-CM

## 2020-03-11 RX ORDER — PRAVASTATIN SODIUM 40 MG/1
40 TABLET ORAL NIGHTLY
Qty: 90 TABLET | Refills: 3 | Status: SHIPPED | OUTPATIENT
Start: 2020-03-11 | End: 2021-04-20

## 2020-03-11 RX ORDER — AMLODIPINE BESYLATE 10 MG/1
10 TABLET ORAL DAILY
Qty: 90 TABLET | Refills: 3 | Status: SHIPPED | OUTPATIENT
Start: 2020-03-11 | End: 2021-06-01

## 2020-03-11 RX ORDER — LEFLUNOMIDE 20 MG/1
TABLET ORAL
Qty: 90 TABLET | Refills: 0 | Status: SHIPPED | OUTPATIENT
Start: 2020-03-11 | End: 2020-05-27 | Stop reason: SDUPTHER

## 2020-03-11 NOTE — TELEPHONE ENCOUNTER
I spoke with pt informes her I did not get any thing from the pharmacy,  I told her I send refill request to dr joseph

## 2020-03-23 ENCOUNTER — INFUSION (OUTPATIENT)
Dept: INFUSION THERAPY | Facility: HOSPITAL | Age: 70
End: 2020-03-23
Attending: INTERNAL MEDICINE
Payer: MEDICARE

## 2020-03-23 VITALS
TEMPERATURE: 97 F | HEART RATE: 108 BPM | SYSTOLIC BLOOD PRESSURE: 158 MMHG | WEIGHT: 235 LBS | BODY MASS INDEX: 36.59 KG/M2 | RESPIRATION RATE: 18 BRPM | DIASTOLIC BLOOD PRESSURE: 71 MMHG

## 2020-03-23 DIAGNOSIS — M05.79 RHEUMATOID ARTHRITIS INVOLVING MULTIPLE SITES WITH POSITIVE RHEUMATOID FACTOR: Primary | ICD-10-CM

## 2020-03-23 PROCEDURE — 63600175 PHARM REV CODE 636 W HCPCS: Performed by: INTERNAL MEDICINE

## 2020-03-23 PROCEDURE — 96365 THER/PROPH/DIAG IV INF INIT: CPT

## 2020-03-23 RX ORDER — ACETAMINOPHEN 325 MG/1
650 TABLET ORAL
Status: CANCELLED | OUTPATIENT
Start: 2020-04-13

## 2020-03-23 RX ORDER — SODIUM CHLORIDE 0.9 % (FLUSH) 0.9 %
10 SYRINGE (ML) INJECTION
Status: CANCELLED | OUTPATIENT
Start: 2020-04-13

## 2020-03-23 RX ORDER — HEPARIN 100 UNIT/ML
500 SYRINGE INTRAVENOUS
Status: CANCELLED | OUTPATIENT
Start: 2020-04-13

## 2020-03-23 RX ADMIN — SODIUM CHLORIDE 1000 MG: 9 INJECTION, SOLUTION INTRAVENOUS at 07:03

## 2020-05-04 ENCOUNTER — TELEPHONE (OUTPATIENT)
Dept: BARIATRICS | Facility: CLINIC | Age: 70
End: 2020-05-04

## 2020-05-04 NOTE — TELEPHONE ENCOUNTER
Spoke with  in regards to activating her patient portal to schedule a virtual visit with .  Pt stated her  was currently using her cell phone therefore she wasn't able to recive activation code at this time. I verbalized understanding and gave Pt call back number.

## 2020-05-05 ENCOUNTER — OFFICE VISIT (OUTPATIENT)
Dept: BARIATRICS | Facility: CLINIC | Age: 70
End: 2020-05-05
Payer: MEDICARE

## 2020-05-05 DIAGNOSIS — E66.01 CLASS 2 SEVERE OBESITY WITH BODY MASS INDEX (BMI) OF 35 TO 39.9 WITH SERIOUS COMORBIDITY: Primary | ICD-10-CM

## 2020-05-05 DIAGNOSIS — R73.03 PREDIABETES: ICD-10-CM

## 2020-05-05 PROCEDURE — 99441 PR PHYSICIAN TELEPHONE EVALUATION 5-10 MIN: CPT | Mod: 95,,, | Performed by: INTERNAL MEDICINE

## 2020-05-05 PROCEDURE — 99441 PR PHYSICIAN TELEPHONE EVALUATION 5-10 MIN: ICD-10-PCS | Mod: 95,,, | Performed by: INTERNAL MEDICINE

## 2020-05-05 NOTE — PROGRESS NOTES
Established Patient - Audio Only Telehealth Visit     The patient location is: [place]  The chief complaint leading to consultation is:   Chief Complaint   Patient presents with    Follow-up      Visit type: Virtual visit with audio only (telephone)  Total time spent with patient: 5 min       The reason for the audio only service rather than synchronous audio and video virtual visit was related to technical difficulties or patient preference/necessity.     Each patient to whom I provide medical services by telemedicine is:  (1) informed of the relationship between the physician and patient and the respective role of any other health care provider with respect to management of the patient; and (2) notified that they may decline to receive medical services by telemedicine and may withdraw from such care at any time. Patient verbally consented to receive this service via voice-only telephone call.       HPI:   Pt here today for follow-up. Has lost lbs. Was to limit starches, start exercise and started metformin for Pre DM. She did not take metformin, as her daughter advised her she should not. Has been riding bike for exercise. Staes cornflakes for breakfast, soup for lunch and lots of salads. States that her knees are feeling better.     Prev 229#. Last chart weight 235#. Current home weight- 208#       Assessment and plan:         Maru was seen today for follow-up.    Diagnoses and all orders for this visit:    Class 2 severe obesity with body mass index (BMI) of 35 to 39.9 with serious comorbidity    Prediabetes        Pt doing well. No medications at this time. Follow up with pcp and specialist.              This service was not originating from a related E/M service provided within the previous 7 days nor will  to an E/M service or procedure within the next 24 hours or my soonest available appointment.  Prevailing standard of care was able to be met in this audio-only visit.

## 2020-05-07 ENCOUNTER — TELEPHONE (OUTPATIENT)
Dept: BARIATRICS | Facility: CLINIC | Age: 70
End: 2020-05-07

## 2020-05-08 NOTE — TELEPHONE ENCOUNTER
Spoke with Pt in regards to scheduling 3 month f/u with . Pt states she is doing well, has lost 30 pounds and will call the office if she has any problems or need a appointment.

## 2020-05-18 DIAGNOSIS — M06.9 RHEUMATOID ARTHRITIS, INVOLVING UNSPECIFIED SITE, UNSPECIFIED RHEUMATOID FACTOR PRESENCE: Primary | ICD-10-CM

## 2020-05-22 ENCOUNTER — TELEPHONE (OUTPATIENT)
Dept: HEPATOLOGY | Facility: HOSPITAL | Age: 70
End: 2020-05-22

## 2020-05-22 ENCOUNTER — TELEPHONE (OUTPATIENT)
Dept: RHEUMATOLOGY | Facility: CLINIC | Age: 70
End: 2020-05-22

## 2020-05-22 ENCOUNTER — LAB VISIT (OUTPATIENT)
Dept: LAB | Facility: HOSPITAL | Age: 70
End: 2020-05-22
Attending: INTERNAL MEDICINE
Payer: MEDICARE

## 2020-05-22 DIAGNOSIS — E11.9 CONTROLLED TYPE 2 DIABETES MELLITUS WITHOUT COMPLICATION, WITHOUT LONG-TERM CURRENT USE OF INSULIN: Primary | ICD-10-CM

## 2020-05-22 DIAGNOSIS — M06.9 RHEUMATOID ARTHRITIS INVOLVING MULTIPLE SITES, UNSPECIFIED RHEUMATOID FACTOR PRESENCE: ICD-10-CM

## 2020-05-22 DIAGNOSIS — R74.8 ABNORMAL TRANSAMINASES: Primary | ICD-10-CM

## 2020-05-22 LAB
ALBUMIN SERPL BCP-MCNC: 3.5 G/DL (ref 3.5–5.2)
ALP SERPL-CCNC: 114 U/L (ref 55–135)
ALT SERPL W/O P-5'-P-CCNC: 49 U/L (ref 10–44)
ANION GAP SERPL CALC-SCNC: 8 MMOL/L (ref 8–16)
AST SERPL-CCNC: 56 U/L (ref 10–40)
BASOPHILS # BLD AUTO: 0.07 K/UL (ref 0–0.2)
BASOPHILS NFR BLD: 0.9 % (ref 0–1.9)
BILIRUB SERPL-MCNC: 0.7 MG/DL (ref 0.1–1)
BUN SERPL-MCNC: 9 MG/DL (ref 8–23)
CALCIUM SERPL-MCNC: 9 MG/DL (ref 8.7–10.5)
CHLORIDE SERPL-SCNC: 103 MMOL/L (ref 95–110)
CO2 SERPL-SCNC: 28 MMOL/L (ref 23–29)
CREAT SERPL-MCNC: 0.8 MG/DL (ref 0.5–1.4)
CRP SERPL-MCNC: 12.4 MG/L (ref 0–8.2)
DIFFERENTIAL METHOD: ABNORMAL
EOSINOPHIL # BLD AUTO: 0.6 K/UL (ref 0–0.5)
EOSINOPHIL NFR BLD: 7.6 % (ref 0–8)
ERYTHROCYTE [DISTWIDTH] IN BLOOD BY AUTOMATED COUNT: 13.6 % (ref 11.5–14.5)
ERYTHROCYTE [SEDIMENTATION RATE] IN BLOOD BY WESTERGREN METHOD: 20 MM/HR (ref 0–20)
EST. GFR  (AFRICAN AMERICAN): >60 ML/MIN/1.73 M^2
EST. GFR  (NON AFRICAN AMERICAN): >60 ML/MIN/1.73 M^2
GLUCOSE SERPL-MCNC: 306 MG/DL (ref 70–110)
HCT VFR BLD AUTO: 47 % (ref 37–48.5)
HGB BLD-MCNC: 15.6 G/DL (ref 12–16)
IMM GRANULOCYTES # BLD AUTO: 0.03 K/UL (ref 0–0.04)
IMM GRANULOCYTES NFR BLD AUTO: 0.4 % (ref 0–0.5)
LYMPHOCYTES # BLD AUTO: 2.9 K/UL (ref 1–4.8)
LYMPHOCYTES NFR BLD: 35.5 % (ref 18–48)
MCH RBC QN AUTO: 29.7 PG (ref 27–31)
MCHC RBC AUTO-ENTMCNC: 33.2 G/DL (ref 32–36)
MCV RBC AUTO: 90 FL (ref 82–98)
MONOCYTES # BLD AUTO: 0.6 K/UL (ref 0.3–1)
MONOCYTES NFR BLD: 7.6 % (ref 4–15)
NEUTROPHILS # BLD AUTO: 3.9 K/UL (ref 1.8–7.7)
NEUTROPHILS NFR BLD: 48 % (ref 38–73)
NRBC BLD-RTO: 0 /100 WBC
PLATELET # BLD AUTO: 212 K/UL (ref 150–350)
PMV BLD AUTO: 10 FL (ref 9.2–12.9)
POTASSIUM SERPL-SCNC: 4.6 MMOL/L (ref 3.5–5.1)
PROT SERPL-MCNC: 6.8 G/DL (ref 6–8.4)
RBC # BLD AUTO: 5.25 M/UL (ref 4–5.4)
SODIUM SERPL-SCNC: 139 MMOL/L (ref 136–145)
WBC # BLD AUTO: 8.19 K/UL (ref 3.9–12.7)

## 2020-05-22 PROCEDURE — 85651 RBC SED RATE NONAUTOMATED: CPT

## 2020-05-22 PROCEDURE — 80053 COMPREHEN METABOLIC PANEL: CPT

## 2020-05-22 PROCEDURE — 36415 COLL VENOUS BLD VENIPUNCTURE: CPT

## 2020-05-22 PROCEDURE — 86140 C-REACTIVE PROTEIN: CPT

## 2020-05-22 PROCEDURE — 85025 COMPLETE CBC W/AUTO DIFF WBC: CPT

## 2020-05-22 NOTE — TELEPHONE ENCOUNTER
----- Message from Timi Royal MD sent at 5/22/2020 12:43 PM CDT -----  Notes recorded by Timi Royal MD on 5/22/2020 at 12:41 PM CDT  The sed rate inflammation test is normal and improved. The glucose is 306 which continues to increase. Need to discuss with Dr. Mark your primary. Will copy him with the lab results. You need Rx for T2  Diabetes.The ast and alt liver tests are mildly elevated and will need to be rechecked in 2 wks. Rebeca will schedule. The kidney function is normal. The cbc is fine. RJQ

## 2020-05-22 NOTE — TELEPHONE ENCOUNTER
Left another msg for pt to return my call.     Ros [place], [place]      NP her sugars are high : check A1c /microalbumin and see me in 1-2 weeks   She needs to be placed on medications   Low carb diet is needed .

## 2020-05-22 NOTE — TELEPHONE ENCOUNTER
Pt scheduled to follow up with Dr. Mark on 6/8/2020 and will have the labs done tomorrow at the hospital when she goes for her COVID testing.

## 2020-05-22 NOTE — TELEPHONE ENCOUNTER
The sed rate inflammation test is normal and improved. The glucose is 306 which continues to increase. Need to discuss with Dr. Mark your primary. Will copy him with the lab results. You need Rx for T2  Diabetes.The ast and alt liver tests are mildly elevated and will need to be rechecked in 2 wks. Rebeca will schedule. The kidney function is normal. The cbc is fine. DELMY      Spoke to patient and notified her of lab results

## 2020-05-23 ENCOUNTER — LAB VISIT (OUTPATIENT)
Dept: INTERNAL MEDICINE | Facility: CLINIC | Age: 70
End: 2020-05-23
Payer: MEDICARE

## 2020-05-23 ENCOUNTER — LAB VISIT (OUTPATIENT)
Dept: LAB | Facility: HOSPITAL | Age: 70
End: 2020-05-23
Attending: INTERNAL MEDICINE
Payer: MEDICARE

## 2020-05-23 DIAGNOSIS — M06.9 RHEUMATOID ARTHRITIS, INVOLVING UNSPECIFIED SITE, UNSPECIFIED RHEUMATOID FACTOR PRESENCE: ICD-10-CM

## 2020-05-23 DIAGNOSIS — E11.9 CONTROLLED TYPE 2 DIABETES MELLITUS WITHOUT COMPLICATION, WITHOUT LONG-TERM CURRENT USE OF INSULIN: ICD-10-CM

## 2020-05-23 LAB
ALBUMIN/CREAT UR: 165.2 UG/MG (ref 0–30)
CREAT UR-MCNC: 112.6 MG/DL (ref 15–325)
ESTIMATED AVG GLUCOSE: 278 MG/DL (ref 68–131)
HBA1C MFR BLD HPLC: 11.3 % (ref 4–5.6)
MICROALBUMIN UR DL<=1MG/L-MCNC: 186 UG/ML
SARS-COV-2 RNA RESP QL NAA+PROBE: NOT DETECTED

## 2020-05-23 PROCEDURE — 83036 HEMOGLOBIN GLYCOSYLATED A1C: CPT

## 2020-05-23 PROCEDURE — U0003 INFECTIOUS AGENT DETECTION BY NUCLEIC ACID (DNA OR RNA); SEVERE ACUTE RESPIRATORY SYNDROME CORONAVIRUS 2 (SARS-COV-2) (CORONAVIRUS DISEASE [COVID-19]), AMPLIFIED PROBE TECHNIQUE, MAKING USE OF HIGH THROUGHPUT TECHNOLOGIES AS DESCRIBED BY CMS-2020-01-R: HCPCS

## 2020-05-23 PROCEDURE — 36415 COLL VENOUS BLD VENIPUNCTURE: CPT

## 2020-05-23 PROCEDURE — 82043 UR ALBUMIN QUANTITATIVE: CPT

## 2020-05-25 ENCOUNTER — INFUSION (OUTPATIENT)
Dept: INFUSION THERAPY | Facility: HOSPITAL | Age: 70
End: 2020-05-25
Attending: INTERNAL MEDICINE
Payer: MEDICARE

## 2020-05-25 VITALS
TEMPERATURE: 97 F | HEART RATE: 93 BPM | SYSTOLIC BLOOD PRESSURE: 141 MMHG | BODY MASS INDEX: 36.74 KG/M2 | WEIGHT: 236 LBS | DIASTOLIC BLOOD PRESSURE: 82 MMHG | RESPIRATION RATE: 18 BRPM

## 2020-05-25 DIAGNOSIS — M05.79 RHEUMATOID ARTHRITIS INVOLVING MULTIPLE SITES WITH POSITIVE RHEUMATOID FACTOR: Primary | ICD-10-CM

## 2020-05-25 PROCEDURE — 25000003 PHARM REV CODE 250: Performed by: INTERNAL MEDICINE

## 2020-05-25 PROCEDURE — 63600175 PHARM REV CODE 636 W HCPCS: Performed by: INTERNAL MEDICINE

## 2020-05-25 PROCEDURE — 96365 THER/PROPH/DIAG IV INF INIT: CPT

## 2020-05-25 RX ORDER — ACETAMINOPHEN 325 MG/1
650 TABLET ORAL
Status: CANCELLED | OUTPATIENT
Start: 2020-06-08

## 2020-05-25 RX ORDER — HEPARIN 100 UNIT/ML
500 SYRINGE INTRAVENOUS
Status: CANCELLED | OUTPATIENT
Start: 2020-06-08

## 2020-05-25 RX ORDER — SODIUM CHLORIDE 0.9 % (FLUSH) 0.9 %
10 SYRINGE (ML) INJECTION
Status: CANCELLED | OUTPATIENT
Start: 2020-06-08

## 2020-05-25 RX ADMIN — SODIUM CHLORIDE 1000 MG: 9 INJECTION, SOLUTION INTRAVENOUS at 08:05

## 2020-05-26 ENCOUNTER — TELEPHONE (OUTPATIENT)
Dept: RHEUMATOLOGY | Facility: CLINIC | Age: 70
End: 2020-05-26

## 2020-05-27 ENCOUNTER — OFFICE VISIT (OUTPATIENT)
Dept: RHEUMATOLOGY | Facility: CLINIC | Age: 70
End: 2020-05-27
Payer: MEDICARE

## 2020-05-27 ENCOUNTER — TELEPHONE (OUTPATIENT)
Dept: RHEUMATOLOGY | Facility: CLINIC | Age: 70
End: 2020-05-27

## 2020-05-27 VITALS
SYSTOLIC BLOOD PRESSURE: 141 MMHG | BODY MASS INDEX: 33.56 KG/M2 | DIASTOLIC BLOOD PRESSURE: 83 MMHG | HEIGHT: 67 IN | WEIGHT: 213.81 LBS | HEART RATE: 109 BPM

## 2020-05-27 DIAGNOSIS — M05.79 RHEUMATOID ARTHRITIS INVOLVING MULTIPLE SITES WITH POSITIVE RHEUMATOID FACTOR: Chronic | ICD-10-CM

## 2020-05-27 DIAGNOSIS — E78.5 HYPERLIPIDEMIA, UNSPECIFIED HYPERLIPIDEMIA TYPE: Primary | ICD-10-CM

## 2020-05-27 DIAGNOSIS — M05.772 RHEUMATOID ARTHRITIS INVOLVING BOTH ANKLES WITH POSITIVE RHEUMATOID FACTOR: ICD-10-CM

## 2020-05-27 DIAGNOSIS — R74.8 ABNORMAL TRANSAMINASES: ICD-10-CM

## 2020-05-27 DIAGNOSIS — M81.0 OSTEOPOROSIS WITHOUT CURRENT PATHOLOGICAL FRACTURE, UNSPECIFIED OSTEOPOROSIS TYPE: ICD-10-CM

## 2020-05-27 DIAGNOSIS — M17.0 PRIMARY OSTEOARTHRITIS OF BOTH KNEES: ICD-10-CM

## 2020-05-27 DIAGNOSIS — I10 BENIGN ESSENTIAL HTN: ICD-10-CM

## 2020-05-27 DIAGNOSIS — G47.00 INSOMNIA, UNSPECIFIED TYPE: ICD-10-CM

## 2020-05-27 DIAGNOSIS — E66.01 MORBID OBESITY: ICD-10-CM

## 2020-05-27 DIAGNOSIS — M05.771 RHEUMATOID ARTHRITIS INVOLVING BOTH ANKLES WITH POSITIVE RHEUMATOID FACTOR: ICD-10-CM

## 2020-05-27 PROBLEM — E11.9 TYPE 2 DIABETES MELLITUS: Status: ACTIVE | Noted: 2020-05-27

## 2020-05-27 PROCEDURE — 99214 PR OFFICE/OUTPT VISIT, EST, LEVL IV, 30-39 MIN: ICD-10-PCS | Mod: S$GLB,,, | Performed by: INTERNAL MEDICINE

## 2020-05-27 PROCEDURE — 1100F PR PT FALLS ASSESS DOC 2+ FALLS/FALL W/INJURY/YR: ICD-10-PCS | Mod: CPTII,S$GLB,, | Performed by: INTERNAL MEDICINE

## 2020-05-27 PROCEDURE — 99999 PR PBB SHADOW E&M-EST. PATIENT-LVL IV: ICD-10-PCS | Mod: PBBFAC,,, | Performed by: INTERNAL MEDICINE

## 2020-05-27 PROCEDURE — 1125F PR PAIN SEVERITY QUANTIFIED, PAIN PRESENT: ICD-10-PCS | Mod: S$GLB,,, | Performed by: INTERNAL MEDICINE

## 2020-05-27 PROCEDURE — 99499 RISK ADDL DX/OHS AUDIT: ICD-10-PCS | Mod: S$GLB,,, | Performed by: INTERNAL MEDICINE

## 2020-05-27 PROCEDURE — 3079F PR MOST RECENT DIASTOLIC BLOOD PRESSURE 80-89 MM HG: ICD-10-PCS | Mod: CPTII,S$GLB,, | Performed by: INTERNAL MEDICINE

## 2020-05-27 PROCEDURE — 3079F DIAST BP 80-89 MM HG: CPT | Mod: CPTII,S$GLB,, | Performed by: INTERNAL MEDICINE

## 2020-05-27 PROCEDURE — 3288F PR FALLS RISK ASSESSMENT DOCUMENTED: ICD-10-PCS | Mod: CPTII,S$GLB,, | Performed by: INTERNAL MEDICINE

## 2020-05-27 PROCEDURE — 3288F FALL RISK ASSESSMENT DOCD: CPT | Mod: CPTII,S$GLB,, | Performed by: INTERNAL MEDICINE

## 2020-05-27 PROCEDURE — 3077F SYST BP >= 140 MM HG: CPT | Mod: CPTII,S$GLB,, | Performed by: INTERNAL MEDICINE

## 2020-05-27 PROCEDURE — 99214 OFFICE O/P EST MOD 30 MIN: CPT | Mod: S$GLB,,, | Performed by: INTERNAL MEDICINE

## 2020-05-27 PROCEDURE — 99499 UNLISTED E&M SERVICE: CPT | Mod: S$GLB,,, | Performed by: INTERNAL MEDICINE

## 2020-05-27 PROCEDURE — 1159F MED LIST DOCD IN RCRD: CPT | Mod: S$GLB,,, | Performed by: INTERNAL MEDICINE

## 2020-05-27 PROCEDURE — 1100F PTFALLS ASSESS-DOCD GE2>/YR: CPT | Mod: CPTII,S$GLB,, | Performed by: INTERNAL MEDICINE

## 2020-05-27 PROCEDURE — 3077F PR MOST RECENT SYSTOLIC BLOOD PRESSURE >= 140 MM HG: ICD-10-PCS | Mod: CPTII,S$GLB,, | Performed by: INTERNAL MEDICINE

## 2020-05-27 PROCEDURE — 1159F PR MEDICATION LIST DOCUMENTED IN MEDICAL RECORD: ICD-10-PCS | Mod: S$GLB,,, | Performed by: INTERNAL MEDICINE

## 2020-05-27 PROCEDURE — 99999 PR PBB SHADOW E&M-EST. PATIENT-LVL IV: CPT | Mod: PBBFAC,,, | Performed by: INTERNAL MEDICINE

## 2020-05-27 PROCEDURE — 1125F AMNT PAIN NOTED PAIN PRSNT: CPT | Mod: S$GLB,,, | Performed by: INTERNAL MEDICINE

## 2020-05-27 RX ORDER — LEFLUNOMIDE 20 MG/1
TABLET ORAL
Qty: 90 TABLET | Refills: 0 | Status: SHIPPED | OUTPATIENT
Start: 2020-05-27 | End: 2020-09-20

## 2020-05-27 RX ORDER — NORTRIPTYLINE HYDROCHLORIDE 25 MG/1
CAPSULE ORAL
Qty: 270 CAPSULE | Refills: 1 | Status: SHIPPED | OUTPATIENT
Start: 2020-05-27 | End: 2022-08-30 | Stop reason: SDUPTHER

## 2020-05-27 RX ORDER — METHOTREXATE 25 MG/ML
25 INJECTION, SOLUTION INTRA-ARTERIAL; INTRAMUSCULAR; INTRAVENOUS
Qty: 4 ML | Refills: 2 | Status: SHIPPED | OUTPATIENT
Start: 2020-05-27 | End: 2020-10-19

## 2020-05-27 ASSESSMENT — ROUTINE ASSESSMENT OF PATIENT INDEX DATA (RAPID3)
MDHAQ FUNCTION SCORE: .4
FATIGUE SCORE: 2.5
PATIENT GLOBAL ASSESSMENT SCORE: 2.5
AM STIFFNESS SCORE: 0, NO
PAIN SCORE: 6
PSYCHOLOGICAL DISTRESS SCORE: 0
TOTAL RAPID3 SCORE: 3.28

## 2020-05-27 NOTE — PROGRESS NOTES
"Subjective:       Patient ID: Maru Shelby is a 69 y.o. female.    Chief Complaint: RA RF+ ACPA+GUSTABO-; Osteoporosis  HPI no am stiffness. Was off methotrexate x 3-4 wks b/o tooth abscess, took antibiotic before extraction and 5 days or so after.   Someone told her to stay off methotrexate for 3 wks! Just resumed last week. Had flare last weekend pain right lateral hip and right knee with swelling, also bilateral index mcps with pain and swelling, all now resolved in 1 days after resuming methotrexate. No fever or constitutional symptoms, No cough, SOB.  Lost 22# by our scales, 30# by hers with smaller portions. Not exercising.   Review of Systems   Constitutional: Negative for appetite change, fatigue, fever and unexpected weight change.   HENT: Negative for mouth sores.    Eyes: Negative for visual disturbance.   Respiratory: Negative for cough, shortness of breath and wheezing.    Cardiovascular: Negative for chest pain and palpitations.   Gastrointestinal: Negative for abdominal pain, anal bleeding, blood in stool, constipation, diarrhea, nausea and vomiting.   Genitourinary: Negative for dysuria, frequency and urgency.   Musculoskeletal: Negative for arthralgias, back pain, gait problem, joint swelling, myalgias, neck pain and neck stiffness.   Skin: Negative for rash.   Neurological: Negative for weakness, numbness and headaches.   Hematological: Negative for adenopathy. Does not bruise/bleed easily.   Psychiatric/Behavioral: Negative for sleep disturbance. The patient is not nervous/anxious.          Objective:   BP (!) 141/83   Pulse 109   Ht 5' 7.2" (1.707 m)   Wt 97 kg (213 lb 12.8 oz)   BMI 33.29 kg/m²      Physical Exam   Constitutional: She is oriented to person, place, and time and well-developed, well-nourished, and in no distress.   obese   HENT:   Head: Normocephalic and atraumatic.   Mouth/Throat: Oropharynx is clear and moist.   Eyes: Conjunctivae and EOM are normal.   Neck: Normal range " of motion. Neck supple. No thyromegaly present.   Cardiovascular: Normal rate, regular rhythm, normal heart sounds and intact distal pulses.  Exam reveals no gallop and no friction rub.    No murmur heard.  Pulmonary/Chest: Breath sounds normal. She has no wheezes. She has no rales. She exhibits no tenderness.   Abdominal: Soft. She exhibits no distension and no mass. There is no tenderness.   Lymphadenopathy:     She has no cervical adenopathy.   Neurological: She is alert and oriented to person, place, and time. She displays normal reflexes. Gait normal.   Nl motor strength UE and LE bilateral   Skin: No rash noted. No erythema. No pallor.     Psychiatric: Mood, memory, affect and judgment normal.   Musculoskeletal: She exhibits no edema.         Results for MECCA DUONG (MRN [id number]) as of 5/27/2020 07:53   Ref. Range 5/22/2020 07:50 5/23/2020 07:03 5/23/2020 07:51 5/23/2020 08:12   WBC Latest Ref Range: 3.90 - 12.70 K/uL 8.19      RBC Latest Ref Range: 4.00 - 5.40 M/uL 5.25      Hemoglobin Latest Ref Range: 12.0 - 16.0 g/dL 15.6      Hematocrit Latest Ref Range: 37.0 - 48.5 % 47.0      MCV Latest Ref Range: 82 - 98 fL 90      MCH Latest Ref Range: 27.0 - 31.0 pg 29.7      MCHC Latest Ref Range: 32.0 - 36.0 g/dL 33.2      RDW Latest Ref Range: 11.5 - 14.5 % 13.6      Platelets Latest Ref Range: 150 - 350 K/uL 212      MPV Latest Ref Range: 9.2 - 12.9 fL 10.0      Gran% Latest Ref Range: 38.0 - 73.0 % 48.0      Gran # (ANC) Latest Ref Range: 1.8 - 7.7 K/uL 3.9      Lymph% Latest Ref Range: 18.0 - 48.0 % 35.5      Lymph # Latest Ref Range: 1.0 - 4.8 K/uL 2.9      Mono% Latest Ref Range: 4.0 - 15.0 % 7.6      Mono # Latest Ref Range: 0.3 - 1.0 K/uL 0.6      Eosinophil% Latest Ref Range: 0.0 - 8.0 % 7.6      Eos # Latest Ref Range: 0.0 - 0.5 K/uL 0.6 (H)      Basophil% Latest Ref Range: 0.0 - 1.9 % 0.9      Baso # Latest Ref Range: 0.00 - 0.20 K/uL 0.07      nRBC Latest Ref Range: 0 /100 WBC 0      Differential  Method Unknown Automated      Immature Grans (Abs) Latest Ref Range: 0.00 - 0.04 K/uL 0.03      Immature Granulocytes Latest Ref Range: 0.0 - 0.5 % 0.4      Sed Rate Latest Ref Range: 0 - 20 mm/Hr 20      Sodium Latest Ref Range: 136 - 145 mmol/L 139      Potassium Latest Ref Range: 3.5 - 5.1 mmol/L 4.6      Chloride Latest Ref Range: 95 - 110 mmol/L 103      CO2 Latest Ref Range: 23 - 29 mmol/L 28      Anion Gap Latest Ref Range: 8 - 16 mmol/L 8      BUN, Bld Latest Ref Range: 8 - 23 mg/dL 9      Creatinine Latest Ref Range: 0.5 - 1.4 mg/dL 0.8      eGFR if non African American Latest Ref Range: >60 mL/min/1.73 m^2 >60      eGFR if African American Latest Ref Range: >60 mL/min/1.73 m^2 >60      Glucose Latest Ref Range: 70 - 110 mg/dL 306 (H)      Calcium Latest Ref Range: 8.7 - 10.5 mg/dL 9.0      Alkaline Phosphatase Latest Ref Range: 55 - 135 U/L 114      PROTEIN TOTAL Latest Ref Range: 6.0 - 8.4 g/dL 6.8      Albumin Latest Ref Range: 3.5 - 5.2 g/dL 3.5      BILIRUBIN TOTAL Latest Ref Range: 0.1 - 1.0 mg/dL 0.7      AST Latest Ref Range: 10 - 40 U/L 56 (H)      ALT Latest Ref Range: 10 - 44 U/L 49 (H)      CRP Latest Ref Range: 0.0 - 8.2 mg/L 12.4 (H)      Hemoglobin A1C External Latest Ref Range: 4.0 - 5.6 %    11.3 (H)   Estimated Avg Glucose Latest Ref Range: 68 - 131 mg/dL    278 (H)   SARS-CoV2 (COVID-19) Qualitative PCR Latest Ref Range: Not Detected    Not Detected    Microalbum.,U,Random Latest Units: ug/mL  186.0     Creatinine, Random Ur Latest Ref Range: 15.0 - 325.0 mg/dL  112.6     MICROALB/CREAT RATIO Latest Ref Range: 0.0 - 30.0 ug/mg  165.2 (H)       Assessment/Plan         Hyperlipidemia, unspecified hyperlipidemia type  -     LIPID PANEL; Future; Expected date: 05/27/2020    Rheumatoid arthritis involving multiple sites with positive rheumatoid factor    Primary osteoarthritis of both knees    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  Dispense: 4 mL; Refill: 2    Insomnia, unspecified type  -     nortriptyline (PAMELOR) 25 MG capsule; TAKE 3 CAPSULES BY MOUTH EVERY EVENING  Dispense: 270 capsule; Refill: 1    Osteoporosis without current pathological fracture, unspecified osteoporosis type    Benign essential HTN    Morbid obesity       Problem List Items Addressed This Visit     Benign essential HTN    Current Assessment & Plan     141/83    F/u Dr. Mark as above goal of 120-130/80  On amlodipine 10mg daily may need addition of low dose thiazide or ACEI/ARB         Hyperlipidemia - Primary    Overview     Results for RHODA MECCA P (MRN [id number]) as of 7/26/2019 08:11   Ref. Range 10/23/2018 07:36   Cholesterol Latest Ref Range: 120 - 199 mg/dL 163   HDL Latest Ref Range: 40 - 75 mg/dL 40   Hdl/Cholesterol Ratio Latest Ref Range: 20.0 - 50.0 % 24.5   LDL Cholesterol External Latest Ref Range: 63.0 - 159.0 mg/dL 70.4   Non-HDL Cholesterol Latest Units: mg/dL 123   Total Cholesterol/HDL Ratio Latest Ref Range: 2.0 - 5.0  4.1   Triglycerides Latest Ref Range: 30 - 150 mg/dL 263 (H)            Current Assessment & Plan     Pravastatin 40mg every evening  Lipid panel ordered         Relevant Orders    LIPID PANEL    Morbid obesity    Current Assessment & Plan     Lost 22#!         Osteoarthritis of knees, bilateral    Overview     Farida Llanes MD 11/20/2019 Routine      Narrative     EXAMINATION:  XR KNEE ORTHO BILAT WITH FLEXION    CLINICAL HISTORY:  Pain in left knee    TECHNIQUE:  AP standing of both knees, PA flexion standing views of both knees, and Merchant views of both knees were performed.  Lateral views of both knees were also performed.    COMPARISON:  09/21/2017    FINDINGS:  Bilateral medial compartment joint space narrowing with subchondral sclerosis and  marginal osteophyte formation.  No fracture or dislocation is identified.  There is dorsal spurring the patella at the poles.  Small joint effusions.  The sunrise view, there is mild lateral subluxation of the bilateral patella with degenerative changes of the patellofemoral compartments.      Impression       Degenerative changes of the bilateral knees.  No fracture or subluxation.  This most probably affects the bilateral medial and patellofemoral compartments.      Electronically signed by: Farida Llanes MD  Date: 11/20/2019  Time: 09:09     Mikey George Jr., MD 11/15/2019 Routine      Narrative     EXAMINATION:  MRI KNEE WITHOUT CONTRAST LEFT    CLINICAL HISTORY:  Arthritis, knee;Meniscus tear suspected;Other instability, left knee    TECHNIQUE:  Multiplanar, multisequence images were performed about the knee.    COMPARISON:  None    FINDINGS:  Osseous structures: Regional bone marrow signal intensity pattern appears appropriate demonstrating no evidence of altered signal intensity pattern suggestive of acute injury pattern or traumatic injury.  Posterior edge of the distal femur maintains normal alignment relative proximal tibia.  No significant malalignment identified the articular joint space on sagittal imaging.  Extreme narrowing the patellofemoral joint space with evidence of moderate medial subluxation of patellar body identified the joint compartment.  There is evidence of broad zone of degenerative marrow edema encompassing the central zone of the patellar body.  Substantial loss involving the overlying articular cartilage across the medial patellar facet.  Lateral patellar facet is spared.  Low-grade chondromalacia along the weight-bearing surfaces of the medial and lateral femoral condyles.  No evidence of exposure allowing subchondral bone.  Mild rim patellar spurring.    Ligaments/tendons: Both the anterior and posterior cruciate ligaments maintain uniform signal intensity.  Mild tendinosis  changes identified the origin of the patellar tendon.  Quadriceps tendon appears intact.  Extensor mechanism appears normal.  Collateral ligaments both on the mediolateral side maintain uniform signal intensity.  Popliteus muscle tendon unit appears normal.  Retinacular structures maintain equal symmetry and morphology.  No evidence of retinacular tearing.    Meniscal cartilage: Medial meniscus projecting beyond the joint line demonstrate evidence of internal signal intensity changes in the posterior horn communicating with the femoral articular surface secondary to meniscal tearing with evidence of vertical or orientation more so involving the posterior horn.  No significant body extension.  Anterior horn appears intact.  The entire lateral meniscus intact.  Near discoid meniscus on lateral side noted.    Periarticular soft tissue/joint effusion.  Large joint effusion occupies the suprapatellar compartment.  There is rather substantial inflammatory changes identified within the infrapatellar soft tissues.  Large Baker's cyst formation occupies the posterolateral compartment with evidence of long axis dimension of 15 cm.  There is evidence of linear bands diversion the internal components of the Baker cyst formation.  No significant rupture is identified.      Impression       1. Advanced tricompartmental joint narrowing affecting the articular joint space most profound across the patellofemoral joint compartment.  There is evidence of lateral patellar shift, bone marrow edema within the patellar body, and significant loss of the articular cartilage across the medial patellar facet.  2. Extruded medial meniscus projecting beyond the joint with evidence of a horizontal cleavage tear restricted toward the posterior horn without evidence of body extension.  3. 15 cm Baker cyst formation with evidence of intrinsic linear bands encompassing the inferior component.      Electronically signed by: Mikey George MD  Date:  11/15/2019  Time: 10:21              Current Assessment & Plan     Weight reduction   Quad and hamstring exercises         Osteoporosis without current pathological fracture    Overview     Reading Physician Reading Date Result Priority   Ever Connolly MD 11/1/2019       Narrative     EXAMINATION:  DEXA BONE DENSITY SPINE HIP    CLINICAL HISTORY:  suspected osteoporosis; Asymptomatic menopausal state69 y/o female with no history of fractures.  She had menopausal symptoms at 46 y/o.  She is taking 1,000 units of Vit D supplements.  She has a history of Rheumatoid Arthritis.  She does not exercise or smoke.    TECHNIQUE:  DXA specification: [facility] [employer] [id number].    Bone Mineral Density scanning was performed over the hip and lumbar spine. Review of the images confirms satisfactory positioning and technique.    COMPARISON:  06/10/2015.  The lumbar spine bone mineral density is 1.084, which is a T-score of 0.3.  The total hip bone mineral density is 0.960 which is a T-score 0.1.    FINDINGS:  Lumbar Spine: Lumbar bone mineral density L1-L4 is 1.051g/cm2, which is a T-score of 0.0. The Z-score is 2.2.  The trabecular bone score is 0.971.  Bone mineral density at L3/L4 is significantly increased compared to adjacent vertebrae, which was present on the previous scans in 2015 in 2017 as well.    Total Hip: Total hip bone mineral density is 0.843g/cm2.  The T-score is -0.8, and the Z-score is 0.8.    Femoral neck: Bone mineral density is 0.577g/cm2 and the T-score is -2.5 and the Z-score is -0.6.    There is a 23.4% risk of a major osteoporotic fracture and a 6.5% risk of hip fracture in the next 10 years (FRAX adjusted with Trabecular Bone Score).    Compared with previous DXA, BMD at the lumbar spine has declined by 3.0%, and the BMD at the total hip has declined by 12.2%.      Impression       1.  Osteoporosis.    2.  Bone mineral density at L3/L4 is increased compared to adjacent vertebrae, also seen  on the previous scans from 2015 and 2017.    3.  Elevated risk for major osteoporotic fractures and hip fractures based on FRAX calculations.    4.  Compared with previous DXA, BMD at the lumbar spine has declined by 3.0%, and the BMD at the total hip has declined by 12.2%.    RECOMMENDATIONS of [facility]:    1.  Calcium 6010-0755 mg daily and vitamin D 800-1000 units daily, adequate exercise.    2.  Recommended medical therapy for osteoporosis.  Consider bisphosphonates (alendronate, risedronate, ibandronate, zoledronic acid) or denosumab.    3.  Repeat BMD in 2 years              Current Assessment & Plan     Alendronate 70mg po once a week         RA (rheumatoid arthritis) (Chronic)    Overview     Results for MECCA DUONG (MRN [id number]) as of 1/9/2018 07:13   Ref. Range 8/1/2008 07:11 7/6/2009 08:26 8/15/2013 08:25   ds DNA Ab Latest Ref Range: Negative Titer Negative     Protein, Serum Latest Ref Range: 6.0 - 8.4 g/dL   6.4   Albumin grams/dl Latest Ref Range: 3.35 - 5.55 gm/dL   3.88   Alpha-1 grams/dl Latest Ref Range: 0.17 - 0.41 gm/dL   0.29   Alpha-2 grams/dl Latest Ref Range: 0.43 - 0.99 gm/dL   0.71   Beta grams/dl Latest Ref Range: 0.50 - 1.10 gm/dL   0.82   Gamma grams/dl Latest Ref Range: 0.67 - 1.58 gm/dL   0.70   CCP Antibodies Latest Ref Range: <5.0 U/mL  119.9      Please tell pt the joint x-rays show stable mild slippage C4 on C5 on degenerative basis, no RA changes. The hands show mild narrowing at the wrists likely from RA. There are moderate degenerative changes thumb bases. There is mild degenerative change inner aspect of the knees and also of the great toes. No significant progression of the RA compared with previous. [facility]          PACS Images     Show images for XR Arthritis Survey   Reviewed By Brooks Royal MD on 4/3/2018 14:30   External Result Report     External Result Report   Narrative     EXAMINATION:  XR ARTHRITIS  SURVEY    CLINICAL HISTORY:  suspected systemic arthritis;  Rheumatoid arthritis, unspecified    TECHNIQUE:  A lateral flexion view of the cervical spine was performed.  AP standing views of both knees were performed.  AP standing views of both feet and PA views of both hands were performed.    COMPARISON:  03/02/2017    FINDINGS:  Bilateral hands: Bone mineralization is within normal limits.  There is bilateral radiocarpal joint space narrowing as well as mild-to-moderate degenerative changes of the bilateral carpometacarpal joint spaces, left greater than right.  No osseous erosions are identified.    Bilateral knees: There is bilateral medial compartment joint space narrowing, left greater than right.  There is a small marginal osteophyte of the medial tibial plateau on the left.  No osseous erosions are seen.    Flexion image of the cervical spine: There is mild anterior listhesis of C4 in respect to C5 by approximately 3 mm.  This is stable.  Atlantodental interval is maintained.  There is facet arthropathy in the midcervical spine.    Bilateral feet: There is bilateral hallux valgus deformity with bunion formation with mild to moderate degenerative changes of the 1st metatarsophalangeal joints bilaterally.  No osseous erosions are detected.   Impression       As above.      Electronically signed by: Farida Llanes MD  Date: 04/03/2018                 Current Assessment & Plan     DAS28   [id number]  CDAI    Cont abatacept 1000mg IV q 28 days  Cont methotrexate 25mg sc q 7 days with folic acid 1mg daily  Cont leflunomide 20mg daily  RTC 3 months with standing labs           Relevant Medications    leflunomide (ARAVA) 20 MG Tab    methotrexate 25 mg/mL injection      Other Visit Diagnoses     Insomnia, unspecified type        Relevant Medications    nortriptyline (PAMELOR) 25 MG capsule

## 2020-05-27 NOTE — PATIENT INSTRUCTIONS
If any infection and need for antibiotics, hold methotrexate while on antibiotics, and resume 3 days later to assure resolution of infection  Walk outdoors, with physical distancing, mask indoors

## 2020-05-27 NOTE — ASSESSMENT & PLAN NOTE
DAS28 2.45   [id number] 2.24(both remission)   CDAI 3.5(LDA)    Cont abatacept 1000mg IV q 28 days  Cont methotrexate 25mg sc q 7 days with folic acid 1mg daily  Cont leflunomide 20mg daily  RTC 3 months with standing labs

## 2020-05-27 NOTE — ASSESSMENT & PLAN NOTE
141/83    F/u Dr. Mark as above goal of 120-130/80  On amlodipine 10mg daily may need addition of low dose thiazide or ACEI/ARB

## 2020-05-29 ENCOUNTER — OFFICE VISIT (OUTPATIENT)
Dept: INTERNAL MEDICINE | Facility: CLINIC | Age: 70
End: 2020-05-29
Payer: MEDICARE

## 2020-05-29 VITALS
DIASTOLIC BLOOD PRESSURE: 74 MMHG | SYSTOLIC BLOOD PRESSURE: 118 MMHG | RESPIRATION RATE: 17 BRPM | OXYGEN SATURATION: 95 % | HEART RATE: 100 BPM | BODY MASS INDEX: 32.8 KG/M2 | HEIGHT: 67 IN | TEMPERATURE: 97 F | WEIGHT: 209 LBS

## 2020-05-29 DIAGNOSIS — E11.9 TYPE 2 DIABETES MELLITUS WITHOUT COMPLICATION, WITHOUT LONG-TERM CURRENT USE OF INSULIN: Primary | ICD-10-CM

## 2020-05-29 PROCEDURE — 99499 RISK ADDL DX/OHS AUDIT: ICD-10-PCS | Mod: S$GLB,,, | Performed by: INTERNAL MEDICINE

## 2020-05-29 PROCEDURE — 3074F PR MOST RECENT SYSTOLIC BLOOD PRESSURE < 130 MM HG: ICD-10-PCS | Mod: CPTII,S$GLB,, | Performed by: INTERNAL MEDICINE

## 2020-05-29 PROCEDURE — 1101F PT FALLS ASSESS-DOCD LE1/YR: CPT | Mod: CPTII,S$GLB,, | Performed by: INTERNAL MEDICINE

## 2020-05-29 PROCEDURE — 1159F MED LIST DOCD IN RCRD: CPT | Mod: S$GLB,,, | Performed by: INTERNAL MEDICINE

## 2020-05-29 PROCEDURE — 3078F DIAST BP <80 MM HG: CPT | Mod: CPTII,S$GLB,, | Performed by: INTERNAL MEDICINE

## 2020-05-29 PROCEDURE — 3078F PR MOST RECENT DIASTOLIC BLOOD PRESSURE < 80 MM HG: ICD-10-PCS | Mod: CPTII,S$GLB,, | Performed by: INTERNAL MEDICINE

## 2020-05-29 PROCEDURE — 99499 UNLISTED E&M SERVICE: CPT | Mod: S$GLB,,, | Performed by: INTERNAL MEDICINE

## 2020-05-29 PROCEDURE — 99999 PR PBB SHADOW E&M-EST. PATIENT-LVL IV: CPT | Mod: PBBFAC,,, | Performed by: INTERNAL MEDICINE

## 2020-05-29 PROCEDURE — 3046F PR MOST RECENT HEMOGLOBIN A1C LEVEL > 9.0%: ICD-10-PCS | Mod: CPTII,S$GLB,, | Performed by: INTERNAL MEDICINE

## 2020-05-29 PROCEDURE — 1101F PR PT FALLS ASSESS DOC 0-1 FALLS W/OUT INJ PAST YR: ICD-10-PCS | Mod: CPTII,S$GLB,, | Performed by: INTERNAL MEDICINE

## 2020-05-29 PROCEDURE — 1159F PR MEDICATION LIST DOCUMENTED IN MEDICAL RECORD: ICD-10-PCS | Mod: S$GLB,,, | Performed by: INTERNAL MEDICINE

## 2020-05-29 PROCEDURE — 3074F SYST BP LT 130 MM HG: CPT | Mod: CPTII,S$GLB,, | Performed by: INTERNAL MEDICINE

## 2020-05-29 PROCEDURE — 1126F AMNT PAIN NOTED NONE PRSNT: CPT | Mod: S$GLB,,, | Performed by: INTERNAL MEDICINE

## 2020-05-29 PROCEDURE — 99214 OFFICE O/P EST MOD 30 MIN: CPT | Mod: S$GLB,,, | Performed by: INTERNAL MEDICINE

## 2020-05-29 PROCEDURE — 99214 PR OFFICE/OUTPT VISIT, EST, LEVL IV, 30-39 MIN: ICD-10-PCS | Mod: S$GLB,,, | Performed by: INTERNAL MEDICINE

## 2020-05-29 PROCEDURE — 3046F HEMOGLOBIN A1C LEVEL >9.0%: CPT | Mod: CPTII,S$GLB,, | Performed by: INTERNAL MEDICINE

## 2020-05-29 PROCEDURE — 99999 PR PBB SHADOW E&M-EST. PATIENT-LVL IV: ICD-10-PCS | Mod: PBBFAC,,, | Performed by: INTERNAL MEDICINE

## 2020-05-29 PROCEDURE — 1126F PR PAIN SEVERITY QUANTIFIED, NO PAIN PRESENT: ICD-10-PCS | Mod: S$GLB,,, | Performed by: INTERNAL MEDICINE

## 2020-05-29 RX ORDER — LANCETS
EACH MISCELLANEOUS
Qty: 100 EACH | Refills: 1 | Status: SHIPPED | OUTPATIENT
Start: 2020-05-29 | End: 2020-06-02 | Stop reason: SDUPTHER

## 2020-05-29 RX ORDER — DEXTROSE 4 G
TABLET,CHEWABLE ORAL
Qty: 1 EACH | Refills: 0 | Status: SHIPPED | OUTPATIENT
Start: 2020-05-29 | End: 2020-06-01

## 2020-05-29 NOTE — PROGRESS NOTES
"Subjective:       Patient ID: Maru Shelby is a 69 y.o. female.    Chief Complaint: Follow-up and Rash (c\o rash under both breast)      HPI:  Patient is known to me from an acute visit and presents to discuss "possible diabetes". She saw her rheumatologist who referred her back to PCP to discuss. Labs from 5/23/2020 personally reviewed and discussed today  A1C 11.3 %; blood sugar >300  Microalb: + ratio    On chart review it seems her fasting blood sugars have been trending up over the last ~6 months. She denies polyuria, polydipsia. She has some numbness in b/l soles of feet. She has never been on diabetes medication before. Her daughter is a diabetic and is aware of the necessary diet changes and blood sugar monitoring. GFR > 60. LFTs were mildly elevated on labs, this is also new.        Past Medical History:   Diagnosis Date    Anxiety     Arthritis     rheumatoid and osteoarthritis    Depression     Hyperlipidemia     Hypertension     Neuromuscular disorder     Obesity     Other specified glaucoma 10/31/2019       Family History   Problem Relation Age of Onset    Pancreatitis Mother 61    Cancer Father         amedistatic    Breast cancer Sister     Breast cancer Sister        Social History     Socioeconomic History    Marital status:      Spouse name: Not on file    Number of children: Not on file    Years of education: Not on file    Highest education level: Not on file   Occupational History    Not on file   Social Needs    Financial resource strain: Not on file    Food insecurity:     Worry: Not on file     Inability: Not on file    Transportation needs:     Medical: Not on file     Non-medical: Not on file   Tobacco Use    Smoking status: Never Smoker    Smokeless tobacco: Never Used   Substance and Sexual Activity    Alcohol use: Yes     Alcohol/week: 2.0 standard drinks     Types: 2 Glasses of wine per week    Drug use: No    Sexual activity: Not on file "   Lifestyle    Physical activity:     Days per week: Not on file     Minutes per session: Not on file    Stress: Not on file   Relationships    Social connections:     Talks on phone: Not on file     Gets together: Not on file     Attends Denominational service: Not on file     Active member of club or organization: Not on file     Attends meetings of clubs or organizations: Not on file     Relationship status: Not on file   Other Topics Concern    Not on file   Social History Narrative    Not on file       Review of Systems   Constitutional: Negative for activity change, fatigue, fever and unexpected weight change.   HENT: Negative for congestion, ear pain, hearing loss, rhinorrhea and sore throat.    Eyes: Negative for pain, redness and visual disturbance.   Respiratory: Negative for cough, shortness of breath and wheezing.    Cardiovascular: Negative for chest pain, palpitations and leg swelling.   Gastrointestinal: Negative for abdominal pain, constipation, diarrhea, nausea and vomiting.   Genitourinary: Negative for dysuria, frequency, pelvic pain and urgency.   Musculoskeletal: Negative for back pain, joint swelling and neck pain.   Skin: Negative for color change, rash and wound.   Neurological: Positive for numbness (feet). Negative for dizziness, tremors, weakness, light-headedness and headaches.         Objective:      Physical Exam   Constitutional: She is oriented to person, place, and time. She appears well-developed and well-nourished. No distress.   HENT:   Head: Normocephalic and atraumatic.   Right Ear: External ear normal.   Left Ear: External ear normal.   Eyes: Pupils are equal, round, and reactive to light. Conjunctivae and EOM are normal. Right eye exhibits no discharge. Left eye exhibits no discharge.   Neck: Neck supple. No tracheal deviation present. No thyromegaly present.   Cardiovascular: Normal rate and regular rhythm.   No murmur heard.  Pulmonary/Chest: Effort normal and breath sounds  normal. No respiratory distress. She has no wheezes. She has no rales.   Abdominal: Soft. Bowel sounds are normal. She exhibits no distension. There is no tenderness.   Musculoskeletal: She exhibits no edema.   Neurological: She is alert and oriented to person, place, and time. No cranial nerve deficit.   Skin: Skin is warm and dry.   Psychiatric: She has a normal mood and affect. Her behavior is normal.   Vitals reviewed.      Assessment:       1. Type 2 diabetes mellitus without complication, without long-term current use of insulin        Plan:       Maru was seen today for follow-up and rash.    Diagnoses and all orders for this visit:    Type 2 diabetes mellitus without complication, without long-term current use of insulin  -     SITagliptan-metformin (JANUMET) 50-1,000 mg per tablet; Take 1 tablet by mouth 2 (two) times daily with meals.  -     lancets Misc; Check blood sugar twice daily  -     blood sugar diagnostic (ACCU-CHEK SMARTVIEW TEST STRIP) Strp; Check blood sugar twice daily  -     Ambulatory referral/consult to Diabetes Education; Future  -     blood-glucose meter (ACCU-CHEK GUIDE GLUCOSE METER) Misc; Check blood sugar twice daily    new diagnosis  Start with janumet, I consider adding a basal insulin to metformin only but will trial Janumet first and see if A1C trends down.  Spent > 15 mins discussing diet modifications, blood sugar checks  Will get her set up with diabetes educator  Labs reviewed  Will have her see PCP in 3 months with repeat labs  Call if blood sugar stay > 160s or < 60s despite med changes prior to next appointment  Foot exam and eye exam to be done with PCP next visit    RTC 3 months with labs

## 2020-05-29 NOTE — Clinical Note
New diabetes diagnosis. Started janumet. Will see you 3 months but will let you order the labs so that they result to you. Thanks

## 2020-06-01 NOTE — TELEPHONE ENCOUNTER
Patient requesting an order for Freestyle Julia kit. I advised that she may not meet medical necessity for coverage per Medicare guidelines. She states that she will pay out of pocket if insurance denies coverage. Order pended. Will fax to [employer]. Thanks

## 2020-06-01 NOTE — TELEPHONE ENCOUNTER
Order was E-scribed to [employer] per Dr. Cross. Will allow [facility] to process authorization thru [employer]

## 2020-06-01 NOTE — TELEPHONE ENCOUNTER
----- Message from Nenita Tomas sent at 2020  8:16 AM CDT -----  Contact: self   Maru Shelby  MRN: [id number]  : 1950  PCP: Florence Mark  Home Phone      [phone]  Work Phone      Not on file.  Mobile          [phone]    MESSAGE:   Patient request to speak to a nurse regarding a Rx for a gloucester - Dexcom.   She was seen on Friday / given Rx for a gloucester - test strips cost is $170.00 / Request a cheaper machine / supplies.      Phone # [phone]    Pharmacy -  [id number] - [place] - [address] AT [facility]

## 2020-06-02 DIAGNOSIS — E11.9 TYPE 2 DIABETES MELLITUS WITHOUT COMPLICATION, WITHOUT LONG-TERM CURRENT USE OF INSULIN: ICD-10-CM

## 2020-06-02 RX ORDER — DEXTROSE 4 G
1 TABLET,CHEWABLE ORAL DAILY
Qty: 100 EACH | Refills: 1 | Status: SHIPPED | OUTPATIENT
Start: 2020-06-02 | End: 2020-06-04 | Stop reason: SDUPTHER

## 2020-06-02 RX ORDER — LANCETS
EACH MISCELLANEOUS
Qty: 100 EACH | Refills: 1 | Status: SHIPPED | OUTPATIENT
Start: 2020-06-02 | End: 2020-06-26 | Stop reason: SDUPTHER

## 2020-06-02 NOTE — TELEPHONE ENCOUNTER
Received letter from pharmacy that the pt is not on insulin and insurance won't cover if more than once a day. Order pended with directions to check blood sugar once a day.

## 2020-06-03 ENCOUNTER — TELEPHONE (OUTPATIENT)
Dept: DIABETES | Facility: HOSPITAL | Age: 70
End: 2020-06-03

## 2020-06-04 DIAGNOSIS — E11.9 TYPE 2 DIABETES MELLITUS WITHOUT COMPLICATION, WITHOUT LONG-TERM CURRENT USE OF INSULIN: Primary | ICD-10-CM

## 2020-06-04 RX ORDER — DEXTROSE 4 G
1 TABLET,CHEWABLE ORAL DAILY
Qty: 100 EACH | Refills: 1 | Status: SHIPPED | OUTPATIENT
Start: 2020-06-04 | End: 2021-10-19

## 2020-06-10 ENCOUNTER — TELEPHONE (OUTPATIENT)
Dept: DIABETES | Facility: HOSPITAL | Age: 70
End: 2020-06-10

## 2020-06-10 NOTE — TELEPHONE ENCOUNTER
"Diabetes Education via phone consult. Newly diagnosed. Has some knowledge of diabetes, since her daughter has had Type 1 diabetes for over 30 years. States she has been monitoring her blood sugars at home three times a day and has noticed a decrease in her numbers since she started on Janumet. Denies experiencing any side effects from the Janumet. Stressed to her the importance of maintaining a log of her results to bring with her to all of her MD appointments.Instructed her on what is diabetes, how insulin works, and Type 2 Diabetes.Instructed her on what an A1c blood test result tells us about her diabetes control. Discussed her A1c of 11.3% and goal is to have her A1c at 7.0%. Discussed the three treatments of Diabetes: meal planning, exercise, and medication. Instructed on goal of exercising 5 days per week for at least 30 minutes a day. Discussed how she could gradually increase her activity to reach this goal. States she has a stationary bike and she will work towards obtaining this goal. Discussed the symptoms of hyper/hypoglycemia and treatment of each. Discussed possible long term complications of uncontrolled diabetes. Instructed on how stress can affect her blood sugar levels. Instructed on increased monitoring of her blood sugars for "Sick days". Instructed on proper foot care.  Patient seemed very interested in teaching and asked pertinent questions throughout the session.  Handouts will be mailed to her today on all educational topics discussed today. She has agreed to call me if she has any further questions.  "

## 2020-06-19 DIAGNOSIS — M06.9 RHEUMATOID ARTHRITIS, INVOLVING UNSPECIFIED SITE, UNSPECIFIED RHEUMATOID FACTOR PRESENCE: Primary | ICD-10-CM

## 2020-06-20 ENCOUNTER — LAB VISIT (OUTPATIENT)
Dept: INTERNAL MEDICINE | Facility: CLINIC | Age: 70
End: 2020-06-20
Payer: MEDICARE

## 2020-06-20 DIAGNOSIS — M06.9 RHEUMATOID ARTHRITIS, INVOLVING UNSPECIFIED SITE, UNSPECIFIED RHEUMATOID FACTOR PRESENCE: ICD-10-CM

## 2020-06-20 PROCEDURE — U0003 INFECTIOUS AGENT DETECTION BY NUCLEIC ACID (DNA OR RNA); SEVERE ACUTE RESPIRATORY SYNDROME CORONAVIRUS 2 (SARS-COV-2) (CORONAVIRUS DISEASE [COVID-19]), AMPLIFIED PROBE TECHNIQUE, MAKING USE OF HIGH THROUGHPUT TECHNOLOGIES AS DESCRIBED BY CMS-2020-01-R: HCPCS

## 2020-06-21 LAB — SARS-COV-2 RNA RESP QL NAA+PROBE: NOT DETECTED

## 2020-06-22 ENCOUNTER — INFUSION (OUTPATIENT)
Dept: INFUSION THERAPY | Facility: HOSPITAL | Age: 70
End: 2020-06-22
Attending: INTERNAL MEDICINE
Payer: MEDICARE

## 2020-06-22 ENCOUNTER — TELEPHONE (OUTPATIENT)
Dept: INTERNAL MEDICINE | Facility: CLINIC | Age: 70
End: 2020-06-22

## 2020-06-22 VITALS
HEART RATE: 90 BPM | BODY MASS INDEX: 31.76 KG/M2 | RESPIRATION RATE: 18 BRPM | TEMPERATURE: 97 F | WEIGHT: 204 LBS | SYSTOLIC BLOOD PRESSURE: 116 MMHG | DIASTOLIC BLOOD PRESSURE: 66 MMHG

## 2020-06-22 DIAGNOSIS — E11.9 TYPE 2 DIABETES MELLITUS WITHOUT COMPLICATION, WITHOUT LONG-TERM CURRENT USE OF INSULIN: ICD-10-CM

## 2020-06-22 DIAGNOSIS — M05.79 RHEUMATOID ARTHRITIS INVOLVING MULTIPLE SITES WITH POSITIVE RHEUMATOID FACTOR: Primary | ICD-10-CM

## 2020-06-22 PROCEDURE — 63600175 PHARM REV CODE 636 W HCPCS: Performed by: INTERNAL MEDICINE

## 2020-06-22 PROCEDURE — 96365 THER/PROPH/DIAG IV INF INIT: CPT

## 2020-06-22 PROCEDURE — 25000003 PHARM REV CODE 250: Performed by: INTERNAL MEDICINE

## 2020-06-22 RX ORDER — HEPARIN 100 UNIT/ML
500 SYRINGE INTRAVENOUS
Status: CANCELLED | OUTPATIENT
Start: 2020-07-06

## 2020-06-22 RX ORDER — SODIUM CHLORIDE 0.9 % (FLUSH) 0.9 %
10 SYRINGE (ML) INJECTION
Status: CANCELLED | OUTPATIENT
Start: 2020-07-06

## 2020-06-22 RX ORDER — ACETAMINOPHEN 325 MG/1
650 TABLET ORAL
Status: CANCELLED | OUTPATIENT
Start: 2020-07-06

## 2020-06-22 RX ADMIN — SODIUM CHLORIDE 1000 MG: 9 INJECTION, SOLUTION INTRAVENOUS at 08:06

## 2020-06-22 NOTE — TELEPHONE ENCOUNTER
----- Message from Homa Hernandez sent at 2020  2:47 PM CDT -----  Contact: Self  Maru Shelby  MRN: [id number]  : 1950  PCP: Primary Doctor No  Home Phone      [phone]  Work Phone      Not on file.  Mobile          [phone]    MESSAGE:     Would like to speak to nurse about getting a prescription for test strips called in to pharmacy.      Pharmacy: [facility]/pharmacy #[id number] - [place] [address] AT [facility]    Phone: [phone]

## 2020-06-22 NOTE — TELEPHONE ENCOUNTER
Can you send in accu check test strips for patient. They were ordered incorrectly the first time. The insurance wouldn't cover them. Please advise

## 2020-06-26 DIAGNOSIS — E11.9 TYPE 2 DIABETES MELLITUS WITHOUT COMPLICATION, WITHOUT LONG-TERM CURRENT USE OF INSULIN: ICD-10-CM

## 2020-06-26 RX ORDER — LANCETS
EACH MISCELLANEOUS
Qty: 100 EACH | Refills: 1 | Status: SHIPPED | OUTPATIENT
Start: 2020-06-26

## 2020-06-26 NOTE — TELEPHONE ENCOUNTER
----- Message from Michelle aHmilton MA sent at 6/26/2020  9:31 AM CDT -----  Strips and lancets were not covered at pharmacy. [employer] requests order to be sent to them.      [phone]  Fax [phone]

## 2020-06-26 NOTE — TELEPHONE ENCOUNTER
Orders pended. Need to fax to [employer] [employer].   Requested Prescriptions     Pending Prescriptions Disp Refills    blood sugar diagnostic (ACCU-CHEK GUIDE) Strp 100 each 3     Sig: Use to check blood sugar once daily and as needed.    lancets Misc 100 each 1     Sig: Check blood sugar once daily

## 2020-07-24 ENCOUNTER — INFUSION (OUTPATIENT)
Dept: INFUSION THERAPY | Facility: HOSPITAL | Age: 70
End: 2020-07-24
Attending: INTERNAL MEDICINE
Payer: MEDICARE

## 2020-07-24 VITALS
WEIGHT: 204 LBS | RESPIRATION RATE: 18 BRPM | BODY MASS INDEX: 31.76 KG/M2 | SYSTOLIC BLOOD PRESSURE: 129 MMHG | TEMPERATURE: 97 F | DIASTOLIC BLOOD PRESSURE: 68 MMHG | HEART RATE: 91 BPM

## 2020-07-24 DIAGNOSIS — M05.79 RHEUMATOID ARTHRITIS INVOLVING MULTIPLE SITES WITH POSITIVE RHEUMATOID FACTOR: Primary | ICD-10-CM

## 2020-07-24 PROCEDURE — 96365 THER/PROPH/DIAG IV INF INIT: CPT

## 2020-07-24 PROCEDURE — 25000003 PHARM REV CODE 250: Performed by: INTERNAL MEDICINE

## 2020-07-24 PROCEDURE — 63600175 PHARM REV CODE 636 W HCPCS: Performed by: INTERNAL MEDICINE

## 2020-07-24 RX ORDER — ACETAMINOPHEN 325 MG/1
650 TABLET ORAL
Status: CANCELLED | OUTPATIENT
Start: 2020-08-03

## 2020-07-24 RX ORDER — HEPARIN 100 UNIT/ML
500 SYRINGE INTRAVENOUS
Status: CANCELLED | OUTPATIENT
Start: 2020-08-03

## 2020-07-24 RX ORDER — SODIUM CHLORIDE 0.9 % (FLUSH) 0.9 %
10 SYRINGE (ML) INJECTION
Status: CANCELLED | OUTPATIENT
Start: 2020-08-03

## 2020-07-24 RX ADMIN — SODIUM CHLORIDE 1000 MG: 9 INJECTION, SOLUTION INTRAVENOUS at 08:07

## 2020-08-24 ENCOUNTER — INFUSION (OUTPATIENT)
Dept: INFUSION THERAPY | Facility: HOSPITAL | Age: 70
End: 2020-08-24
Attending: INTERNAL MEDICINE
Payer: MEDICARE

## 2020-08-24 VITALS
WEIGHT: 204 LBS | BODY MASS INDEX: 31.76 KG/M2 | RESPIRATION RATE: 18 BRPM | SYSTOLIC BLOOD PRESSURE: 123 MMHG | DIASTOLIC BLOOD PRESSURE: 75 MMHG | TEMPERATURE: 98 F | HEART RATE: 93 BPM

## 2020-08-24 DIAGNOSIS — M05.79 RHEUMATOID ARTHRITIS INVOLVING MULTIPLE SITES WITH POSITIVE RHEUMATOID FACTOR: Primary | ICD-10-CM

## 2020-08-24 PROCEDURE — 96365 THER/PROPH/DIAG IV INF INIT: CPT

## 2020-08-24 PROCEDURE — 25000003 PHARM REV CODE 250: Performed by: INTERNAL MEDICINE

## 2020-08-24 PROCEDURE — 63600175 PHARM REV CODE 636 W HCPCS: Performed by: INTERNAL MEDICINE

## 2020-08-24 RX ORDER — HEPARIN 100 UNIT/ML
500 SYRINGE INTRAVENOUS
Status: CANCELLED | OUTPATIENT
Start: 2020-08-31

## 2020-08-24 RX ORDER — SODIUM CHLORIDE 0.9 % (FLUSH) 0.9 %
10 SYRINGE (ML) INJECTION
Status: CANCELLED | OUTPATIENT
Start: 2020-08-31

## 2020-08-24 RX ORDER — ACETAMINOPHEN 325 MG/1
650 TABLET ORAL
Status: CANCELLED | OUTPATIENT
Start: 2020-08-31

## 2020-08-24 RX ADMIN — SODIUM CHLORIDE 1000 MG: 9 INJECTION, SOLUTION INTRAVENOUS at 07:08

## 2020-08-26 ENCOUNTER — OFFICE VISIT (OUTPATIENT)
Dept: RHEUMATOLOGY | Facility: CLINIC | Age: 70
End: 2020-08-26
Payer: MEDICARE

## 2020-08-26 VITALS
WEIGHT: 198 LBS | SYSTOLIC BLOOD PRESSURE: 122 MMHG | HEART RATE: 107 BPM | BODY MASS INDEX: 31.08 KG/M2 | HEIGHT: 67 IN | DIASTOLIC BLOOD PRESSURE: 77 MMHG

## 2020-08-26 DIAGNOSIS — Z79.631 LONG TERM METHOTREXATE USER: ICD-10-CM

## 2020-08-26 DIAGNOSIS — M05.771 RHEUMATOID ARTHRITIS INVOLVING BOTH ANKLES WITH POSITIVE RHEUMATOID FACTOR: Primary | ICD-10-CM

## 2020-08-26 DIAGNOSIS — M81.0 OSTEOPOROSIS WITHOUT CURRENT PATHOLOGICAL FRACTURE, UNSPECIFIED OSTEOPOROSIS TYPE: ICD-10-CM

## 2020-08-26 DIAGNOSIS — Z79.899 ON ABATACEPT THERAPY: ICD-10-CM

## 2020-08-26 DIAGNOSIS — M05.772 RHEUMATOID ARTHRITIS INVOLVING BOTH ANKLES WITH POSITIVE RHEUMATOID FACTOR: Primary | ICD-10-CM

## 2020-08-26 PROCEDURE — 1125F PR PAIN SEVERITY QUANTIFIED, PAIN PRESENT: ICD-10-PCS | Mod: S$GLB,,, | Performed by: INTERNAL MEDICINE

## 2020-08-26 PROCEDURE — 1101F PR PT FALLS ASSESS DOC 0-1 FALLS W/OUT INJ PAST YR: ICD-10-PCS | Mod: CPTII,S$GLB,, | Performed by: INTERNAL MEDICINE

## 2020-08-26 PROCEDURE — 3074F SYST BP LT 130 MM HG: CPT | Mod: CPTII,S$GLB,, | Performed by: INTERNAL MEDICINE

## 2020-08-26 PROCEDURE — 1159F MED LIST DOCD IN RCRD: CPT | Mod: S$GLB,,, | Performed by: INTERNAL MEDICINE

## 2020-08-26 PROCEDURE — 99999 PR PBB SHADOW E&M-EST. PATIENT-LVL V: CPT | Mod: PBBFAC,,, | Performed by: INTERNAL MEDICINE

## 2020-08-26 PROCEDURE — 1125F AMNT PAIN NOTED PAIN PRSNT: CPT | Mod: S$GLB,,, | Performed by: INTERNAL MEDICINE

## 2020-08-26 PROCEDURE — 99214 OFFICE O/P EST MOD 30 MIN: CPT | Mod: S$GLB,,, | Performed by: INTERNAL MEDICINE

## 2020-08-26 PROCEDURE — 99999 PR PBB SHADOW E&M-EST. PATIENT-LVL V: ICD-10-PCS | Mod: PBBFAC,,, | Performed by: INTERNAL MEDICINE

## 2020-08-26 PROCEDURE — 3008F BODY MASS INDEX DOCD: CPT | Mod: CPTII,S$GLB,, | Performed by: INTERNAL MEDICINE

## 2020-08-26 PROCEDURE — 1159F PR MEDICATION LIST DOCUMENTED IN MEDICAL RECORD: ICD-10-PCS | Mod: S$GLB,,, | Performed by: INTERNAL MEDICINE

## 2020-08-26 PROCEDURE — 99214 PR OFFICE/OUTPT VISIT, EST, LEVL IV, 30-39 MIN: ICD-10-PCS | Mod: S$GLB,,, | Performed by: INTERNAL MEDICINE

## 2020-08-26 PROCEDURE — 3008F PR BODY MASS INDEX (BMI) DOCUMENTED: ICD-10-PCS | Mod: CPTII,S$GLB,, | Performed by: INTERNAL MEDICINE

## 2020-08-26 PROCEDURE — 99499 RISK ADDL DX/OHS AUDIT: ICD-10-PCS | Mod: S$GLB,,, | Performed by: INTERNAL MEDICINE

## 2020-08-26 PROCEDURE — 3078F DIAST BP <80 MM HG: CPT | Mod: CPTII,S$GLB,, | Performed by: INTERNAL MEDICINE

## 2020-08-26 PROCEDURE — 1101F PT FALLS ASSESS-DOCD LE1/YR: CPT | Mod: CPTII,S$GLB,, | Performed by: INTERNAL MEDICINE

## 2020-08-26 PROCEDURE — 3074F PR MOST RECENT SYSTOLIC BLOOD PRESSURE < 130 MM HG: ICD-10-PCS | Mod: CPTII,S$GLB,, | Performed by: INTERNAL MEDICINE

## 2020-08-26 PROCEDURE — 99499 UNLISTED E&M SERVICE: CPT | Mod: S$GLB,,, | Performed by: INTERNAL MEDICINE

## 2020-08-26 PROCEDURE — 3078F PR MOST RECENT DIASTOLIC BLOOD PRESSURE < 80 MM HG: ICD-10-PCS | Mod: CPTII,S$GLB,, | Performed by: INTERNAL MEDICINE

## 2020-08-26 NOTE — PROGRESS NOTES
I have personally taken the history and examined the patient and agree with the resident,s note as stated above     RA: RF+ ACPA+ GUSTABO-:  TJ 0  SJ 0 Pt global 10 ESR 23 CRP 9.3  DAS28 2.33 CWF22-DTD 1.94(both remission) CDAI 1(remission)  OA knees, asymptomatic  Osteoporosis DXA 10/28/19  Obesity, lost 15# since 5/27/20 visit  /77 on amlodipine 10mg daily  Hyperlipidemia LDL 70.4 10/23/18 on pravastatin 40mg every evening      High doseFlu shot Sept  Cont abatacept 1000mg IV q 28 days  Cont methotrexate 25mg sc q 7 days with folic acid 1mg daily  Cont leflunomide 20mg daily  Cont amlodipine 10mg daily  Cont pravastatin 40mg every evening  RTC 3 months with standing labs with lipid panel     Answers for HPI/ROS submitted by the patient on 8/26/2020   fever: No  eye redness: No  mouth sores: No  headaches: No  shortness of breath: No  chest pain: No  trouble swallowing: No  diarrhea: No  constipation: No  unexpected weight change: No  genital sore: No  dysuria: No  During the last 3 days, have you had a skin rash?: No  Bruises or bleeds easily: No  cough: No

## 2020-08-26 NOTE — PROGRESS NOTES
"Subjective:       Patient ID: Maru Shelby is a 70 y.o. female.    Chief Complaint: RA RF+ ACPA+GUSTABO-; Osteoporosis  HPI   70 year old female with PMH glaucoma, HLD, HTN, vitamin D deficiency, T2DM, obesity, RA, osteoarthritis    Last seen on 5/27/2020. Reported a flare at that time that resolved after resuming methotrexate after dental procedure. Today patient has no tender or swollen joints. Reports doing well. Intentional 15lb weight loss since last visit. Patient says she has been eating less rice and bread. Walks ~1mile daily.  Managed on abatacept (Orencia) IV 1000mg monthly, methotrexate 25mg sc q7day, leflunomide 20mg daily. Takes nortriptyline 75mg qhs for improved sleep.  Osteoporosis managed on alendronate 70mg q7days and Vitamin D2 50,000u weekly.  Adherent to pravastatin 40mg nightly and amlodipine 10mg daily.  Diabetes managed by Dr. Florence Gastelum (PCP)      Review of Systems   Constitutional: Negative for fever and unexpected weight change.   HENT: Negative for mouth sores and trouble swallowing.    Eyes: Negative for redness.   Respiratory: Negative for cough and shortness of breath.    Cardiovascular: Negative for chest pain.   Gastrointestinal: Negative for constipation and diarrhea.   Genitourinary: Negative for dysuria and genital sores.   Skin: Negative for rash.   Neurological: Negative for headaches.   Hematological: Does not bruise/bleed easily.         Objective:   /77   Pulse 107   Ht 5' 7.2" (1.707 m)   Wt 89.8 kg (198 lb)   BMI 30.83 kg/m²      Physical Exam   Constitutional: She is oriented to person, place, and time and well-developed, well-nourished, and in no distress. No distress.   HENT:   Head: Normocephalic and atraumatic.   Eyes: EOM are normal.   Neck: Neck supple.   Cardiovascular: Normal rate, regular rhythm and normal heart sounds.    Pulmonary/Chest: Effort normal and breath sounds normal. No respiratory distress.   Abdominal: Soft. Bowel sounds are normal. " She exhibits no distension. There is no abdominal tenderness.       Right Side Rheumatological Exam     Examination finds the shoulder, elbow, wrist, knee, 1st PIP, 1st MCP, 2nd PIP, 2nd MCP, 3rd PIP, 3rd MCP, 4th PIP, 4th MCP, 5th PIP and 5th MCP normal.    Left Side Rheumatological Exam     Examination finds the shoulder, elbow, wrist, knee, 1st PIP, 1st MCP, 2nd PIP, 2nd MCP, 3rd PIP, 3rd MCP, 4th PIP, 4th MCP, 5th PIP and 5th MCP normal.      Neurological: She is alert and oriented to person, place, and time.   Skin: No rash noted.           10/28/2019 2/18/2020 5/27/2020   LEES-28 (ESR) 2.64 (Low disease activity) 2.54 (Remission) 2.45 (Remission)   LEES-28 (CRP) 2.16 (Remission) 2.19 (Remission) 2.24 (Remission)   Tender (LEES-28) 0 / 28  0 / 28  0 / 28    Swollen (LEES-28) 0 / 28  0 / 28  0 / 28    Provider Global 0 mm 10 mm 10 mm   Patient Global 15 mm 15 mm 25 mm   ESR 32 mm/hr 28 mm/hr 20 mm/hr   CRP 14.7 mg/L 15.9 mg/L 12.4 mg/L        Assessment:       1. Rheumatoid arthritis involving both ankles with positive rheumatoid factor    2. Long term methotrexate user    3. On abatacept therapy    4. Osteoporosis without current pathological fracture, unspecified osteoporosis type            Plan:       Problem List Items Addressed This Visit        Orthopedic    RA (rheumatoid arthritis) - Primary (Chronic)    Osteoporosis without current pathological fracture       Other    Long term methotrexate user      Other Visit Diagnoses     On abatacept therapy            1. Continue abatacept (Orencia) IV 1000mg monthly   2. Continue methotrexate 25mg sc q7day  3. Continue leflunomide 20mg daily. Takes   4. Continue nortriptyline 75mg qhs  5. Continue alendronate 70mg q7days and   6. Continue Vitamin D2 50,000u weekly.  7. Continue pravastatin 40mg nightly  8. Continue amlodipine 10mg daily.  9. Flu vaccine as soon as available  10. Lipid panel with next labs  11. Continue weight loss    Anival Forrester MD  [employer] [facility]  PGY1

## 2020-10-05 ENCOUNTER — INFUSION (OUTPATIENT)
Dept: INFUSION THERAPY | Facility: HOSPITAL | Age: 70
End: 2020-10-05
Attending: INTERNAL MEDICINE
Payer: MEDICARE

## 2020-10-05 VITALS
BODY MASS INDEX: 30.83 KG/M2 | RESPIRATION RATE: 18 BRPM | TEMPERATURE: 98 F | SYSTOLIC BLOOD PRESSURE: 119 MMHG | HEART RATE: 91 BPM | WEIGHT: 198 LBS | DIASTOLIC BLOOD PRESSURE: 66 MMHG

## 2020-10-05 DIAGNOSIS — M05.79 RHEUMATOID ARTHRITIS INVOLVING MULTIPLE SITES WITH POSITIVE RHEUMATOID FACTOR: Primary | ICD-10-CM

## 2020-10-05 PROCEDURE — 63600175 PHARM REV CODE 636 W HCPCS: Performed by: INTERNAL MEDICINE

## 2020-10-05 PROCEDURE — 96365 THER/PROPH/DIAG IV INF INIT: CPT

## 2020-10-05 PROCEDURE — 25000003 PHARM REV CODE 250: Performed by: INTERNAL MEDICINE

## 2020-10-05 RX ORDER — ACETAMINOPHEN 325 MG/1
650 TABLET ORAL
Status: CANCELLED | OUTPATIENT
Start: 2020-10-19

## 2020-10-05 RX ORDER — HEPARIN 100 UNIT/ML
500 SYRINGE INTRAVENOUS
Status: CANCELLED | OUTPATIENT
Start: 2020-10-19

## 2020-10-05 RX ORDER — SODIUM CHLORIDE 0.9 % (FLUSH) 0.9 %
10 SYRINGE (ML) INJECTION
Status: CANCELLED | OUTPATIENT
Start: 2020-10-19

## 2020-10-05 RX ADMIN — SODIUM CHLORIDE 1000 MG: 9 INJECTION, SOLUTION INTRAVENOUS at 08:10

## 2020-10-07 ENCOUNTER — PATIENT OUTREACH (OUTPATIENT)
Dept: FAMILY MEDICINE | Facility: CLINIC | Age: 70
End: 2020-10-07

## 2020-10-07 DIAGNOSIS — E11.65 UNCONTROLLED TYPE 2 DIABETES MELLITUS WITH HYPERGLYCEMIA: Primary | ICD-10-CM

## 2020-10-26 DIAGNOSIS — E11.9 TYPE 2 DIABETES MELLITUS WITHOUT COMPLICATION, WITHOUT LONG-TERM CURRENT USE OF INSULIN: ICD-10-CM

## 2020-10-26 NOTE — TELEPHONE ENCOUNTER
Requested Prescriptions     Pending Prescriptions Disp Refills    SITagliptan-metformin (JANUMET) 50-1,000 mg per tablet 180 tablet 0     Sig: Take 1 tablet by mouth 2 (two) times daily with meals.   Patient requesting med refill. LOV: 5/29/20. Advised patient that she is due for 6 month follow up.      Patient Ph: [phone]    [facility]/pharmacy #[id number] - [place], [place] - [address] AT [facility] [phone] (Phone)  [phone] (Fax)

## 2020-10-29 ENCOUNTER — OFFICE VISIT (OUTPATIENT)
Dept: INTERNAL MEDICINE | Facility: CLINIC | Age: 70
End: 2020-10-29
Payer: MEDICARE

## 2020-10-29 VITALS
WEIGHT: 201.25 LBS | HEIGHT: 67 IN | HEART RATE: 100 BPM | OXYGEN SATURATION: 95 % | BODY MASS INDEX: 31.59 KG/M2 | DIASTOLIC BLOOD PRESSURE: 78 MMHG | SYSTOLIC BLOOD PRESSURE: 122 MMHG | RESPIRATION RATE: 16 BRPM

## 2020-10-29 DIAGNOSIS — K42.9 PARAUMBILICAL HERNIA: ICD-10-CM

## 2020-10-29 DIAGNOSIS — M25.562 ACUTE PAIN OF LEFT KNEE: ICD-10-CM

## 2020-10-29 DIAGNOSIS — M25.551 BILATERAL HIP PAIN: Primary | ICD-10-CM

## 2020-10-29 DIAGNOSIS — M25.552 BILATERAL HIP PAIN: Primary | ICD-10-CM

## 2020-10-29 PROCEDURE — 1159F MED LIST DOCD IN RCRD: CPT | Mod: S$GLB,,, | Performed by: INTERNAL MEDICINE

## 2020-10-29 PROCEDURE — 3008F PR BODY MASS INDEX (BMI) DOCUMENTED: ICD-10-PCS | Mod: CPTII,S$GLB,, | Performed by: INTERNAL MEDICINE

## 2020-10-29 PROCEDURE — 3074F PR MOST RECENT SYSTOLIC BLOOD PRESSURE < 130 MM HG: ICD-10-PCS | Mod: CPTII,S$GLB,, | Performed by: INTERNAL MEDICINE

## 2020-10-29 PROCEDURE — 96372 THER/PROPH/DIAG INJ SC/IM: CPT | Mod: S$GLB,,, | Performed by: INTERNAL MEDICINE

## 2020-10-29 PROCEDURE — 3078F DIAST BP <80 MM HG: CPT | Mod: CPTII,S$GLB,, | Performed by: INTERNAL MEDICINE

## 2020-10-29 PROCEDURE — 99999 PR PBB SHADOW E&M-EST. PATIENT-LVL V: CPT | Mod: PBBFAC,,, | Performed by: INTERNAL MEDICINE

## 2020-10-29 PROCEDURE — 96372 PR INJECTION,THERAP/PROPH/DIAG2ST, IM OR SUBCUT: ICD-10-PCS | Mod: S$GLB,,, | Performed by: INTERNAL MEDICINE

## 2020-10-29 PROCEDURE — 3074F SYST BP LT 130 MM HG: CPT | Mod: CPTII,S$GLB,, | Performed by: INTERNAL MEDICINE

## 2020-10-29 PROCEDURE — 99999 PR PBB SHADOW E&M-EST. PATIENT-LVL V: ICD-10-PCS | Mod: PBBFAC,,, | Performed by: INTERNAL MEDICINE

## 2020-10-29 PROCEDURE — 99213 OFFICE O/P EST LOW 20 MIN: CPT | Mod: 25,S$GLB,, | Performed by: INTERNAL MEDICINE

## 2020-10-29 PROCEDURE — 3008F BODY MASS INDEX DOCD: CPT | Mod: CPTII,S$GLB,, | Performed by: INTERNAL MEDICINE

## 2020-10-29 PROCEDURE — 1159F PR MEDICATION LIST DOCUMENTED IN MEDICAL RECORD: ICD-10-PCS | Mod: S$GLB,,, | Performed by: INTERNAL MEDICINE

## 2020-10-29 PROCEDURE — 3078F PR MOST RECENT DIASTOLIC BLOOD PRESSURE < 80 MM HG: ICD-10-PCS | Mod: CPTII,S$GLB,, | Performed by: INTERNAL MEDICINE

## 2020-10-29 PROCEDURE — 1125F PR PAIN SEVERITY QUANTIFIED, PAIN PRESENT: ICD-10-PCS | Mod: S$GLB,,, | Performed by: INTERNAL MEDICINE

## 2020-10-29 PROCEDURE — 1101F PR PT FALLS ASSESS DOC 0-1 FALLS W/OUT INJ PAST YR: ICD-10-PCS | Mod: CPTII,S$GLB,, | Performed by: INTERNAL MEDICINE

## 2020-10-29 PROCEDURE — 99213 PR OFFICE/OUTPT VISIT, EST, LEVL III, 20-29 MIN: ICD-10-PCS | Mod: 25,S$GLB,, | Performed by: INTERNAL MEDICINE

## 2020-10-29 PROCEDURE — 1101F PT FALLS ASSESS-DOCD LE1/YR: CPT | Mod: CPTII,S$GLB,, | Performed by: INTERNAL MEDICINE

## 2020-10-29 PROCEDURE — 1125F AMNT PAIN NOTED PAIN PRSNT: CPT | Mod: S$GLB,,, | Performed by: INTERNAL MEDICINE

## 2020-10-29 RX ORDER — METHYLPREDNISOLONE ACETATE 80 MG/ML
80 INJECTION, SUSPENSION INTRA-ARTICULAR; INTRALESIONAL; INTRAMUSCULAR; SOFT TISSUE
Status: COMPLETED | OUTPATIENT
Start: 2020-10-29 | End: 2020-10-29

## 2020-10-29 RX ORDER — METHYLPREDNISOLONE 4 MG/1
TABLET ORAL
Qty: 1 PACKAGE | Refills: 0 | Status: SHIPPED | OUTPATIENT
Start: 2020-10-29 | End: 2020-11-02 | Stop reason: SDUPTHER

## 2020-10-29 RX ADMIN — METHYLPREDNISOLONE ACETATE 80 MG: 80 INJECTION, SUSPENSION INTRA-ARTICULAR; INTRALESIONAL; INTRAMUSCULAR; SOFT TISSUE at 10:10

## 2020-10-29 NOTE — PROGRESS NOTES
Subjective:       Patient ID: Maru Shelby is a 70 y.o. female.    Chief Complaint: Knee Pain, Hip Pain, and Dizziness    H/o rheumatoid arthitis     Hip Pain   Incident onset: bilateral hip pain  There was no injury mechanism. Pain location: bilateral hip and knee pains. The pain is at a severity of 6/10. The pain is moderate. Associated symptoms include numbness (feet). She has tried acetaminophen for the symptoms.     Review of Systems   Constitutional: Negative for activity change, fatigue, fever and unexpected weight change.   HENT: Negative for congestion, ear pain, hearing loss, rhinorrhea and sore throat.    Eyes: Negative for pain, redness and visual disturbance.   Respiratory: Negative for cough, shortness of breath and wheezing.    Cardiovascular: Negative for chest pain, palpitations and leg swelling.   Gastrointestinal: Negative for abdominal pain, constipation, diarrhea, nausea and vomiting.   Genitourinary: Negative for dysuria, frequency, pelvic pain and urgency.   Musculoskeletal: Positive for arthralgias, gait problem and joint swelling. Negative for back pain and neck pain.   Skin: Negative for color change, rash and wound.   Neurological: Positive for numbness (feet). Negative for dizziness, tremors, weakness, light-headedness and headaches.       Objective:      Physical Exam  Vitals signs and nursing note reviewed.   Constitutional:       Appearance: She is well-developed.   HENT:      Head: Normocephalic and atraumatic.      Right Ear: External ear normal.      Left Ear: External ear normal.   Eyes:      Conjunctiva/sclera: Conjunctivae normal.      Pupils: Pupils are equal, round, and reactive to light.   Neck:      Musculoskeletal: Normal range of motion and neck supple.      Thyroid: No thyromegaly.      Vascular: No JVD.      Trachea: No tracheal deviation.   Cardiovascular:      Rate and Rhythm: Normal rate and regular rhythm.      Heart sounds: Normal heart sounds.   Pulmonary:       Effort: Pulmonary effort is normal. No respiratory distress.      Breath sounds: Normal breath sounds. No wheezing or rales.   Chest:      Chest wall: No tenderness.   Abdominal:      General: Bowel sounds are normal. There is no distension.      Palpations: Abdomen is soft. There is no mass.      Tenderness: There is no abdominal tenderness. There is no guarding or rebound.          Comments: Paraumbilical hernia  ; reducible.   Offered her general surgery referral ; she declined    Musculoskeletal:      Right hip: She exhibits decreased range of motion. She exhibits no swelling and no deformity.      Left hip: She exhibits decreased range of motion and decreased strength.      Left knee: She exhibits decreased range of motion and swelling. Tenderness found. Lateral joint line tenderness noted.   Lymphadenopathy:      Cervical: No cervical adenopathy.   Skin:     General: Skin is warm and dry.   Neurological:      Mental Status: She is alert and oriented to person, place, and time.      Cranial Nerves: No cranial nerve deficit.      Motor: No abnormal muscle tone.      Coordination: Coordination normal.      Deep Tendon Reflexes: Reflexes are normal and symmetric. Reflexes normal.      Comments: CN: Optic discs are flat with normal vasculature, PERRL, Extraoccular movements and visual fields are full. Normal facial sensation and strength, Hearing symmetric, Tongue and Palate are midline and strong. Shoulder Shrug symmetric and strong.         Assessment:       1. Bilateral hip pain    2. Acute pain of left knee    3. Paraumbilical hernia        Plan:   Maru was seen today for knee pain, hip pain and dizziness.    Diagnoses and all orders for this visit:    Bilateral hip pain  -     methylPREDNISolone acetate injection 80 mg  -     methylPREDNISolone (MEDROL DOSEPACK) 4 mg tablet; use as directed    Acute pain of left knee  -     methylPREDNISolone acetate injection 80 mg  -     methylPREDNISolone (MEDROL  DOSEPACK) 4 mg tablet; use as directed    Paraumbilical hernia    Paraumbilical hernia  ; reducible.   Offered her general surgery referral ; she declined         Sugar may go up .  She will watch and call me    Problem List Items Addressed This Visit     None      Visit Diagnoses     Bilateral hip pain    -  Primary

## 2020-11-02 ENCOUNTER — INFUSION (OUTPATIENT)
Dept: INFUSION THERAPY | Facility: HOSPITAL | Age: 70
End: 2020-11-02
Attending: INTERNAL MEDICINE
Payer: MEDICARE

## 2020-11-02 VITALS
SYSTOLIC BLOOD PRESSURE: 124 MMHG | BODY MASS INDEX: 30.98 KG/M2 | WEIGHT: 199 LBS | RESPIRATION RATE: 18 BRPM | DIASTOLIC BLOOD PRESSURE: 69 MMHG | HEART RATE: 85 BPM | TEMPERATURE: 98 F

## 2020-11-02 DIAGNOSIS — M25.551 BILATERAL HIP PAIN: ICD-10-CM

## 2020-11-02 DIAGNOSIS — M25.552 BILATERAL HIP PAIN: ICD-10-CM

## 2020-11-02 DIAGNOSIS — M25.562 ACUTE PAIN OF LEFT KNEE: ICD-10-CM

## 2020-11-02 DIAGNOSIS — M05.79 RHEUMATOID ARTHRITIS INVOLVING MULTIPLE SITES WITH POSITIVE RHEUMATOID FACTOR: Primary | ICD-10-CM

## 2020-11-02 PROCEDURE — 63600175 PHARM REV CODE 636 W HCPCS: Performed by: INTERNAL MEDICINE

## 2020-11-02 PROCEDURE — 96365 THER/PROPH/DIAG IV INF INIT: CPT

## 2020-11-02 PROCEDURE — 25000003 PHARM REV CODE 250: Performed by: INTERNAL MEDICINE

## 2020-11-02 RX ORDER — SODIUM CHLORIDE 0.9 % (FLUSH) 0.9 %
10 SYRINGE (ML) INJECTION
Status: CANCELLED | OUTPATIENT
Start: 2020-11-23

## 2020-11-02 RX ORDER — ACETAMINOPHEN 325 MG/1
650 TABLET ORAL
Status: CANCELLED | OUTPATIENT
Start: 2020-11-23

## 2020-11-02 RX ORDER — METHYLPREDNISOLONE 4 MG/1
TABLET ORAL
Qty: 1 PACKAGE | Refills: 0 | Status: SHIPPED | OUTPATIENT
Start: 2020-11-02 | End: 2020-11-18

## 2020-11-02 RX ORDER — HEPARIN 100 UNIT/ML
500 SYRINGE INTRAVENOUS
Status: CANCELLED | OUTPATIENT
Start: 2020-11-23

## 2020-11-02 RX ADMIN — SODIUM CHLORIDE 1000 MG: 9 INJECTION, SOLUTION INTRAVENOUS at 08:11

## 2020-11-02 NOTE — TELEPHONE ENCOUNTER
----- Message from Homa Hernandez sent at 2020  8:27 AM CST -----  Contact: Geronimo/  Maru Shelby  MRN: [id number]  : 1950  PCP: Florence Mark  Home Phone      [phone]  Work Phone      Not on file.  Mobile          [phone]      MESSAGE:    came by this morning and requested if RX methylPREDNISolone (MEDROL DOSEPACK) 4 mg tablet could be re sent to pharmacy today because they had problems with power at pharmacy and it did not go through.  Please call to advise.    [facility]/pharmacy #[id number] - [place], [place] - [address] AT [facility]    [phone]

## 2020-11-17 ENCOUNTER — LAB VISIT (OUTPATIENT)
Dept: LAB | Facility: HOSPITAL | Age: 70
End: 2020-11-17
Attending: INTERNAL MEDICINE
Payer: MEDICARE

## 2020-11-17 ENCOUNTER — OFFICE VISIT (OUTPATIENT)
Dept: RHEUMATOLOGY | Facility: CLINIC | Age: 70
End: 2020-11-17
Payer: MEDICARE

## 2020-11-17 VITALS
SYSTOLIC BLOOD PRESSURE: 129 MMHG | HEIGHT: 67 IN | DIASTOLIC BLOOD PRESSURE: 81 MMHG | HEART RATE: 104 BPM | BODY MASS INDEX: 30.95 KG/M2 | WEIGHT: 197.19 LBS

## 2020-11-17 DIAGNOSIS — M05.772 RHEUMATOID ARTHRITIS INVOLVING BOTH ANKLES WITH POSITIVE RHEUMATOID FACTOR: ICD-10-CM

## 2020-11-17 DIAGNOSIS — M05.79 RHEUMATOID ARTHRITIS INVOLVING MULTIPLE SITES WITH POSITIVE RHEUMATOID FACTOR: Primary | ICD-10-CM

## 2020-11-17 DIAGNOSIS — M67.959 TENDINOPATHY OF GLUTEAL REGION: ICD-10-CM

## 2020-11-17 DIAGNOSIS — M54.9 DORSALGIA, UNSPECIFIED: ICD-10-CM

## 2020-11-17 DIAGNOSIS — M81.0 AGE RELATED OSTEOPOROSIS, UNSPECIFIED PATHOLOGICAL FRACTURE PRESENCE: ICD-10-CM

## 2020-11-17 DIAGNOSIS — R05.9 COUGH: ICD-10-CM

## 2020-11-17 DIAGNOSIS — M81.0 OSTEOPOROSIS OF FEMUR WITHOUT PATHOLOGICAL FRACTURE: ICD-10-CM

## 2020-11-17 DIAGNOSIS — M54.50 ACUTE BILATERAL LOW BACK PAIN WITHOUT SCIATICA: ICD-10-CM

## 2020-11-17 DIAGNOSIS — E78.5 HYPERLIPIDEMIA, UNSPECIFIED HYPERLIPIDEMIA TYPE: ICD-10-CM

## 2020-11-17 DIAGNOSIS — E11.65 UNCONTROLLED TYPE 2 DIABETES MELLITUS WITH HYPERGLYCEMIA: ICD-10-CM

## 2020-11-17 DIAGNOSIS — M05.771 RHEUMATOID ARTHRITIS INVOLVING BOTH ANKLES WITH POSITIVE RHEUMATOID FACTOR: ICD-10-CM

## 2020-11-17 DIAGNOSIS — M06.9 RHEUMATOID ARTHRITIS INVOLVING MULTIPLE SITES: ICD-10-CM

## 2020-11-17 DIAGNOSIS — E55.9 VITAMIN D DEFICIENCY: ICD-10-CM

## 2020-11-17 DIAGNOSIS — M85.80 OSTEOPENIA, UNSPECIFIED LOCATION: ICD-10-CM

## 2020-11-17 LAB
ALBUMIN SERPL BCP-MCNC: 3.6 G/DL (ref 3.5–5.2)
ALP SERPL-CCNC: 72 U/L (ref 55–135)
ALT SERPL W/O P-5'-P-CCNC: 16 U/L (ref 10–44)
ANION GAP SERPL CALC-SCNC: 9 MMOL/L (ref 8–16)
AST SERPL-CCNC: 13 U/L (ref 10–40)
BASOPHILS # BLD AUTO: 0.07 K/UL (ref 0–0.2)
BASOPHILS NFR BLD: 0.6 % (ref 0–1.9)
BILIRUB SERPL-MCNC: 0.5 MG/DL (ref 0.1–1)
BUN SERPL-MCNC: 14 MG/DL (ref 8–23)
CALCIUM SERPL-MCNC: 9 MG/DL (ref 8.7–10.5)
CHLORIDE SERPL-SCNC: 106 MMOL/L (ref 95–110)
CHOLEST SERPL-MCNC: 163 MG/DL (ref 120–199)
CHOLEST/HDLC SERPL: 3.5 {RATIO} (ref 2–5)
CO2 SERPL-SCNC: 27 MMOL/L (ref 23–29)
CREAT SERPL-MCNC: 0.7 MG/DL (ref 0.5–1.4)
CRP SERPL-MCNC: 30.1 MG/L (ref 0–8.2)
DIFFERENTIAL METHOD: ABNORMAL
EOSINOPHIL # BLD AUTO: 0.4 K/UL (ref 0–0.5)
EOSINOPHIL NFR BLD: 3 % (ref 0–8)
ERYTHROCYTE [DISTWIDTH] IN BLOOD BY AUTOMATED COUNT: 14.7 % (ref 11.5–14.5)
ERYTHROCYTE [SEDIMENTATION RATE] IN BLOOD BY WESTERGREN METHOD: 29 MM/HR (ref 0–20)
EST. GFR  (AFRICAN AMERICAN): >60 ML/MIN/1.73 M^2
EST. GFR  (NON AFRICAN AMERICAN): >60 ML/MIN/1.73 M^2
ESTIMATED AVG GLUCOSE: 128 MG/DL (ref 68–131)
GLUCOSE SERPL-MCNC: 135 MG/DL (ref 70–110)
HBA1C MFR BLD HPLC: 6.1 % (ref 4–5.6)
HCT VFR BLD AUTO: 43 % (ref 37–48.5)
HDLC SERPL-MCNC: 46 MG/DL (ref 40–75)
HDLC SERPL: 28.2 % (ref 20–50)
HGB BLD-MCNC: 14.1 G/DL (ref 12–16)
IMM GRANULOCYTES # BLD AUTO: 0.08 K/UL (ref 0–0.04)
IMM GRANULOCYTES NFR BLD AUTO: 0.6 % (ref 0–0.5)
LDLC SERPL CALC-MCNC: 84 MG/DL (ref 63–159)
LYMPHOCYTES # BLD AUTO: 2.8 K/UL (ref 1–4.8)
LYMPHOCYTES NFR BLD: 22 % (ref 18–48)
MCH RBC QN AUTO: 30.4 PG (ref 27–31)
MCHC RBC AUTO-ENTMCNC: 32.8 G/DL (ref 32–36)
MCV RBC AUTO: 93 FL (ref 82–98)
MONOCYTES # BLD AUTO: 0.9 K/UL (ref 0.3–1)
MONOCYTES NFR BLD: 7.3 % (ref 4–15)
NEUTROPHILS # BLD AUTO: 8.3 K/UL (ref 1.8–7.7)
NEUTROPHILS NFR BLD: 66.5 % (ref 38–73)
NONHDLC SERPL-MCNC: 117 MG/DL
NRBC BLD-RTO: 0 /100 WBC
PLATELET # BLD AUTO: 246 K/UL (ref 150–350)
PMV BLD AUTO: 9.4 FL (ref 9.2–12.9)
POTASSIUM SERPL-SCNC: 4.2 MMOL/L (ref 3.5–5.1)
PROT SERPL-MCNC: 6.8 G/DL (ref 6–8.4)
RBC # BLD AUTO: 4.64 M/UL (ref 4–5.4)
SODIUM SERPL-SCNC: 142 MMOL/L (ref 136–145)
TRIGL SERPL-MCNC: 165 MG/DL (ref 30–150)
WBC # BLD AUTO: 12.52 K/UL (ref 3.9–12.7)

## 2020-11-17 PROCEDURE — 1101F PR PT FALLS ASSESS DOC 0-1 FALLS W/OUT INJ PAST YR: ICD-10-PCS | Mod: CPTII,S$GLB,, | Performed by: INTERNAL MEDICINE

## 2020-11-17 PROCEDURE — 85025 COMPLETE CBC W/AUTO DIFF WBC: CPT

## 2020-11-17 PROCEDURE — 3074F PR MOST RECENT SYSTOLIC BLOOD PRESSURE < 130 MM HG: ICD-10-PCS | Mod: CPTII,S$GLB,, | Performed by: INTERNAL MEDICINE

## 2020-11-17 PROCEDURE — 3288F PR FALLS RISK ASSESSMENT DOCUMENTED: ICD-10-PCS | Mod: CPTII,S$GLB,, | Performed by: INTERNAL MEDICINE

## 2020-11-17 PROCEDURE — 3008F PR BODY MASS INDEX (BMI) DOCUMENTED: ICD-10-PCS | Mod: CPTII,S$GLB,, | Performed by: INTERNAL MEDICINE

## 2020-11-17 PROCEDURE — 1159F MED LIST DOCD IN RCRD: CPT | Mod: S$GLB,,, | Performed by: INTERNAL MEDICINE

## 2020-11-17 PROCEDURE — 3288F FALL RISK ASSESSMENT DOCD: CPT | Mod: CPTII,S$GLB,, | Performed by: INTERNAL MEDICINE

## 2020-11-17 PROCEDURE — 1159F PR MEDICATION LIST DOCUMENTED IN MEDICAL RECORD: ICD-10-PCS | Mod: S$GLB,,, | Performed by: INTERNAL MEDICINE

## 2020-11-17 PROCEDURE — 99999 PR PBB SHADOW E&M-EST. PATIENT-LVL IV: ICD-10-PCS | Mod: PBBFAC,,, | Performed by: INTERNAL MEDICINE

## 2020-11-17 PROCEDURE — 3079F DIAST BP 80-89 MM HG: CPT | Mod: CPTII,S$GLB,, | Performed by: INTERNAL MEDICINE

## 2020-11-17 PROCEDURE — 99214 OFFICE O/P EST MOD 30 MIN: CPT | Mod: S$GLB,,, | Performed by: INTERNAL MEDICINE

## 2020-11-17 PROCEDURE — 3008F BODY MASS INDEX DOCD: CPT | Mod: CPTII,S$GLB,, | Performed by: INTERNAL MEDICINE

## 2020-11-17 PROCEDURE — 80061 LIPID PANEL: CPT

## 2020-11-17 PROCEDURE — 3079F PR MOST RECENT DIASTOLIC BLOOD PRESSURE 80-89 MM HG: ICD-10-PCS | Mod: CPTII,S$GLB,, | Performed by: INTERNAL MEDICINE

## 2020-11-17 PROCEDURE — 99999 PR PBB SHADOW E&M-EST. PATIENT-LVL IV: CPT | Mod: PBBFAC,,, | Performed by: INTERNAL MEDICINE

## 2020-11-17 PROCEDURE — 83036 HEMOGLOBIN GLYCOSYLATED A1C: CPT

## 2020-11-17 PROCEDURE — 86140 C-REACTIVE PROTEIN: CPT

## 2020-11-17 PROCEDURE — 3074F SYST BP LT 130 MM HG: CPT | Mod: CPTII,S$GLB,, | Performed by: INTERNAL MEDICINE

## 2020-11-17 PROCEDURE — 99214 PR OFFICE/OUTPT VISIT, EST, LEVL IV, 30-39 MIN: ICD-10-PCS | Mod: S$GLB,,, | Performed by: INTERNAL MEDICINE

## 2020-11-17 PROCEDURE — 80053 COMPREHEN METABOLIC PANEL: CPT

## 2020-11-17 PROCEDURE — 1101F PT FALLS ASSESS-DOCD LE1/YR: CPT | Mod: CPTII,S$GLB,, | Performed by: INTERNAL MEDICINE

## 2020-11-17 PROCEDURE — 36415 COLL VENOUS BLD VENIPUNCTURE: CPT

## 2020-11-17 PROCEDURE — 1125F AMNT PAIN NOTED PAIN PRSNT: CPT | Mod: S$GLB,,, | Performed by: INTERNAL MEDICINE

## 2020-11-17 PROCEDURE — 1125F PR PAIN SEVERITY QUANTIFIED, PAIN PRESENT: ICD-10-PCS | Mod: S$GLB,,, | Performed by: INTERNAL MEDICINE

## 2020-11-17 PROCEDURE — 85651 RBC SED RATE NONAUTOMATED: CPT

## 2020-11-17 RX ORDER — LEFLUNOMIDE 20 MG/1
20 TABLET ORAL DAILY
Qty: 90 TABLET | Refills: 0 | Status: SHIPPED | OUTPATIENT
Start: 2020-11-17 | End: 2021-05-12

## 2020-11-17 RX ORDER — TRAMADOL HYDROCHLORIDE 50 MG/1
50 TABLET ORAL EVERY 6 HOURS PRN
Qty: 7 TABLET | Refills: 0 | Status: SHIPPED | OUTPATIENT
Start: 2020-11-17 | End: 2022-10-26 | Stop reason: SDUPTHER

## 2020-11-17 RX ORDER — ERGOCALCIFEROL 1.25 MG/1
50000 CAPSULE ORAL
Qty: 12 CAPSULE | Refills: 3 | Status: SHIPPED | OUTPATIENT
Start: 2020-11-17 | End: 2022-08-30 | Stop reason: SDUPTHER

## 2020-11-17 RX ORDER — ALENDRONATE SODIUM 70 MG/1
70 TABLET ORAL
Qty: 12 TABLET | Refills: 3 | Status: SHIPPED | OUTPATIENT
Start: 2020-11-17 | End: 2022-01-07 | Stop reason: SDUPTHER

## 2020-11-17 RX ORDER — METHOTREXATE 25 MG/ML
25 INJECTION, SOLUTION INTRA-ARTERIAL; INTRAMUSCULAR; INTRAVENOUS
Qty: 4 ML | Refills: 2 | Status: SHIPPED | OUTPATIENT
Start: 2020-11-17 | End: 2021-03-03

## 2020-11-17 ASSESSMENT — ROUTINE ASSESSMENT OF PATIENT INDEX DATA (RAPID3)
PAIN SCORE: 8
MDHAQ FUNCTION SCORE: 0.4
PSYCHOLOGICAL DISTRESS SCORE: 0
TOTAL RAPID3 SCORE: 5.61
FATIGUE SCORE: 6.5
AM STIFFNESS SCORE: 0, NO
PATIENT GLOBAL ASSESSMENT SCORE: 7.5

## 2020-11-17 NOTE — PROGRESS NOTES
"Subjective:       Patient ID: Maru Shelby is a 70 y.o. female.    Chief Complaint: RA RF+ ACPA+GUSTABO-; Osteoporosis  HPI   70 year old female with PMH glaucoma, HLD, HTN, vitamin D deficiency, T2DM, obesity, RA, osteoarthritis here for three month follow-up. Last seen on 8/26/20 and was doing well. She is on abatacept (Orencia) IV 1000mg monthly, methotrexate 25mg sc q7day, folic acid 1 mg daily, leflunomide 20mg daily for her RA, her osteoporosis managed on alendronate 70mg q7days and Vitamin D2 50,000u weekly. Also on pravastatin 40mg nightly and amlodipine 10mg daily for CV health. Most recent standing labs: hgb A1c 6.1, ESR 29 (chronic, mildly elevated), glucose 135, lipid panel wnl except trig 135. Today, she states she is having some post-nasal drip with increased drainage, resulting in a sore throat and cough. Symptoms started Sunday. Denies fever otherwise. She is also having aching and stiffness, swelling in her BL thumbs and index fingers. Also, over the last two weeks, she has been having BL hip pain. Right hip is hurting worse today, localized to the right glute posteriorly. Denies radicular symptoms and numbness and tingling. She has been taking OTC Tylenol, without much relief. She has not seen other providers or had any injections to her hips.    Review of Systems   Constitutional: Negative for fever and unexpected weight change.   HENT: Negative for mouth sores and trouble swallowing.    Eyes: Negative for redness.   Respiratory: Negative for cough and shortness of breath.    Cardiovascular: Negative for chest pain.   Gastrointestinal: Negative for constipation and diarrhea.   Genitourinary: Negative for dysuria and genital sores.   Skin: Negative for rash.   Neurological: Negative for headaches.   Hematological: Does not bruise/bleed easily.         Objective:   /81   Pulse 104   Ht 5' 7.2" (1.707 m)   Wt 89.4 kg (197 lb 3.2 oz)   BMI 30.70 kg/m²      Physical Exam   Constitutional: " She is oriented to person, place, and time and well-developed, well-nourished, and in no distress. No distress.   HENT:   Head: Normocephalic and atraumatic.   Eyes: EOM are normal.   Neck: Neck supple.   Cardiovascular: Normal rate, regular rhythm and normal heart sounds.    Pulmonary/Chest: Effort normal and breath sounds normal. No respiratory distress.   Abdominal: Soft. Bowel sounds are normal. She exhibits no distension. There is no abdominal tenderness.   Back/Neck Exam   Tenderness Right paramedian tenderness of the Lower L-Spine, Upper L-Spine, SI Joint and Piriformis.Left paramedian tenderness of the Upper L-Spine, Lower L-Spine, SI Joint and Piriformis.      Comments:  TTP in the lumbar paraspinals BL, SIJ and piriformis BL  SLT + on the left, - on the right  Gaenslen's negative BL  Strength 5/5 in the LE except BL hip flexion 4/5  Reflexes 2+ and symmetric    Neurological: She is alert and oriented to person, place, and time.   Skin: No rash noted.         10/28/2019 2/18/2020 5/27/2020 11/17/2020   LEES-28 (ESR) 2.64 (Low disease activity) 2.54 (Remission) 2.45 (Remission) 3.41 (Moderate disease activity)   LEES-28 (CRP) 2.16 (Remission) 2.19 (Remission) 2.24 (Remission) --   Tender (LEES-28) 0 / 28  0 / 28  0 / 28  0 / 28    Swollen (LEES-28) 0 / 28  0 / 28  0 / 28  0 / 28    Provider Global 0 mm 10 mm 10 mm 20 mm   Patient Global 15 mm 15 mm 25 mm 75 mm   ESR 32 mm/hr 28 mm/hr 20 mm/hr 29 mm/hr   CRP 14.7 mg/L 15.9 mg/L 12.4 mg/L --        Assessment:       1. Rheumatoid arthritis involving multiple sites with positive rheumatoid factor    2. Vitamin D deficiency    3. Osteoporosis of femur without pathological fracture    4. Acute bilateral low back pain without sciatica    5. Age related osteoporosis, unspecified pathological fracture presence    6. Osteopenia, unspecified location    7. Rheumatoid arthritis involving both ankles with positive rheumatoid factor    8. Tendinopathy of gluteal region    9.  Dorsalgia, unspecified    10. Cough          Plan:       Problem List Items Addressed This Visit        Endocrine    Vitamin D deficiency       Orthopedic    RA (rheumatoid arthritis) - Primary (Chronic)    Relevant Medications    leflunomide (ARAVA) 20 MG Tab    methotrexate 25 mg/mL injection      Other Visit Diagnoses     Osteoporosis of femur without pathological fracture        Acute bilateral low back pain without sciatica        Relevant Orders    Ambulatory referral/consult to Physical/Occupational Therapy    Age related osteoporosis, unspecified pathological fracture presence        Relevant Medications    alendronate (FOSAMAX) 70 MG tablet    Osteopenia, unspecified location        Relevant Medications    ergocalciferol (ERGOCALCIFEROL) 50,000 unit Cap    Tendinopathy of gluteal region        Relevant Orders    Ambulatory referral/consult to Physical/Occupational Therapy    Dorsalgia, unspecified        Relevant Orders    X-Ray Lumbar Spine 2 Or 3 Views    Cough        Relevant Orders    COVID-19 Routine Screening        RA: RF+ ACPA+ GUSTABO-:  TJ 0  SJ 0 Pt global 75  ESR 29 CRP   DAS28 2.33 LWD24-QSM 1.94(both remission) CDAI 1(remission)  1. Continue abatacept (Orencia) IV 1000mg monthly   2. Continue methotrexate 25mg sc q7day  3. Continue leflunomide 20mg daily.    4. Continue nortriptyline 75mg qhs for sleep  5. Continue alendronate 70mg q7days; DEXA up to date until 10/2021  6. Continue Vitamin D2 50,000u weekly.  7. Repeat lumbar X-ray given LBP and osteoporosis  8. Referral to formal physical therapy for LBP, BL gluteal tendonopathy for gluteal and lumbar extensor stretching, strengthening exercises  9. Tramadol 50 mg po q6h prn for pain; dispensed 7 tablets with 0 refills  10. COVID-19 screening test given recent soar throat, increased drainage, cough   11. Continue pravastatin 40mg nightly  12. Continue amlodipine 10mg daily.  13. Continue weight loss  14. RTC 3 months with standing labs    Patient  seen and discussed with Dr. Royal, who agrees with the above plan    Ricco Starkey MD  [employer] [facility] PGY1

## 2020-11-17 NOTE — PROGRESS NOTES
I have personally taken the history and examined the patient and agree with the resident,s note as stated above              RA: RF+ ACPA+ GUSTABO-:  TJ 0  SJ 0 Pt global 75  ESR 29 CRP   DAS28 2.33 BBG12-PGS 1.94(both remission) CDAI 1(remission)  OA knees, asymptomatic  Osteoporosis DXA 10/28/19  Obesity, lost 1# since 8/26//20 visit  /81 on amlodipine 10mg daily  Hyperlipidemia LDL 84 11/17/20  on pravastatin 40mg every evening  URI, cough        Lumbar spine x-ray r/o fracture  Ref to PT for low back pain  Tramadol 50mg daily prn #7  If no improvement, ref [facility]  In view of URI symptoms and cough, SARS-CoV-2 PCR  Cont abatacept 1000mg IV q 28 days  Cont methotrexate 25mg sc q 7 days with folic acid 1mg daily  Cont leflunomide 20mg daily  Cont amlodipine 10mg daily  Cont pravastatin 40mg every evening  RTC 3 months with standing labs with lipid panel   PHYSICAL THERAPY DAILY NOTE  Time In: 1031  Time Out: 1118  Patient Seen For: AM;Transfer training;Gait training; Therapeutic exercise; Other (see progress notes)    Subjective: \" I'm tired. \"         Objective: No pain noted. GROSS ASSESSMENT Daily Assessment            COGNITION Daily Assessment           BED/MAT MOBILITY Daily Assessment    Supine to Sit : 5 (Supervision)  Sit to Supine : 5 (Supervision)       TRANSFERS Daily Assessment    Transfer Type: SPT without device  Transfer Assistance : 4 (Contact guard assistance)  Sit to Stand Assistance: Minimal assistance  Car Transfers: Not tested       GAIT Daily Assessment    Amount of Assistance: 4 (Minimal assistance)  Distance (ft): 400 Feet (ft)  Assistive Device: Other (comment)(no device)       STEPS or STAIRS Daily Assessment    Level of Assist : 0 (Not tested)       BALANCE Daily Assessment            WHEELCHAIR MOBILITY Daily Assessment            LOWER EXTREMITY EXERCISES Daily Assessment    Extremity: Both  Exercise Type #1: Other (comment)(nustep x 10 minutes)  Sets Performed: 1  Reps Performed: 10  Level of Assist: Supervision          Assessment: Patient will require 24 hour assistance at home for safety. Plan of Care: Continue with plan of care.     Gian Parish, JOSE J  8/20/2019

## 2020-11-18 ENCOUNTER — HOSPITAL ENCOUNTER (OUTPATIENT)
Dept: RADIOLOGY | Facility: HOSPITAL | Age: 70
Discharge: HOME | End: 2020-11-18
Attending: STUDENT IN AN ORGANIZED HEALTH CARE EDUCATION/TRAINING PROGRAM
Payer: MEDICARE

## 2020-11-18 ENCOUNTER — OFFICE VISIT (OUTPATIENT)
Dept: INTERNAL MEDICINE | Facility: CLINIC | Age: 70
End: 2020-11-18
Payer: MEDICARE

## 2020-11-18 VITALS
WEIGHT: 199.75 LBS | OXYGEN SATURATION: 96 % | SYSTOLIC BLOOD PRESSURE: 120 MMHG | BODY MASS INDEX: 31.35 KG/M2 | DIASTOLIC BLOOD PRESSURE: 70 MMHG | HEIGHT: 67 IN | RESPIRATION RATE: 18 BRPM | HEART RATE: 100 BPM

## 2020-11-18 DIAGNOSIS — M05.79 RHEUMATOID ARTHRITIS INVOLVING MULTIPLE SITES WITH POSITIVE RHEUMATOID FACTOR: Primary | Chronic | ICD-10-CM

## 2020-11-18 DIAGNOSIS — J01.80 ACUTE NON-RECURRENT SINUSITIS OF OTHER SINUS: ICD-10-CM

## 2020-11-18 DIAGNOSIS — M54.9 DORSALGIA, UNSPECIFIED: ICD-10-CM

## 2020-11-18 DIAGNOSIS — E11.9 TYPE 2 DIABETES MELLITUS WITHOUT COMPLICATION, WITHOUT LONG-TERM CURRENT USE OF INSULIN: ICD-10-CM

## 2020-11-18 PROCEDURE — 1159F MED LIST DOCD IN RCRD: CPT | Mod: S$GLB,,, | Performed by: NURSE PRACTITIONER

## 2020-11-18 PROCEDURE — 1125F AMNT PAIN NOTED PAIN PRSNT: CPT | Mod: S$GLB,,, | Performed by: NURSE PRACTITIONER

## 2020-11-18 PROCEDURE — 3008F PR BODY MASS INDEX (BMI) DOCUMENTED: ICD-10-PCS | Mod: CPTII,S$GLB,, | Performed by: NURSE PRACTITIONER

## 2020-11-18 PROCEDURE — 99999 PR PBB SHADOW E&M-EST. PATIENT-LVL IV: ICD-10-PCS | Mod: PBBFAC,,, | Performed by: NURSE PRACTITIONER

## 2020-11-18 PROCEDURE — 3074F PR MOST RECENT SYSTOLIC BLOOD PRESSURE < 130 MM HG: ICD-10-PCS | Mod: CPTII,S$GLB,, | Performed by: NURSE PRACTITIONER

## 2020-11-18 PROCEDURE — U0003 INFECTIOUS AGENT DETECTION BY NUCLEIC ACID (DNA OR RNA); SEVERE ACUTE RESPIRATORY SYNDROME CORONAVIRUS 2 (SARS-COV-2) (CORONAVIRUS DISEASE [COVID-19]), AMPLIFIED PROBE TECHNIQUE, MAKING USE OF HIGH THROUGHPUT TECHNOLOGIES AS DESCRIBED BY CMS-2020-01-R: HCPCS

## 2020-11-18 PROCEDURE — 99214 OFFICE O/P EST MOD 30 MIN: CPT | Mod: 25,S$GLB,, | Performed by: NURSE PRACTITIONER

## 2020-11-18 PROCEDURE — 3008F BODY MASS INDEX DOCD: CPT | Mod: CPTII,S$GLB,, | Performed by: NURSE PRACTITIONER

## 2020-11-18 PROCEDURE — 3078F PR MOST RECENT DIASTOLIC BLOOD PRESSURE < 80 MM HG: ICD-10-PCS | Mod: CPTII,S$GLB,, | Performed by: NURSE PRACTITIONER

## 2020-11-18 PROCEDURE — 96372 PR INJECTION,THERAP/PROPH/DIAG2ST, IM OR SUBCUT: ICD-10-PCS | Mod: S$GLB,,, | Performed by: NURSE PRACTITIONER

## 2020-11-18 PROCEDURE — 99999 PR PBB SHADOW E&M-EST. PATIENT-LVL IV: CPT | Mod: PBBFAC,,, | Performed by: NURSE PRACTITIONER

## 2020-11-18 PROCEDURE — 72100 X-RAY EXAM L-S SPINE 2/3 VWS: CPT | Mod: 26,,, | Performed by: RADIOLOGY

## 2020-11-18 PROCEDURE — 99214 PR OFFICE/OUTPT VISIT, EST, LEVL IV, 30-39 MIN: ICD-10-PCS | Mod: 25,S$GLB,, | Performed by: NURSE PRACTITIONER

## 2020-11-18 PROCEDURE — 72100 X-RAY EXAM L-S SPINE 2/3 VWS: CPT | Mod: TC

## 2020-11-18 PROCEDURE — 1125F PR PAIN SEVERITY QUANTIFIED, PAIN PRESENT: ICD-10-PCS | Mod: S$GLB,,, | Performed by: NURSE PRACTITIONER

## 2020-11-18 PROCEDURE — 3044F PR MOST RECENT HEMOGLOBIN A1C LEVEL <7.0%: ICD-10-PCS | Mod: CPTII,S$GLB,, | Performed by: NURSE PRACTITIONER

## 2020-11-18 PROCEDURE — 3074F SYST BP LT 130 MM HG: CPT | Mod: CPTII,S$GLB,, | Performed by: NURSE PRACTITIONER

## 2020-11-18 PROCEDURE — 96372 THER/PROPH/DIAG INJ SC/IM: CPT | Mod: S$GLB,,, | Performed by: NURSE PRACTITIONER

## 2020-11-18 PROCEDURE — 3078F DIAST BP <80 MM HG: CPT | Mod: CPTII,S$GLB,, | Performed by: NURSE PRACTITIONER

## 2020-11-18 PROCEDURE — 3044F HG A1C LEVEL LT 7.0%: CPT | Mod: CPTII,S$GLB,, | Performed by: NURSE PRACTITIONER

## 2020-11-18 PROCEDURE — 1159F PR MEDICATION LIST DOCUMENTED IN MEDICAL RECORD: ICD-10-PCS | Mod: S$GLB,,, | Performed by: NURSE PRACTITIONER

## 2020-11-18 PROCEDURE — 72100 XR LUMBAR SPINE 2 OR 3 VIEWS: ICD-10-PCS | Mod: 26,,, | Performed by: RADIOLOGY

## 2020-11-18 RX ORDER — METHYLPREDNISOLONE ACETATE 80 MG/ML
80 INJECTION, SUSPENSION INTRA-ARTICULAR; INTRALESIONAL; INTRAMUSCULAR; SOFT TISSUE
Status: COMPLETED | OUTPATIENT
Start: 2020-11-18 | End: 2020-11-18

## 2020-11-18 RX ORDER — AZITHROMYCIN 250 MG/1
TABLET, FILM COATED ORAL
Qty: 6 TABLET | Refills: 0 | Status: SHIPPED | OUTPATIENT
Start: 2020-11-18 | End: 2021-01-12

## 2020-11-18 RX ADMIN — METHYLPREDNISOLONE ACETATE 80 MG: 80 INJECTION, SUSPENSION INTRA-ARTICULAR; INTRALESIONAL; INTRAMUSCULAR; SOFT TISSUE at 08:11

## 2020-11-18 NOTE — PROGRESS NOTES
Subjective:       Patient ID: Maru Shelby is a 70 y.o. female.    Chief Complaint: Sinus Problem, Cough, and Sore Throat    HPI: Pt presents to clinic today known to me with c/o nasal drainage started on Sunday. She started coughing and lost voice on Monday. Taking sudafed without relief. No fever., No loss of smell or taste. Saw rheumatologist and he ordered covid screen.   Lab Results   Component Value Date    HGBA1C 6.1 (H) 11/17/2020     She reports that she was rx medrol last month for hip pain- she was not given that rx due to it was never filled.   Review of Systems   Constitutional: Negative for appetite change, chills, fatigue, fever and unexpected weight change.   HENT: Positive for postnasal drip, rhinorrhea and sinus pressure. Negative for congestion, ear pain, sore throat and trouble swallowing.    Eyes: Negative for pain, discharge, itching and visual disturbance.   Respiratory: Negative for cough, choking, chest tightness and shortness of breath.    Cardiovascular: Negative for chest pain, palpitations and leg swelling.   Gastrointestinal: Negative for abdominal distention, abdominal pain, constipation, diarrhea, nausea and vomiting.   Genitourinary: Negative for difficulty urinating, dysuria, flank pain, frequency and hematuria.   Musculoskeletal: Negative for arthralgias, back pain, gait problem, joint swelling, myalgias, neck pain and neck stiffness.   Skin: Negative for rash and wound.   Neurological: Negative for dizziness, seizures, speech difficulty, weakness, light-headedness and headaches.       Objective:      Physical Exam  Vitals signs and nursing note reviewed.   Constitutional:       General: She is not in acute distress.     Appearance: Normal appearance. She is well-developed. She is obese.   HENT:      Head: Normocephalic and atraumatic.      Right Ear: External ear normal.      Left Ear: External ear normal.      Nose: Nose normal.      Mouth/Throat:      Pharynx: Posterior  oropharyngeal erythema present.   Eyes:      Pupils: Pupils are equal, round, and reactive to light.   Neck:      Musculoskeletal: Normal range of motion and neck supple.   Cardiovascular:      Rate and Rhythm: Normal rate and regular rhythm.      Heart sounds: Normal heart sounds.   Pulmonary:      Effort: Pulmonary effort is normal. No respiratory distress.      Breath sounds: Normal breath sounds. No wheezing or rales.      Comments: Loose junky cough  Abdominal:      General: Bowel sounds are normal. There is no distension.      Palpations: Abdomen is soft. There is no mass.      Tenderness: There is no abdominal tenderness. There is no guarding.   Musculoskeletal: Normal range of motion.         General: No swelling.   Skin:     General: Skin is warm and dry.      Capillary Refill: Capillary refill takes less than 2 seconds.   Neurological:      Mental Status: She is alert and oriented to person, place, and time.      Deep Tendon Reflexes: Reflexes are normal and symmetric.         Assessment:       1. Rheumatoid arthritis involving multiple sites with positive rheumatoid factor    2. Acute non-recurrent sinusitis of other sinus    3. Type 2 diabetes mellitus without complication, without long-term current use of insulin        Plan:     Problem List Items Addressed This Visit     RA (rheumatoid arthritis) - Primary (Chronic)- will covid swab per Dr Rushing request                  Other Visit Diagnoses     Acute non-recurrent sinusitis of other sinus   Medrol today DM well controlled and did not take the steroids last month per Dr Fuentes rx and start z pack today

## 2020-11-19 ENCOUNTER — TELEPHONE (OUTPATIENT)
Dept: RHEUMATOLOGY | Facility: CLINIC | Age: 70
End: 2020-11-19

## 2020-11-19 DIAGNOSIS — M47.816 LUMBAR SPONDYLOSIS: Primary | ICD-10-CM

## 2020-11-20 LAB — SARS-COV-2 RNA RESP QL NAA+PROBE: NOT DETECTED

## 2020-11-30 ENCOUNTER — INFUSION (OUTPATIENT)
Dept: INFUSION THERAPY | Facility: HOSPITAL | Age: 70
End: 2020-11-30
Attending: INTERNAL MEDICINE
Payer: MEDICARE

## 2020-11-30 VITALS
DIASTOLIC BLOOD PRESSURE: 64 MMHG | HEART RATE: 91 BPM | SYSTOLIC BLOOD PRESSURE: 116 MMHG | RESPIRATION RATE: 18 BRPM | TEMPERATURE: 98 F

## 2020-11-30 DIAGNOSIS — M05.79 RHEUMATOID ARTHRITIS INVOLVING MULTIPLE SITES WITH POSITIVE RHEUMATOID FACTOR: Primary | ICD-10-CM

## 2020-11-30 PROCEDURE — 96365 THER/PROPH/DIAG IV INF INIT: CPT

## 2020-11-30 PROCEDURE — 25000003 PHARM REV CODE 250: Performed by: INTERNAL MEDICINE

## 2020-11-30 PROCEDURE — 63600175 PHARM REV CODE 636 W HCPCS: Performed by: INTERNAL MEDICINE

## 2020-11-30 RX ORDER — SODIUM CHLORIDE 0.9 % (FLUSH) 0.9 %
10 SYRINGE (ML) INJECTION
Status: CANCELLED | OUTPATIENT
Start: 2020-12-21

## 2020-11-30 RX ORDER — ACETAMINOPHEN 325 MG/1
650 TABLET ORAL
Status: CANCELLED | OUTPATIENT
Start: 2020-12-21

## 2020-11-30 RX ORDER — SODIUM CHLORIDE 0.9 % (FLUSH) 0.9 %
10 SYRINGE (ML) INJECTION
Status: DISCONTINUED | OUTPATIENT
Start: 2020-11-30 | End: 2020-11-30 | Stop reason: HOSPADM

## 2020-11-30 RX ORDER — HEPARIN 100 UNIT/ML
500 SYRINGE INTRAVENOUS
Status: CANCELLED | OUTPATIENT
Start: 2020-12-21

## 2020-11-30 RX ADMIN — SODIUM CHLORIDE 1000 MG: 9 INJECTION, SOLUTION INTRAVENOUS at 08:11

## 2020-11-30 NOTE — PATIENT INSTRUCTIONS
Abatacept solution for injection (subcutaneous or intravenous use)  What is this medicine?  ABATACEPT (a ba TA sept) is used to treat moderate to severe active rheumatoid arthritis in adults. This medicine is also used to treat juvenile idiopathic arthritis.  How should I use this medicine?  This medicine is for infusion into a vein or for injection under the skin. Infusions are given by a health care professional in a hospital or clinic setting. If you are to give your own medicine at home, you will be taught how to prepare and give this medicine under the skin. Use exactly as directed. Take your medicine at regular intervals. Do not take your medicine more often than directed.  It is important that you put your used needles and syringes in a special sharps container. Do not put them in a trash can. If you do not have a sharps container, call your pharmacist or healthcare provider to get one.  Talk to your pediatrician regarding the use of this medicine in children. While infusions in a clinic may be prescribed for children as young as 6 years for selected conditions, precautions do apply.  What side effects may I notice from receiving this medicine?  Side effects that you should report to your doctor or health care professional as soon as possible:  · allergic reactions like skin rash, itching or hives, swelling of the face, lips, or tongue  · breathing problems  · chest pain  · signs of infection - fever or chills, cough, unusual tiredness, pain or trouble passing urine, or warm, red or painful skin  Side effects that usually do not require medical attention (Report these to your doctor or health care professional if they continue or are bothersome.):  · dizziness  · headache  · nausea, vomiting  · sore throat  · stomach upset  What may interact with this medicine?  Do not take this medicine with any of the following  medications:  · adalimumab  · anakinra  · certolizumab  · etanercept  · golimumab  · infliximab  · live virus vaccines  · rituximab  · tocilizumab  This medicine may also interact with the following medications:  · vaccines  What if I miss a dose?  This medicine is used once a week if given by injection under the skin. If you miss a dose, take it as soon as you can. If it is almost time for your next dose, take only that dose. Do not take double or extra doses.  If you are to be given an infusion, it is important not to miss your dose. Doses are usually every 4 weeks. Call your doctor or health care professional if you are unable to keep an appointment.  Where should I keep my medicine?  Infusions will be given in a hospital or clinic and will not be stored at home.  Storage for syringes given under the skin and stored at home:  Keep out of the reach of children. Store in a refrigerator between 2 and 8 degrees C (36 and 46 degrees F). Keep this medicine in the original container. Protect from light. Do not freeze. Throw away any unused medicine after the expiration date.  What should I tell my health care provider before I take this medicine?  They need to know if you have any of these conditions:  · are taking other medicines to treat rheumatoid arthritis  · COPD  · diabetes  · infection or history of infections  · recently received or scheduled to receive a vaccine  · scheduled to have surgery  · tuberculosis, a positive skin test for tuberculosis or have recently been in close contact with someone who has tuberculosis  · viral hepatitis  · an unusual or allergic reaction to abatacept, other medicines, foods, dyes, or preservatives  · pregnant or trying to get pregnant  · breast-feeding  What should I watch for while using this medicine?  Visit your doctor for regular check ups while you are taking this medicine. Tell your doctor or healthcare professional if your symptoms do not start to get better or if they get  worse.  Call your doctor or health care professional if you get a cold or other infection while receiving this medicine. Do not treat yourself. This medicine may decrease your body's ability to fight infection. Try to avoid being around people who are sick.  NOTE:This sheet is a summary. It may not cover all possible information. If you have questions about this medicine, talk to your doctor, pharmacist, or health care provider. Copyright© 2017 [employer]        Abatacept Solution for injection  What is this medicine?  ABATACEPT (a ba TA sept) is used to treat moderate to severe active rheumatoid arthritis in adults. This medicine is also used to treat juvenile idiopathic arthritis.  How should I use this medicine?  This medicine is for infusion into a vein. It is given by a health care professional in a hospital or clinic setting.  Talk to your pediatrician regarding the use of this medicine in children. While this drug may be prescribed for children as young as 6 years for selected conditions, precautions do apply.  What side effects may I notice from receiving this medicine?  Side effects that you should report to your doctor or health care professional as soon as possible:  · allergic reactions like skin rash, itching or hives, swelling of the face, lips, or tongue  · breathing problems  · chest pain  · signs of infection - fever or chills, cough, unusual tiredness, pain or trouble passing urine, or warm, red or painful skin  Side effects that usually do not require medical attention (Report these to your doctor or health care professional if they continue or are bothersome.):  · dizziness  · headache  · nausea, vomiting  · sore throat  · stomach upset  What may interact with this medicine?  Do not take this medicine with any of the following medications:  · adalimumab  · anakinra  · certolizumab  · etanercept  · golimumab  · infliximab  · live virus vaccines  · rituximab  · tocilizumab  This medicine may  also interact with the following medications:  · vaccines  What if I miss a dose?  It is important not to miss your dose. Call your doctor or health care professional if you are unable to keep an appointment.  Where should I keep my medicine?  This drug is given in a hospital or clinic and will not be stored at home.  What should I tell my health care provider before I take this medicine?  They need to know if you have any of these conditions:  · are taking other medicines to treat rheumatoid arthritis  · COPD  · diabetes  · infection or history of infections  · recently received or scheduled to receive a vaccine  · scheduled to have surgery  · tuberculosis, a positive skin test for tuberculosis or have recently been in close contact with someone who has tuberculosis  · viral hepatitis  · an unusual or allergic reaction to abatacept, other medicines, foods, dyes, or preservatives  · pregnant or trying to get pregnant  · breast-feeding  What should I watch for while using this medicine?  Visit your doctor for regular check ups while you are taking this medicine. Tell your doctor or healthcare professional if your symptoms do not start to get better or if they get worse.  Call your doctor or health care professional if you get a cold or other infection while receiving this medicine. Do not treat yourself. This medicine may decrease your body's ability to fight infection. Try to avoid being around people who are sick.  NOTE:This sheet is a summary. It may not cover all possible information. If you have questions about this medicine, talk to your doctor, pharmacist, or health care provider. Copyright© 2017 [employer]

## 2020-12-28 ENCOUNTER — INFUSION (OUTPATIENT)
Dept: INFUSION THERAPY | Facility: HOSPITAL | Age: 70
End: 2020-12-28
Attending: INTERNAL MEDICINE
Payer: MEDICARE

## 2020-12-28 VITALS
TEMPERATURE: 97 F | HEART RATE: 88 BPM | DIASTOLIC BLOOD PRESSURE: 77 MMHG | RESPIRATION RATE: 18 BRPM | SYSTOLIC BLOOD PRESSURE: 133 MMHG | WEIGHT: 200.63 LBS | BODY MASS INDEX: 31.23 KG/M2

## 2020-12-28 DIAGNOSIS — M05.79 RHEUMATOID ARTHRITIS INVOLVING MULTIPLE SITES WITH POSITIVE RHEUMATOID FACTOR: Primary | ICD-10-CM

## 2020-12-28 PROCEDURE — 63600175 PHARM REV CODE 636 W HCPCS: Performed by: INTERNAL MEDICINE

## 2020-12-28 PROCEDURE — 25000003 PHARM REV CODE 250: Performed by: INTERNAL MEDICINE

## 2020-12-28 PROCEDURE — 96365 THER/PROPH/DIAG IV INF INIT: CPT

## 2020-12-28 RX ORDER — SODIUM CHLORIDE 0.9 % (FLUSH) 0.9 %
10 SYRINGE (ML) INJECTION
Status: CANCELLED | OUTPATIENT
Start: 2021-01-18

## 2020-12-28 RX ORDER — ACETAMINOPHEN 325 MG/1
650 TABLET ORAL
Status: CANCELLED | OUTPATIENT
Start: 2021-01-18

## 2020-12-28 RX ORDER — HEPARIN 100 UNIT/ML
500 SYRINGE INTRAVENOUS
Status: CANCELLED | OUTPATIENT
Start: 2021-01-18

## 2020-12-28 RX ADMIN — SODIUM CHLORIDE 1000 MG: 9 INJECTION, SOLUTION INTRAVENOUS at 08:12

## 2021-01-12 ENCOUNTER — HOSPITAL ENCOUNTER (OUTPATIENT)
Dept: RADIOLOGY | Facility: HOSPITAL | Age: 71
Discharge: HOME | End: 2021-01-12
Attending: NURSE PRACTITIONER
Payer: MEDICARE

## 2021-01-12 ENCOUNTER — OFFICE VISIT (OUTPATIENT)
Dept: INTERNAL MEDICINE | Facility: CLINIC | Age: 71
End: 2021-01-12
Payer: MEDICARE

## 2021-01-12 VITALS
BODY MASS INDEX: 34.22 KG/M2 | DIASTOLIC BLOOD PRESSURE: 64 MMHG | OXYGEN SATURATION: 96 % | RESPIRATION RATE: 16 BRPM | HEIGHT: 67 IN | WEIGHT: 218.06 LBS | SYSTOLIC BLOOD PRESSURE: 114 MMHG | HEART RATE: 98 BPM | TEMPERATURE: 98 F

## 2021-01-12 DIAGNOSIS — J20.9 ACUTE BRONCHITIS, UNSPECIFIED ORGANISM: ICD-10-CM

## 2021-01-12 DIAGNOSIS — Z12.31 ENCOUNTER FOR SCREENING MAMMOGRAM FOR BREAST CANCER: Primary | ICD-10-CM

## 2021-01-12 LAB
CTP QC/QA: YES
POC MOLECULAR INFLUENZA A AGN: NEGATIVE
POC MOLECULAR INFLUENZA B AGN: NEGATIVE

## 2021-01-12 PROCEDURE — 99213 PR OFFICE/OUTPT VISIT, EST, LEVL III, 20-29 MIN: ICD-10-PCS | Mod: S$GLB,,, | Performed by: NURSE PRACTITIONER

## 2021-01-12 PROCEDURE — U0003 INFECTIOUS AGENT DETECTION BY NUCLEIC ACID (DNA OR RNA); SEVERE ACUTE RESPIRATORY SYNDROME CORONAVIRUS 2 (SARS-COV-2) (CORONAVIRUS DISEASE [COVID-19]), AMPLIFIED PROBE TECHNIQUE, MAKING USE OF HIGH THROUGHPUT TECHNOLOGIES AS DESCRIBED BY CMS-2020-01-R: HCPCS

## 2021-01-12 PROCEDURE — 1101F PT FALLS ASSESS-DOCD LE1/YR: CPT | Mod: CPTII,S$GLB,, | Performed by: NURSE PRACTITIONER

## 2021-01-12 PROCEDURE — 1159F PR MEDICATION LIST DOCUMENTED IN MEDICAL RECORD: ICD-10-PCS | Mod: S$GLB,,, | Performed by: NURSE PRACTITIONER

## 2021-01-12 PROCEDURE — 99213 OFFICE O/P EST LOW 20 MIN: CPT | Mod: S$GLB,,, | Performed by: NURSE PRACTITIONER

## 2021-01-12 PROCEDURE — 3008F PR BODY MASS INDEX (BMI) DOCUMENTED: ICD-10-PCS | Mod: CPTII,S$GLB,, | Performed by: NURSE PRACTITIONER

## 2021-01-12 PROCEDURE — 99999 PR PBB SHADOW E&M-EST. PATIENT-LVL V: CPT | Mod: PBBFAC,,, | Performed by: NURSE PRACTITIONER

## 2021-01-12 PROCEDURE — 71046 XR CHEST PA AND LATERAL: ICD-10-PCS | Mod: 26,,, | Performed by: RADIOLOGY

## 2021-01-12 PROCEDURE — 3074F PR MOST RECENT SYSTOLIC BLOOD PRESSURE < 130 MM HG: ICD-10-PCS | Mod: CPTII,S$GLB,, | Performed by: NURSE PRACTITIONER

## 2021-01-12 PROCEDURE — 71046 X-RAY EXAM CHEST 2 VIEWS: CPT | Mod: TC

## 2021-01-12 PROCEDURE — 3008F BODY MASS INDEX DOCD: CPT | Mod: CPTII,S$GLB,, | Performed by: NURSE PRACTITIONER

## 2021-01-12 PROCEDURE — 1159F MED LIST DOCD IN RCRD: CPT | Mod: S$GLB,,, | Performed by: NURSE PRACTITIONER

## 2021-01-12 PROCEDURE — 87502 INFLUENZA DNA AMP PROBE: CPT | Mod: QW,S$GLB,, | Performed by: NURSE PRACTITIONER

## 2021-01-12 PROCEDURE — 1126F PR PAIN SEVERITY QUANTIFIED, NO PAIN PRESENT: ICD-10-PCS | Mod: S$GLB,,, | Performed by: NURSE PRACTITIONER

## 2021-01-12 PROCEDURE — 3078F DIAST BP <80 MM HG: CPT | Mod: CPTII,S$GLB,, | Performed by: NURSE PRACTITIONER

## 2021-01-12 PROCEDURE — 3078F PR MOST RECENT DIASTOLIC BLOOD PRESSURE < 80 MM HG: ICD-10-PCS | Mod: CPTII,S$GLB,, | Performed by: NURSE PRACTITIONER

## 2021-01-12 PROCEDURE — 3074F SYST BP LT 130 MM HG: CPT | Mod: CPTII,S$GLB,, | Performed by: NURSE PRACTITIONER

## 2021-01-12 PROCEDURE — 87502 POCT INFLUENZA A/B MOLECULAR: ICD-10-PCS | Mod: QW,S$GLB,, | Performed by: NURSE PRACTITIONER

## 2021-01-12 PROCEDURE — 1101F PR PT FALLS ASSESS DOC 0-1 FALLS W/OUT INJ PAST YR: ICD-10-PCS | Mod: CPTII,S$GLB,, | Performed by: NURSE PRACTITIONER

## 2021-01-12 PROCEDURE — 71046 X-RAY EXAM CHEST 2 VIEWS: CPT | Mod: 26,,, | Performed by: RADIOLOGY

## 2021-01-12 PROCEDURE — 1126F AMNT PAIN NOTED NONE PRSNT: CPT | Mod: S$GLB,,, | Performed by: NURSE PRACTITIONER

## 2021-01-12 PROCEDURE — 3288F FALL RISK ASSESSMENT DOCD: CPT | Mod: CPTII,S$GLB,, | Performed by: NURSE PRACTITIONER

## 2021-01-12 PROCEDURE — 99999 PR PBB SHADOW E&M-EST. PATIENT-LVL V: ICD-10-PCS | Mod: PBBFAC,,, | Performed by: NURSE PRACTITIONER

## 2021-01-12 PROCEDURE — 3288F PR FALLS RISK ASSESSMENT DOCUMENTED: ICD-10-PCS | Mod: CPTII,S$GLB,, | Performed by: NURSE PRACTITIONER

## 2021-01-12 RX ORDER — ALBUTEROL SULFATE 1.25 MG/3ML
1.25 SOLUTION RESPIRATORY (INHALATION) EVERY 6 HOURS PRN
Qty: 1 BOX | Refills: 0 | Status: SHIPPED | OUTPATIENT
Start: 2021-01-12 | End: 2021-10-19

## 2021-01-12 RX ORDER — ALBUTEROL SULFATE 90 UG/1
AEROSOL, METERED RESPIRATORY (INHALATION)
COMMUNITY
Start: 2021-01-09 | End: 2021-10-19

## 2021-01-12 RX ORDER — IPRATROPIUM BROMIDE 0.5 MG/2.5ML
500 SOLUTION RESPIRATORY (INHALATION) 4 TIMES DAILY
Qty: 1 BOX | Refills: 0 | Status: SHIPPED | OUTPATIENT
Start: 2021-01-12 | End: 2021-10-19

## 2021-01-12 RX ORDER — PREDNISONE 20 MG/1
40 TABLET ORAL DAILY
Qty: 10 TABLET | Refills: 0 | Status: SHIPPED | OUTPATIENT
Start: 2021-01-12 | End: 2021-01-17

## 2021-01-12 RX ORDER — DOXYCYCLINE 100 MG/1
CAPSULE ORAL
COMMUNITY
Start: 2021-01-09 | End: 2022-08-30

## 2021-01-12 RX ORDER — BENZONATATE 100 MG/1
100 CAPSULE ORAL 3 TIMES DAILY PRN
COMMUNITY
End: 2021-10-19

## 2021-01-13 LAB — SARS-COV-2 RNA RESP QL NAA+PROBE: NOT DETECTED

## 2021-01-25 ENCOUNTER — INFUSION (OUTPATIENT)
Dept: INFUSION THERAPY | Facility: HOSPITAL | Age: 71
End: 2021-01-25
Attending: INTERNAL MEDICINE
Payer: MEDICARE

## 2021-01-25 VITALS
HEART RATE: 96 BPM | RESPIRATION RATE: 18 BRPM | DIASTOLIC BLOOD PRESSURE: 64 MMHG | TEMPERATURE: 99 F | SYSTOLIC BLOOD PRESSURE: 124 MMHG

## 2021-01-25 DIAGNOSIS — M05.79 RHEUMATOID ARTHRITIS INVOLVING MULTIPLE SITES WITH POSITIVE RHEUMATOID FACTOR: Primary | ICD-10-CM

## 2021-01-25 PROCEDURE — 63600175 PHARM REV CODE 636 W HCPCS: Performed by: INTERNAL MEDICINE

## 2021-01-25 PROCEDURE — 25000003 PHARM REV CODE 250: Performed by: INTERNAL MEDICINE

## 2021-01-25 PROCEDURE — 96365 THER/PROPH/DIAG IV INF INIT: CPT

## 2021-01-25 RX ORDER — ACETAMINOPHEN 325 MG/1
650 TABLET ORAL
Status: CANCELLED | OUTPATIENT
Start: 2021-02-15

## 2021-01-25 RX ORDER — SODIUM CHLORIDE 0.9 % (FLUSH) 0.9 %
10 SYRINGE (ML) INJECTION
Status: CANCELLED | OUTPATIENT
Start: 2021-02-15

## 2021-01-25 RX ORDER — HEPARIN 100 UNIT/ML
500 SYRINGE INTRAVENOUS
Status: CANCELLED | OUTPATIENT
Start: 2021-02-15

## 2021-01-25 RX ADMIN — SODIUM CHLORIDE 1000 MG: 9 INJECTION, SOLUTION INTRAVENOUS at 08:01

## 2021-01-26 ENCOUNTER — OFFICE VISIT (OUTPATIENT)
Dept: INTERNAL MEDICINE | Facility: CLINIC | Age: 71
End: 2021-01-26
Payer: MEDICARE

## 2021-01-26 VITALS
BODY MASS INDEX: 32.35 KG/M2 | OXYGEN SATURATION: 96 % | DIASTOLIC BLOOD PRESSURE: 64 MMHG | WEIGHT: 206.13 LBS | HEART RATE: 101 BPM | RESPIRATION RATE: 18 BRPM | HEIGHT: 67 IN | SYSTOLIC BLOOD PRESSURE: 118 MMHG | TEMPERATURE: 99 F

## 2021-01-26 DIAGNOSIS — M25.551 RIGHT HIP PAIN: ICD-10-CM

## 2021-01-26 DIAGNOSIS — J20.9 ACUTE BRONCHITIS, UNSPECIFIED ORGANISM: Primary | ICD-10-CM

## 2021-01-26 PROCEDURE — 96372 THER/PROPH/DIAG INJ SC/IM: CPT | Mod: S$GLB,,, | Performed by: NURSE PRACTITIONER

## 2021-01-26 PROCEDURE — 3074F SYST BP LT 130 MM HG: CPT | Mod: CPTII,S$GLB,, | Performed by: NURSE PRACTITIONER

## 2021-01-26 PROCEDURE — 1101F PR PT FALLS ASSESS DOC 0-1 FALLS W/OUT INJ PAST YR: ICD-10-PCS | Mod: CPTII,S$GLB,, | Performed by: NURSE PRACTITIONER

## 2021-01-26 PROCEDURE — 1159F PR MEDICATION LIST DOCUMENTED IN MEDICAL RECORD: ICD-10-PCS | Mod: S$GLB,,, | Performed by: NURSE PRACTITIONER

## 2021-01-26 PROCEDURE — 3008F PR BODY MASS INDEX (BMI) DOCUMENTED: ICD-10-PCS | Mod: CPTII,S$GLB,, | Performed by: NURSE PRACTITIONER

## 2021-01-26 PROCEDURE — 3288F FALL RISK ASSESSMENT DOCD: CPT | Mod: CPTII,S$GLB,, | Performed by: NURSE PRACTITIONER

## 2021-01-26 PROCEDURE — 1101F PT FALLS ASSESS-DOCD LE1/YR: CPT | Mod: CPTII,S$GLB,, | Performed by: NURSE PRACTITIONER

## 2021-01-26 PROCEDURE — 3288F PR FALLS RISK ASSESSMENT DOCUMENTED: ICD-10-PCS | Mod: CPTII,S$GLB,, | Performed by: NURSE PRACTITIONER

## 2021-01-26 PROCEDURE — 99999 PR PBB SHADOW E&M-EST. PATIENT-LVL V: CPT | Mod: PBBFAC,,, | Performed by: NURSE PRACTITIONER

## 2021-01-26 PROCEDURE — 99999 PR PBB SHADOW E&M-EST. PATIENT-LVL V: ICD-10-PCS | Mod: PBBFAC,,, | Performed by: NURSE PRACTITIONER

## 2021-01-26 PROCEDURE — 3078F DIAST BP <80 MM HG: CPT | Mod: CPTII,S$GLB,, | Performed by: NURSE PRACTITIONER

## 2021-01-26 PROCEDURE — 3078F PR MOST RECENT DIASTOLIC BLOOD PRESSURE < 80 MM HG: ICD-10-PCS | Mod: CPTII,S$GLB,, | Performed by: NURSE PRACTITIONER

## 2021-01-26 PROCEDURE — 1159F MED LIST DOCD IN RCRD: CPT | Mod: S$GLB,,, | Performed by: NURSE PRACTITIONER

## 2021-01-26 PROCEDURE — 99214 PR OFFICE/OUTPT VISIT, EST, LEVL IV, 30-39 MIN: ICD-10-PCS | Mod: 25,S$GLB,, | Performed by: NURSE PRACTITIONER

## 2021-01-26 PROCEDURE — 3074F PR MOST RECENT SYSTOLIC BLOOD PRESSURE < 130 MM HG: ICD-10-PCS | Mod: CPTII,S$GLB,, | Performed by: NURSE PRACTITIONER

## 2021-01-26 PROCEDURE — 3008F BODY MASS INDEX DOCD: CPT | Mod: CPTII,S$GLB,, | Performed by: NURSE PRACTITIONER

## 2021-01-26 PROCEDURE — 1125F AMNT PAIN NOTED PAIN PRSNT: CPT | Mod: S$GLB,,, | Performed by: NURSE PRACTITIONER

## 2021-01-26 PROCEDURE — 99214 OFFICE O/P EST MOD 30 MIN: CPT | Mod: 25,S$GLB,, | Performed by: NURSE PRACTITIONER

## 2021-01-26 PROCEDURE — 96372 PR INJECTION,THERAP/PROPH/DIAG2ST, IM OR SUBCUT: ICD-10-PCS | Mod: S$GLB,,, | Performed by: NURSE PRACTITIONER

## 2021-01-26 PROCEDURE — 1125F PR PAIN SEVERITY QUANTIFIED, PAIN PRESENT: ICD-10-PCS | Mod: S$GLB,,, | Performed by: NURSE PRACTITIONER

## 2021-01-26 RX ORDER — KETOROLAC TROMETHAMINE 30 MG/ML
30 INJECTION, SOLUTION INTRAMUSCULAR; INTRAVENOUS
Status: COMPLETED | OUTPATIENT
Start: 2021-01-26 | End: 2021-01-26

## 2021-01-26 RX ORDER — CYCLOBENZAPRINE HCL 5 MG
5 TABLET ORAL 3 TIMES DAILY PRN
Qty: 20 TABLET | Refills: 0 | Status: SHIPPED | OUTPATIENT
Start: 2021-01-26 | End: 2021-02-05

## 2021-01-26 RX ADMIN — KETOROLAC TROMETHAMINE 30 MG: 30 INJECTION, SOLUTION INTRAMUSCULAR; INTRAVENOUS at 03:01

## 2021-02-12 ENCOUNTER — TELEPHONE (OUTPATIENT)
Dept: INTERNAL MEDICINE | Facility: CLINIC | Age: 71
End: 2021-02-12

## 2021-02-18 RX ORDER — CLOTRIMAZOLE AND BETAMETHASONE DIPROPIONATE 10; .64 MG/G; MG/G
CREAM TOPICAL 2 TIMES DAILY
Qty: 45 G | Refills: 1 | Status: SHIPPED | OUTPATIENT
Start: 2021-02-18 | End: 2021-10-19

## 2021-02-22 ENCOUNTER — INFUSION (OUTPATIENT)
Dept: INFUSION THERAPY | Facility: HOSPITAL | Age: 71
End: 2021-02-22
Attending: INTERNAL MEDICINE
Payer: MEDICARE

## 2021-02-22 VITALS
DIASTOLIC BLOOD PRESSURE: 72 MMHG | TEMPERATURE: 98 F | HEART RATE: 91 BPM | RESPIRATION RATE: 18 BRPM | SYSTOLIC BLOOD PRESSURE: 129 MMHG

## 2021-02-22 DIAGNOSIS — M05.79 RHEUMATOID ARTHRITIS INVOLVING MULTIPLE SITES WITH POSITIVE RHEUMATOID FACTOR: Primary | ICD-10-CM

## 2021-02-22 PROCEDURE — 96365 THER/PROPH/DIAG IV INF INIT: CPT

## 2021-02-22 PROCEDURE — 63600175 PHARM REV CODE 636 W HCPCS: Performed by: INTERNAL MEDICINE

## 2021-02-22 PROCEDURE — 25000003 PHARM REV CODE 250: Performed by: INTERNAL MEDICINE

## 2021-02-22 RX ORDER — ACETAMINOPHEN 325 MG/1
650 TABLET ORAL
Status: CANCELLED | OUTPATIENT
Start: 2021-03-15

## 2021-02-22 RX ORDER — HEPARIN 100 UNIT/ML
500 SYRINGE INTRAVENOUS
Status: CANCELLED | OUTPATIENT
Start: 2021-03-15

## 2021-02-22 RX ORDER — SODIUM CHLORIDE 0.9 % (FLUSH) 0.9 %
10 SYRINGE (ML) INJECTION
Status: CANCELLED | OUTPATIENT
Start: 2021-03-15

## 2021-02-22 RX ADMIN — SODIUM CHLORIDE 1000 MG: 9 INJECTION, SOLUTION INTRAVENOUS at 07:02

## 2021-02-23 ENCOUNTER — PATIENT MESSAGE (OUTPATIENT)
Dept: RHEUMATOLOGY | Facility: CLINIC | Age: 71
End: 2021-02-23

## 2021-02-25 ENCOUNTER — HOSPITAL ENCOUNTER (OUTPATIENT)
Dept: RADIOLOGY | Facility: HOSPITAL | Age: 71
Discharge: HOME | End: 2021-02-25
Attending: NURSE PRACTITIONER
Payer: MEDICARE

## 2021-02-25 VITALS — HEIGHT: 67 IN | WEIGHT: 206 LBS | BODY MASS INDEX: 32.33 KG/M2

## 2021-02-25 DIAGNOSIS — Z12.31 ENCOUNTER FOR SCREENING MAMMOGRAM FOR BREAST CANCER: ICD-10-CM

## 2021-02-25 PROCEDURE — 77063 BREAST TOMOSYNTHESIS BI: CPT | Mod: 26,,, | Performed by: RADIOLOGY

## 2021-02-25 PROCEDURE — 77067 SCR MAMMO BI INCL CAD: CPT | Mod: 26,,, | Performed by: RADIOLOGY

## 2021-02-25 PROCEDURE — 77067 MAMMO DIGITAL SCREENING BILAT WITH TOMO: ICD-10-PCS | Mod: 26,,, | Performed by: RADIOLOGY

## 2021-02-25 PROCEDURE — 77067 SCR MAMMO BI INCL CAD: CPT | Mod: TC

## 2021-02-25 PROCEDURE — 77063 MAMMO DIGITAL SCREENING BILAT WITH TOMO: ICD-10-PCS | Mod: 26,,, | Performed by: RADIOLOGY

## 2021-03-01 ENCOUNTER — LAB VISIT (OUTPATIENT)
Dept: LAB | Facility: HOSPITAL | Age: 71
End: 2021-03-01
Attending: INTERNAL MEDICINE
Payer: MEDICARE

## 2021-03-01 DIAGNOSIS — M06.9 RHEUMATOID ARTHRITIS INVOLVING MULTIPLE SITES: ICD-10-CM

## 2021-03-01 LAB
ALBUMIN SERPL BCP-MCNC: 3.7 G/DL (ref 3.5–5.2)
ALP SERPL-CCNC: 84 U/L (ref 55–135)
ALT SERPL W/O P-5'-P-CCNC: 18 U/L (ref 10–44)
ANION GAP SERPL CALC-SCNC: 10 MMOL/L (ref 8–16)
AST SERPL-CCNC: 16 U/L (ref 10–40)
BASOPHILS # BLD AUTO: 0.1 K/UL (ref 0–0.2)
BASOPHILS NFR BLD: 0.8 % (ref 0–1.9)
BILIRUB SERPL-MCNC: 0.4 MG/DL (ref 0.1–1)
BUN SERPL-MCNC: 13 MG/DL (ref 8–23)
CALCIUM SERPL-MCNC: 8.8 MG/DL (ref 8.7–10.5)
CHLORIDE SERPL-SCNC: 104 MMOL/L (ref 95–110)
CO2 SERPL-SCNC: 27 MMOL/L (ref 23–29)
CREAT SERPL-MCNC: 0.7 MG/DL (ref 0.5–1.4)
DIFFERENTIAL METHOD: ABNORMAL
EOSINOPHIL # BLD AUTO: 0.6 K/UL (ref 0–0.5)
EOSINOPHIL NFR BLD: 4.7 % (ref 0–8)
ERYTHROCYTE [DISTWIDTH] IN BLOOD BY AUTOMATED COUNT: 15.3 % (ref 11.5–14.5)
ERYTHROCYTE [SEDIMENTATION RATE] IN BLOOD BY WESTERGREN METHOD: 35 MM/HR (ref 0–20)
EST. GFR  (AFRICAN AMERICAN): >60 ML/MIN/1.73 M^2
EST. GFR  (NON AFRICAN AMERICAN): >60 ML/MIN/1.73 M^2
GLUCOSE SERPL-MCNC: 133 MG/DL (ref 70–110)
HCT VFR BLD AUTO: 41.8 % (ref 37–48.5)
HGB BLD-MCNC: 13.7 G/DL (ref 12–16)
IMM GRANULOCYTES # BLD AUTO: 0.13 K/UL (ref 0–0.04)
IMM GRANULOCYTES NFR BLD AUTO: 1 % (ref 0–0.5)
LYMPHOCYTES # BLD AUTO: 3.6 K/UL (ref 1–4.8)
LYMPHOCYTES NFR BLD: 28.7 % (ref 18–48)
MCH RBC QN AUTO: 31.1 PG (ref 27–31)
MCHC RBC AUTO-ENTMCNC: 32.8 G/DL (ref 32–36)
MCV RBC AUTO: 95 FL (ref 82–98)
MONOCYTES # BLD AUTO: 0.8 K/UL (ref 0.3–1)
MONOCYTES NFR BLD: 6.4 % (ref 4–15)
NEUTROPHILS # BLD AUTO: 7.3 K/UL (ref 1.8–7.7)
NEUTROPHILS NFR BLD: 58.4 % (ref 38–73)
NRBC BLD-RTO: 0 /100 WBC
PLATELET # BLD AUTO: 261 K/UL (ref 150–350)
PMV BLD AUTO: 9.2 FL (ref 9.2–12.9)
POTASSIUM SERPL-SCNC: 4.3 MMOL/L (ref 3.5–5.1)
PROT SERPL-MCNC: 6.8 G/DL (ref 6–8.4)
RBC # BLD AUTO: 4.41 M/UL (ref 4–5.4)
SODIUM SERPL-SCNC: 141 MMOL/L (ref 136–145)
WBC # BLD AUTO: 12.57 K/UL (ref 3.9–12.7)

## 2021-03-01 PROCEDURE — 85651 RBC SED RATE NONAUTOMATED: CPT

## 2021-03-01 PROCEDURE — 36415 COLL VENOUS BLD VENIPUNCTURE: CPT

## 2021-03-01 PROCEDURE — 80053 COMPREHEN METABOLIC PANEL: CPT

## 2021-03-01 PROCEDURE — 85025 COMPLETE CBC W/AUTO DIFF WBC: CPT

## 2021-03-01 PROCEDURE — 86140 C-REACTIVE PROTEIN: CPT

## 2021-03-02 ENCOUNTER — PATIENT OUTREACH (OUTPATIENT)
Dept: ADMINISTRATIVE | Facility: OTHER | Age: 71
End: 2021-03-02

## 2021-03-02 DIAGNOSIS — E11.9 TYPE 2 DIABETES MELLITUS WITHOUT COMPLICATION, WITHOUT LONG-TERM CURRENT USE OF INSULIN: Primary | ICD-10-CM

## 2021-03-02 LAB — CRP SERPL-MCNC: 10.8 MG/L (ref 0–8.2)

## 2021-03-03 ENCOUNTER — OFFICE VISIT (OUTPATIENT)
Dept: RHEUMATOLOGY | Facility: CLINIC | Age: 71
End: 2021-03-03
Payer: MEDICARE

## 2021-03-03 VITALS
SYSTOLIC BLOOD PRESSURE: 132 MMHG | BODY MASS INDEX: 32.07 KG/M2 | HEART RATE: 105 BPM | HEIGHT: 67 IN | DIASTOLIC BLOOD PRESSURE: 76 MMHG | WEIGHT: 204.31 LBS

## 2021-03-03 DIAGNOSIS — M05.79 RHEUMATOID ARTHRITIS INVOLVING MULTIPLE SITES WITH POSITIVE RHEUMATOID FACTOR: Primary | ICD-10-CM

## 2021-03-03 DIAGNOSIS — M05.771 RHEUMATOID ARTHRITIS INVOLVING BOTH ANKLES WITH POSITIVE RHEUMATOID FACTOR: ICD-10-CM

## 2021-03-03 DIAGNOSIS — M81.0 OSTEOPOROSIS WITHOUT CURRENT PATHOLOGICAL FRACTURE, UNSPECIFIED OSTEOPOROSIS TYPE: ICD-10-CM

## 2021-03-03 DIAGNOSIS — M05.772 RHEUMATOID ARTHRITIS INVOLVING BOTH ANKLES WITH POSITIVE RHEUMATOID FACTOR: ICD-10-CM

## 2021-03-03 DIAGNOSIS — M15.9 OSTEOARTHRITIS, GENERALIZED: ICD-10-CM

## 2021-03-03 PROCEDURE — 3008F BODY MASS INDEX DOCD: CPT | Mod: CPTII,S$GLB,, | Performed by: INTERNAL MEDICINE

## 2021-03-03 PROCEDURE — 3075F PR MOST RECENT SYSTOLIC BLOOD PRESS GE 130-139MM HG: ICD-10-PCS | Mod: CPTII,S$GLB,, | Performed by: INTERNAL MEDICINE

## 2021-03-03 PROCEDURE — 3008F PR BODY MASS INDEX (BMI) DOCUMENTED: ICD-10-PCS | Mod: CPTII,S$GLB,, | Performed by: INTERNAL MEDICINE

## 2021-03-03 PROCEDURE — 99499 RISK ADDL DX/OHS AUDIT: ICD-10-PCS | Mod: S$GLB,,, | Performed by: INTERNAL MEDICINE

## 2021-03-03 PROCEDURE — 1159F MED LIST DOCD IN RCRD: CPT | Mod: S$GLB,,, | Performed by: INTERNAL MEDICINE

## 2021-03-03 PROCEDURE — 99214 PR OFFICE/OUTPT VISIT, EST, LEVL IV, 30-39 MIN: ICD-10-PCS | Mod: S$GLB,,, | Performed by: INTERNAL MEDICINE

## 2021-03-03 PROCEDURE — 3288F FALL RISK ASSESSMENT DOCD: CPT | Mod: CPTII,S$GLB,, | Performed by: INTERNAL MEDICINE

## 2021-03-03 PROCEDURE — 3288F PR FALLS RISK ASSESSMENT DOCUMENTED: ICD-10-PCS | Mod: CPTII,S$GLB,, | Performed by: INTERNAL MEDICINE

## 2021-03-03 PROCEDURE — 3078F DIAST BP <80 MM HG: CPT | Mod: CPTII,S$GLB,, | Performed by: INTERNAL MEDICINE

## 2021-03-03 PROCEDURE — 3078F PR MOST RECENT DIASTOLIC BLOOD PRESSURE < 80 MM HG: ICD-10-PCS | Mod: CPTII,S$GLB,, | Performed by: INTERNAL MEDICINE

## 2021-03-03 PROCEDURE — 99999 PR PBB SHADOW E&M-EST. PATIENT-LVL III: CPT | Mod: PBBFAC,,, | Performed by: INTERNAL MEDICINE

## 2021-03-03 PROCEDURE — 1159F PR MEDICATION LIST DOCUMENTED IN MEDICAL RECORD: ICD-10-PCS | Mod: S$GLB,,, | Performed by: INTERNAL MEDICINE

## 2021-03-03 PROCEDURE — 99999 PR PBB SHADOW E&M-EST. PATIENT-LVL III: ICD-10-PCS | Mod: PBBFAC,,, | Performed by: INTERNAL MEDICINE

## 2021-03-03 PROCEDURE — 3075F SYST BP GE 130 - 139MM HG: CPT | Mod: CPTII,S$GLB,, | Performed by: INTERNAL MEDICINE

## 2021-03-03 PROCEDURE — 1101F PT FALLS ASSESS-DOCD LE1/YR: CPT | Mod: CPTII,S$GLB,, | Performed by: INTERNAL MEDICINE

## 2021-03-03 PROCEDURE — 1101F PR PT FALLS ASSESS DOC 0-1 FALLS W/OUT INJ PAST YR: ICD-10-PCS | Mod: CPTII,S$GLB,, | Performed by: INTERNAL MEDICINE

## 2021-03-03 PROCEDURE — 1125F AMNT PAIN NOTED PAIN PRSNT: CPT | Mod: S$GLB,,, | Performed by: INTERNAL MEDICINE

## 2021-03-03 PROCEDURE — 99499 UNLISTED E&M SERVICE: CPT | Mod: S$GLB,,, | Performed by: INTERNAL MEDICINE

## 2021-03-03 PROCEDURE — 99214 OFFICE O/P EST MOD 30 MIN: CPT | Mod: S$GLB,,, | Performed by: INTERNAL MEDICINE

## 2021-03-03 PROCEDURE — 1125F PR PAIN SEVERITY QUANTIFIED, PAIN PRESENT: ICD-10-PCS | Mod: S$GLB,,, | Performed by: INTERNAL MEDICINE

## 2021-03-03 RX ORDER — METHOTREXATE 25 MG/ML
20 INJECTION, SOLUTION INTRA-ARTERIAL; INTRAMUSCULAR; INTRAVENOUS
Qty: 4 ML | Refills: 2 | Status: SHIPPED | OUTPATIENT
Start: 2021-03-03 | End: 2021-03-03

## 2021-03-03 RX ORDER — METHOTREXATE 25 MG/ML
INJECTION, SOLUTION INTRA-ARTERIAL; INTRAMUSCULAR; INTRAVENOUS
Qty: 4 ML | Refills: 2 | Status: SHIPPED | OUTPATIENT
Start: 2021-03-03 | End: 2022-06-06

## 2021-03-03 RX ORDER — FOLIC ACID 1 MG/1
TABLET ORAL
Qty: 150 TABLET | Refills: 3 | Status: SHIPPED | OUTPATIENT
Start: 2021-03-03 | End: 2022-08-30 | Stop reason: SDUPTHER

## 2021-03-03 ASSESSMENT — ROUTINE ASSESSMENT OF PATIENT INDEX DATA (RAPID3)
FATIGUE SCORE: 0
AM STIFFNESS SCORE: 0, NO
PSYCHOLOGICAL DISTRESS SCORE: 0
PAIN SCORE: 1
MDHAQ FUNCTION SCORE: 0.3
TOTAL RAPID3 SCORE: 0.67
PATIENT GLOBAL ASSESSMENT SCORE: 0

## 2021-03-04 ENCOUNTER — HOSPITAL ENCOUNTER (OUTPATIENT)
Dept: RADIOLOGY | Facility: HOSPITAL | Age: 71
Discharge: HOME | End: 2021-03-04
Attending: STUDENT IN AN ORGANIZED HEALTH CARE EDUCATION/TRAINING PROGRAM
Payer: MEDICARE

## 2021-03-04 ENCOUNTER — HOSPITAL ENCOUNTER (OUTPATIENT)
Dept: RADIOLOGY | Facility: HOSPITAL | Age: 71
Discharge: HOME | End: 2021-03-04
Attending: NURSE PRACTITIONER
Payer: MEDICARE

## 2021-03-04 DIAGNOSIS — R92.8 ABNORMAL MAMMOGRAM: ICD-10-CM

## 2021-03-04 DIAGNOSIS — M15.9 OSTEOARTHRITIS, GENERALIZED: ICD-10-CM

## 2021-03-04 PROCEDURE — 73521 X-RAY EXAM HIPS BI 2 VIEWS: CPT | Mod: TC

## 2021-03-04 PROCEDURE — 77061 BREAST TOMOSYNTHESIS UNI: CPT | Mod: TC,LT

## 2021-03-04 PROCEDURE — 77065 MAMMO DIGITAL DIAGNOSTIC LEFT WITH TOMO: ICD-10-PCS | Mod: 26,LT,, | Performed by: RADIOLOGY

## 2021-03-04 PROCEDURE — 77061 BREAST TOMOSYNTHESIS UNI: CPT | Mod: 26,LT,, | Performed by: RADIOLOGY

## 2021-03-04 PROCEDURE — 73521 XR HIPS BILATERAL 2 VIEW INCL AP PELVIS: ICD-10-PCS | Mod: 26,,, | Performed by: RADIOLOGY

## 2021-03-04 PROCEDURE — 77061 MAMMO DIGITAL DIAGNOSTIC LEFT WITH TOMO: ICD-10-PCS | Mod: 26,LT,, | Performed by: RADIOLOGY

## 2021-03-04 PROCEDURE — 73521 X-RAY EXAM HIPS BI 2 VIEWS: CPT | Mod: 26,,, | Performed by: RADIOLOGY

## 2021-03-04 PROCEDURE — 77065 DX MAMMO INCL CAD UNI: CPT | Mod: 26,LT,, | Performed by: RADIOLOGY

## 2021-03-11 DIAGNOSIS — E11.9 TYPE 2 DIABETES MELLITUS WITHOUT COMPLICATION, WITHOUT LONG-TERM CURRENT USE OF INSULIN: ICD-10-CM

## 2021-03-11 RX ORDER — SITAGLIPTIN AND METFORMIN HYDROCHLORIDE 1000; 50 MG/1; MG/1
1 TABLET, FILM COATED ORAL 2 TIMES DAILY WITH MEALS
Qty: 60 TABLET | Refills: 0 | Status: SHIPPED | OUTPATIENT
Start: 2021-03-11 | End: 2021-04-06

## 2021-03-22 ENCOUNTER — INFUSION (OUTPATIENT)
Dept: INFUSION THERAPY | Facility: HOSPITAL | Age: 71
End: 2021-03-22
Attending: NURSE PRACTITIONER
Payer: MEDICARE

## 2021-03-22 VITALS
HEART RATE: 90 BPM | WEIGHT: 204 LBS | RESPIRATION RATE: 18 BRPM | DIASTOLIC BLOOD PRESSURE: 67 MMHG | TEMPERATURE: 98 F | BODY MASS INDEX: 31.76 KG/M2 | SYSTOLIC BLOOD PRESSURE: 120 MMHG

## 2021-03-22 DIAGNOSIS — M05.79 RHEUMATOID ARTHRITIS INVOLVING MULTIPLE SITES WITH POSITIVE RHEUMATOID FACTOR: Primary | ICD-10-CM

## 2021-03-22 PROCEDURE — 96365 THER/PROPH/DIAG IV INF INIT: CPT

## 2021-03-22 PROCEDURE — 25000003 PHARM REV CODE 250: Performed by: INTERNAL MEDICINE

## 2021-03-22 PROCEDURE — 63600175 PHARM REV CODE 636 W HCPCS: Performed by: INTERNAL MEDICINE

## 2021-03-22 RX ORDER — HEPARIN 100 UNIT/ML
500 SYRINGE INTRAVENOUS
Status: CANCELLED | OUTPATIENT
Start: 2021-04-12

## 2021-03-22 RX ORDER — ACETAMINOPHEN 325 MG/1
650 TABLET ORAL
Status: CANCELLED | OUTPATIENT
Start: 2021-04-12

## 2021-03-22 RX ORDER — SODIUM CHLORIDE 0.9 % (FLUSH) 0.9 %
10 SYRINGE (ML) INJECTION
Status: CANCELLED | OUTPATIENT
Start: 2021-04-12

## 2021-03-22 RX ADMIN — SODIUM CHLORIDE 1000 MG: 9 INJECTION, SOLUTION INTRAVENOUS at 08:03

## 2021-04-26 ENCOUNTER — INFUSION (OUTPATIENT)
Dept: INFUSION THERAPY | Facility: HOSPITAL | Age: 71
End: 2021-04-26
Attending: NURSE PRACTITIONER
Payer: MEDICARE

## 2021-04-26 VITALS
RESPIRATION RATE: 18 BRPM | SYSTOLIC BLOOD PRESSURE: 116 MMHG | HEART RATE: 89 BPM | BODY MASS INDEX: 31.61 KG/M2 | WEIGHT: 203 LBS | DIASTOLIC BLOOD PRESSURE: 68 MMHG

## 2021-04-26 DIAGNOSIS — M05.79 RHEUMATOID ARTHRITIS INVOLVING MULTIPLE SITES WITH POSITIVE RHEUMATOID FACTOR: Primary | ICD-10-CM

## 2021-04-26 PROCEDURE — 63600175 PHARM REV CODE 636 W HCPCS: Performed by: INTERNAL MEDICINE

## 2021-04-26 PROCEDURE — 25000003 PHARM REV CODE 250: Performed by: INTERNAL MEDICINE

## 2021-04-26 PROCEDURE — 96365 THER/PROPH/DIAG IV INF INIT: CPT

## 2021-04-26 RX ORDER — ACETAMINOPHEN 325 MG/1
650 TABLET ORAL
Status: CANCELLED | OUTPATIENT
Start: 2021-05-10

## 2021-04-26 RX ORDER — SODIUM CHLORIDE 0.9 % (FLUSH) 0.9 %
10 SYRINGE (ML) INJECTION
Status: CANCELLED | OUTPATIENT
Start: 2021-05-10

## 2021-04-26 RX ORDER — HEPARIN 100 UNIT/ML
500 SYRINGE INTRAVENOUS
Status: CANCELLED | OUTPATIENT
Start: 2021-05-10

## 2021-04-26 RX ADMIN — SODIUM CHLORIDE 1000 MG: 9 INJECTION, SOLUTION INTRAVENOUS at 08:04

## 2021-05-12 ENCOUNTER — TELEPHONE (OUTPATIENT)
Dept: RHEUMATOLOGY | Facility: CLINIC | Age: 71
End: 2021-05-12

## 2021-06-02 ENCOUNTER — INFUSION (OUTPATIENT)
Dept: INFUSION THERAPY | Facility: HOSPITAL | Age: 71
End: 2021-06-02
Attending: NURSE PRACTITIONER
Payer: MEDICARE

## 2021-06-02 ENCOUNTER — LAB VISIT (OUTPATIENT)
Dept: LAB | Facility: HOSPITAL | Age: 71
End: 2021-06-02
Attending: NURSE PRACTITIONER
Payer: MEDICARE

## 2021-06-02 VITALS
TEMPERATURE: 98 F | HEART RATE: 91 BPM | SYSTOLIC BLOOD PRESSURE: 115 MMHG | DIASTOLIC BLOOD PRESSURE: 72 MMHG | RESPIRATION RATE: 18 BRPM

## 2021-06-02 DIAGNOSIS — M05.79 RHEUMATOID ARTHRITIS INVOLVING MULTIPLE SITES WITH POSITIVE RHEUMATOID FACTOR: Primary | ICD-10-CM

## 2021-06-02 DIAGNOSIS — M06.9 RHEUMATOID ARTHRITIS INVOLVING MULTIPLE SITES: ICD-10-CM

## 2021-06-02 LAB
ALBUMIN SERPL BCP-MCNC: 3.6 G/DL (ref 3.5–5.2)
ALP SERPL-CCNC: 76 U/L (ref 55–135)
ALT SERPL W/O P-5'-P-CCNC: 22 U/L (ref 10–44)
ANION GAP SERPL CALC-SCNC: 10 MMOL/L (ref 8–16)
AST SERPL-CCNC: 19 U/L (ref 10–40)
BASOPHILS # BLD AUTO: 0.09 K/UL (ref 0–0.2)
BASOPHILS NFR BLD: 0.8 % (ref 0–1.9)
BILIRUB SERPL-MCNC: 0.3 MG/DL (ref 0.1–1)
BUN SERPL-MCNC: 10 MG/DL (ref 8–23)
CALCIUM SERPL-MCNC: 8.9 MG/DL (ref 8.7–10.5)
CHLORIDE SERPL-SCNC: 104 MMOL/L (ref 95–110)
CO2 SERPL-SCNC: 24 MMOL/L (ref 23–29)
CREAT SERPL-MCNC: 0.8 MG/DL (ref 0.5–1.4)
DIFFERENTIAL METHOD: ABNORMAL
EOSINOPHIL # BLD AUTO: 0.4 K/UL (ref 0–0.5)
EOSINOPHIL NFR BLD: 3.8 % (ref 0–8)
ERYTHROCYTE [DISTWIDTH] IN BLOOD BY AUTOMATED COUNT: 14.7 % (ref 11.5–14.5)
ERYTHROCYTE [SEDIMENTATION RATE] IN BLOOD BY WESTERGREN METHOD: 16 MM/HR (ref 0–20)
EST. GFR  (AFRICAN AMERICAN): >60 ML/MIN/1.73 M^2
EST. GFR  (NON AFRICAN AMERICAN): >60 ML/MIN/1.73 M^2
GLUCOSE SERPL-MCNC: 139 MG/DL (ref 70–110)
HCT VFR BLD AUTO: 42.6 % (ref 37–48.5)
HGB BLD-MCNC: 14 G/DL (ref 12–16)
IMM GRANULOCYTES # BLD AUTO: 0.09 K/UL (ref 0–0.04)
IMM GRANULOCYTES NFR BLD AUTO: 0.8 % (ref 0–0.5)
LYMPHOCYTES # BLD AUTO: 2.9 K/UL (ref 1–4.8)
LYMPHOCYTES NFR BLD: 26.8 % (ref 18–48)
MCH RBC QN AUTO: 30.2 PG (ref 27–31)
MCHC RBC AUTO-ENTMCNC: 32.9 G/DL (ref 32–36)
MCV RBC AUTO: 92 FL (ref 82–98)
MONOCYTES # BLD AUTO: 0.9 K/UL (ref 0.3–1)
MONOCYTES NFR BLD: 8.3 % (ref 4–15)
NEUTROPHILS # BLD AUTO: 6.5 K/UL (ref 1.8–7.7)
NEUTROPHILS NFR BLD: 59.5 % (ref 38–73)
NRBC BLD-RTO: 0 /100 WBC
PLATELET # BLD AUTO: 257 K/UL (ref 150–450)
PMV BLD AUTO: 9.7 FL (ref 9.2–12.9)
POTASSIUM SERPL-SCNC: 4.1 MMOL/L (ref 3.5–5.1)
PROT SERPL-MCNC: 6.7 G/DL (ref 6–8.4)
RBC # BLD AUTO: 4.63 M/UL (ref 4–5.4)
SODIUM SERPL-SCNC: 138 MMOL/L (ref 136–145)
WBC # BLD AUTO: 10.85 K/UL (ref 3.9–12.7)

## 2021-06-02 PROCEDURE — 36592 COLLECT BLOOD FROM PICC: CPT

## 2021-06-02 PROCEDURE — 80053 COMPREHEN METABOLIC PANEL: CPT | Performed by: INTERNAL MEDICINE

## 2021-06-02 PROCEDURE — 85651 RBC SED RATE NONAUTOMATED: CPT | Performed by: INTERNAL MEDICINE

## 2021-06-02 PROCEDURE — 63600175 PHARM REV CODE 636 W HCPCS: Performed by: NURSE PRACTITIONER

## 2021-06-02 PROCEDURE — 25000003 PHARM REV CODE 250: Performed by: NURSE PRACTITIONER

## 2021-06-02 PROCEDURE — 86140 C-REACTIVE PROTEIN: CPT | Performed by: INTERNAL MEDICINE

## 2021-06-02 PROCEDURE — 85025 COMPLETE CBC W/AUTO DIFF WBC: CPT | Performed by: INTERNAL MEDICINE

## 2021-06-02 PROCEDURE — 96365 THER/PROPH/DIAG IV INF INIT: CPT

## 2021-06-02 RX ADMIN — SODIUM CHLORIDE 1000 MG: 9 INJECTION, SOLUTION INTRAVENOUS at 08:06

## 2021-06-03 LAB — CRP SERPL-MCNC: 9.1 MG/L (ref 0–8.2)

## 2021-07-07 ENCOUNTER — PATIENT MESSAGE (OUTPATIENT)
Dept: ADMINISTRATIVE | Facility: HOSPITAL | Age: 71
End: 2021-07-07

## 2021-07-28 DIAGNOSIS — E11.9 TYPE 2 DIABETES MELLITUS WITHOUT COMPLICATION: ICD-10-CM

## 2021-08-04 ENCOUNTER — PATIENT MESSAGE (OUTPATIENT)
Dept: ADMINISTRATIVE | Facility: HOSPITAL | Age: 71
End: 2021-08-04

## 2021-08-09 ENCOUNTER — INFUSION (OUTPATIENT)
Dept: INFUSION THERAPY | Facility: HOSPITAL | Age: 71
End: 2021-08-09
Attending: NURSE PRACTITIONER
Payer: MEDICARE

## 2021-08-09 VITALS
TEMPERATURE: 98 F | BODY MASS INDEX: 31.14 KG/M2 | SYSTOLIC BLOOD PRESSURE: 117 MMHG | HEART RATE: 94 BPM | WEIGHT: 200 LBS | RESPIRATION RATE: 18 BRPM | DIASTOLIC BLOOD PRESSURE: 67 MMHG

## 2021-08-09 DIAGNOSIS — M05.79 RHEUMATOID ARTHRITIS INVOLVING MULTIPLE SITES WITH POSITIVE RHEUMATOID FACTOR: Primary | ICD-10-CM

## 2021-08-09 PROCEDURE — 96365 THER/PROPH/DIAG IV INF INIT: CPT

## 2021-08-09 PROCEDURE — 25000003 PHARM REV CODE 250: Performed by: NURSE PRACTITIONER

## 2021-08-09 PROCEDURE — 63600175 PHARM REV CODE 636 W HCPCS: Mod: JA | Performed by: NURSE PRACTITIONER

## 2021-08-09 RX ADMIN — SODIUM CHLORIDE 1000 MG: 9 INJECTION, SOLUTION INTRAVENOUS at 09:08

## 2021-08-14 ENCOUNTER — TELEPHONE (OUTPATIENT)
Dept: RHEUMATOLOGY | Facility: CLINIC | Age: 71
End: 2021-08-14

## 2021-09-20 ENCOUNTER — INFUSION (OUTPATIENT)
Dept: INFUSION THERAPY | Facility: HOSPITAL | Age: 71
End: 2021-09-20
Attending: NURSE PRACTITIONER
Payer: MEDICARE

## 2021-09-20 VITALS — SYSTOLIC BLOOD PRESSURE: 137 MMHG | HEART RATE: 95 BPM | DIASTOLIC BLOOD PRESSURE: 72 MMHG | RESPIRATION RATE: 18 BRPM

## 2021-09-20 DIAGNOSIS — M05.79 RHEUMATOID ARTHRITIS INVOLVING MULTIPLE SITES WITH POSITIVE RHEUMATOID FACTOR: Primary | ICD-10-CM

## 2021-09-20 PROCEDURE — 63600175 PHARM REV CODE 636 W HCPCS: Mod: JA | Performed by: NURSE PRACTITIONER

## 2021-09-20 PROCEDURE — 25000003 PHARM REV CODE 250: Performed by: NURSE PRACTITIONER

## 2021-09-20 PROCEDURE — 96365 THER/PROPH/DIAG IV INF INIT: CPT

## 2021-09-20 RX ADMIN — SODIUM CHLORIDE 1000 MG: 9 INJECTION, SOLUTION INTRAVENOUS at 09:09

## 2021-10-18 ENCOUNTER — PATIENT MESSAGE (OUTPATIENT)
Dept: ADMINISTRATIVE | Facility: HOSPITAL | Age: 71
End: 2021-10-18
Payer: MEDICARE

## 2021-10-18 ENCOUNTER — INFUSION (OUTPATIENT)
Dept: INFUSION THERAPY | Facility: HOSPITAL | Age: 71
End: 2021-10-18
Attending: NURSE PRACTITIONER
Payer: MEDICARE

## 2021-10-18 VITALS
HEART RATE: 92 BPM | RESPIRATION RATE: 18 BRPM | TEMPERATURE: 98 F | SYSTOLIC BLOOD PRESSURE: 121 MMHG | DIASTOLIC BLOOD PRESSURE: 70 MMHG

## 2021-10-18 DIAGNOSIS — M05.79 RHEUMATOID ARTHRITIS INVOLVING MULTIPLE SITES WITH POSITIVE RHEUMATOID FACTOR: Primary | ICD-10-CM

## 2021-10-18 PROCEDURE — 25000003 PHARM REV CODE 250: Performed by: NURSE PRACTITIONER

## 2021-10-18 PROCEDURE — 96365 THER/PROPH/DIAG IV INF INIT: CPT

## 2021-10-18 PROCEDURE — 63600175 PHARM REV CODE 636 W HCPCS: Mod: JA | Performed by: NURSE PRACTITIONER

## 2021-10-18 RX ADMIN — SODIUM CHLORIDE 1000 MG: 9 INJECTION, SOLUTION INTRAVENOUS at 08:10

## 2021-10-19 ENCOUNTER — OFFICE VISIT (OUTPATIENT)
Dept: INTERNAL MEDICINE | Facility: CLINIC | Age: 71
End: 2021-10-19
Payer: MEDICARE

## 2021-10-19 VITALS
OXYGEN SATURATION: 98 % | TEMPERATURE: 97 F | BODY MASS INDEX: 31.8 KG/M2 | SYSTOLIC BLOOD PRESSURE: 130 MMHG | HEART RATE: 108 BPM | WEIGHT: 202.63 LBS | RESPIRATION RATE: 16 BRPM | DIASTOLIC BLOOD PRESSURE: 70 MMHG | HEIGHT: 67 IN

## 2021-10-19 DIAGNOSIS — R53.1 WEAKNESS: ICD-10-CM

## 2021-10-19 DIAGNOSIS — R05.9 COUGH: Primary | ICD-10-CM

## 2021-10-19 PROCEDURE — 99214 OFFICE O/P EST MOD 30 MIN: CPT | Mod: S$GLB,,, | Performed by: NURSE PRACTITIONER

## 2021-10-19 PROCEDURE — 3044F HG A1C LEVEL LT 7.0%: CPT | Mod: CPTII,S$GLB,, | Performed by: NURSE PRACTITIONER

## 2021-10-19 PROCEDURE — 1126F PR PAIN SEVERITY QUANTIFIED, NO PAIN PRESENT: ICD-10-PCS | Mod: CPTII,S$GLB,, | Performed by: NURSE PRACTITIONER

## 2021-10-19 PROCEDURE — 3078F DIAST BP <80 MM HG: CPT | Mod: CPTII,S$GLB,, | Performed by: NURSE PRACTITIONER

## 2021-10-19 PROCEDURE — 1100F PR PT FALLS ASSESS DOC 2+ FALLS/FALL W/INJURY/YR: ICD-10-PCS | Mod: CPTII,S$GLB,, | Performed by: NURSE PRACTITIONER

## 2021-10-19 PROCEDURE — 3044F PR MOST RECENT HEMOGLOBIN A1C LEVEL <7.0%: ICD-10-PCS | Mod: CPTII,S$GLB,, | Performed by: NURSE PRACTITIONER

## 2021-10-19 PROCEDURE — 3008F BODY MASS INDEX DOCD: CPT | Mod: CPTII,S$GLB,, | Performed by: NURSE PRACTITIONER

## 2021-10-19 PROCEDURE — 3008F PR BODY MASS INDEX (BMI) DOCUMENTED: ICD-10-PCS | Mod: CPTII,S$GLB,, | Performed by: NURSE PRACTITIONER

## 2021-10-19 PROCEDURE — 3075F SYST BP GE 130 - 139MM HG: CPT | Mod: CPTII,S$GLB,, | Performed by: NURSE PRACTITIONER

## 2021-10-19 PROCEDURE — U0003 INFECTIOUS AGENT DETECTION BY NUCLEIC ACID (DNA OR RNA); SEVERE ACUTE RESPIRATORY SYNDROME CORONAVIRUS 2 (SARS-COV-2) (CORONAVIRUS DISEASE [COVID-19]), AMPLIFIED PROBE TECHNIQUE, MAKING USE OF HIGH THROUGHPUT TECHNOLOGIES AS DESCRIBED BY CMS-2020-01-R: HCPCS | Performed by: NURSE PRACTITIONER

## 2021-10-19 PROCEDURE — 1159F PR MEDICATION LIST DOCUMENTED IN MEDICAL RECORD: ICD-10-PCS | Mod: CPTII,S$GLB,, | Performed by: NURSE PRACTITIONER

## 2021-10-19 PROCEDURE — 3075F PR MOST RECENT SYSTOLIC BLOOD PRESS GE 130-139MM HG: ICD-10-PCS | Mod: CPTII,S$GLB,, | Performed by: NURSE PRACTITIONER

## 2021-10-19 PROCEDURE — U0005 INFEC AGEN DETEC AMPLI PROBE: HCPCS | Performed by: NURSE PRACTITIONER

## 2021-10-19 PROCEDURE — 3288F FALL RISK ASSESSMENT DOCD: CPT | Mod: CPTII,S$GLB,, | Performed by: NURSE PRACTITIONER

## 2021-10-19 PROCEDURE — 99214 PR OFFICE/OUTPT VISIT, EST, LEVL IV, 30-39 MIN: ICD-10-PCS | Mod: S$GLB,,, | Performed by: NURSE PRACTITIONER

## 2021-10-19 PROCEDURE — 1159F MED LIST DOCD IN RCRD: CPT | Mod: CPTII,S$GLB,, | Performed by: NURSE PRACTITIONER

## 2021-10-19 PROCEDURE — 1100F PTFALLS ASSESS-DOCD GE2>/YR: CPT | Mod: CPTII,S$GLB,, | Performed by: NURSE PRACTITIONER

## 2021-10-19 PROCEDURE — 99999 PR PBB SHADOW E&M-EST. PATIENT-LVL IV: CPT | Mod: PBBFAC,,, | Performed by: NURSE PRACTITIONER

## 2021-10-19 PROCEDURE — 3288F PR FALLS RISK ASSESSMENT DOCUMENTED: ICD-10-PCS | Mod: CPTII,S$GLB,, | Performed by: NURSE PRACTITIONER

## 2021-10-19 PROCEDURE — 99999 PR PBB SHADOW E&M-EST. PATIENT-LVL IV: ICD-10-PCS | Mod: PBBFAC,,, | Performed by: NURSE PRACTITIONER

## 2021-10-19 PROCEDURE — 3078F PR MOST RECENT DIASTOLIC BLOOD PRESSURE < 80 MM HG: ICD-10-PCS | Mod: CPTII,S$GLB,, | Performed by: NURSE PRACTITIONER

## 2021-10-19 PROCEDURE — 1126F AMNT PAIN NOTED NONE PRSNT: CPT | Mod: CPTII,S$GLB,, | Performed by: NURSE PRACTITIONER

## 2021-10-20 ENCOUNTER — INFUSION (OUTPATIENT)
Dept: INFECTIOUS DISEASES | Facility: HOSPITAL | Age: 71
End: 2021-10-20
Attending: NURSE PRACTITIONER
Payer: MEDICARE

## 2021-10-20 VITALS
OXYGEN SATURATION: 94 % | HEART RATE: 85 BPM | TEMPERATURE: 98 F | RESPIRATION RATE: 20 BRPM | DIASTOLIC BLOOD PRESSURE: 79 MMHG | SYSTOLIC BLOOD PRESSURE: 115 MMHG

## 2021-10-20 DIAGNOSIS — U07.1 COVID-19 VIRUS DETECTED: ICD-10-CM

## 2021-10-20 DIAGNOSIS — U07.1 COVID: Primary | ICD-10-CM

## 2021-10-20 LAB
SARS-COV-2 RNA RESP QL NAA+PROBE: DETECTED
SARS-COV-2- CYCLE NUMBER: 26

## 2021-10-20 PROCEDURE — 63600175 PHARM REV CODE 636 W HCPCS: Performed by: FAMILY MEDICINE

## 2021-10-20 PROCEDURE — 25000003 PHARM REV CODE 250: Performed by: FAMILY MEDICINE

## 2021-10-20 PROCEDURE — M0243 CASIRIVI AND IMDEVI INFUSION: HCPCS | Performed by: FAMILY MEDICINE

## 2021-10-20 RX ORDER — ALBUTEROL SULFATE 90 UG/1
2 AEROSOL, METERED RESPIRATORY (INHALATION)
Status: ACTIVE | OUTPATIENT
Start: 2021-10-20

## 2021-10-20 RX ORDER — DIPHENHYDRAMINE HYDROCHLORIDE 50 MG/ML
25 INJECTION INTRAMUSCULAR; INTRAVENOUS ONCE AS NEEDED
Status: ACTIVE | OUTPATIENT
Start: 2021-10-20 | End: 2033-03-18

## 2021-10-20 RX ORDER — SODIUM CHLORIDE 0.9 % (FLUSH) 0.9 %
10 SYRINGE (ML) INJECTION
Status: ACTIVE | OUTPATIENT
Start: 2021-10-20

## 2021-10-20 RX ORDER — EPINEPHRINE 0.3 MG/.3ML
0.3 INJECTION SUBCUTANEOUS
Status: ACTIVE | OUTPATIENT
Start: 2021-10-20

## 2021-10-20 RX ORDER — ACETAMINOPHEN 325 MG/1
650 TABLET ORAL ONCE AS NEEDED
Status: ACTIVE | OUTPATIENT
Start: 2021-10-20 | End: 2033-03-18

## 2021-10-20 RX ORDER — ONDANSETRON 4 MG/1
4 TABLET, ORALLY DISINTEGRATING ORAL ONCE AS NEEDED
Status: ACTIVE | OUTPATIENT
Start: 2021-10-20 | End: 2033-03-18

## 2021-10-20 RX ADMIN — CASIRIVIMAB AND IMDEVIMAB 600 MG: 600; 600 INJECTION, SOLUTION, CONCENTRATE INTRAVENOUS at 11:10

## 2021-11-09 ENCOUNTER — TELEPHONE (OUTPATIENT)
Dept: RHEUMATOLOGY | Facility: CLINIC | Age: 71
End: 2021-11-09
Payer: MEDICARE

## 2021-11-09 DIAGNOSIS — M06.9 RHEUMATOID ARTHRITIS INVOLVING MULTIPLE SITES, UNSPECIFIED WHETHER RHEUMATOID FACTOR PRESENT: Primary | ICD-10-CM

## 2021-11-11 LAB
LEFT EYE DM RETINOPATHY: NEGATIVE
RIGHT EYE DM RETINOPATHY: NEGATIVE

## 2021-11-15 ENCOUNTER — INFUSION (OUTPATIENT)
Dept: INFUSION THERAPY | Facility: HOSPITAL | Age: 71
End: 2021-11-15
Attending: NURSE PRACTITIONER
Payer: MEDICARE

## 2021-11-15 VITALS
DIASTOLIC BLOOD PRESSURE: 78 MMHG | HEART RATE: 84 BPM | RESPIRATION RATE: 18 BRPM | WEIGHT: 203 LBS | SYSTOLIC BLOOD PRESSURE: 131 MMHG | TEMPERATURE: 98 F | BODY MASS INDEX: 31.79 KG/M2

## 2021-11-15 DIAGNOSIS — M05.79 RHEUMATOID ARTHRITIS INVOLVING MULTIPLE SITES WITH POSITIVE RHEUMATOID FACTOR: Primary | ICD-10-CM

## 2021-11-15 PROCEDURE — 63600175 PHARM REV CODE 636 W HCPCS: Mod: JA | Performed by: NURSE PRACTITIONER

## 2021-11-15 PROCEDURE — 25000003 PHARM REV CODE 250: Performed by: NURSE PRACTITIONER

## 2021-11-15 PROCEDURE — 96365 THER/PROPH/DIAG IV INF INIT: CPT

## 2021-11-15 RX ADMIN — SODIUM CHLORIDE 1000 MG: 9 INJECTION, SOLUTION INTRAVENOUS at 08:11

## 2021-12-21 ENCOUNTER — INFUSION (OUTPATIENT)
Dept: INFUSION THERAPY | Facility: HOSPITAL | Age: 71
End: 2021-12-21
Attending: NURSE PRACTITIONER
Payer: MEDICARE

## 2021-12-21 VITALS
RESPIRATION RATE: 18 BRPM | WEIGHT: 203 LBS | HEART RATE: 94 BPM | DIASTOLIC BLOOD PRESSURE: 75 MMHG | BODY MASS INDEX: 31.79 KG/M2 | SYSTOLIC BLOOD PRESSURE: 133 MMHG

## 2021-12-21 DIAGNOSIS — M05.79 RHEUMATOID ARTHRITIS INVOLVING MULTIPLE SITES WITH POSITIVE RHEUMATOID FACTOR: Primary | ICD-10-CM

## 2021-12-21 PROCEDURE — 25000003 PHARM REV CODE 250: Performed by: NURSE PRACTITIONER

## 2021-12-21 PROCEDURE — 63600175 PHARM REV CODE 636 W HCPCS: Mod: JA | Performed by: NURSE PRACTITIONER

## 2021-12-21 PROCEDURE — 96365 THER/PROPH/DIAG IV INF INIT: CPT

## 2021-12-21 RX ADMIN — SODIUM CHLORIDE 1000 MG: 9 INJECTION, SOLUTION INTRAVENOUS at 08:12

## 2022-01-07 ENCOUNTER — TELEPHONE (OUTPATIENT)
Dept: RHEUMATOLOGY | Facility: CLINIC | Age: 72
End: 2022-01-07
Payer: MEDICARE

## 2022-01-07 DIAGNOSIS — M81.0 AGE RELATED OSTEOPOROSIS, UNSPECIFIED PATHOLOGICAL FRACTURE PRESENCE: ICD-10-CM

## 2022-01-07 DIAGNOSIS — M81.0 OSTEOPOROSIS, UNSPECIFIED OSTEOPOROSIS TYPE, UNSPECIFIED PATHOLOGICAL FRACTURE PRESENCE: Primary | ICD-10-CM

## 2022-01-07 RX ORDER — ALENDRONATE SODIUM 70 MG/1
70 TABLET ORAL
Qty: 12 TABLET | Refills: 3 | Status: SHIPPED | OUTPATIENT
Start: 2022-01-07 | End: 2022-08-02

## 2022-01-26 ENCOUNTER — INFUSION (OUTPATIENT)
Dept: INFUSION THERAPY | Facility: HOSPITAL | Age: 72
End: 2022-01-26
Attending: NURSE PRACTITIONER
Payer: MEDICARE

## 2022-01-26 VITALS — RESPIRATION RATE: 18 BRPM | DIASTOLIC BLOOD PRESSURE: 77 MMHG | HEART RATE: 90 BPM | SYSTOLIC BLOOD PRESSURE: 132 MMHG

## 2022-01-26 DIAGNOSIS — M05.79 RHEUMATOID ARTHRITIS INVOLVING MULTIPLE SITES WITH POSITIVE RHEUMATOID FACTOR: Primary | ICD-10-CM

## 2022-01-26 PROCEDURE — 96365 THER/PROPH/DIAG IV INF INIT: CPT

## 2022-01-26 PROCEDURE — 25000003 PHARM REV CODE 250: Performed by: NURSE PRACTITIONER

## 2022-01-26 PROCEDURE — 63600175 PHARM REV CODE 636 W HCPCS: Mod: JA | Performed by: NURSE PRACTITIONER

## 2022-01-26 RX ADMIN — SODIUM CHLORIDE 1000 MG: 9 INJECTION, SOLUTION INTRAVENOUS at 08:01

## 2022-01-26 NOTE — PATIENT INSTRUCTIONS
Patient Education       Abatacept (ab karey TA sept)   Brand Names: US Orencia; Orencia ClickJect   Brand Names: [place]enzie Orencia   What is this drug used for?   · It is used to treat some types of arthritis.  · It may be given to you for other reasons. Talk with the doctor.    What do I need to tell my doctor BEFORE I take this drug?   · If you are allergic to this drug; any part of this drug; or any other drugs, foods, or substances. Tell your doctor about the allergy and what signs you had.  · If you are taking any of these drugs: Adalimumab, certolizumab, etanercept, golimumab, or infliximab.  · If you are taking anakinra, rituximab, or tocilizumab.  This is not a list of all drugs or health problems that interact with this drug.  Tell your doctor and pharmacist about all of your drugs (prescription or OTC, natural products, vitamins) and health problems. You must check to make sure that it is safe for you to take this drug with all of your drugs and health problems. Do not start, stop, or change the dose of any drug without checking with your doctor.  What are some things I need to know or do while I take this drug?   All products:   · Tell all of your health care providers that you take this drug. This includes your doctors, nurses, pharmacists, and dentists.  · This drug may be used with other drugs to treat your health condition. If you are also taking other drugs, talk with your doctor about the risks and side effects that may happen.  · You may have more of a chance of getting an infection. Wash hands often. Stay away from people with infections, colds, or flu. Some infections have been very bad and even deadly.  · You will need a TB (tuberculosis) test before starting this drug.  · Hepatitis B testing may be done. A hepatitis B infection may get worse while taking this drug.  · Make sure you are up to date with all your vaccines before treatment with this drug.  · Talk with your doctor before getting any  vaccines while you take this drug and after you stop taking it. Vaccine use with this drug may either raise the chance of an infection or make the vaccine not work as well. Talk with your doctor.  · Breathing problems have happened more often in people with chronic obstructive pulmonary disease (COPD) when taking this drug. This includes COPD that gets worse, cough, and trouble breathing. Talk with the doctor.  · This drug may add to the chance of getting some types of cancer. Talk with the doctor.  · Have your skin checked as you have been told by your doctor.  · If you are 65 or older, use this drug with care. You could have more side effects.  · Tell your doctor if you are pregnant, plan on getting pregnant, or are breast-feeding. You will need to talk about the benefits and risks to you and the baby.  · If you used this drug during pregnancy, tell your baby's doctor. You will need to discuss the safety and timing of certain vaccines with the doctor.  Vials:   · If you have high blood sugar (diabetes), talk with your doctor about which glucose tests are best to use.  What are some side effects that I need to call my doctor about right away?   WARNING/CAUTION: Even though it may be rare, some people may have very bad and sometimes deadly side effects when taking a drug. Tell your doctor or get medical help right away if you have any of the following signs or symptoms that may be related to a very bad side effect:  · Signs of an allergic reaction, like rash; hives; itching; red, swollen, blistered, or peeling skin with or without fever; wheezing; tightness in the chest or throat; trouble breathing, swallowing, or talking; unusual hoarseness; or swelling of the mouth, face, lips, tongue, or throat. Rarely, some allergic reactions have been deadly.  · Signs of infection like fever, chills, very bad sore throat, ear or sinus pain, cough, more sputum or change in color of sputum, pain with passing urine, mouth sores,  or wound that will not heal.  · Signs of high or low blood pressure like very bad headache or dizziness, passing out, or change in eyesight.  · Feeling very tired or weak.  · Flu-like signs.  · Warm, red, or painful skin or sores on the body.  · A skin lump or growth.  · Change in color or size of a mole.  · Call your doctor right away if you have a swollen gland, night sweats, shortness of breath, or weight loss without trying.  What are some other side effects of this drug?   All drugs may cause side effects. However, many people have no side effects or only have minor side effects. Call your doctor or get medical help if any of these side effects or any other side effects bother you or do not go away:  · Dizziness or headache.  · Signs of a common cold.  · Nose or throat irritation.  · Upset stomach.  · Stomach pain or diarrhea.  · Back pain.  · Irritation where this drug is used.  These are not all of the side effects that may occur. If you have questions about side effects, call your doctor. Call your doctor for medical advice about side effects.  You may report side effects to your national health agency.  You may report side effects to the FDA at 1-970.834.6048. You may also report side effects at https://www.fda.gov/medwatch.  How is this drug best taken?   Use this drug as ordered by your doctor. Read all information given to you. Follow all instructions closely.  Vials:   · It is given as an infusion into a vein over a period of time.  Auto-injector shot and prefilled syringes:   · It is given as a shot into the fatty part of the skin.  · If you will be giving yourself the shot, your doctor or nurse will teach you how to give the shot.  · Wash your hands before and after you give the shot.  · Do not use if the solution is cloudy, leaking, or has particles.  · This drug is colorless to a faint yellow. Do not use if the solution changes color.  · Before using this drug, take it out of the refrigerator and  leave it at room temperature for 30 minutes.  · Move the site where you give the shot with each shot.  · Do not give into skin within 2 inches of the belly button.  · Do not give into skin that is irritated, tender, bruised, red, scaly, hard, scarred, or has stretch marks.  · Do not rub the site where you give the shot.  · Throw syringe away after use. Do not use the same syringe more than one time.  · Throw away needles in a needle/sharp disposal box. Do not reuse needles or other items. When the box is full, follow all local rules for getting rid of it. Talk with a doctor or pharmacist if you have any questions.  Prefilled syringes:   · If using prefilled syringe, do not get rid of air bubble from syringe.  What do I do if I miss a dose?   · Call your doctor to find out what to do.    How do I store and/or throw out this drug?   Vials:   · If you need to store this drug at home, talk with your doctor, nurse, or pharmacist about how to store it.  Auto-injector shot and prefilled syringes:   · Store in a refrigerator. Do not freeze.  · Store in the original container to protect from light.  All products:   · Keep all drugs in a safe place. Keep all drugs out of the reach of children and pets.  · Throw away unused or  drugs. Do not flush down a toilet or pour down a drain unless you are told to do so. Check with your pharmacist if you have questions about the best way to throw out drugs. There may be drug take-back programs in your area.  General drug facts   · If your symptoms or health problems do not get better or if they become worse, call your doctor.  · Do not share your drugs with others and do not take anyone else's drugs.  · Some drugs may have another patient information leaflet. If you have any questions about this drug, please talk with your doctor, nurse, pharmacist, or other health care provider.  · Some drugs may have another patient information leaflet. Check with your pharmacist. If you have  any questions about this drug, please talk with your doctor, nurse, pharmacist, or other health care provider.  · If you think there has been an overdose, call your poison control center or get medical care right away. Be ready to tell or show what was taken, how much, and when it happened.    Consumer Information Use and Disclaimer   This generalized information is a limited summary of diagnosis, treatment, and/or medication information. It is not meant to be comprehensive and should be used as a tool to help the user understand and/or assess potential diagnostic and treatment options. It does NOT include all information about conditions, treatments, medications, side effects, or risks that may apply to a specific patient. It is not intended to be medical advice or a substitute for the medical advice, diagnosis, or treatment of a health care provider based on the health care provider's examination and assessment of a patient's specific and unique circumstances. Patients must speak with a health care provider for complete information about their health, medical questions, and treatment options, including any risks or benefits regarding use of medications. This information does not endorse any treatments or medications as safe, effective, or approved for treating a specific patient. UpTo[employer], Inc[employer] and its affiliates disclaim any warranty or liability relating to this information or the use thereof. The use of this information is governed by the Terms of Use, available at https://www.[employer].com/en/solutions/lexicomp/about/isatu.  Last Reviewed Date   2020-08-13  Copyright   © 2021 UpToDate, Inc. and its affiliates and/or licensors. All rights reserved.

## 2022-02-07 ENCOUNTER — TELEPHONE (OUTPATIENT)
Dept: INTERNAL MEDICINE | Facility: CLINIC | Age: 72
End: 2022-02-07
Payer: MEDICARE

## 2022-02-07 DIAGNOSIS — Z12.31 ENCOUNTER FOR SCREENING MAMMOGRAM FOR BREAST CANCER: Primary | ICD-10-CM

## 2022-02-07 NOTE — TELEPHONE ENCOUNTER
----- Message from Homa Hernandez sent at 2022  3:14 PM CST -----  Contact: Self  Maru Shelby  MRN: [id number]  : 1950  PCP: Florence Mark  Home Phone      [phone]  Work Phone      Not on file.  [place]          [phone]      MESSAGE:     Requesting assistance in getting mammogram ordered and scheduled.  Please call to assist and advise.      [phone]

## 2022-02-14 ENCOUNTER — TELEPHONE (OUTPATIENT)
Dept: RHEUMATOLOGY | Facility: CLINIC | Age: 72
End: 2022-02-14
Payer: MEDICARE

## 2022-02-14 ENCOUNTER — TELEPHONE (OUTPATIENT)
Dept: INFUSION THERAPY | Facility: HOSPITAL | Age: 72
End: 2022-02-14
Payer: MEDICARE

## 2022-02-14 RX ORDER — ACETAMINOPHEN 325 MG/1
650 TABLET ORAL
Status: CANCELLED | OUTPATIENT
Start: 2022-02-14

## 2022-02-14 RX ORDER — SODIUM CHLORIDE 0.9 % (FLUSH) 0.9 %
10 SYRINGE (ML) INJECTION
Status: CANCELLED | OUTPATIENT
Start: 2022-02-14

## 2022-02-14 RX ORDER — HEPARIN 100 UNIT/ML
500 SYRINGE INTRAVENOUS
Status: CANCELLED | OUTPATIENT
Start: 2022-02-14

## 2022-02-21 ENCOUNTER — INFUSION (OUTPATIENT)
Dept: INFUSION THERAPY | Facility: HOSPITAL | Age: 72
End: 2022-02-21
Attending: INTERNAL MEDICINE
Payer: MEDICARE

## 2022-02-21 VITALS
BODY MASS INDEX: 31.79 KG/M2 | WEIGHT: 203 LBS | HEART RATE: 86 BPM | TEMPERATURE: 98 F | RESPIRATION RATE: 18 BRPM | SYSTOLIC BLOOD PRESSURE: 122 MMHG | DIASTOLIC BLOOD PRESSURE: 76 MMHG

## 2022-02-21 DIAGNOSIS — M05.79 RHEUMATOID ARTHRITIS INVOLVING MULTIPLE SITES WITH POSITIVE RHEUMATOID FACTOR: Primary | ICD-10-CM

## 2022-02-21 PROCEDURE — 63600175 PHARM REV CODE 636 W HCPCS: Mod: JA | Performed by: INTERNAL MEDICINE

## 2022-02-21 PROCEDURE — 25000003 PHARM REV CODE 250: Performed by: INTERNAL MEDICINE

## 2022-02-21 PROCEDURE — 96365 THER/PROPH/DIAG IV INF INIT: CPT

## 2022-02-21 RX ORDER — SODIUM CHLORIDE 0.9 % (FLUSH) 0.9 %
10 SYRINGE (ML) INJECTION
Status: CANCELLED | OUTPATIENT
Start: 2022-03-14

## 2022-02-21 RX ORDER — HEPARIN 100 UNIT/ML
500 SYRINGE INTRAVENOUS
Status: CANCELLED | OUTPATIENT
Start: 2022-03-14

## 2022-02-21 RX ORDER — ACETAMINOPHEN 325 MG/1
650 TABLET ORAL
Status: CANCELLED | OUTPATIENT
Start: 2022-03-14

## 2022-02-21 RX ADMIN — SODIUM CHLORIDE 1000 MG: 9 INJECTION, SOLUTION INTRAVENOUS at 09:02

## 2022-02-23 DIAGNOSIS — D84.9 IMMUNOSUPPRESSED STATUS: ICD-10-CM

## 2022-03-14 ENCOUNTER — TELEPHONE (OUTPATIENT)
Dept: RHEUMATOLOGY | Facility: CLINIC | Age: 72
End: 2022-03-14
Payer: MEDICARE

## 2022-03-21 ENCOUNTER — INFUSION (OUTPATIENT)
Dept: INFUSION THERAPY | Facility: HOSPITAL | Age: 72
End: 2022-03-21
Attending: INTERNAL MEDICINE
Payer: MEDICARE

## 2022-03-21 VITALS
HEART RATE: 93 BPM | BODY MASS INDEX: 31.79 KG/M2 | RESPIRATION RATE: 18 BRPM | SYSTOLIC BLOOD PRESSURE: 117 MMHG | WEIGHT: 203 LBS | TEMPERATURE: 98 F | DIASTOLIC BLOOD PRESSURE: 67 MMHG

## 2022-03-21 DIAGNOSIS — M05.79 RHEUMATOID ARTHRITIS INVOLVING MULTIPLE SITES WITH POSITIVE RHEUMATOID FACTOR: Primary | ICD-10-CM

## 2022-03-21 PROCEDURE — 96365 THER/PROPH/DIAG IV INF INIT: CPT

## 2022-03-21 PROCEDURE — 25000003 PHARM REV CODE 250: Performed by: INTERNAL MEDICINE

## 2022-03-21 PROCEDURE — 63600175 PHARM REV CODE 636 W HCPCS: Mod: JA | Performed by: INTERNAL MEDICINE

## 2022-03-21 RX ORDER — ACETAMINOPHEN 325 MG/1
650 TABLET ORAL
Status: CANCELLED | OUTPATIENT
Start: 2022-04-11

## 2022-03-21 RX ORDER — HEPARIN 100 UNIT/ML
500 SYRINGE INTRAVENOUS
Status: CANCELLED | OUTPATIENT
Start: 2022-04-11

## 2022-03-21 RX ORDER — SODIUM CHLORIDE 0.9 % (FLUSH) 0.9 %
10 SYRINGE (ML) INJECTION
Status: CANCELLED | OUTPATIENT
Start: 2022-04-11

## 2022-03-21 RX ADMIN — SODIUM CHLORIDE 1000 MG: 9 INJECTION, SOLUTION INTRAVENOUS at 08:03

## 2022-04-01 ENCOUNTER — HOSPITAL ENCOUNTER (OUTPATIENT)
Dept: RADIOLOGY | Facility: HOSPITAL | Age: 72
Discharge: HOME | End: 2022-04-01
Attending: INTERNAL MEDICINE
Payer: MEDICARE

## 2022-04-01 VITALS — BODY MASS INDEX: 31.39 KG/M2 | HEIGHT: 67 IN | WEIGHT: 200 LBS

## 2022-04-01 DIAGNOSIS — Z12.31 ENCOUNTER FOR SCREENING MAMMOGRAM FOR BREAST CANCER: ICD-10-CM

## 2022-04-01 PROCEDURE — 77063 BREAST TOMOSYNTHESIS BI: CPT | Mod: 26,,, | Performed by: RADIOLOGY

## 2022-04-01 PROCEDURE — 77067 SCR MAMMO BI INCL CAD: CPT | Mod: 26,,, | Performed by: RADIOLOGY

## 2022-04-01 PROCEDURE — 77063 MAMMO DIGITAL SCREENING BILAT WITH TOMO: ICD-10-PCS | Mod: 26,,, | Performed by: RADIOLOGY

## 2022-04-01 PROCEDURE — 77067 MAMMO DIGITAL SCREENING BILAT WITH TOMO: ICD-10-PCS | Mod: 26,,, | Performed by: RADIOLOGY

## 2022-04-01 PROCEDURE — 77067 SCR MAMMO BI INCL CAD: CPT | Mod: TC

## 2022-04-01 PROCEDURE — 77063 BREAST TOMOSYNTHESIS BI: CPT | Mod: TC

## 2022-04-18 ENCOUNTER — INFUSION (OUTPATIENT)
Dept: INFUSION THERAPY | Facility: HOSPITAL | Age: 72
End: 2022-04-18
Attending: INTERNAL MEDICINE
Payer: MEDICARE

## 2022-04-18 ENCOUNTER — PATIENT OUTREACH (OUTPATIENT)
Dept: ADMINISTRATIVE | Facility: HOSPITAL | Age: 72
End: 2022-04-18
Payer: MEDICARE

## 2022-04-18 VITALS
SYSTOLIC BLOOD PRESSURE: 124 MMHG | TEMPERATURE: 97 F | DIASTOLIC BLOOD PRESSURE: 63 MMHG | HEART RATE: 90 BPM | RESPIRATION RATE: 18 BRPM

## 2022-04-18 DIAGNOSIS — M05.79 RHEUMATOID ARTHRITIS INVOLVING MULTIPLE SITES WITH POSITIVE RHEUMATOID FACTOR: Primary | ICD-10-CM

## 2022-04-18 PROCEDURE — 96365 THER/PROPH/DIAG IV INF INIT: CPT

## 2022-04-18 PROCEDURE — 25000003 PHARM REV CODE 250: Performed by: INTERNAL MEDICINE

## 2022-04-18 PROCEDURE — 63600175 PHARM REV CODE 636 W HCPCS: Mod: JA | Performed by: INTERNAL MEDICINE

## 2022-04-18 RX ORDER — ACETAMINOPHEN 325 MG/1
650 TABLET ORAL
Status: CANCELLED | OUTPATIENT
Start: 2022-05-09

## 2022-04-18 RX ORDER — HEPARIN 100 UNIT/ML
500 SYRINGE INTRAVENOUS
Status: CANCELLED | OUTPATIENT
Start: 2022-05-09

## 2022-04-18 RX ORDER — SODIUM CHLORIDE 0.9 % (FLUSH) 0.9 %
10 SYRINGE (ML) INJECTION
Status: CANCELLED | OUTPATIENT
Start: 2022-05-09

## 2022-04-18 RX ADMIN — SODIUM CHLORIDE 1000 MG: 9 INJECTION, SOLUTION INTRAVENOUS at 08:04

## 2022-05-16 DIAGNOSIS — E11.9 TYPE 2 DIABETES MELLITUS WITHOUT COMPLICATION, WITHOUT LONG-TERM CURRENT USE OF INSULIN: ICD-10-CM

## 2022-05-16 NOTE — TELEPHONE ENCOUNTER
Care Due:                  Date            Visit Type   Department     Provider  --------------------------------------------------------------------------------                                EP -                              PRIMARY      [facility] INTER[facility]  Last Visit: 10-      [facility] ([facility]   MEDICINE BRENDA Mark  Next Visit: None Scheduled  None         None Found                                                            Last  Test          Frequency    Reason                     Performed    Due Date  --------------------------------------------------------------------------------    Office Visit  12 months..  LAILA..................  10-   10-    HBA1C.......  6 months...  JANUMET..................  03- 08-    [employer] Embedded Care Gaps. Reference number: [id number]. 5/16/2022   6:47:52 PM CDT

## 2022-05-17 NOTE — TELEPHONE ENCOUNTER
Refill Routing Note   Medication(s) are not appropriate for processing by [facility] for the following reason(s):      - Patient has not been seen in over 15 months by PCP  - Required laboratory values are outdated    [facility] action(s):  Defer Medication-related problems identified:   Requires labs  Requires appointment        Medication reconciliation completed: No     Appointments  past 12m or future 3m with PCP    Date Provider   Last Visit   10/29/2020 Florence Mark MD   Next Visit   Visit date not found Florence Mark MD   ED visits in past 90 days: 0        Note composed:4:19 PM 05/17/2022

## 2022-05-18 RX ORDER — SITAGLIPTIN AND METFORMIN HYDROCHLORIDE 1000; 50 MG/1; MG/1
TABLET, FILM COATED ORAL
Qty: 60 TABLET | Refills: 0 | Status: SHIPPED | OUTPATIENT
Start: 2022-05-18 | End: 2022-06-14

## 2022-05-26 ENCOUNTER — LAB VISIT (OUTPATIENT)
Dept: LAB | Facility: HOSPITAL | Age: 72
End: 2022-05-26
Attending: INTERNAL MEDICINE
Payer: MEDICARE

## 2022-05-26 DIAGNOSIS — M06.9 RHEUMATOID ARTHRITIS INVOLVING MULTIPLE SITES, UNSPECIFIED WHETHER RHEUMATOID FACTOR PRESENT: ICD-10-CM

## 2022-05-26 LAB
ALBUMIN SERPL BCP-MCNC: 3.7 G/DL (ref 3.5–5.2)
ALP SERPL-CCNC: 82 U/L (ref 55–135)
ALT SERPL W/O P-5'-P-CCNC: 15 U/L (ref 10–44)
ANION GAP SERPL CALC-SCNC: 10 MMOL/L (ref 8–16)
AST SERPL-CCNC: 14 U/L (ref 10–40)
BASOPHILS # BLD AUTO: 0.06 K/UL (ref 0–0.2)
BASOPHILS NFR BLD: 0.5 % (ref 0–1.9)
BILIRUB SERPL-MCNC: 0.4 MG/DL (ref 0.1–1)
BUN SERPL-MCNC: 10 MG/DL (ref 8–23)
CALCIUM SERPL-MCNC: 9.4 MG/DL (ref 8.7–10.5)
CHLORIDE SERPL-SCNC: 104 MMOL/L (ref 95–110)
CO2 SERPL-SCNC: 29 MMOL/L (ref 23–29)
CREAT SERPL-MCNC: 0.8 MG/DL (ref 0.5–1.4)
CRP SERPL-MCNC: 8.2 MG/L (ref 0–8.2)
DIFFERENTIAL METHOD: ABNORMAL
EOSINOPHIL # BLD AUTO: 0.4 K/UL (ref 0–0.5)
EOSINOPHIL NFR BLD: 3.2 % (ref 0–8)
ERYTHROCYTE [DISTWIDTH] IN BLOOD BY AUTOMATED COUNT: 15.2 % (ref 11.5–14.5)
ERYTHROCYTE [SEDIMENTATION RATE] IN BLOOD BY WESTERGREN METHOD: 18 MM/HR (ref 0–20)
EST. GFR  (AFRICAN AMERICAN): >60 ML/MIN/1.73 M^2
EST. GFR  (NON AFRICAN AMERICAN): >60 ML/MIN/1.73 M^2
GLUCOSE SERPL-MCNC: 132 MG/DL (ref 70–110)
HCT VFR BLD AUTO: 44.6 % (ref 37–48.5)
HGB BLD-MCNC: 14.4 G/DL (ref 12–16)
IMM GRANULOCYTES # BLD AUTO: 0.07 K/UL (ref 0–0.04)
IMM GRANULOCYTES NFR BLD AUTO: 0.6 % (ref 0–0.5)
LYMPHOCYTES # BLD AUTO: 2.6 K/UL (ref 1–4.8)
LYMPHOCYTES NFR BLD: 22.4 % (ref 18–48)
MCH RBC QN AUTO: 29.4 PG (ref 27–31)
MCHC RBC AUTO-ENTMCNC: 32.3 G/DL (ref 32–36)
MCV RBC AUTO: 91 FL (ref 82–98)
MONOCYTES # BLD AUTO: 1 K/UL (ref 0.3–1)
MONOCYTES NFR BLD: 8.5 % (ref 4–15)
NEUTROPHILS # BLD AUTO: 7.5 K/UL (ref 1.8–7.7)
NEUTROPHILS NFR BLD: 64.8 % (ref 38–73)
NRBC BLD-RTO: 0 /100 WBC
PLATELET # BLD AUTO: 258 K/UL (ref 150–450)
PMV BLD AUTO: 9.8 FL (ref 9.2–12.9)
POTASSIUM SERPL-SCNC: 4.5 MMOL/L (ref 3.5–5.1)
PROT SERPL-MCNC: 7 G/DL (ref 6–8.4)
RBC # BLD AUTO: 4.9 M/UL (ref 4–5.4)
SODIUM SERPL-SCNC: 143 MMOL/L (ref 136–145)
WBC # BLD AUTO: 11.58 K/UL (ref 3.9–12.7)

## 2022-05-26 PROCEDURE — 36415 COLL VENOUS BLD VENIPUNCTURE: CPT | Performed by: INTERNAL MEDICINE

## 2022-05-26 PROCEDURE — 80053 COMPREHEN METABOLIC PANEL: CPT | Performed by: INTERNAL MEDICINE

## 2022-05-26 PROCEDURE — 85651 RBC SED RATE NONAUTOMATED: CPT | Performed by: INTERNAL MEDICINE

## 2022-05-26 PROCEDURE — 86140 C-REACTIVE PROTEIN: CPT | Performed by: INTERNAL MEDICINE

## 2022-05-26 PROCEDURE — 85025 COMPLETE CBC W/AUTO DIFF WBC: CPT | Performed by: INTERNAL MEDICINE

## 2022-05-30 ENCOUNTER — INFUSION (OUTPATIENT)
Dept: INFUSION THERAPY | Facility: HOSPITAL | Age: 72
End: 2022-05-30
Attending: INTERNAL MEDICINE
Payer: MEDICARE

## 2022-05-30 VITALS
HEART RATE: 89 BPM | TEMPERATURE: 97 F | SYSTOLIC BLOOD PRESSURE: 120 MMHG | RESPIRATION RATE: 18 BRPM | DIASTOLIC BLOOD PRESSURE: 67 MMHG

## 2022-05-30 DIAGNOSIS — M05.79 RHEUMATOID ARTHRITIS INVOLVING MULTIPLE SITES WITH POSITIVE RHEUMATOID FACTOR: Primary | ICD-10-CM

## 2022-05-30 PROCEDURE — 25000003 PHARM REV CODE 250: Performed by: INTERNAL MEDICINE

## 2022-05-30 PROCEDURE — 96365 THER/PROPH/DIAG IV INF INIT: CPT

## 2022-05-30 PROCEDURE — 63600175 PHARM REV CODE 636 W HCPCS: Mod: JA | Performed by: INTERNAL MEDICINE

## 2022-05-30 RX ORDER — SODIUM CHLORIDE 0.9 % (FLUSH) 0.9 %
10 SYRINGE (ML) INJECTION
Status: CANCELLED | OUTPATIENT
Start: 2022-06-06

## 2022-05-30 RX ORDER — HEPARIN 100 UNIT/ML
500 SYRINGE INTRAVENOUS
Status: CANCELLED | OUTPATIENT
Start: 2022-06-06

## 2022-05-30 RX ORDER — ACETAMINOPHEN 325 MG/1
650 TABLET ORAL
Status: CANCELLED | OUTPATIENT
Start: 2022-06-06

## 2022-05-30 RX ORDER — SODIUM CHLORIDE 0.9 % (FLUSH) 0.9 %
10 SYRINGE (ML) INJECTION
Status: DISCONTINUED | OUTPATIENT
Start: 2022-05-30 | End: 2022-05-30 | Stop reason: HOSPADM

## 2022-05-30 RX ADMIN — SODIUM CHLORIDE 1000 MG: 9 INJECTION, SOLUTION INTRAVENOUS at 07:05

## 2022-05-31 ENCOUNTER — OFFICE VISIT (OUTPATIENT)
Dept: RHEUMATOLOGY | Facility: CLINIC | Age: 72
End: 2022-05-31
Payer: MEDICARE

## 2022-05-31 ENCOUNTER — LAB VISIT (OUTPATIENT)
Dept: LAB | Facility: HOSPITAL | Age: 72
End: 2022-05-31
Attending: INTERNAL MEDICINE
Payer: MEDICARE

## 2022-05-31 VITALS
WEIGHT: 202 LBS | HEIGHT: 67 IN | DIASTOLIC BLOOD PRESSURE: 81 MMHG | SYSTOLIC BLOOD PRESSURE: 146 MMHG | BODY MASS INDEX: 31.71 KG/M2 | HEART RATE: 100 BPM

## 2022-05-31 DIAGNOSIS — E78.5 HYPERLIPIDEMIA, UNSPECIFIED HYPERLIPIDEMIA TYPE: ICD-10-CM

## 2022-05-31 DIAGNOSIS — E11.9 TYPE 2 DIABETES MELLITUS WITHOUT COMPLICATION, UNSPECIFIED WHETHER LONG TERM INSULIN USE: ICD-10-CM

## 2022-05-31 DIAGNOSIS — M81.0 OSTEOPOROSIS, UNSPECIFIED OSTEOPOROSIS TYPE, UNSPECIFIED PATHOLOGICAL FRACTURE PRESENCE: Primary | ICD-10-CM

## 2022-05-31 DIAGNOSIS — E66.9 OBESITY (BMI 30-39.9): ICD-10-CM

## 2022-05-31 LAB
CHOLEST SERPL-MCNC: 157 MG/DL (ref 120–199)
CHOLEST/HDLC SERPL: 4 {RATIO} (ref 2–5)
ESTIMATED AVG GLUCOSE: 128 MG/DL (ref 68–131)
HBA1C MFR BLD: 6.1 % (ref 4–5.6)
HDLC SERPL-MCNC: 39 MG/DL (ref 40–75)
HDLC SERPL: 24.8 % (ref 20–50)
LDLC SERPL CALC-MCNC: 65.4 MG/DL (ref 63–159)
NONHDLC SERPL-MCNC: 118 MG/DL
TRIGL SERPL-MCNC: 263 MG/DL (ref 30–150)

## 2022-05-31 PROCEDURE — 3079F PR MOST RECENT DIASTOLIC BLOOD PRESSURE 80-89 MM HG: ICD-10-PCS | Mod: CPTII,S$GLB,, | Performed by: INTERNAL MEDICINE

## 2022-05-31 PROCEDURE — 36415 COLL VENOUS BLD VENIPUNCTURE: CPT | Performed by: INTERNAL MEDICINE

## 2022-05-31 PROCEDURE — 3077F PR MOST RECENT SYSTOLIC BLOOD PRESSURE >= 140 MM HG: ICD-10-PCS | Mod: CPTII,S$GLB,, | Performed by: INTERNAL MEDICINE

## 2022-05-31 PROCEDURE — 1125F PR PAIN SEVERITY QUANTIFIED, PAIN PRESENT: ICD-10-PCS | Mod: CPTII,S$GLB,, | Performed by: INTERNAL MEDICINE

## 2022-05-31 PROCEDURE — 83036 HEMOGLOBIN GLYCOSYLATED A1C: CPT | Performed by: INTERNAL MEDICINE

## 2022-05-31 PROCEDURE — 1101F PR PT FALLS ASSESS DOC 0-1 FALLS W/OUT INJ PAST YR: ICD-10-PCS | Mod: CPTII,S$GLB,, | Performed by: INTERNAL MEDICINE

## 2022-05-31 PROCEDURE — 3288F FALL RISK ASSESSMENT DOCD: CPT | Mod: CPTII,S$GLB,, | Performed by: INTERNAL MEDICINE

## 2022-05-31 PROCEDURE — 1101F PT FALLS ASSESS-DOCD LE1/YR: CPT | Mod: CPTII,S$GLB,, | Performed by: INTERNAL MEDICINE

## 2022-05-31 PROCEDURE — 3008F BODY MASS INDEX DOCD: CPT | Mod: CPTII,S$GLB,, | Performed by: INTERNAL MEDICINE

## 2022-05-31 PROCEDURE — 1159F MED LIST DOCD IN RCRD: CPT | Mod: CPTII,S$GLB,, | Performed by: INTERNAL MEDICINE

## 2022-05-31 PROCEDURE — 99214 PR OFFICE/OUTPT VISIT, EST, LEVL IV, 30-39 MIN: ICD-10-PCS | Mod: S$GLB,,, | Performed by: INTERNAL MEDICINE

## 2022-05-31 PROCEDURE — 1159F PR MEDICATION LIST DOCUMENTED IN MEDICAL RECORD: ICD-10-PCS | Mod: CPTII,S$GLB,, | Performed by: INTERNAL MEDICINE

## 2022-05-31 PROCEDURE — 99214 OFFICE O/P EST MOD 30 MIN: CPT | Mod: S$GLB,,, | Performed by: INTERNAL MEDICINE

## 2022-05-31 PROCEDURE — 99999 PR PBB SHADOW E&M-EST. PATIENT-LVL III: CPT | Mod: PBBFAC,,, | Performed by: INTERNAL MEDICINE

## 2022-05-31 PROCEDURE — 99999 PR PBB SHADOW E&M-EST. PATIENT-LVL III: ICD-10-PCS | Mod: PBBFAC,,, | Performed by: INTERNAL MEDICINE

## 2022-05-31 PROCEDURE — 99499 RISK ADDL DX/OHS AUDIT: ICD-10-PCS | Mod: S$GLB,,, | Performed by: INTERNAL MEDICINE

## 2022-05-31 PROCEDURE — 3079F DIAST BP 80-89 MM HG: CPT | Mod: CPTII,S$GLB,, | Performed by: INTERNAL MEDICINE

## 2022-05-31 PROCEDURE — 3077F SYST BP >= 140 MM HG: CPT | Mod: CPTII,S$GLB,, | Performed by: INTERNAL MEDICINE

## 2022-05-31 PROCEDURE — 3288F PR FALLS RISK ASSESSMENT DOCUMENTED: ICD-10-PCS | Mod: CPTII,S$GLB,, | Performed by: INTERNAL MEDICINE

## 2022-05-31 PROCEDURE — 1125F AMNT PAIN NOTED PAIN PRSNT: CPT | Mod: CPTII,S$GLB,, | Performed by: INTERNAL MEDICINE

## 2022-05-31 PROCEDURE — 80061 LIPID PANEL: CPT | Performed by: INTERNAL MEDICINE

## 2022-05-31 PROCEDURE — 3008F PR BODY MASS INDEX (BMI) DOCUMENTED: ICD-10-PCS | Mod: CPTII,S$GLB,, | Performed by: INTERNAL MEDICINE

## 2022-05-31 PROCEDURE — 99499 UNLISTED E&M SERVICE: CPT | Mod: S$GLB,,, | Performed by: INTERNAL MEDICINE

## 2022-05-31 ASSESSMENT — ROUTINE ASSESSMENT OF PATIENT INDEX DATA (RAPID3)
PATIENT GLOBAL ASSESSMENT SCORE: 4
AM STIFFNESS SCORE: 0, NO
FATIGUE SCORE: 2.5
MDHAQ FUNCTION SCORE: 0.2
PSYCHOLOGICAL DISTRESS SCORE: 0
PAIN SCORE: 1
TOTAL RAPID3 SCORE: 1.89

## 2022-05-31 NOTE — PROGRESS NOTES
I have personally taken the history and examined the patient and agree with the resident,s note as stated above         RA: RF+ ACPA+ GUSTABO-:  TJ 0  SJ 1  Pt global 40 ESR 18 CRP 8.2  DAS28 2.72  GFI95-LGE 2.16 CDAI 4(all LDA)  Rheumatoid Arthritis Treatment Goals:  -Disease Activity Measure: CDAI  -Target: LDA  -Therapy/Change: decrease methotrexate to 20mg sc once a week and increase folic acid to 8mg day after methotrexate, 1mg all other days  -Shared Decision Making: Discussed goals of care and therapy plan together with patient as outlined above.      OA knees;left>right  OA hips: left>right  Osteoporosis DXA 10/28/19 on alendronate intermittently with drug holidays since 2015, consider change to denosumab  Obesity, lost 2# since 3/3/21   /81  on amlodipine 10mg daily  Hyperlipidemia LDL 84 11/17/20  on pravastatin 40mg every evening  T2 DM HbA1c 5.8 3/4/21  S/p Covid 10/19/21       *4th dose, 2nd booster of Moderna vaccine, one week prior to next abatacept, also hold methotrexate and leflunomide for 1 wk post booster, then resume  DXA as previously ordered decide on changing alendronate to denosumab given duration of alendronate  Cont abatacept 1000mg IV q 28 days needs co-pay assistance   methotrexate 25mg sc q 7 days with folic acid 1mg daily but 8 mg day after methotrexate dosing.  Cont leflunomide 20mg daily  Cont amlodipine 10mg daily  Cont pravastatin 40mg every evening  Cont alendronate 70mg once a week pending DXA and probable change to denosumab  Increase walking,stationary bike  Ref to Bariatric Medicine  Mediterranean/DASH diet, no junk food, low carb, weight reduction  RTC 3 months with standing labs and lipid panel   Admit to Medical / Surgical Unit

## 2022-05-31 NOTE — PROGRESS NOTES
Subjective:       Patient ID: Maru Shelby is a 71 y.o. female.    Chief Complaint: RA RF+ ACPA+GUSTABO-; Osteoporosis  HPI   70 year old female with PMH glaucoma, HLD, HTN, vitamin D deficiency, T2DM, obesity, RA, osteoarthritis here for three month follow-up. Last seen on 3/3/21 at which time, she continued to do well, but reported some dry mouth and oral ulcers. She also noted some intermittent hip pain. We continued her DMARD therapy consisting of Orencia, methotrexate and leflunomide although we decreased methotrexate to 20 mg qweek. We also continued her osteoporosis tx with alendronate. For her hip pain, we ordered hip xrays, which indicated mild degenerative changes in the left>right hip, degenerative changes in the sacroiliac joints and severe degenerative changes lumbar spine.    Today, she states that she is doing well. She reports she tried taking methotrexate every other week around February. She noticed increased joint pains doing this and last month began taking it weekly at 25 mg kvng. Since then she notes her joint pain has been much better. She notes still having intermittent hip pain. Has hip pain today which is similar to the hip pain she had at her previous visit. Regarding her RA, she feels like her disease is well controlled. She denies any complications nor adverse effects of the medications. She notes no changes to her dry mouth since last visit. However, notes she has not had any oral ulcers.    She remains on abatacept (Orencia) IV 1000mg monthly, methotrexate 25mg sc q7day, folic acid 1 mg daily (except 8mg on day after taking Methotrexate), leflunomide 20mg daily for her RA, her osteoporosis managed on alendronate 70mg q7days and Vitamin D2 50,000u weekly. Also on pravastatin 40mg nightly and amlodipine 10mg daily for CV health.       Review of Systems   Constitutional: Negative for fever and unexpected weight change.   HENT: Negative for mouth sores and trouble swallowing.         " Dry mouth.     Eyes: Negative for redness.   Respiratory: Negative for cough and shortness of breath.    Cardiovascular: Negative for chest pain.   Gastrointestinal: Negative for constipation and diarrhea.   Genitourinary: Negative for dysuria and genital sores.   Skin: Negative for rash.   Neurological: Negative for headaches.   Hematological: Does not bruise/bleed easily.         Objective:   BP (!) 146/81   Pulse 100   Ht 5' 7.2" (1.707 m)   Wt 91.6 kg (202 lb)   BMI 31.45 kg/m²      Physical Exam   Constitutional: She is oriented to person, place, and time. No distress.   HENT:   Head: Normocephalic and atraumatic.   Cardiovascular: Normal rate, regular rhythm and normal heart sounds.   Pulmonary/Chest: Effort normal and breath sounds normal. No respiratory distress.   Abdominal: Soft. Bowel sounds are normal. She exhibits no distension. There is no abdominal tenderness.   Musculoskeletal:         General: No tenderness. Normal range of motion.      Cervical back: Neck supple.      Comments: Crepitus noted with PROM of BL knees.    Neurological: She is alert and oriented to person, place, and time.   Skin: No rash noted.       Right Side Rheumatological Exam     The patient has an enlarged knee, 2nd PIP, 2nd MCP, 3rd PIP and 3rd MCP    Left Side Rheumatological Exam     The patient has an enlarged knee, 2nd PIP, 2nd MCP, 3rd PIP and 3rd MCP.        Hip Exam:  TTP of left greater trochanter  ROM internal rotation mildly decreased  FABERE: -  FADIR: +           5/27/2020 11/17/2020 3/3/2021 5/31/2022   Tender (LEES-28) 0 / 28  0 / 28 1 / 28  0 / 28    Swollen (LEES-28) 0 / 28  0 / 28  0 / 28 1 / 28    Provider Global 10 mm 20 mm 10 mm 0 mm   Patient Global 25 mm 75 mm 0 mm 30 mm   ESR 20 mm/hr 29 mm/hr 35 mm/hr 18 mm/hr   CRP 12.4 mg/L 30.1 mg/L 10.8 mg/L 8.2 mg/L   LEES-28 (ESR) 2.45 (Remission) 3.41 (Moderate disease activity) 3.05 (Low disease activity) 2.72 (Low disease activity)   LEES-28 (CRP) 2.24 " (Remission) 3.25 (Moderate disease activity) 2.41 (Remission) 2.46 (Remission)   CDAI Score 0  0  1  4      Lab Visit on 05/26/2022   Component Date Value Ref Range Status    Sed Rate 05/26/2022 18  0 - 20 mm/Hr Final    CRP 05/26/2022 8.2  0.0 - 8.2 mg/L Final    WBC 05/26/2022 11.58  3.90 - 12.70 K/uL Final    RBC 05/26/2022 4.90  4.00 - 5.40 M/uL Final    Hemoglobin 05/26/2022 14.4  12.0 - 16.0 g/dL Final    Hematocrit 05/26/2022 44.6  37.0 - 48.5 % Final    MCV 05/26/2022 91  82 - 98 fL Final    MCH 05/26/2022 29.4  27.0 - 31.0 pg Final    MCHC 05/26/2022 32.3  32.0 - 36.0 g/dL Final    RDW 05/26/2022 15.2 (A) 11.5 - 14.5 % Final    Platelets 05/26/2022 258  150 - 450 K/uL Final    MPV 05/26/2022 9.8  9.2 - 12.9 fL Final    Immature Granulocytes 05/26/2022 0.6 (A) 0.0 - 0.5 % Final    Gran # (ANC) 05/26/2022 7.5  1.8 - 7.7 K/uL Final    Immature Grans (Abs) 05/26/2022 0.07 (A) 0.00 - 0.04 K/uL Final    Comment: Mild elevation in immature granulocytes is non specific and   can be seen in a variety of conditions including stress response,   acute inflammation, trauma and pregnancy. Correlation with other   laboratory and clinical findings is essential.      Lymph # 05/26/2022 2.6  1.0 - 4.8 K/uL Final    Mono # 05/26/2022 1.0  0.3 - 1.0 K/uL Final    Eos # 05/26/2022 0.4  0.0 - 0.5 K/uL Final    Baso # 05/26/2022 0.06  0.00 - 0.20 K/uL Final    nRBC 05/26/2022 0  0 /100 WBC Final    Gran % 05/26/2022 64.8  38.0 - 73.0 % Final    Lymph % 05/26/2022 22.4  18.0 - 48.0 % Final    Mono % 05/26/2022 8.5  4.0 - 15.0 % Final    Eosinophil % 05/26/2022 3.2  0.0 - 8.0 % Final    Basophil % 05/26/2022 0.5  0.0 - 1.9 % Final    Differential Method 05/26/2022 Automated   Final    Sodium 05/26/2022 143  136 - 145 mmol/L Final    Potassium 05/26/2022 4.5  3.5 - 5.1 mmol/L Final    Chloride 05/26/2022 104  95 - 110 mmol/L Final    CO2 05/26/2022 29  23 - 29 mmol/L Final    Glucose 05/26/2022 132 (A)  70 - 110 mg/dL Final    BUN 05/26/2022 10  8 - 23 mg/dL Final    Creatinine 05/26/2022 0.8  0.5 - 1.4 mg/dL Final    Calcium 05/26/2022 9.4  8.7 - 10.5 mg/dL Final    Total Protein 05/26/2022 7.0  6.0 - 8.4 g/dL Final    Albumin 05/26/2022 3.7  3.5 - 5.2 g/dL Final    Total Bilirubin 05/26/2022 0.4  0.1 - 1.0 mg/dL Final    Comment: For infants and newborns, interpretation of results should be based  on gestational age, weight and in agreement with clinical  observations.    Premature Infant recommended reference ranges:  Up to 24 hours.............<8.0 mg/dL  Up to 48 hours............<12.0 mg/dL  3-5 days..................<15.0 mg/dL  6-29 days.................<15.0 mg/dL      Alkaline Phosphatase 05/26/2022 82  55 - 135 U/L Final    AST 05/26/2022 14  10 - 40 U/L Final    ALT 05/26/2022 15  10 - 44 U/L Final    Anion Gap 05/26/2022 10  8 - 16 mmol/L Final    eGFR if African American 05/26/2022 >60  >60 mL/min/1.73 m^2 Final    eGFR if non African American 05/26/2022 >60  >60 mL/min/1.73 m^2 Final    Comment: Calculation used to obtain the estimated glomerular filtration  rate (eGFR) is the CKD-EPI equation.           Assessment:       1. Osteoporosis, unspecified osteoporosis type, unspecified pathological fracture presence    2. Type 2 diabetes mellitus without complication, unspecified whether long term insulin use    3. Hyperlipidemia, unspecified hyperlipidemia type          Plan:       Problem List Items Addressed This Visit        Cardiac/Vascular    Hyperlipidemia    Relevant Orders    LIPID PANEL       Endocrine    Type 2 diabetes mellitus    Relevant Orders    Hemoglobin A1C      Other Visit Diagnoses     Osteoporosis, unspecified osteoporosis type, unspecified pathological fracture presence    -  Primary    Relevant Orders    DXA Bone Density Spine And Hip        1. Rheumatoid arthritis involving multiple sites with positive rheumatoid factor   RF+ ACPA+ GUSTABO-:    DAS28 (ESR) 2.72     DAS28  (CRP) 2.46     CDAI Score 4     Tender 28 0     Swollen 28 1     - Continue Methotrexate to 25mg q7 days.  - Continue Folic Acid to 8 tablets (8mg) on day after taking MTX, 1 tablet (1mg) on every other day.  -  Continue abatacept (Orencia) IV 1000mg monthly   - Continue leflunomide 20mg daily.     2. Osteoporosis without current pathological fracture, unspecified osteoporosis type   Last DXA Scan 10/2019. Indicative of osteoporosis with high risk of major and hip fx.  - Continue alendronate 70mg q7days  - Continue Vitamin D2 50,000u weekly.  - Repeat DXA          3. Osteoarthritis, generalized      4. Recommend COVID Booster- 1 week prior to Orencia, will hold Methotrexate and Leflunomide for 1 week after    5. Referral to bariatric medicine    RTC 3 months with standing labs    I performed a history, review of systems, and physical exam with the patient. The assessment and plan were discussed and agreed upon with Dr. Royal, the attending of record, before sharing with the patient. The face to face encounter time, including answering all patient questions, was 20 minutes.     Cordell Arias M.D.  PGY-1  [employer] [facility]

## 2022-05-31 NOTE — PATIENT INSTRUCTIONS
COVID Booster:  Needs to be 1 week prior to Orencia  Stop taking Methotrexate and Leflunomide for 1 week following booster, then take again as prescribed.

## 2022-06-14 ENCOUNTER — TELEPHONE (OUTPATIENT)
Dept: BARIATRICS | Facility: CLINIC | Age: 72
End: 2022-06-14
Payer: MEDICARE

## 2022-07-12 ENCOUNTER — APPOINTMENT (OUTPATIENT)
Dept: RADIOLOGY | Facility: CLINIC | Age: 72
End: 2022-07-12
Attending: FAMILY MEDICINE
Payer: MEDICARE

## 2022-07-12 ENCOUNTER — OFFICE VISIT (OUTPATIENT)
Dept: FAMILY MEDICINE | Facility: CLINIC | Age: 72
End: 2022-07-12
Payer: MEDICARE

## 2022-07-12 VITALS
BODY MASS INDEX: 31.16 KG/M2 | DIASTOLIC BLOOD PRESSURE: 82 MMHG | HEART RATE: 102 BPM | RESPIRATION RATE: 20 BRPM | SYSTOLIC BLOOD PRESSURE: 128 MMHG | HEIGHT: 67 IN | WEIGHT: 198.5 LBS

## 2022-07-12 DIAGNOSIS — M25.561 ACUTE PAIN OF RIGHT KNEE: ICD-10-CM

## 2022-07-12 DIAGNOSIS — M05.79 RHEUMATOID ARTHRITIS INVOLVING MULTIPLE SITES WITH POSITIVE RHEUMATOID FACTOR: Primary | ICD-10-CM

## 2022-07-12 DIAGNOSIS — M79.651 PAIN OF RIGHT THIGH: ICD-10-CM

## 2022-07-12 DIAGNOSIS — R42 VERTIGO: ICD-10-CM

## 2022-07-12 PROCEDURE — 1125F AMNT PAIN NOTED PAIN PRSNT: CPT | Mod: CPTII,S$GLB,, | Performed by: FAMILY MEDICINE

## 2022-07-12 PROCEDURE — 3288F PR FALLS RISK ASSESSMENT DOCUMENTED: ICD-10-PCS | Mod: CPTII,S$GLB,, | Performed by: FAMILY MEDICINE

## 2022-07-12 PROCEDURE — 3044F HG A1C LEVEL LT 7.0%: CPT | Mod: CPTII,S$GLB,, | Performed by: FAMILY MEDICINE

## 2022-07-12 PROCEDURE — 3079F DIAST BP 80-89 MM HG: CPT | Mod: CPTII,S$GLB,, | Performed by: FAMILY MEDICINE

## 2022-07-12 PROCEDURE — 3074F PR MOST RECENT SYSTOLIC BLOOD PRESSURE < 130 MM HG: ICD-10-PCS | Mod: CPTII,S$GLB,, | Performed by: FAMILY MEDICINE

## 2022-07-12 PROCEDURE — 73560 X-RAY EXAM OF KNEE 1 OR 2: CPT | Mod: TC,PO,RT

## 2022-07-12 PROCEDURE — 1100F PR PT FALLS ASSESS DOC 2+ FALLS/FALL W/INJURY/YR: ICD-10-PCS | Mod: CPTII,S$GLB,, | Performed by: FAMILY MEDICINE

## 2022-07-12 PROCEDURE — 3288F FALL RISK ASSESSMENT DOCD: CPT | Mod: CPTII,S$GLB,, | Performed by: FAMILY MEDICINE

## 2022-07-12 PROCEDURE — 99999 PR PBB SHADOW E&M-EST. PATIENT-LVL III: ICD-10-PCS | Mod: PBBFAC,,, | Performed by: FAMILY MEDICINE

## 2022-07-12 PROCEDURE — 3079F PR MOST RECENT DIASTOLIC BLOOD PRESSURE 80-89 MM HG: ICD-10-PCS | Mod: CPTII,S$GLB,, | Performed by: FAMILY MEDICINE

## 2022-07-12 PROCEDURE — 1125F PR PAIN SEVERITY QUANTIFIED, PAIN PRESENT: ICD-10-PCS | Mod: CPTII,S$GLB,, | Performed by: FAMILY MEDICINE

## 2022-07-12 PROCEDURE — 1100F PTFALLS ASSESS-DOCD GE2>/YR: CPT | Mod: CPTII,S$GLB,, | Performed by: FAMILY MEDICINE

## 2022-07-12 PROCEDURE — 1159F MED LIST DOCD IN RCRD: CPT | Mod: CPTII,S$GLB,, | Performed by: FAMILY MEDICINE

## 2022-07-12 PROCEDURE — 99213 PR OFFICE/OUTPT VISIT, EST, LEVL III, 20-29 MIN: ICD-10-PCS | Mod: S$GLB,,, | Performed by: FAMILY MEDICINE

## 2022-07-12 PROCEDURE — 3008F BODY MASS INDEX DOCD: CPT | Mod: CPTII,S$GLB,, | Performed by: FAMILY MEDICINE

## 2022-07-12 PROCEDURE — 3074F SYST BP LT 130 MM HG: CPT | Mod: CPTII,S$GLB,, | Performed by: FAMILY MEDICINE

## 2022-07-12 PROCEDURE — 99999 PR PBB SHADOW E&M-EST. PATIENT-LVL III: CPT | Mod: PBBFAC,,, | Performed by: FAMILY MEDICINE

## 2022-07-12 PROCEDURE — 3044F PR MOST RECENT HEMOGLOBIN A1C LEVEL <7.0%: ICD-10-PCS | Mod: CPTII,S$GLB,, | Performed by: FAMILY MEDICINE

## 2022-07-12 PROCEDURE — 3008F PR BODY MASS INDEX (BMI) DOCUMENTED: ICD-10-PCS | Mod: CPTII,S$GLB,, | Performed by: FAMILY MEDICINE

## 2022-07-12 PROCEDURE — 99213 OFFICE O/P EST LOW 20 MIN: CPT | Mod: S$GLB,,, | Performed by: FAMILY MEDICINE

## 2022-07-12 PROCEDURE — 1159F PR MEDICATION LIST DOCUMENTED IN MEDICAL RECORD: ICD-10-PCS | Mod: CPTII,S$GLB,, | Performed by: FAMILY MEDICINE

## 2022-07-12 RX ORDER — LIDOCAINE 50 MG/G
1 PATCH TOPICAL DAILY
Qty: 30 PATCH | Refills: 1 | Status: SHIPPED | OUTPATIENT
Start: 2022-07-12

## 2022-07-12 NOTE — PROGRESS NOTES
"Subjective:       Patient ID: Maru Shelby is a 72 y.o. female.    Chief Complaint: Leg Pain (Patient here today for pain in right thigh area) and Oral Pain (Patient reports biting tongue "while back and now it gets stuck")    Pt is a 72 y.o. female who presents for check up for R thigh pain of 9/10 of fleeting for a few minutes getting up from her supine position, staying fo r5-10 min and then leaves. Doing well on current meds. Denies any side effects. Prevention is up to date.    Review of Systems   Constitutional: Negative for appetite change, chills and fever.   HENT: Negative for rhinorrhea, sinus pressure, sore throat and trouble swallowing.    Respiratory: Negative for cough, chest tightness, shortness of breath and wheezing.    Cardiovascular: Negative for chest pain and palpitations.   Gastrointestinal: Negative for abdominal pain, blood in stool, diarrhea, nausea and vomiting.   Genitourinary: Negative for dysuria, flank pain, hematuria, pelvic pain, urgency, vaginal bleeding, vaginal discharge and vaginal pain.   Musculoskeletal: Negative for back pain, joint swelling and neck stiffness.        R anterior thigh pain upon getting up from bed or laying down   Skin: Negative for rash.   Neurological: Negative for dizziness, weakness, light-headedness, numbness and headaches.   Hematological: Does not bruise/bleed easily.   Psychiatric/Behavioral: Negative for agitation. The patient is not nervous/anxious.        Objective:      Physical Exam  Constitutional:       Appearance: She is well-developed.   HENT:      Head: Normocephalic.   Eyes:      Pupils: Pupils are equal, round, and reactive to light.   Neck:      Thyroid: No thyromegaly.   Cardiovascular:      Rate and Rhythm: Normal rate and regular rhythm.   Pulmonary:      Effort: No respiratory distress.      Breath sounds: No wheezing or rales.   Chest:      Chest wall: No tenderness.   Abdominal:      General: There is no distension.      " Tenderness: There is no abdominal tenderness. There is no guarding or rebound.   Musculoskeletal:         General: No tenderness. Normal range of motion.      Cervical back: Normal range of motion and neck supple.   Lymphadenopathy:      Cervical: No cervical adenopathy.   Skin:     General: Skin is warm and dry.      Coloration: Skin is not pale.      Findings: No rash.   Neurological:      Mental Status: She is alert and oriented to person, place, and time.      Cranial Nerves: No cranial nerve deficit.      Motor: No abnormal muscle tone.      Coordination: Coordination normal.      Deep Tendon Reflexes: Reflexes are normal and symmetric. Reflexes normal.   Psychiatric:         Thought Content: Thought content normal.         Judgment: Judgment normal.         Assessment:       No diagnosis found.    Plan:   There are no diagnoses linked to this encounter.

## 2022-07-14 ENCOUNTER — HOSPITAL ENCOUNTER (OUTPATIENT)
Dept: RADIOLOGY | Facility: CLINIC | Age: 72
Discharge: HOME | End: 2022-07-14
Attending: INTERNAL MEDICINE
Payer: MEDICARE

## 2022-07-14 DIAGNOSIS — M81.0 OSTEOPOROSIS, UNSPECIFIED OSTEOPOROSIS TYPE, UNSPECIFIED PATHOLOGICAL FRACTURE PRESENCE: ICD-10-CM

## 2022-07-14 PROCEDURE — 77080 DEXA BONE DENSITY SPINE HIP: ICD-10-PCS | Mod: 26,,, | Performed by: INTERNAL MEDICINE

## 2022-07-14 PROCEDURE — 77080 DXA BONE DENSITY AXIAL: CPT | Mod: 26,,, | Performed by: INTERNAL MEDICINE

## 2022-07-14 PROCEDURE — 77080 DXA BONE DENSITY AXIAL: CPT | Mod: TC

## 2022-07-25 ENCOUNTER — INFUSION (OUTPATIENT)
Dept: INFUSION THERAPY | Facility: HOSPITAL | Age: 72
End: 2022-07-25
Attending: INTERNAL MEDICINE
Payer: MEDICARE

## 2022-07-25 VITALS
RESPIRATION RATE: 18 BRPM | TEMPERATURE: 98 F | SYSTOLIC BLOOD PRESSURE: 151 MMHG | DIASTOLIC BLOOD PRESSURE: 74 MMHG | HEART RATE: 70 BPM

## 2022-07-25 DIAGNOSIS — M05.79 RHEUMATOID ARTHRITIS INVOLVING MULTIPLE SITES WITH POSITIVE RHEUMATOID FACTOR: Primary | ICD-10-CM

## 2022-07-25 PROCEDURE — 96365 THER/PROPH/DIAG IV INF INIT: CPT

## 2022-07-25 PROCEDURE — 63600175 PHARM REV CODE 636 W HCPCS: Mod: JA | Performed by: INTERNAL MEDICINE

## 2022-07-25 PROCEDURE — 25000003 PHARM REV CODE 250: Performed by: INTERNAL MEDICINE

## 2022-07-25 RX ORDER — HEPARIN 100 UNIT/ML
500 SYRINGE INTRAVENOUS
Status: CANCELLED | OUTPATIENT
Start: 2022-08-01

## 2022-07-25 RX ORDER — ACETAMINOPHEN 325 MG/1
650 TABLET ORAL
Status: CANCELLED | OUTPATIENT
Start: 2022-08-01

## 2022-07-25 RX ORDER — SODIUM CHLORIDE 0.9 % (FLUSH) 0.9 %
10 SYRINGE (ML) INJECTION
Status: CANCELLED | OUTPATIENT
Start: 2022-08-01

## 2022-07-25 RX ADMIN — SODIUM CHLORIDE 1000 MG: 9 INJECTION, SOLUTION INTRAVENOUS at 08:07

## 2022-08-25 ENCOUNTER — LAB VISIT (OUTPATIENT)
Dept: LAB | Facility: HOSPITAL | Age: 72
End: 2022-08-25
Attending: INTERNAL MEDICINE
Payer: MEDICARE

## 2022-08-25 DIAGNOSIS — M06.9 RHEUMATOID ARTHRITIS INVOLVING MULTIPLE SITES, UNSPECIFIED WHETHER RHEUMATOID FACTOR PRESENT: ICD-10-CM

## 2022-08-25 LAB
ALBUMIN SERPL BCP-MCNC: 3.9 G/DL (ref 3.5–5.2)
ALP SERPL-CCNC: 68 U/L (ref 55–135)
ALT SERPL W/O P-5'-P-CCNC: 28 U/L (ref 10–44)
ANION GAP SERPL CALC-SCNC: 12 MMOL/L (ref 8–16)
AST SERPL-CCNC: 22 U/L (ref 10–40)
BASOPHILS # BLD AUTO: 0.12 K/UL (ref 0–0.2)
BASOPHILS NFR BLD: 0.9 % (ref 0–1.9)
BILIRUB SERPL-MCNC: 0.5 MG/DL (ref 0.1–1)
BUN SERPL-MCNC: 17 MG/DL (ref 8–23)
CALCIUM SERPL-MCNC: 9.4 MG/DL (ref 8.7–10.5)
CHLORIDE SERPL-SCNC: 104 MMOL/L (ref 95–110)
CO2 SERPL-SCNC: 27 MMOL/L (ref 23–29)
CREAT SERPL-MCNC: 0.8 MG/DL (ref 0.5–1.4)
CRP SERPL-MCNC: 1.6 MG/L (ref 0–8.2)
DIFFERENTIAL METHOD: ABNORMAL
EOSINOPHIL # BLD AUTO: 0.6 K/UL (ref 0–0.5)
EOSINOPHIL NFR BLD: 4.8 % (ref 0–8)
ERYTHROCYTE [DISTWIDTH] IN BLOOD BY AUTOMATED COUNT: 15.8 % (ref 11.5–14.5)
ERYTHROCYTE [SEDIMENTATION RATE] IN BLOOD BY WESTERGREN METHOD: 12 MM/HR (ref 0–20)
EST. GFR  (NO RACE VARIABLE): >60 ML/MIN/1.73 M^2
GLUCOSE SERPL-MCNC: 148 MG/DL (ref 70–110)
HCT VFR BLD AUTO: 45.5 % (ref 37–48.5)
HGB BLD-MCNC: 15 G/DL (ref 12–16)
IMM GRANULOCYTES # BLD AUTO: 0.21 K/UL (ref 0–0.04)
IMM GRANULOCYTES NFR BLD AUTO: 1.6 % (ref 0–0.5)
LYMPHOCYTES # BLD AUTO: 4.1 K/UL (ref 1–4.8)
LYMPHOCYTES NFR BLD: 31.7 % (ref 18–48)
MCH RBC QN AUTO: 30.7 PG (ref 27–31)
MCHC RBC AUTO-ENTMCNC: 33 G/DL (ref 32–36)
MCV RBC AUTO: 93 FL (ref 82–98)
MONOCYTES # BLD AUTO: 1.1 K/UL (ref 0.3–1)
MONOCYTES NFR BLD: 8.6 % (ref 4–15)
NEUTROPHILS # BLD AUTO: 6.8 K/UL (ref 1.8–7.7)
NEUTROPHILS NFR BLD: 52.4 % (ref 38–73)
NRBC BLD-RTO: 0 /100 WBC
PLATELET # BLD AUTO: 273 K/UL (ref 150–450)
PMV BLD AUTO: 9.6 FL (ref 9.2–12.9)
POTASSIUM SERPL-SCNC: 4.5 MMOL/L (ref 3.5–5.1)
PROT SERPL-MCNC: 6.8 G/DL (ref 6–8.4)
RBC # BLD AUTO: 4.89 M/UL (ref 4–5.4)
SODIUM SERPL-SCNC: 143 MMOL/L (ref 136–145)
WBC # BLD AUTO: 12.98 K/UL (ref 3.9–12.7)

## 2022-08-25 PROCEDURE — 36415 COLL VENOUS BLD VENIPUNCTURE: CPT | Performed by: INTERNAL MEDICINE

## 2022-08-25 PROCEDURE — 85651 RBC SED RATE NONAUTOMATED: CPT | Performed by: INTERNAL MEDICINE

## 2022-08-25 PROCEDURE — 80053 COMPREHEN METABOLIC PANEL: CPT | Performed by: INTERNAL MEDICINE

## 2022-08-25 PROCEDURE — 85025 COMPLETE CBC W/AUTO DIFF WBC: CPT | Performed by: INTERNAL MEDICINE

## 2022-08-25 PROCEDURE — 86140 C-REACTIVE PROTEIN: CPT | Performed by: INTERNAL MEDICINE

## 2022-08-30 ENCOUNTER — OFFICE VISIT (OUTPATIENT)
Dept: RHEUMATOLOGY | Facility: CLINIC | Age: 72
End: 2022-08-30
Payer: MEDICARE

## 2022-08-30 VITALS
BODY MASS INDEX: 31.55 KG/M2 | HEIGHT: 67 IN | WEIGHT: 201 LBS | HEART RATE: 112 BPM | SYSTOLIC BLOOD PRESSURE: 139 MMHG | DIASTOLIC BLOOD PRESSURE: 74 MMHG

## 2022-08-30 DIAGNOSIS — M05.771 RHEUMATOID ARTHRITIS INVOLVING BOTH ANKLES WITH POSITIVE RHEUMATOID FACTOR: ICD-10-CM

## 2022-08-30 DIAGNOSIS — M05.772 RHEUMATOID ARTHRITIS INVOLVING BOTH ANKLES WITH POSITIVE RHEUMATOID FACTOR: ICD-10-CM

## 2022-08-30 DIAGNOSIS — M85.80 OSTEOPENIA, UNSPECIFIED LOCATION: ICD-10-CM

## 2022-08-30 DIAGNOSIS — M05.79 RHEUMATOID ARTHRITIS INVOLVING MULTIPLE SITES WITH POSITIVE RHEUMATOID FACTOR: Primary | ICD-10-CM

## 2022-08-30 DIAGNOSIS — Z79.631 LONG TERM METHOTREXATE USER: ICD-10-CM

## 2022-08-30 DIAGNOSIS — G47.00 INSOMNIA, UNSPECIFIED TYPE: ICD-10-CM

## 2022-08-30 PROCEDURE — 99999 PR PBB SHADOW E&M-EST. PATIENT-LVL IV: CPT | Mod: PBBFAC,,, | Performed by: INTERNAL MEDICINE

## 2022-08-30 PROCEDURE — 99213 OFFICE O/P EST LOW 20 MIN: CPT | Mod: S$GLB,,, | Performed by: INTERNAL MEDICINE

## 2022-08-30 PROCEDURE — 3044F HG A1C LEVEL LT 7.0%: CPT | Mod: CPTII,S$GLB,, | Performed by: INTERNAL MEDICINE

## 2022-08-30 PROCEDURE — 3008F PR BODY MASS INDEX (BMI) DOCUMENTED: ICD-10-PCS | Mod: CPTII,S$GLB,, | Performed by: INTERNAL MEDICINE

## 2022-08-30 PROCEDURE — 1126F PR PAIN SEVERITY QUANTIFIED, NO PAIN PRESENT: ICD-10-PCS | Mod: CPTII,S$GLB,, | Performed by: INTERNAL MEDICINE

## 2022-08-30 PROCEDURE — 3008F BODY MASS INDEX DOCD: CPT | Mod: CPTII,S$GLB,, | Performed by: INTERNAL MEDICINE

## 2022-08-30 PROCEDURE — 99999 PR PBB SHADOW E&M-EST. PATIENT-LVL IV: ICD-10-PCS | Mod: PBBFAC,,, | Performed by: INTERNAL MEDICINE

## 2022-08-30 PROCEDURE — 3288F PR FALLS RISK ASSESSMENT DOCUMENTED: ICD-10-PCS | Mod: CPTII,S$GLB,, | Performed by: INTERNAL MEDICINE

## 2022-08-30 PROCEDURE — 1126F AMNT PAIN NOTED NONE PRSNT: CPT | Mod: CPTII,S$GLB,, | Performed by: INTERNAL MEDICINE

## 2022-08-30 PROCEDURE — 1159F PR MEDICATION LIST DOCUMENTED IN MEDICAL RECORD: ICD-10-PCS | Mod: CPTII,S$GLB,, | Performed by: INTERNAL MEDICINE

## 2022-08-30 PROCEDURE — 3044F PR MOST RECENT HEMOGLOBIN A1C LEVEL <7.0%: ICD-10-PCS | Mod: CPTII,S$GLB,, | Performed by: INTERNAL MEDICINE

## 2022-08-30 PROCEDURE — 1101F PR PT FALLS ASSESS DOC 0-1 FALLS W/OUT INJ PAST YR: ICD-10-PCS | Mod: CPTII,S$GLB,, | Performed by: INTERNAL MEDICINE

## 2022-08-30 PROCEDURE — 99213 PR OFFICE/OUTPT VISIT, EST, LEVL III, 20-29 MIN: ICD-10-PCS | Mod: S$GLB,,, | Performed by: INTERNAL MEDICINE

## 2022-08-30 PROCEDURE — 3288F FALL RISK ASSESSMENT DOCD: CPT | Mod: CPTII,S$GLB,, | Performed by: INTERNAL MEDICINE

## 2022-08-30 PROCEDURE — 3075F PR MOST RECENT SYSTOLIC BLOOD PRESS GE 130-139MM HG: ICD-10-PCS | Mod: CPTII,S$GLB,, | Performed by: INTERNAL MEDICINE

## 2022-08-30 PROCEDURE — 3078F DIAST BP <80 MM HG: CPT | Mod: CPTII,S$GLB,, | Performed by: INTERNAL MEDICINE

## 2022-08-30 PROCEDURE — 1159F MED LIST DOCD IN RCRD: CPT | Mod: CPTII,S$GLB,, | Performed by: INTERNAL MEDICINE

## 2022-08-30 PROCEDURE — 3075F SYST BP GE 130 - 139MM HG: CPT | Mod: CPTII,S$GLB,, | Performed by: INTERNAL MEDICINE

## 2022-08-30 PROCEDURE — 3078F PR MOST RECENT DIASTOLIC BLOOD PRESSURE < 80 MM HG: ICD-10-PCS | Mod: CPTII,S$GLB,, | Performed by: INTERNAL MEDICINE

## 2022-08-30 PROCEDURE — 1101F PT FALLS ASSESS-DOCD LE1/YR: CPT | Mod: CPTII,S$GLB,, | Performed by: INTERNAL MEDICINE

## 2022-08-30 RX ORDER — FOLIC ACID 1 MG/1
TABLET ORAL
Qty: 150 TABLET | Refills: 3 | Status: SHIPPED | OUTPATIENT
Start: 2022-08-30 | End: 2022-08-30 | Stop reason: SDUPTHER

## 2022-08-30 RX ORDER — NORTRIPTYLINE HYDROCHLORIDE 25 MG/1
CAPSULE ORAL
Qty: 270 CAPSULE | Refills: 1 | Status: SHIPPED | OUTPATIENT
Start: 2022-08-30 | End: 2022-12-05

## 2022-08-30 RX ORDER — LEFLUNOMIDE 20 MG/1
20 TABLET ORAL DAILY
Qty: 90 TABLET | Refills: 0 | Status: SHIPPED | OUTPATIENT
Start: 2022-08-30 | End: 2022-12-05 | Stop reason: SDUPTHER

## 2022-08-30 RX ORDER — ERGOCALCIFEROL 1.25 MG/1
50000 CAPSULE ORAL
Qty: 12 CAPSULE | Refills: 3 | Status: SHIPPED | OUTPATIENT
Start: 2022-08-30 | End: 2023-06-05 | Stop reason: SDUPTHER

## 2022-08-30 RX ORDER — METHOTREXATE 25 MG/ML
INJECTION, SOLUTION INTRA-ARTERIAL; INTRAMUSCULAR; INTRAVENOUS
Qty: 4 ML | Refills: 2 | Status: SHIPPED | OUTPATIENT
Start: 2022-08-30 | End: 2022-12-05 | Stop reason: SDUPTHER

## 2022-08-30 RX ORDER — FOLIC ACID 1 MG/1
TABLET ORAL
Qty: 150 TABLET | Refills: 3 | Status: SHIPPED | OUTPATIENT
Start: 2022-08-30 | End: 2023-06-05 | Stop reason: SDUPTHER

## 2022-08-30 RX ORDER — SYRINGE,SAFETY WITH NEEDLE,1ML 25GX1"
SYRINGE (EA) MISCELLANEOUS
Qty: 10 EACH | Refills: 3 | Status: SHIPPED | OUTPATIENT
Start: 2022-08-30

## 2022-08-30 NOTE — PATIENT INSTRUCTIONS
Cont abatacept 1000mg IV q 28 days    methotrexate 25mg sc q 7 days with folic acid 1mg daily but 8 mg day after methotrexate dosing.  Cont leflunomide 20mg daily  Cont amlodipine 10mg daily  Cont pravastatin 40mg every evening  Cont vitamin D 50,000 units weekly   Finish last dose of alendronate 70 mg then begin drug holiday- Repeat DXA in 1-2 years   Wool elbow protector for left elbow  Increase walking,stationary bike  Mediterranean/DASH diet, no junk food, low carb, weight reduction  RTC 3 months with standing labs

## 2022-08-30 NOTE — PROGRESS NOTES
"I have personally taken the history and examined the patient and agree with the resident,s note as stated above              RA: RF+ ACPA+ GUSTABO-:  TJ 0  SJ 0 Pt global 0 ESR 12 CRP 1.6 DAS28 1.74 KXO87-VWC 1.3  CDAI 0(all remission)  Rheumatoid Arthritis Treatment Goals:  -Disease Activity Measure: CDAI  -Target: LDA  -Therapy/Change: none  -Shared Decision Making: Discussed goals of care and therapy plan together with patient as outlined above.      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 Can go back on "drug holiday" off alendronate. Repeat DXA in 1-2 years  Obesity, lost 1# since 5/31/22  /72 on amlodipine 10mg daily  Hyperlipidemia LDL 65.4 5/31/22  on pravastatin 40mg every evening  T2 DM HbA1c 6.1 5/31/22  S/p Covid 10/19/21        Cont abatacept 1000mg IV q 28 days    methotrexate 20mg (0.8 ml) sc q 7 days with folic acid 1mg daily but 8 mg day after methotrexate dosing.  Cont leflunomide 20mg daily  Cont amlodipine 10mg daily  Cont pravastatin 40mg every evening  Stop alendronate  Increase walking,stationary bike  Ref to Bariatric Medicine  Mediterranean/DASH diet, no junk food, low carb, weight reduction  RTC 3 months with standing labs   "

## 2022-08-30 NOTE — PROGRESS NOTES
Subjective:       Patient ID: Maru Shelby is a 72 y.o. female.    Chief Complaint: No chief complaint on file.    Patient with RA RF+ ACPA+GUSTABO-; Osteoporosis presents for 3 month follow up. V 5/31/22. Since then, she has been doing well. She did have right thigh pain for which she saw Dr. Florence who did an injection 2 weeks ago. She said this resolved the pain. No recent falls or trauma. No new joint swelling or pain. Her mouth continues to be dry and she has had 1 mucosal oral which went away with mouthwash. She has been taking her blood pressure at home and her SBP averages 120s-130s.    Patient continues taking the alendronate 70 mg weekly, and she only has one pill left. Patient had DXA done 7/14/22. DXA showed low bone density with only moderate fracture risk, with significant improvement compared with previous. Dr. Royal decided a drug holiday off alendronate would be appropriate with a DXA in 1-2 years.     She remains on abatacept (Orencia) IV 1000mg monthly, methotrexate 25mg sc q7day, leflunomide 20mg daily for her RA. Also on pravastatin 40mg nightly and amlodipine 10mg daily for CV health.     She has not been able to take folic acid 1 mg daily (except 8mg on day after taking Methotrexate). It has been a few months since she has had it. She has not been taking Vitamin D2 50,000 weekly either due to it not being filled.     Diet: smaller portions of regular diet, vegetables with a protein   Exercise: walking     Review of Systems   Constitutional:  Negative for fever and unexpected weight change.   HENT:  Negative for mouth sores and trouble swallowing.    Eyes:  Negative for redness.   Respiratory:  Negative for cough and shortness of breath.    Cardiovascular:  Negative for chest pain.   Gastrointestinal:  Negative for constipation and diarrhea.   Genitourinary:  Negative for dysuria and genital sores.   Skin:  Negative for rash.   Neurological:  Negative for headaches.   Hematological:   "Does not bruise/bleed easily.       Objective:   BP (!) 150/88   Pulse (!) 112   Ht 5' 7.2" (1.707 m)   Wt 91.2 kg (201 lb)   BMI 31.29 kg/m²      Physical Exam   Constitutional: She is oriented to person, place, and time. She appears obese.   HENT:   Head: Normocephalic and atraumatic.   Eyes: Conjunctivae are normal.   Cardiovascular: Normal rate, regular rhythm, normal heart sounds and normal pulses.   Pulmonary/Chest: Effort normal and breath sounds normal.   Abdominal: Normal appearance.   Neurological: She is alert and oriented to person, place, and time.   Skin: Skin is warm and dry.   Psychiatric: Her behavior is normal. Mood, judgment and thought content normal.      Right Side Rheumatological Exam     The patient has an enlarged knee, 2nd PIP, 2nd MCP, 3rd PIP and 3rd MCP     Left Side Rheumatological Exam     The patient has an enlarged knee, 2nd PIP, 2nd MCP, 3rd PIP and 3rd MCP.  Left elbow rheumatoid nodule          11/17/2020 3/3/2021 5/31/2022 8/30/2022   Tender (LEES-28) 0 / 28  1 / 28  0 / 28  0 / 28    Swollen (LEES-28) 0 / 28  0 / 28 1 / 28  0 / 28    Provider Global 20 mm 10 mm 0 mm 0 mm   Patient Global 75 mm 0 mm 30 mm 0 mm   ESR 29 mm/hr 35 mm/hr 18 mm/hr 12 mm/hr   CRP 30.1 mg/L 10.8 mg/L 8.2 mg/L 1.6 mg/L   LEES-28 (ESR) 3.41 (Moderate disease activity) 3.05 (Low disease activity) 2.72 (Low disease activity) 1.74 (Remission)   LEES-28 (CRP) 3.25 (Moderate disease activity) 2.41 (Remission) 2.46 (Remission) 1.3 (Remission)   CDAI Score 0  1  4  0         Assessment:       1. Rheumatoid arthritis involving multiple sites with positive rheumatoid factor    2. Osteopenia, unspecified location            Plan:       Problem List Items Addressed This Visit          Immunology/Multi System    RA (rheumatoid arthritis) - Primary (Chronic)     Other Visit Diagnoses       Osteopenia, unspecified location              Cont abatacept 1000mg IV q 28 days    methotrexate 25mg sc q 7 days with folic acid " 1mg daily but 8 mg day after methotrexate dosing.  Cont leflunomide 20mg daily  Cont amlodipine 10mg daily  Cont pravastatin 40mg every evening  Cont vitamin D 50,000 units weekly   Finish last dose of alendronate 70 mg then begin drug holiday- Repeat DXA in 1-2 years   Wool elbow protector for left elbow  Increase walking,stationary bike  Mediterranean/DASH diet, no junk food, low carb, weight reduction  RTC 3 months with standing labs

## 2022-09-26 ENCOUNTER — INFUSION (OUTPATIENT)
Dept: INFUSION THERAPY | Facility: HOSPITAL | Age: 72
End: 2022-09-26
Attending: INTERNAL MEDICINE
Payer: MEDICARE

## 2022-09-26 VITALS
SYSTOLIC BLOOD PRESSURE: 124 MMHG | HEART RATE: 88 BPM | DIASTOLIC BLOOD PRESSURE: 72 MMHG | RESPIRATION RATE: 18 BRPM | TEMPERATURE: 97 F

## 2022-09-26 DIAGNOSIS — M05.79 RHEUMATOID ARTHRITIS INVOLVING MULTIPLE SITES WITH POSITIVE RHEUMATOID FACTOR: Primary | ICD-10-CM

## 2022-09-26 PROCEDURE — 25000003 PHARM REV CODE 250: Performed by: INTERNAL MEDICINE

## 2022-09-26 PROCEDURE — 96365 THER/PROPH/DIAG IV INF INIT: CPT

## 2022-09-26 PROCEDURE — 63600175 PHARM REV CODE 636 W HCPCS: Mod: JA | Performed by: INTERNAL MEDICINE

## 2022-09-26 RX ORDER — SODIUM CHLORIDE 0.9 % (FLUSH) 0.9 %
10 SYRINGE (ML) INJECTION
Status: CANCELLED | OUTPATIENT
Start: 2022-10-17

## 2022-09-26 RX ORDER — ACETAMINOPHEN 325 MG/1
650 TABLET ORAL
Status: CANCELLED | OUTPATIENT
Start: 2022-10-17

## 2022-09-26 RX ORDER — HEPARIN 100 UNIT/ML
500 SYRINGE INTRAVENOUS
Status: CANCELLED | OUTPATIENT
Start: 2022-10-17

## 2022-09-26 RX ORDER — SODIUM CHLORIDE 0.9 % (FLUSH) 0.9 %
10 SYRINGE (ML) INJECTION
Status: DISCONTINUED | OUTPATIENT
Start: 2022-09-26 | End: 2022-09-26 | Stop reason: HOSPADM

## 2022-09-26 RX ADMIN — SODIUM CHLORIDE 1000 MG: 9 INJECTION, SOLUTION INTRAVENOUS at 08:09

## 2022-09-28 DIAGNOSIS — E11.9 TYPE 2 DIABETES MELLITUS WITHOUT COMPLICATION: ICD-10-CM

## 2022-10-12 ENCOUNTER — OFFICE VISIT (OUTPATIENT)
Dept: INTERNAL MEDICINE | Facility: CLINIC | Age: 72
End: 2022-10-12
Payer: MEDICARE

## 2022-10-12 VITALS
HEART RATE: 105 BPM | BODY MASS INDEX: 31.71 KG/M2 | RESPIRATION RATE: 18 BRPM | WEIGHT: 202 LBS | SYSTOLIC BLOOD PRESSURE: 124 MMHG | OXYGEN SATURATION: 94 % | HEIGHT: 67 IN | DIASTOLIC BLOOD PRESSURE: 80 MMHG

## 2022-10-12 DIAGNOSIS — Z79.4 TYPE 2 DIABETES MELLITUS WITH OTHER SPECIFIED COMPLICATION, WITH LONG-TERM CURRENT USE OF INSULIN: ICD-10-CM

## 2022-10-12 DIAGNOSIS — D69.2 OTHER NONTHROMBOCYTOPENIC PURPURA: ICD-10-CM

## 2022-10-12 DIAGNOSIS — E11.9 TYPE 2 DIABETES MELLITUS WITHOUT COMPLICATION, WITHOUT LONG-TERM CURRENT USE OF INSULIN: ICD-10-CM

## 2022-10-12 DIAGNOSIS — D84.9 IMMUNODEFICIENCY, UNSPECIFIED: ICD-10-CM

## 2022-10-12 DIAGNOSIS — E11.69 TYPE 2 DIABETES MELLITUS WITH OTHER SPECIFIED COMPLICATION, WITH LONG-TERM CURRENT USE OF INSULIN: ICD-10-CM

## 2022-10-12 DIAGNOSIS — R09.81 SINUS CONGESTION: ICD-10-CM

## 2022-10-12 DIAGNOSIS — J04.0 ACUTE LARYNGITIS: Primary | ICD-10-CM

## 2022-10-12 PROCEDURE — 3074F SYST BP LT 130 MM HG: CPT | Mod: CPTII,S$GLB,, | Performed by: INTERNAL MEDICINE

## 2022-10-12 PROCEDURE — 1160F PR REVIEW ALL MEDS BY PRESCRIBER/CLIN PHARMACIST DOCUMENTED: ICD-10-PCS | Mod: CPTII,S$GLB,, | Performed by: INTERNAL MEDICINE

## 2022-10-12 PROCEDURE — 99214 PR OFFICE/OUTPT VISIT, EST, LEVL IV, 30-39 MIN: ICD-10-PCS | Mod: 25,S$GLB,, | Performed by: INTERNAL MEDICINE

## 2022-10-12 PROCEDURE — 96372 PR INJECTION,THERAP/PROPH/DIAG2ST, IM OR SUBCUT: ICD-10-PCS | Mod: S$GLB,,, | Performed by: INTERNAL MEDICINE

## 2022-10-12 PROCEDURE — 3008F BODY MASS INDEX DOCD: CPT | Mod: CPTII,S$GLB,, | Performed by: INTERNAL MEDICINE

## 2022-10-12 PROCEDURE — 99999 PR PBB SHADOW E&M-EST. PATIENT-LVL III: ICD-10-PCS | Mod: PBBFAC,,, | Performed by: INTERNAL MEDICINE

## 2022-10-12 PROCEDURE — 99999 PR PBB SHADOW E&M-EST. PATIENT-LVL III: CPT | Mod: PBBFAC,,, | Performed by: INTERNAL MEDICINE

## 2022-10-12 PROCEDURE — 99499 UNLISTED E&M SERVICE: CPT | Mod: S$GLB,,, | Performed by: INTERNAL MEDICINE

## 2022-10-12 PROCEDURE — 99214 OFFICE O/P EST MOD 30 MIN: CPT | Mod: 25,S$GLB,, | Performed by: INTERNAL MEDICINE

## 2022-10-12 PROCEDURE — 3044F PR MOST RECENT HEMOGLOBIN A1C LEVEL <7.0%: ICD-10-PCS | Mod: CPTII,S$GLB,, | Performed by: INTERNAL MEDICINE

## 2022-10-12 PROCEDURE — 3044F HG A1C LEVEL LT 7.0%: CPT | Mod: CPTII,S$GLB,, | Performed by: INTERNAL MEDICINE

## 2022-10-12 PROCEDURE — 3079F PR MOST RECENT DIASTOLIC BLOOD PRESSURE 80-89 MM HG: ICD-10-PCS | Mod: CPTII,S$GLB,, | Performed by: INTERNAL MEDICINE

## 2022-10-12 PROCEDURE — 1160F RVW MEDS BY RX/DR IN RCRD: CPT | Mod: CPTII,S$GLB,, | Performed by: INTERNAL MEDICINE

## 2022-10-12 PROCEDURE — 3288F FALL RISK ASSESSMENT DOCD: CPT | Mod: CPTII,S$GLB,, | Performed by: INTERNAL MEDICINE

## 2022-10-12 PROCEDURE — 1101F PR PT FALLS ASSESS DOC 0-1 FALLS W/OUT INJ PAST YR: ICD-10-PCS | Mod: CPTII,S$GLB,, | Performed by: INTERNAL MEDICINE

## 2022-10-12 PROCEDURE — 3074F PR MOST RECENT SYSTOLIC BLOOD PRESSURE < 130 MM HG: ICD-10-PCS | Mod: CPTII,S$GLB,, | Performed by: INTERNAL MEDICINE

## 2022-10-12 PROCEDURE — 3008F PR BODY MASS INDEX (BMI) DOCUMENTED: ICD-10-PCS | Mod: CPTII,S$GLB,, | Performed by: INTERNAL MEDICINE

## 2022-10-12 PROCEDURE — 1159F MED LIST DOCD IN RCRD: CPT | Mod: CPTII,S$GLB,, | Performed by: INTERNAL MEDICINE

## 2022-10-12 PROCEDURE — 3288F PR FALLS RISK ASSESSMENT DOCUMENTED: ICD-10-PCS | Mod: CPTII,S$GLB,, | Performed by: INTERNAL MEDICINE

## 2022-10-12 PROCEDURE — 1159F PR MEDICATION LIST DOCUMENTED IN MEDICAL RECORD: ICD-10-PCS | Mod: CPTII,S$GLB,, | Performed by: INTERNAL MEDICINE

## 2022-10-12 PROCEDURE — 1126F PR PAIN SEVERITY QUANTIFIED, NO PAIN PRESENT: ICD-10-PCS | Mod: CPTII,S$GLB,, | Performed by: INTERNAL MEDICINE

## 2022-10-12 PROCEDURE — 1101F PT FALLS ASSESS-DOCD LE1/YR: CPT | Mod: CPTII,S$GLB,, | Performed by: INTERNAL MEDICINE

## 2022-10-12 PROCEDURE — 96372 THER/PROPH/DIAG INJ SC/IM: CPT | Mod: S$GLB,,, | Performed by: INTERNAL MEDICINE

## 2022-10-12 PROCEDURE — 1126F AMNT PAIN NOTED NONE PRSNT: CPT | Mod: CPTII,S$GLB,, | Performed by: INTERNAL MEDICINE

## 2022-10-12 PROCEDURE — 3079F DIAST BP 80-89 MM HG: CPT | Mod: CPTII,S$GLB,, | Performed by: INTERNAL MEDICINE

## 2022-10-12 RX ORDER — SITAGLIPTIN AND METFORMIN HYDROCHLORIDE 1000; 50 MG/1; MG/1
1 TABLET, FILM COATED ORAL 2 TIMES DAILY WITH MEALS
Qty: 180 TABLET | Refills: 1 | Status: SHIPPED | OUTPATIENT
Start: 2022-10-12 | End: 2022-10-26

## 2022-10-12 RX ORDER — TRIAMCINOLONE ACETONIDE 40 MG/ML
40 INJECTION, SUSPENSION INTRA-ARTICULAR; INTRAMUSCULAR
Status: COMPLETED | OUTPATIENT
Start: 2022-10-12 | End: 2022-10-12

## 2022-10-12 RX ORDER — AZITHROMYCIN 250 MG/1
TABLET, FILM COATED ORAL
Qty: 6 TABLET | Refills: 0 | Status: SHIPPED | OUTPATIENT
Start: 2022-10-12 | End: 2022-10-26

## 2022-10-12 RX ADMIN — TRIAMCINOLONE ACETONIDE 40 MG: 40 INJECTION, SUSPENSION INTRA-ARTICULAR; INTRAMUSCULAR at 02:10

## 2022-10-12 NOTE — PROGRESS NOTES
Subjective:       Patient ID: Maru Shelby is a 72 y.o. female.    Chief Complaint: Sinus Problem    Sinus Problem  This is a new problem. The current episode started in the past 7 days. The problem has been gradually worsening since onset. There has been no fever. Associated symptoms include chills, coughing, headaches, a hoarse voice, sinus pressure, sneezing and a sore throat. Pertinent negatives include no congestion, ear pain, neck pain or shortness of breath. Past treatments include nothing. The treatment provided no relief.   Review of Systems   Constitutional:  Positive for chills, fatigue and fever.   HENT:  Positive for hoarse voice, sinus pressure, sneezing, sore throat and trouble swallowing. Negative for congestion, drooling, ear pain and postnasal drip.    Respiratory:  Positive for cough. Negative for chest tightness and shortness of breath.    Cardiovascular:  Negative for chest pain, palpitations and leg swelling.   Gastrointestinal:  Negative for abdominal pain, diarrhea, nausea and vomiting.   Musculoskeletal:  Positive for myalgias. Negative for back pain, gait problem, joint swelling and neck pain.   Skin:  Negative for rash and wound.   Neurological:  Positive for headaches. Negative for dizziness, weakness and light-headedness.     Objective:      Physical Exam  Vitals and nursing note reviewed.   Constitutional:       Appearance: She is well-developed.   HENT:      Head: Normocephalic and atraumatic.      Right Ear: External ear normal. A middle ear effusion is present.      Left Ear: External ear normal. A middle ear effusion is present.      Nose: Mucosal edema and rhinorrhea present.      Mouth/Throat:      Mouth: Mucous membranes are pale.   Eyes:      Conjunctiva/sclera: Conjunctivae normal.      Pupils: Pupils are equal, round, and reactive to light.   Neck:      Thyroid: No thyromegaly.      Vascular: No JVD.      Trachea: No tracheal deviation.   Cardiovascular:      Rate and  Rhythm: Normal rate and regular rhythm.      Heart sounds: Normal heart sounds.   Pulmonary:      Effort: Pulmonary effort is normal. No respiratory distress.      Breath sounds: Normal breath sounds. No wheezing or rales.   Chest:      Chest wall: No tenderness.   Abdominal:      General: Bowel sounds are normal. There is no distension.      Palpations: Abdomen is soft. There is no mass.      Tenderness: There is no abdominal tenderness. There is no guarding or rebound.   Musculoskeletal:         General: Normal range of motion.      Cervical back: Normal range of motion and neck supple.   Lymphadenopathy:      Cervical: No cervical adenopathy.   Skin:     General: Skin is warm and dry.   Neurological:      Mental Status: She is alert and oriented to person, place, and time.      Cranial Nerves: No cranial nerve deficit.      Motor: No abnormal muscle tone.      Coordination: Coordination normal.      Deep Tendon Reflexes: Reflexes are normal and symmetric.       Assessment:       1. Acute laryngitis    2. Sinus congestion    3. Type 2 diabetes mellitus without complication, without long-term current use of insulin    4. Immunodeficiency, unspecified    5. Other nonthrombocytopenic purpura    6. Type 2 diabetes mellitus with other specified complication, with long-term current use of insulin          Plan:   Maru was seen today for sinus problem.    Diagnoses and all orders for this visit:    Acute laryngitis  -     azithromycin (ZITHROMAX Z-VALDEZ) 250 MG tablet; 2 today then 1 daily for 4 days  -     triamcinolone acetonide injection 40 mg    Sinus congestion  -     triamcinolone acetonide injection 40 mg  Continue same medication and dose.   Take Claritin/allegra/flonase  Simply saline nasal lavage q.2 to 3 hours p.r.n.  Avoid dust, allergens, other sinus irritants.  Handwashing technique discussed to prevent spreading germs.  Type 2 diabetes mellitus without complication, without long-term current use of  insulin  -     SITagliptan-metformin (JANUMET) 50-1,000 mg per tablet; Take 1 tablet by mouth 2 (two) times daily with meals.  Lab Results   Component Value Date    HGBA1C 6.1 (H) 05/31/2022   Continue janumet       Immunodeficiency, unspecified  She is on her infusion for arthritis     Other nonthrombocytopenic purpura  Not related to patient   Lab Results   Component Value Date    WBC 12.98 (H) 08/25/2022    HGB 15.0 08/25/2022    HCT 45.5 08/25/2022    MCV 93 08/25/2022     08/25/2022         Type 2 diabetes mellitus with other specified complication, with long-term current use of insulin  Well controlled.          Problem List Items Addressed This Visit    None  Visit Diagnoses       Acute laryngitis    -  Primary    Relevant Medications    azithromycin (ZITHROMAX Z-VALDEZ) 250 MG tablet    Sinus congestion

## 2022-10-22 ENCOUNTER — HOSPITAL ENCOUNTER (EMERGENCY)
Facility: HOSPITAL | Age: 72
Discharge: HOME | End: 2022-10-22
Attending: STUDENT IN AN ORGANIZED HEALTH CARE EDUCATION/TRAINING PROGRAM
Payer: MEDICARE

## 2022-10-22 VITALS
DIASTOLIC BLOOD PRESSURE: 82 MMHG | BODY MASS INDEX: 31.52 KG/M2 | SYSTOLIC BLOOD PRESSURE: 124 MMHG | HEIGHT: 67 IN | HEART RATE: 108 BPM | OXYGEN SATURATION: 95 % | WEIGHT: 200.81 LBS | TEMPERATURE: 99 F | RESPIRATION RATE: 18 BRPM

## 2022-10-22 DIAGNOSIS — R21 RASH OF FACE: Primary | ICD-10-CM

## 2022-10-22 DIAGNOSIS — R51.9 ACUTE FACIAL PAIN: ICD-10-CM

## 2022-10-22 PROCEDURE — 99283 EMERGENCY DEPT VISIT LOW MDM: CPT

## 2022-10-22 RX ORDER — CEPHALEXIN 500 MG/1
500 CAPSULE ORAL 4 TIMES DAILY
Qty: 20 CAPSULE | Refills: 0 | Status: SHIPPED | OUTPATIENT
Start: 2022-10-22 | End: 2022-10-27

## 2022-10-24 ENCOUNTER — INFUSION (OUTPATIENT)
Dept: INFUSION THERAPY | Facility: HOSPITAL | Age: 72
End: 2022-10-24
Attending: INTERNAL MEDICINE
Payer: MEDICARE

## 2022-10-24 ENCOUNTER — TELEPHONE (OUTPATIENT)
Dept: INTERNAL MEDICINE | Facility: CLINIC | Age: 72
End: 2022-10-24
Payer: MEDICARE

## 2022-10-24 VITALS
RESPIRATION RATE: 18 BRPM | WEIGHT: 201 LBS | TEMPERATURE: 98 F | DIASTOLIC BLOOD PRESSURE: 77 MMHG | SYSTOLIC BLOOD PRESSURE: 118 MMHG | BODY MASS INDEX: 31.25 KG/M2 | HEART RATE: 98 BPM

## 2022-10-24 DIAGNOSIS — M05.79 RHEUMATOID ARTHRITIS INVOLVING MULTIPLE SITES WITH POSITIVE RHEUMATOID FACTOR: Primary | ICD-10-CM

## 2022-10-24 PROCEDURE — 96365 THER/PROPH/DIAG IV INF INIT: CPT

## 2022-10-24 PROCEDURE — 63600175 PHARM REV CODE 636 W HCPCS: Mod: JA | Performed by: INTERNAL MEDICINE

## 2022-10-24 PROCEDURE — 25000003 PHARM REV CODE 250: Performed by: INTERNAL MEDICINE

## 2022-10-24 RX ORDER — SODIUM CHLORIDE 0.9 % (FLUSH) 0.9 %
10 SYRINGE (ML) INJECTION
Status: CANCELLED | OUTPATIENT
Start: 2022-11-21

## 2022-10-24 RX ORDER — HEPARIN 100 UNIT/ML
500 SYRINGE INTRAVENOUS
Status: CANCELLED | OUTPATIENT
Start: 2022-11-21

## 2022-10-24 RX ORDER — ACETAMINOPHEN 325 MG/1
650 TABLET ORAL
Status: CANCELLED | OUTPATIENT
Start: 2022-11-21

## 2022-10-24 RX ADMIN — SODIUM CHLORIDE 1000 MG: 9 INJECTION, SOLUTION INTRAVENOUS at 08:10

## 2022-10-24 NOTE — TELEPHONE ENCOUNTER
----- Message from Renetta Ayala sent at 10/24/2022  9:06 AM CDT -----  Contact: pt  Maru Shelby  MRN: [id number]  : 1950  PCP: Florence Mark  Home Phone      [phone]  Work Phone      Not on file.  Mobile          [phone]      MESSAGE: Pt states she has a tooth that is giving her a lot of pain to the right side of her face and is going up to her head. Pt will go see dentist. Pt also states she went to the ER and she is taking Cephalexin. Pt wanted to see Dr. Mark but there are no available appts until Monday the .       [phone]

## 2022-10-24 NOTE — TELEPHONE ENCOUNTER
I reviewed her ER note and she also had a rash on her face which could be shingles and would explain the headache. I attempted to contact her to discuss, but no answer. LM for her to return my call.

## 2022-10-24 NOTE — TELEPHONE ENCOUNTER
This writer spoke with Pt's mother, Radha Bryson, to inform her that [facility] in [place] deflected the Pt for a bed d/t acuity on their unit. Discussed other options for placement and Radha Bryson gave verbal consent for us to look into [facility]. This writer called [facility] intake line and they will take the referral. Packet faxed to [facility] for review. Unable to reach pt LM is best for her to see a dentist since this is coming from a tooth.

## 2022-10-25 ENCOUNTER — TELEPHONE (OUTPATIENT)
Dept: INTERNAL MEDICINE | Facility: CLINIC | Age: 72
End: 2022-10-25
Payer: MEDICARE

## 2022-10-25 NOTE — TELEPHONE ENCOUNTER
----- Message from Renetta Ayala sent at 10/25/2022 11:10 AM CDT -----  Contact: pt  Maru ECKERT Mariokashifbandar  MRN: [id number]  : 1950  PCP: Florence Mark  Home Phone      [phone]  Work Phone      Not on file.  Mobile          [phone]      MESSAGE: Pt left a message on the machine saying Dr Mrak please call me back.    [phone]

## 2022-10-26 ENCOUNTER — OFFICE VISIT (OUTPATIENT)
Dept: INTERNAL MEDICINE | Facility: CLINIC | Age: 72
End: 2022-10-26
Payer: MEDICARE

## 2022-10-26 ENCOUNTER — TELEPHONE (OUTPATIENT)
Dept: INTERNAL MEDICINE | Facility: CLINIC | Age: 72
End: 2022-10-26

## 2022-10-26 VITALS
DIASTOLIC BLOOD PRESSURE: 80 MMHG | HEIGHT: 67 IN | WEIGHT: 191 LBS | OXYGEN SATURATION: 95 % | BODY MASS INDEX: 29.98 KG/M2 | SYSTOLIC BLOOD PRESSURE: 108 MMHG | TEMPERATURE: 97 F | HEART RATE: 95 BPM | RESPIRATION RATE: 18 BRPM

## 2022-10-26 DIAGNOSIS — R51.9 ACUTE NONINTRACTABLE HEADACHE, UNSPECIFIED HEADACHE TYPE: Primary | ICD-10-CM

## 2022-10-26 DIAGNOSIS — R51.9 FACIAL PAIN: ICD-10-CM

## 2022-10-26 PROCEDURE — 3079F DIAST BP 80-89 MM HG: CPT | Mod: CPTII,S$GLB,, | Performed by: INTERNAL MEDICINE

## 2022-10-26 PROCEDURE — 3074F PR MOST RECENT SYSTOLIC BLOOD PRESSURE < 130 MM HG: ICD-10-PCS | Mod: CPTII,S$GLB,, | Performed by: INTERNAL MEDICINE

## 2022-10-26 PROCEDURE — 3074F SYST BP LT 130 MM HG: CPT | Mod: CPTII,S$GLB,, | Performed by: INTERNAL MEDICINE

## 2022-10-26 PROCEDURE — 3044F HG A1C LEVEL LT 7.0%: CPT | Mod: CPTII,S$GLB,, | Performed by: INTERNAL MEDICINE

## 2022-10-26 PROCEDURE — 3079F PR MOST RECENT DIASTOLIC BLOOD PRESSURE 80-89 MM HG: ICD-10-PCS | Mod: CPTII,S$GLB,, | Performed by: INTERNAL MEDICINE

## 2022-10-26 PROCEDURE — 99999 PR PBB SHADOW E&M-EST. PATIENT-LVL III: CPT | Mod: PBBFAC,,, | Performed by: INTERNAL MEDICINE

## 2022-10-26 PROCEDURE — 3044F PR MOST RECENT HEMOGLOBIN A1C LEVEL <7.0%: ICD-10-PCS | Mod: CPTII,S$GLB,, | Performed by: INTERNAL MEDICINE

## 2022-10-26 PROCEDURE — 1101F PR PT FALLS ASSESS DOC 0-1 FALLS W/OUT INJ PAST YR: ICD-10-PCS | Mod: CPTII,S$GLB,, | Performed by: INTERNAL MEDICINE

## 2022-10-26 PROCEDURE — 99214 PR OFFICE/OUTPT VISIT, EST, LEVL IV, 30-39 MIN: ICD-10-PCS | Mod: S$GLB,,, | Performed by: INTERNAL MEDICINE

## 2022-10-26 PROCEDURE — 1125F PR PAIN SEVERITY QUANTIFIED, PAIN PRESENT: ICD-10-PCS | Mod: CPTII,S$GLB,, | Performed by: INTERNAL MEDICINE

## 2022-10-26 PROCEDURE — 3288F PR FALLS RISK ASSESSMENT DOCUMENTED: ICD-10-PCS | Mod: CPTII,S$GLB,, | Performed by: INTERNAL MEDICINE

## 2022-10-26 PROCEDURE — 1159F PR MEDICATION LIST DOCUMENTED IN MEDICAL RECORD: ICD-10-PCS | Mod: CPTII,S$GLB,, | Performed by: INTERNAL MEDICINE

## 2022-10-26 PROCEDURE — 1159F MED LIST DOCD IN RCRD: CPT | Mod: CPTII,S$GLB,, | Performed by: INTERNAL MEDICINE

## 2022-10-26 PROCEDURE — 99999 PR PBB SHADOW E&M-EST. PATIENT-LVL III: ICD-10-PCS | Mod: PBBFAC,,, | Performed by: INTERNAL MEDICINE

## 2022-10-26 PROCEDURE — 99214 OFFICE O/P EST MOD 30 MIN: CPT | Mod: S$GLB,,, | Performed by: INTERNAL MEDICINE

## 2022-10-26 PROCEDURE — 3288F FALL RISK ASSESSMENT DOCD: CPT | Mod: CPTII,S$GLB,, | Performed by: INTERNAL MEDICINE

## 2022-10-26 PROCEDURE — 1101F PT FALLS ASSESS-DOCD LE1/YR: CPT | Mod: CPTII,S$GLB,, | Performed by: INTERNAL MEDICINE

## 2022-10-26 PROCEDURE — 1125F AMNT PAIN NOTED PAIN PRSNT: CPT | Mod: CPTII,S$GLB,, | Performed by: INTERNAL MEDICINE

## 2022-10-26 RX ORDER — GABAPENTIN 300 MG/1
300 CAPSULE ORAL NIGHTLY
Qty: 30 CAPSULE | Refills: 3 | Status: SHIPPED | OUTPATIENT
Start: 2022-10-26 | End: 2022-12-05

## 2022-10-26 RX ORDER — METFORMIN HYDROCHLORIDE 500 MG/1
500 TABLET ORAL 2 TIMES DAILY WITH MEALS
Qty: 180 TABLET | Refills: 3 | Status: SHIPPED | OUTPATIENT
Start: 2022-10-26 | End: 2023-01-03

## 2022-10-26 RX ORDER — TRAMADOL HYDROCHLORIDE 50 MG/1
50 TABLET ORAL EVERY 6 HOURS PRN
Qty: 7 TABLET | Refills: 0 | Status: SHIPPED | OUTPATIENT
Start: 2022-10-26 | End: 2022-12-05

## 2022-10-26 NOTE — PROGRESS NOTES
"Subjective:       Patient ID: Maru Shelby is a 72 y.o. female.    Chief Complaint: ER follow up    Maru Shelby is a 72 y.o. female  With rash on R temple ; painful ;small area ;   reports patient has severe headache and face  pain .  Seen in ER ; started on keflex .   reports she gets confused after she gets her headache attack.  10/10 headaches ; " she gets out of it ;dead stare "   Review of Systems   Constitutional:  Negative for chills and fever.   HENT:  Negative for congestion, hearing loss, sinus pressure and sore throat.    Eyes:  Negative for photophobia.   Respiratory:  Negative for cough, choking, chest tightness and wheezing.    Cardiovascular:  Negative for chest pain and palpitations.   Gastrointestinal:  Negative for blood in stool, nausea and vomiting.   Genitourinary:  Negative for dysuria and hematuria.   Musculoskeletal:  Negative for arthralgias and myalgias.   Skin:  Negative for pallor.   Neurological:  Positive for headaches. Negative for dizziness and numbness.   Hematological:  Does not bruise/bleed easily.   Psychiatric/Behavioral:  Positive for confusion. Negative for suicidal ideas. The patient is not nervous/anxious.      Objective:      Physical Exam  Vitals and nursing note reviewed.   Constitutional:       Appearance: She is well-developed.   HENT:      Head: Normocephalic and atraumatic.        Comments: Area of pain   Small spot in the middle ;hyperemic   No other areas of vesicular rash .    Cardiovascular:      Rate and Rhythm: Normal rate and regular rhythm.      Heart sounds: Normal heart sounds.   Pulmonary:      Effort: Pulmonary effort is normal.      Breath sounds: Normal breath sounds.   Abdominal:      General: Bowel sounds are normal.      Palpations: Abdomen is soft.      Tenderness: There is no abdominal tenderness.   Skin:     General: Skin is warm and dry.   Neurological:      Mental Status: She is alert and oriented to person, place, and " time.   Psychiatric:         Behavior: Behavior normal.         Thought Content: Thought content normal.         Judgment: Judgment normal.       Assessment:       1. Acute nonintractable headache, unspecified headache type    2. Facial pain        Plan:   Maru was seen today for er follow up.    Diagnoses and all orders for this visit:    Acute nonintractable headache, unspecified headache type  -     CT Head Without Contrast; Future  -     traMADoL (ULTRAM) 50 mg tablet; Take 1 tablet (50 mg total) by mouth every 6 (six) hours as needed for Pain.    Facial pain  -     gabapentin (NEURONTIN) 300 MG capsule; Take 1 capsule (300 mg total) by mouth every evening.  -     traMADoL (ULTRAM) 50 mg tablet; Take 1 tablet (50 mg total) by mouth every 6 (six) hours as needed for Pain.    Problem List Items Addressed This Visit    None

## 2022-10-27 ENCOUNTER — HOSPITAL ENCOUNTER (OUTPATIENT)
Dept: RADIOLOGY | Facility: HOSPITAL | Age: 72
Discharge: HOME | End: 2022-10-27
Attending: INTERNAL MEDICINE
Payer: MEDICARE

## 2022-10-27 DIAGNOSIS — R51.9 ACUTE NONINTRACTABLE HEADACHE, UNSPECIFIED HEADACHE TYPE: ICD-10-CM

## 2022-10-27 PROCEDURE — 70450 CT HEAD WITHOUT CONTRAST: ICD-10-PCS | Mod: 26,,, | Performed by: RADIOLOGY

## 2022-10-27 PROCEDURE — 70450 CT HEAD/BRAIN W/O DYE: CPT | Mod: TC

## 2022-10-27 PROCEDURE — 70450 CT HEAD/BRAIN W/O DYE: CPT | Mod: 26,,, | Performed by: RADIOLOGY

## 2022-11-01 ENCOUNTER — TELEPHONE (OUTPATIENT)
Dept: INTERNAL MEDICINE | Facility: CLINIC | Age: 72
End: 2022-11-01
Payer: MEDICARE

## 2022-11-01 DIAGNOSIS — R42 DIZZINESS: Primary | ICD-10-CM

## 2022-11-01 NOTE — TELEPHONE ENCOUNTER
----- Message from Renetta Ayala sent at 2022 11:52 AM CDT -----  Contact: pt  Maru Shelby  MRN: [id number]  : 1950  PCP: Florence Mark  Home Phone      [phone]  Work Phone      Not on file.  Mobile          [phone]      MESSAGE: pt states she isn't feeling to well. Pt would like some blood work done.      [phone] or   [place] [phone]

## 2022-11-01 NOTE — TELEPHONE ENCOUNTER
Patient c/o dizziness and sleepiness. She denies headache. States that she still has the rash on her right temple. She is requesting blood work. Please advise. Thanks.

## 2022-11-02 ENCOUNTER — LAB VISIT (OUTPATIENT)
Dept: LAB | Facility: HOSPITAL | Age: 72
End: 2022-11-02
Attending: INTERNAL MEDICINE
Payer: MEDICARE

## 2022-11-02 DIAGNOSIS — R42 DIZZINESS: ICD-10-CM

## 2022-11-02 LAB
ALBUMIN SERPL BCP-MCNC: 3.4 G/DL (ref 3.5–5.2)
ALP SERPL-CCNC: 72 U/L (ref 55–135)
ALT SERPL W/O P-5'-P-CCNC: 32 U/L (ref 10–44)
ANION GAP SERPL CALC-SCNC: 9 MMOL/L (ref 8–16)
AST SERPL-CCNC: 21 U/L (ref 10–40)
BASOPHILS # BLD AUTO: 0.07 K/UL (ref 0–0.2)
BASOPHILS NFR BLD: 0.8 % (ref 0–1.9)
BILIRUB SERPL-MCNC: 0.3 MG/DL (ref 0.1–1)
BUN SERPL-MCNC: 15 MG/DL (ref 8–23)
CALCIUM SERPL-MCNC: 9.5 MG/DL (ref 8.7–10.5)
CHLORIDE SERPL-SCNC: 103 MMOL/L (ref 95–110)
CO2 SERPL-SCNC: 27 MMOL/L (ref 23–29)
CREAT SERPL-MCNC: 0.8 MG/DL (ref 0.5–1.4)
DIFFERENTIAL METHOD: ABNORMAL
EOSINOPHIL # BLD AUTO: 0.5 K/UL (ref 0–0.5)
EOSINOPHIL NFR BLD: 5.2 % (ref 0–8)
ERYTHROCYTE [DISTWIDTH] IN BLOOD BY AUTOMATED COUNT: 13.8 % (ref 11.5–14.5)
EST. GFR  (NO RACE VARIABLE): >60 ML/MIN/1.73 M^2
GLUCOSE SERPL-MCNC: 282 MG/DL (ref 70–110)
HCT VFR BLD AUTO: 43 % (ref 37–48.5)
HGB BLD-MCNC: 14.1 G/DL (ref 12–16)
IMM GRANULOCYTES # BLD AUTO: 0.05 K/UL (ref 0–0.04)
IMM GRANULOCYTES NFR BLD AUTO: 0.6 % (ref 0–0.5)
LYMPHOCYTES # BLD AUTO: 3.1 K/UL (ref 1–4.8)
LYMPHOCYTES NFR BLD: 34.7 % (ref 18–48)
MCH RBC QN AUTO: 30.3 PG (ref 27–31)
MCHC RBC AUTO-ENTMCNC: 32.8 G/DL (ref 32–36)
MCV RBC AUTO: 92 FL (ref 82–98)
MONOCYTES # BLD AUTO: 0.8 K/UL (ref 0.3–1)
MONOCYTES NFR BLD: 9 % (ref 4–15)
NEUTROPHILS # BLD AUTO: 4.4 K/UL (ref 1.8–7.7)
NEUTROPHILS NFR BLD: 49.7 % (ref 38–73)
NRBC BLD-RTO: 0 /100 WBC
PLATELET # BLD AUTO: 250 K/UL (ref 150–450)
PMV BLD AUTO: 11.1 FL (ref 9.2–12.9)
POTASSIUM SERPL-SCNC: 4.2 MMOL/L (ref 3.5–5.1)
PROT SERPL-MCNC: 6.3 G/DL (ref 6–8.4)
RBC # BLD AUTO: 4.66 M/UL (ref 4–5.4)
SODIUM SERPL-SCNC: 139 MMOL/L (ref 136–145)
WBC # BLD AUTO: 8.86 K/UL (ref 3.9–12.7)

## 2022-11-02 PROCEDURE — 85025 COMPLETE CBC W/AUTO DIFF WBC: CPT | Performed by: INTERNAL MEDICINE

## 2022-11-02 PROCEDURE — 80053 COMPREHEN METABOLIC PANEL: CPT | Performed by: INTERNAL MEDICINE

## 2022-11-02 PROCEDURE — 36415 COLL VENOUS BLD VENIPUNCTURE: CPT | Performed by: INTERNAL MEDICINE

## 2022-11-04 ENCOUNTER — TELEPHONE (OUTPATIENT)
Dept: INTERNAL MEDICINE | Facility: CLINIC | Age: 72
End: 2022-11-04
Payer: MEDICARE

## 2022-11-04 NOTE — TELEPHONE ENCOUNTER
----- Message from Nenita Tomas sent at 2022 11:24 AM CDT -----  Regarding: Lab Reults  Contact: self  Maru Shelby  MRN: [id number]  : 1950  PCP: Florence Mark  Home Phone      [phone]  Work Phone      Not on file.  Mobile          [phone]      MESSAGE:   Lab results     Phone #  [phone]    Pharmacy - [id number] - [place] - [address] AT [facility]

## 2022-11-04 NOTE — TELEPHONE ENCOUNTER
Pt would like lab results reviewed, I spoke to her Friday she states she ahd already ate when she went for the blood work.

## 2022-11-07 NOTE — TELEPHONE ENCOUNTER
NP her white count is normal   Only thing I see is her sugar was very high   Watch carbs  Continue metformin   Lab Results   Component Value Date    HGBA1C 6.1 (H) 05/31/2022

## 2022-11-21 ENCOUNTER — INFUSION (OUTPATIENT)
Dept: INFUSION THERAPY | Facility: HOSPITAL | Age: 72
End: 2022-11-21
Attending: INTERNAL MEDICINE
Payer: MEDICARE

## 2022-11-21 VITALS
HEART RATE: 95 BPM | WEIGHT: 191 LBS | BODY MASS INDEX: 29.69 KG/M2 | DIASTOLIC BLOOD PRESSURE: 98 MMHG | TEMPERATURE: 97 F | SYSTOLIC BLOOD PRESSURE: 138 MMHG | RESPIRATION RATE: 18 BRPM

## 2022-11-21 DIAGNOSIS — M05.79 RHEUMATOID ARTHRITIS INVOLVING MULTIPLE SITES WITH POSITIVE RHEUMATOID FACTOR: Primary | ICD-10-CM

## 2022-11-21 PROCEDURE — 25000003 PHARM REV CODE 250: Performed by: INTERNAL MEDICINE

## 2022-11-21 PROCEDURE — 63600175 PHARM REV CODE 636 W HCPCS: Mod: JA | Performed by: INTERNAL MEDICINE

## 2022-11-21 PROCEDURE — 96365 THER/PROPH/DIAG IV INF INIT: CPT

## 2022-11-21 RX ORDER — HEPARIN 100 UNIT/ML
500 SYRINGE INTRAVENOUS
Status: CANCELLED | OUTPATIENT
Start: 2022-12-19

## 2022-11-21 RX ORDER — ACETAMINOPHEN 325 MG/1
650 TABLET ORAL
Status: CANCELLED | OUTPATIENT
Start: 2022-12-19

## 2022-11-21 RX ORDER — SODIUM CHLORIDE 0.9 % (FLUSH) 0.9 %
10 SYRINGE (ML) INJECTION
Status: CANCELLED | OUTPATIENT
Start: 2022-12-19

## 2022-11-21 RX ADMIN — SODIUM CHLORIDE 1000 MG: 9 INJECTION, SOLUTION INTRAVENOUS at 08:11

## 2022-11-23 LAB
LEFT EYE DM RETINOPATHY: NEGATIVE
RIGHT EYE DM RETINOPATHY: NEGATIVE

## 2022-11-29 ENCOUNTER — TELEPHONE (OUTPATIENT)
Dept: RHEUMATOLOGY | Facility: CLINIC | Age: 72
End: 2022-11-29
Payer: MEDICARE

## 2022-11-29 RX ORDER — PRAVASTATIN SODIUM 40 MG/1
40 TABLET ORAL NIGHTLY
Qty: 90 TABLET | Refills: 3 | Status: SHIPPED | OUTPATIENT
Start: 2022-11-29 | End: 2023-02-23

## 2022-11-29 NOTE — TELEPHONE ENCOUNTER
"Received refill request for Fosamax. We stopped this last visit, and she is on "drug holiday" off Fosamax as bone density had improved to point she no longer needs to take. DELMY  "

## 2022-11-30 ENCOUNTER — TELEPHONE (OUTPATIENT)
Dept: RHEUMATOLOGY | Facility: CLINIC | Age: 72
End: 2022-11-30
Payer: MEDICARE

## 2022-11-30 NOTE — TELEPHONE ENCOUNTER
"  Spoke with the patient and reminded the patient that she is not to take the fosamax and to call her pharmacy to take the refill request off of automatic refill      Received refill request for Fosamax. We stopped this last visit, and she is on "drug holiday" off Fosamax as bone density had improved to point she no longer needs to take. DELMY        "

## 2022-12-01 ENCOUNTER — LAB VISIT (OUTPATIENT)
Dept: LAB | Facility: HOSPITAL | Age: 72
End: 2022-12-01
Attending: INTERNAL MEDICINE
Payer: MEDICARE

## 2022-12-01 DIAGNOSIS — M06.9 RHEUMATOID ARTHRITIS INVOLVING MULTIPLE SITES, UNSPECIFIED WHETHER RHEUMATOID FACTOR PRESENT: ICD-10-CM

## 2022-12-01 LAB
ALBUMIN SERPL BCP-MCNC: 3.6 G/DL (ref 3.5–5.2)
ALP SERPL-CCNC: 88 U/L (ref 55–135)
ALT SERPL W/O P-5'-P-CCNC: 27 U/L (ref 10–44)
ANION GAP SERPL CALC-SCNC: 12 MMOL/L (ref 8–16)
AST SERPL-CCNC: 21 U/L (ref 10–40)
BASOPHILS # BLD AUTO: 0.07 K/UL (ref 0–0.2)
BASOPHILS NFR BLD: 0.7 % (ref 0–1.9)
BILIRUB SERPL-MCNC: 0.5 MG/DL (ref 0.1–1)
BUN SERPL-MCNC: 10 MG/DL (ref 8–23)
CALCIUM SERPL-MCNC: 9.4 MG/DL (ref 8.7–10.5)
CHLORIDE SERPL-SCNC: 104 MMOL/L (ref 95–110)
CO2 SERPL-SCNC: 26 MMOL/L (ref 23–29)
CREAT SERPL-MCNC: 0.7 MG/DL (ref 0.5–1.4)
CRP SERPL-MCNC: 5.3 MG/L (ref 0–8.2)
DIFFERENTIAL METHOD: ABNORMAL
EOSINOPHIL # BLD AUTO: 0.4 K/UL (ref 0–0.5)
EOSINOPHIL NFR BLD: 3.7 % (ref 0–8)
ERYTHROCYTE [DISTWIDTH] IN BLOOD BY AUTOMATED COUNT: 15.6 % (ref 11.5–14.5)
ERYTHROCYTE [SEDIMENTATION RATE] IN BLOOD BY WESTERGREN METHOD: 26 MM/HR (ref 0–20)
EST. GFR  (NO RACE VARIABLE): >60 ML/MIN/1.73 M^2
GLUCOSE SERPL-MCNC: 193 MG/DL (ref 70–110)
HCT VFR BLD AUTO: 41.5 % (ref 37–48.5)
HGB BLD-MCNC: 13.7 G/DL (ref 12–16)
IMM GRANULOCYTES # BLD AUTO: 0.13 K/UL (ref 0–0.04)
IMM GRANULOCYTES NFR BLD AUTO: 1.3 % (ref 0–0.5)
LYMPHOCYTES # BLD AUTO: 2.9 K/UL (ref 1–4.8)
LYMPHOCYTES NFR BLD: 29.7 % (ref 18–48)
MCH RBC QN AUTO: 30.4 PG (ref 27–31)
MCHC RBC AUTO-ENTMCNC: 33 G/DL (ref 32–36)
MCV RBC AUTO: 92 FL (ref 82–98)
MONOCYTES # BLD AUTO: 0.8 K/UL (ref 0.3–1)
MONOCYTES NFR BLD: 7.9 % (ref 4–15)
NEUTROPHILS # BLD AUTO: 5.6 K/UL (ref 1.8–7.7)
NEUTROPHILS NFR BLD: 56.7 % (ref 38–73)
NRBC BLD-RTO: 0 /100 WBC
PLATELET # BLD AUTO: 268 K/UL (ref 150–450)
PMV BLD AUTO: 9.3 FL (ref 9.2–12.9)
POTASSIUM SERPL-SCNC: 4.4 MMOL/L (ref 3.5–5.1)
PROT SERPL-MCNC: 6.7 G/DL (ref 6–8.4)
RBC # BLD AUTO: 4.51 M/UL (ref 4–5.4)
SODIUM SERPL-SCNC: 142 MMOL/L (ref 136–145)
WBC # BLD AUTO: 9.88 K/UL (ref 3.9–12.7)

## 2022-12-01 PROCEDURE — 80053 COMPREHEN METABOLIC PANEL: CPT | Performed by: INTERNAL MEDICINE

## 2022-12-01 PROCEDURE — 85651 RBC SED RATE NONAUTOMATED: CPT | Performed by: INTERNAL MEDICINE

## 2022-12-01 PROCEDURE — 85025 COMPLETE CBC W/AUTO DIFF WBC: CPT | Performed by: INTERNAL MEDICINE

## 2022-12-01 PROCEDURE — 36415 COLL VENOUS BLD VENIPUNCTURE: CPT | Performed by: INTERNAL MEDICINE

## 2022-12-01 PROCEDURE — 86140 C-REACTIVE PROTEIN: CPT | Performed by: INTERNAL MEDICINE

## 2022-12-01 NOTE — PROGRESS NOTES
The liver and kidney tests are normal, but the glucose was 193 which is way too high. Were you fasting? Will copy Dr. Mark your internist. The sed rate is only minimally elevated.  The cbc is fine. [id number]

## 2022-12-05 ENCOUNTER — OFFICE VISIT (OUTPATIENT)
Dept: RHEUMATOLOGY | Facility: CLINIC | Age: 72
End: 2022-12-05
Payer: MEDICARE

## 2022-12-05 VITALS
BODY MASS INDEX: 31.24 KG/M2 | SYSTOLIC BLOOD PRESSURE: 145 MMHG | HEART RATE: 104 BPM | DIASTOLIC BLOOD PRESSURE: 89 MMHG | HEIGHT: 66 IN | WEIGHT: 194.38 LBS

## 2022-12-05 DIAGNOSIS — M05.772 RHEUMATOID ARTHRITIS INVOLVING BOTH ANKLES WITH POSITIVE RHEUMATOID FACTOR: ICD-10-CM

## 2022-12-05 DIAGNOSIS — M05.771 RHEUMATOID ARTHRITIS INVOLVING BOTH ANKLES WITH POSITIVE RHEUMATOID FACTOR: ICD-10-CM

## 2022-12-05 DIAGNOSIS — M16.0 BILATERAL PRIMARY OSTEOARTHRITIS OF HIP: ICD-10-CM

## 2022-12-05 DIAGNOSIS — M17.0 BILATERAL PRIMARY OSTEOARTHRITIS OF KNEE: ICD-10-CM

## 2022-12-05 DIAGNOSIS — R29.6 FREQUENT FALLS: Primary | ICD-10-CM

## 2022-12-05 DIAGNOSIS — M06.9 RHEUMATOID ARTHRITIS, INVOLVING UNSPECIFIED SITE, UNSPECIFIED WHETHER RHEUMATOID FACTOR PRESENT: ICD-10-CM

## 2022-12-05 PROCEDURE — 1159F MED LIST DOCD IN RCRD: CPT | Mod: CPTII,S$GLB,, | Performed by: INTERNAL MEDICINE

## 2022-12-05 PROCEDURE — 3044F PR MOST RECENT HEMOGLOBIN A1C LEVEL <7.0%: ICD-10-PCS | Mod: CPTII,S$GLB,, | Performed by: INTERNAL MEDICINE

## 2022-12-05 PROCEDURE — 3077F PR MOST RECENT SYSTOLIC BLOOD PRESSURE >= 140 MM HG: ICD-10-PCS | Mod: CPTII,S$GLB,, | Performed by: INTERNAL MEDICINE

## 2022-12-05 PROCEDURE — 1159F PR MEDICATION LIST DOCUMENTED IN MEDICAL RECORD: ICD-10-PCS | Mod: CPTII,S$GLB,, | Performed by: INTERNAL MEDICINE

## 2022-12-05 PROCEDURE — 3008F BODY MASS INDEX DOCD: CPT | Mod: CPTII,S$GLB,, | Performed by: INTERNAL MEDICINE

## 2022-12-05 PROCEDURE — 99999 PR PBB SHADOW E&M-EST. PATIENT-LVL V: CPT | Mod: PBBFAC,,, | Performed by: INTERNAL MEDICINE

## 2022-12-05 PROCEDURE — 3079F DIAST BP 80-89 MM HG: CPT | Mod: CPTII,S$GLB,, | Performed by: INTERNAL MEDICINE

## 2022-12-05 PROCEDURE — 3079F PR MOST RECENT DIASTOLIC BLOOD PRESSURE 80-89 MM HG: ICD-10-PCS | Mod: CPTII,S$GLB,, | Performed by: INTERNAL MEDICINE

## 2022-12-05 PROCEDURE — 99213 PR OFFICE/OUTPT VISIT, EST, LEVL III, 20-29 MIN: ICD-10-PCS | Mod: S$GLB,,, | Performed by: INTERNAL MEDICINE

## 2022-12-05 PROCEDURE — 99213 OFFICE O/P EST LOW 20 MIN: CPT | Mod: S$GLB,,, | Performed by: INTERNAL MEDICINE

## 2022-12-05 PROCEDURE — 3077F SYST BP >= 140 MM HG: CPT | Mod: CPTII,S$GLB,, | Performed by: INTERNAL MEDICINE

## 2022-12-05 PROCEDURE — 99999 PR PBB SHADOW E&M-EST. PATIENT-LVL V: ICD-10-PCS | Mod: PBBFAC,,, | Performed by: INTERNAL MEDICINE

## 2022-12-05 PROCEDURE — 3044F HG A1C LEVEL LT 7.0%: CPT | Mod: CPTII,S$GLB,, | Performed by: INTERNAL MEDICINE

## 2022-12-05 PROCEDURE — 1126F AMNT PAIN NOTED NONE PRSNT: CPT | Mod: CPTII,S$GLB,, | Performed by: INTERNAL MEDICINE

## 2022-12-05 PROCEDURE — 1126F PR PAIN SEVERITY QUANTIFIED, NO PAIN PRESENT: ICD-10-PCS | Mod: CPTII,S$GLB,, | Performed by: INTERNAL MEDICINE

## 2022-12-05 PROCEDURE — 3008F PR BODY MASS INDEX (BMI) DOCUMENTED: ICD-10-PCS | Mod: CPTII,S$GLB,, | Performed by: INTERNAL MEDICINE

## 2022-12-05 RX ORDER — LEFLUNOMIDE 20 MG/1
20 TABLET ORAL DAILY
Qty: 90 TABLET | Refills: 0 | Status: SHIPPED | OUTPATIENT
Start: 2022-12-05 | End: 2023-02-22

## 2022-12-05 RX ORDER — MELOXICAM 15 MG/1
15 TABLET ORAL
COMMUNITY
Start: 2022-08-10 | End: 2022-12-05

## 2022-12-05 RX ORDER — AMITRIPTYLINE HYDROCHLORIDE 25 MG/1
25 TABLET, FILM COATED ORAL NIGHTLY
COMMUNITY
Start: 2022-08-10 | End: 2022-12-05

## 2022-12-05 RX ORDER — TOBRAMYCIN AND DEXAMETHASONE 3; 1 MG/ML; MG/ML
SUSPENSION/ DROPS OPHTHALMIC
COMMUNITY
Start: 2022-09-20

## 2022-12-05 RX ORDER — CHLORZOXAZONE 500 MG/1
500 TABLET ORAL
COMMUNITY
Start: 2022-08-10 | End: 2022-12-05

## 2022-12-05 RX ORDER — METHOTREXATE 25 MG/ML
INJECTION, SOLUTION INTRA-ARTERIAL; INTRAMUSCULAR; INTRAVENOUS
Qty: 4 ML | Refills: 2 | Status: SHIPPED | OUTPATIENT
Start: 2022-12-05 | End: 2023-03-06 | Stop reason: SDUPTHER

## 2022-12-05 ASSESSMENT — ROUTINE ASSESSMENT OF PATIENT INDEX DATA (RAPID3)
PATIENT GLOBAL ASSESSMENT SCORE: 2
AM STIFFNESS SCORE: 0, NO
PSYCHOLOGICAL DISTRESS SCORE: 1.1
PAIN SCORE: 1
MDHAQ FUNCTION SCORE: 0.5
FATIGUE SCORE: 1.5
TOTAL RAPID3 SCORE: 1.56

## 2022-12-05 NOTE — PROGRESS NOTES
Subjective:       Patient ID: Maru Shelby is a 72 y.o. female.    Chief Complaint: Disease Management            She complains of joint swelling. Pertinent negatives include no fatigue, fever, trouble swallowing, myalgias or headaches.       Mrs. Shelby is a 72 year old female with RA RF+ ACPA+ GUSTABO-; osteoporosis and osteoarthritis presents for 3 month follow up. V 8/30/22. At that time, she started a drug holiday from Alendronate. Since then, patient reports overall she is doing well. She reports recent sinus injections that is improved today. She has fallen recently, once where she presented to the ED and received head CT that was read as normal. She reports multiple falls recently she believes is due to vertigo/balance caused by turning too quickly. Her most recent fall was 3 weeks ago where she reports falling on her left hip, knee, and head. She reports she had a small bump on her head that has resolved; she was not seen for this fall. She denies LOC, persistent headache, or visual changes.     She does report occasional joint pain and swelling at the base of her thumbs bilaterally. She states the pain will occur randomly and last for 1 day and will be better the next day. She also reports dry mouth frequently. She recently had a mouth sore to the left of her tongue last week; she has a mouth wash she uses for these sores. She reports her recent sore is now resolved.     The patient does request a rolling walker for ambulation. She states when she walks, she hunches over and walks with a stooped posture which worries her further about falls. She reports walking with a grocery cart significantly improves her posture and ability to walk longer distances. She denies back pain, leg pain, or weakness.     She remains on abatacept (Orencia) IV 1000mg monthly, methotrexate 25mg sc q7day, leflunomide 20mg daily, and folic acid 1mg daily (except 8mg on day after taking MTX) for her RA. Also on pravastatin  "40mg nightly and amlodipine 10mg daily for CV health.        Review of Systems   Constitutional:  Negative for activity change, fatigue and fever.   HENT:  Positive for congestion, mouth sores and sinus pain. Negative for nosebleeds, postnasal drip, sneezing, sore throat and trouble swallowing.    Eyes:  Negative for pain, redness and visual disturbance.   Respiratory:  Negative for cough, chest tightness and shortness of breath.    Cardiovascular:  Negative for chest pain.   Gastrointestinal:  Negative for abdominal pain, constipation, diarrhea, nausea and vomiting.   Endocrine: Negative.    Genitourinary: Negative.    Musculoskeletal:  Positive for arthralgias, gait problem and joint swelling. Negative for back pain and myalgias.        + Falls   Skin:  Negative for rash.   Allergic/Immunologic: Negative.    Neurological:  Positive for dizziness. Negative for tremors, syncope, weakness, light-headedness, numbness and headaches.   Hematological: Negative.    Psychiatric/Behavioral:  Negative for confusion and sleep disturbance.        Objective:   BP (!) 145/89   Pulse 104   Ht 5' 6" (1.676 m)   Wt 88.2 kg (194 lb 6.4 oz)   BMI 31.38 kg/m²      Physical Exam   Constitutional: She is oriented to person, place, and time. normal appearance. She appears obese.   HENT:   Head: Normocephalic.   Nose: Nose normal. No rhinorrhea or nasal congestion.   Mouth/Throat: Mucous membranes are moist. No posterior oropharyngeal erythema.   Eyes: Pupils are equal, round, and reactive to light.   Cardiovascular: Normal rate and regular rhythm.   Pulmonary/Chest: Effort normal and breath sounds normal.   Abdominal: Soft. There is no abdominal tenderness.   Musculoskeletal:      Cervical back: Normal range of motion.      Comments: Mild TTP over left lateral hip  Mild TTP over left anterior knee    Neurological: She is alert and oriented to person, place, and time. She displays no weakness.   Skin:   Abrasion over left anterior " knee    Psychiatric: Her behavior is normal. Mood normal.       3/3/2021 5/31/2022 8/30/2022 12/5/2022   Tender (LEES-28) 1 / 28 0 / 28 0 / 28 0 / 28    Swollen (LEES-28) 0 / 28 1 / 28  0 / 28  0 / 28    Provider Global 10 mm 0 mm 0 mm 0 mm   Patient Global 0 mm 30 mm 0 mm 20 mm   ESR 35 mm/hr 18 mm/hr 12 mm/hr 26 mm/hr   CRP 10.8 mg/L 8.2 mg/L 1.6 mg/L 5.3 mg/L   LEES-28 (ESR) 3.05 (Low disease activity) 2.72 (Low disease activity) 1.74 (Remission) 2.56 (Remission)   LEES-28 (CRP) 2.41 (Remission) 2.46 (Remission) 1.3 (Remission) 1.9 (Remission)   CDAI Score 1  4  0  2         Assessment:     72 year old female presents for medical management with below medical problems:   1. Frequent falls    2. Bilateral primary osteoarthritis of knee    3. Bilateral primary osteoarthritis of hip    4. Rheumatoid arthritis, involving unspecified site, unspecified whether rheumatoid factor present    5. Rheumatoid arthritis involving both ankles with positive rheumatoid factor              Plan:       Problem List Items Addressed This Visit          Immunology/Multi System    RA (rheumatoid arthritis) (Chronic)    Relevant Medications    leflunomide (ARAVA) 20 MG Tab    methotrexate 25 mg/mL injection    Other Relevant Orders    Prior authorization Order     Other Visit Diagnoses       Frequent falls    -  Primary    Relevant Orders    Ambulatory referral/consult to Physical/Occupational Therapy    WALKER FOR HOME USE    WALKER FOR HOME USE    Bilateral primary osteoarthritis of knee        Relevant Orders    Ambulatory referral/consult to Physical/Occupational Therapy    WALKER FOR HOME USE    WALKER FOR HOME USE    Bilateral primary osteoarthritis of hip        Relevant Orders    Ambulatory referral/consult to Physical/Occupational Therapy    WALKER FOR HOME USE    WALKER FOR HOME USE          Will refer to PT for balance difficulties, conditioning  Will order gait-assistive device for frequent falls.    Standing labs     Authorization for Orencia   Discontinued elavil, parafon forte, gabapentin, meloxicam, nortriptyline for concerns of medication-induced dizziness.   Needs bivalent COVID booster; informed to hold MTX and Leflunomide for 7 days after COVID booster. Get booster 1 week prior to Orencia injections.  8.   Exercise; increase walking. Patient reports she will walk around her neighborhood more often.   9.   Healthy diet; Mediterranean, DASH, no junk food, low carb, weight reduction   10.   Med refills, MTX and leflunomide

## 2022-12-05 NOTE — PROGRESS NOTES
"I have personally taken the history and examined the patient and agree with the resident,s note as stated above          RA: RF+ ACPA+ GUSTABO-:  TJ 0  SJ 0 Pt global  20 ESR 26 CRP 5.3 DAS28 2.56 [id number] 1.9  CDAI 2(all remission)  Rheumatoid Arthritis Treatment Goals:  -Disease Activity Measure: CDAI  -Target: LDA  -Therapy/Change: none  -Shared Decision Making: Discussed goals of care and therapy plan together with patient as outlined above.      Frequent falls  OA knees;left>right  OA hips: left>right  Low bone density DXA 7/14/22 on alendronate intermittently with drug holidays since 2015. Can go back on "drug holiday" off alendronate. Repeat DXA in 1-2 years  Obesity, lost 7# since 8/30/22  /89 on amlodipine 10mg daily  Hyperlipidemia LDL 65.4 5/31/22  on pravastatin 40mg every evening  T2 DM HbA1c 6.1 5/31/22  S/p Covid 10/19/21     *bivalent Covid vaccine booster, this week prior to upcoming Orencia.   Hold methotrexate and leflunomide for one week after any vaccinations  Forward date of flu vaccine so can be placed in Immunizations  Rollator walker  Ref to PT(Ext) for fall prevention, balance  Cont abatacept 1000mg IV q 28 days   Stop sedating medications as listed which likely contributing to falls  Cont methotrexate 20mg (0.8 ml) sc q 7 days with folic acid 1mg daily but 8 mg day after methotrexate dosing.  Cont leflunomide 20mg daily  Cont amlodipine 10mg daily  Cont pravastatin 40mg every evening  Increase walking,stationary bike  Ref to Bariatric Medicine  Mediterranean/DASH diet, no junk food, low carb, weight reduction  F/u Dr. Mark for EH, T2 DM   Ref to PT for fall prevention, balance   RTC 3 months with standing labs   "

## 2022-12-05 NOTE — PATIENT INSTRUCTIONS
*bivalent Covid booster, hold methotrexate and leflunomide for one week after vaccination, then resume. Get booster one week prior to next Orencia infusion.

## 2022-12-08 ENCOUNTER — TELEPHONE (OUTPATIENT)
Dept: RHEUMATOLOGY | Facility: CLINIC | Age: 72
End: 2022-12-08
Payer: MEDICARE

## 2022-12-08 DIAGNOSIS — M17.0 OSTEOARTHRITIS OF BOTH KNEES, UNSPECIFIED OSTEOARTHRITIS TYPE: ICD-10-CM

## 2022-12-08 DIAGNOSIS — M05.79 RHEUMATOID ARTHRITIS INVOLVING MULTIPLE SITES WITH POSITIVE RHEUMATOID FACTOR: Primary | ICD-10-CM

## 2022-12-12 PROBLEM — Z74.09 IMPAIRED FUNCTIONAL MOBILITY, BALANCE, GAIT, AND ENDURANCE: Status: ACTIVE | Noted: 2022-12-12

## 2022-12-12 PROBLEM — R29.6 FALLS FREQUENTLY: Status: ACTIVE | Noted: 2022-12-12

## 2022-12-19 ENCOUNTER — INFUSION (OUTPATIENT)
Dept: INFUSION THERAPY | Facility: HOSPITAL | Age: 72
End: 2022-12-19
Attending: INTERNAL MEDICINE
Payer: MEDICARE

## 2022-12-19 VITALS
DIASTOLIC BLOOD PRESSURE: 74 MMHG | HEART RATE: 88 BPM | TEMPERATURE: 98 F | SYSTOLIC BLOOD PRESSURE: 131 MMHG | RESPIRATION RATE: 20 BRPM

## 2022-12-19 DIAGNOSIS — Z74.09 IMPAIRED FUNCTIONAL MOBILITY, BALANCE, GAIT, AND ENDURANCE: ICD-10-CM

## 2022-12-19 DIAGNOSIS — D84.9 IMMUNODEFICIENCY, UNSPECIFIED: ICD-10-CM

## 2022-12-19 DIAGNOSIS — R29.6 FALLS FREQUENTLY: Primary | ICD-10-CM

## 2022-12-19 PROCEDURE — 25000003 PHARM REV CODE 250: Performed by: INTERNAL MEDICINE

## 2022-12-19 PROCEDURE — 96365 THER/PROPH/DIAG IV INF INIT: CPT

## 2022-12-19 PROCEDURE — 63600175 PHARM REV CODE 636 W HCPCS: Mod: JA | Performed by: INTERNAL MEDICINE

## 2022-12-19 RX ORDER — SODIUM CHLORIDE 0.9 % (FLUSH) 0.9 %
10 SYRINGE (ML) INJECTION
Status: CANCELLED | OUTPATIENT
Start: 2023-01-16

## 2022-12-19 RX ORDER — HEPARIN 100 UNIT/ML
500 SYRINGE INTRAVENOUS
Status: CANCELLED | OUTPATIENT
Start: 2023-01-16

## 2022-12-19 RX ORDER — ACETAMINOPHEN 325 MG/1
650 TABLET ORAL
Status: CANCELLED | OUTPATIENT
Start: 2023-01-16

## 2022-12-19 RX ADMIN — SODIUM CHLORIDE 1000 MG: 9 INJECTION, SOLUTION INTRAVENOUS at 08:12

## 2023-01-03 ENCOUNTER — TELEPHONE (OUTPATIENT)
Dept: INTERNAL MEDICINE | Facility: CLINIC | Age: 73
End: 2023-01-03
Payer: MEDICARE

## 2023-01-03 NOTE — TELEPHONE ENCOUNTER
Patient stated that she does not like the metformin. She would like you to resend the Janumet to sonia for her

## 2023-01-03 NOTE — TELEPHONE ENCOUNTER
----- Message from Renetta Ayala sent at 1/3/2023 10:36 AM CST -----  Contact: pt  Maru Shelby  MRN: [id number]  : 1950  PCP: Florence Mark  Home Phone      [phone]  Work Phone      Not on file.  [employer]          [phone]      MESSAGE: pt would like to discuss a different type of medication with you.      [phone]

## 2023-01-09 ENCOUNTER — PATIENT MESSAGE (OUTPATIENT)
Dept: ADMINISTRATIVE | Facility: HOSPITAL | Age: 73
End: 2023-01-09
Payer: MEDICARE

## 2023-01-12 ENCOUNTER — LAB VISIT (OUTPATIENT)
Dept: LAB | Facility: HOSPITAL | Age: 73
End: 2023-01-12
Attending: INTERNAL MEDICINE
Payer: MEDICARE

## 2023-01-12 DIAGNOSIS — E11.9 TYPE 2 DIABETES MELLITUS WITHOUT COMPLICATION, WITHOUT LONG-TERM CURRENT USE OF INSULIN: ICD-10-CM

## 2023-01-12 LAB
ALBUMIN SERPL BCP-MCNC: 3.8 G/DL (ref 3.5–5.2)
ALBUMIN/CREAT UR: 69.8 UG/MG (ref 0–30)
ALP SERPL-CCNC: 84 U/L (ref 55–135)
ALT SERPL W/O P-5'-P-CCNC: 20 U/L (ref 10–44)
ANION GAP SERPL CALC-SCNC: 13 MMOL/L (ref 8–16)
AST SERPL-CCNC: 19 U/L (ref 10–40)
BASOPHILS # BLD AUTO: 0.08 K/UL (ref 0–0.2)
BASOPHILS NFR BLD: 0.7 % (ref 0–1.9)
BILIRUB SERPL-MCNC: 0.4 MG/DL (ref 0.1–1)
BUN SERPL-MCNC: 17 MG/DL (ref 8–23)
CALCIUM SERPL-MCNC: 10.3 MG/DL (ref 8.7–10.5)
CHLORIDE SERPL-SCNC: 105 MMOL/L (ref 95–110)
CHOLEST SERPL-MCNC: 195 MG/DL (ref 120–199)
CHOLEST/HDLC SERPL: 4.4 {RATIO} (ref 2–5)
CO2 SERPL-SCNC: 24 MMOL/L (ref 23–29)
CREAT SERPL-MCNC: 0.8 MG/DL (ref 0.5–1.4)
CREAT UR-MCNC: 196.4 MG/DL (ref 15–325)
DIFFERENTIAL METHOD: ABNORMAL
EOSINOPHIL # BLD AUTO: 0.4 K/UL (ref 0–0.5)
EOSINOPHIL NFR BLD: 3.5 % (ref 0–8)
ERYTHROCYTE [DISTWIDTH] IN BLOOD BY AUTOMATED COUNT: 15 % (ref 11.5–14.5)
EST. GFR  (NO RACE VARIABLE): >60 ML/MIN/1.73 M^2
ESTIMATED AVG GLUCOSE: 180 MG/DL (ref 68–131)
GLUCOSE SERPL-MCNC: 195 MG/DL (ref 70–110)
HBA1C MFR BLD: 7.9 % (ref 4–5.6)
HCT VFR BLD AUTO: 44.6 % (ref 37–48.5)
HDLC SERPL-MCNC: 44 MG/DL (ref 40–75)
HDLC SERPL: 22.6 % (ref 20–50)
HGB BLD-MCNC: 14.8 G/DL (ref 12–16)
IMM GRANULOCYTES # BLD AUTO: 0.08 K/UL (ref 0–0.04)
IMM GRANULOCYTES NFR BLD AUTO: 0.7 % (ref 0–0.5)
LDLC SERPL CALC-MCNC: 92.6 MG/DL (ref 63–159)
LYMPHOCYTES # BLD AUTO: 3.3 K/UL (ref 1–4.8)
LYMPHOCYTES NFR BLD: 29.1 % (ref 18–48)
MCH RBC QN AUTO: 31.1 PG (ref 27–31)
MCHC RBC AUTO-ENTMCNC: 33.2 G/DL (ref 32–36)
MCV RBC AUTO: 94 FL (ref 82–98)
MICROALBUMIN UR DL<=1MG/L-MCNC: 137 UG/ML
MONOCYTES # BLD AUTO: 1 K/UL (ref 0.3–1)
MONOCYTES NFR BLD: 8.7 % (ref 4–15)
NEUTROPHILS # BLD AUTO: 6.5 K/UL (ref 1.8–7.7)
NEUTROPHILS NFR BLD: 57.3 % (ref 38–73)
NONHDLC SERPL-MCNC: 151 MG/DL
NRBC BLD-RTO: 0 /100 WBC
PLATELET # BLD AUTO: 288 K/UL (ref 150–450)
PMV BLD AUTO: 10.1 FL (ref 9.2–12.9)
POTASSIUM SERPL-SCNC: 4.7 MMOL/L (ref 3.5–5.1)
PROT SERPL-MCNC: 7.2 G/DL (ref 6–8.4)
RBC # BLD AUTO: 4.76 M/UL (ref 4–5.4)
SODIUM SERPL-SCNC: 142 MMOL/L (ref 136–145)
TRIGL SERPL-MCNC: 292 MG/DL (ref 30–150)
TSH SERPL DL<=0.005 MIU/L-ACNC: 1.7 UIU/ML (ref 0.4–4)
WBC # BLD AUTO: 11.32 K/UL (ref 3.9–12.7)

## 2023-01-12 PROCEDURE — 36415 COLL VENOUS BLD VENIPUNCTURE: CPT | Performed by: INTERNAL MEDICINE

## 2023-01-12 PROCEDURE — 82570 ASSAY OF URINE CREATININE: CPT | Performed by: INTERNAL MEDICINE

## 2023-01-12 PROCEDURE — 85025 COMPLETE CBC W/AUTO DIFF WBC: CPT | Performed by: INTERNAL MEDICINE

## 2023-01-12 PROCEDURE — 84443 ASSAY THYROID STIM HORMONE: CPT | Performed by: INTERNAL MEDICINE

## 2023-01-12 PROCEDURE — 80061 LIPID PANEL: CPT | Performed by: INTERNAL MEDICINE

## 2023-01-12 PROCEDURE — 83036 HEMOGLOBIN GLYCOSYLATED A1C: CPT | Performed by: INTERNAL MEDICINE

## 2023-01-12 PROCEDURE — 80053 COMPREHEN METABOLIC PANEL: CPT | Performed by: INTERNAL MEDICINE

## 2023-01-17 ENCOUNTER — INFUSION (OUTPATIENT)
Dept: INFUSION THERAPY | Facility: HOSPITAL | Age: 73
End: 2023-01-17
Attending: INTERNAL MEDICINE
Payer: MEDICARE

## 2023-01-17 ENCOUNTER — OFFICE VISIT (OUTPATIENT)
Dept: INTERNAL MEDICINE | Facility: CLINIC | Age: 73
End: 2023-01-17
Payer: MEDICARE

## 2023-01-17 VITALS
TEMPERATURE: 98 F | WEIGHT: 198.63 LBS | RESPIRATION RATE: 18 BRPM | SYSTOLIC BLOOD PRESSURE: 136 MMHG | DIASTOLIC BLOOD PRESSURE: 73 MMHG | OXYGEN SATURATION: 97 % | RESPIRATION RATE: 20 BRPM | BODY MASS INDEX: 31.92 KG/M2 | HEIGHT: 66 IN | HEART RATE: 91 BPM | DIASTOLIC BLOOD PRESSURE: 82 MMHG | SYSTOLIC BLOOD PRESSURE: 140 MMHG | HEART RATE: 86 BPM

## 2023-01-17 DIAGNOSIS — Z79.4 TYPE 2 DIABETES MELLITUS WITH OTHER SPECIFIED COMPLICATION, WITH LONG-TERM CURRENT USE OF INSULIN: Primary | ICD-10-CM

## 2023-01-17 DIAGNOSIS — M05.771 RHEUMATOID ARTHRITIS INVOLVING BOTH ANKLES WITH POSITIVE RHEUMATOID FACTOR: ICD-10-CM

## 2023-01-17 DIAGNOSIS — I70.0 AORTIC ATHEROSCLEROSIS: ICD-10-CM

## 2023-01-17 DIAGNOSIS — M05.772 RHEUMATOID ARTHRITIS INVOLVING BOTH ANKLES WITH POSITIVE RHEUMATOID FACTOR: ICD-10-CM

## 2023-01-17 DIAGNOSIS — D84.9 IMMUNODEFICIENCY, UNSPECIFIED: ICD-10-CM

## 2023-01-17 DIAGNOSIS — R29.6 FALLS FREQUENTLY: Primary | ICD-10-CM

## 2023-01-17 DIAGNOSIS — E11.69 TYPE 2 DIABETES MELLITUS WITH OTHER SPECIFIED COMPLICATION, WITH LONG-TERM CURRENT USE OF INSULIN: Primary | ICD-10-CM

## 2023-01-17 DIAGNOSIS — Z74.09 IMPAIRED FUNCTIONAL MOBILITY, BALANCE, GAIT, AND ENDURANCE: ICD-10-CM

## 2023-01-17 DIAGNOSIS — D69.2 OTHER NONTHROMBOCYTOPENIC PURPURA: ICD-10-CM

## 2023-01-17 PROCEDURE — 3060F PR POS MICROALBUMINURIA RESULT DOCUMENTED/REVIEW: ICD-10-PCS | Mod: CPTII,S$GLB,, | Performed by: INTERNAL MEDICINE

## 2023-01-17 PROCEDURE — 1159F MED LIST DOCD IN RCRD: CPT | Mod: CPTII,S$GLB,, | Performed by: INTERNAL MEDICINE

## 2023-01-17 PROCEDURE — 25000003 PHARM REV CODE 250: Performed by: INTERNAL MEDICINE

## 2023-01-17 PROCEDURE — 3066F PR DOCUMENTATION OF TREATMENT FOR NEPHROPATHY: ICD-10-PCS | Mod: CPTII,S$GLB,, | Performed by: INTERNAL MEDICINE

## 2023-01-17 PROCEDURE — 1101F PR PT FALLS ASSESS DOC 0-1 FALLS W/OUT INJ PAST YR: ICD-10-PCS | Mod: CPTII,S$GLB,, | Performed by: INTERNAL MEDICINE

## 2023-01-17 PROCEDURE — 99214 PR OFFICE/OUTPT VISIT, EST, LEVL IV, 30-39 MIN: ICD-10-PCS | Mod: 25,S$GLB,, | Performed by: INTERNAL MEDICINE

## 2023-01-17 PROCEDURE — 1159F PR MEDICATION LIST DOCUMENTED IN MEDICAL RECORD: ICD-10-PCS | Mod: CPTII,S$GLB,, | Performed by: INTERNAL MEDICINE

## 2023-01-17 PROCEDURE — 63600175 PHARM REV CODE 636 W HCPCS: Mod: JA | Performed by: INTERNAL MEDICINE

## 2023-01-17 PROCEDURE — 96372 THER/PROPH/DIAG INJ SC/IM: CPT | Mod: S$GLB,,, | Performed by: INTERNAL MEDICINE

## 2023-01-17 PROCEDURE — 3051F HG A1C>EQUAL 7.0%<8.0%: CPT | Mod: CPTII,S$GLB,, | Performed by: INTERNAL MEDICINE

## 2023-01-17 PROCEDURE — 3288F FALL RISK ASSESSMENT DOCD: CPT | Mod: CPTII,S$GLB,, | Performed by: INTERNAL MEDICINE

## 2023-01-17 PROCEDURE — 96365 THER/PROPH/DIAG IV INF INIT: CPT

## 2023-01-17 PROCEDURE — 1160F PR REVIEW ALL MEDS BY PRESCRIBER/CLIN PHARMACIST DOCUMENTED: ICD-10-PCS | Mod: CPTII,S$GLB,, | Performed by: INTERNAL MEDICINE

## 2023-01-17 PROCEDURE — 1125F AMNT PAIN NOTED PAIN PRSNT: CPT | Mod: CPTII,S$GLB,, | Performed by: INTERNAL MEDICINE

## 2023-01-17 PROCEDURE — 3060F POS MICROALBUMINURIA REV: CPT | Mod: CPTII,S$GLB,, | Performed by: INTERNAL MEDICINE

## 2023-01-17 PROCEDURE — 1125F PR PAIN SEVERITY QUANTIFIED, PAIN PRESENT: ICD-10-PCS | Mod: CPTII,S$GLB,, | Performed by: INTERNAL MEDICINE

## 2023-01-17 PROCEDURE — 99999 PR PBB SHADOW E&M-EST. PATIENT-LVL IV: ICD-10-PCS | Mod: PBBFAC,,, | Performed by: INTERNAL MEDICINE

## 2023-01-17 PROCEDURE — 1160F RVW MEDS BY RX/DR IN RCRD: CPT | Mod: CPTII,S$GLB,, | Performed by: INTERNAL MEDICINE

## 2023-01-17 PROCEDURE — 99214 OFFICE O/P EST MOD 30 MIN: CPT | Mod: 25,S$GLB,, | Performed by: INTERNAL MEDICINE

## 2023-01-17 PROCEDURE — 3075F SYST BP GE 130 - 139MM HG: CPT | Mod: CPTII,S$GLB,, | Performed by: INTERNAL MEDICINE

## 2023-01-17 PROCEDURE — 3075F PR MOST RECENT SYSTOLIC BLOOD PRESS GE 130-139MM HG: ICD-10-PCS | Mod: CPTII,S$GLB,, | Performed by: INTERNAL MEDICINE

## 2023-01-17 PROCEDURE — 3008F PR BODY MASS INDEX (BMI) DOCUMENTED: ICD-10-PCS | Mod: CPTII,S$GLB,, | Performed by: INTERNAL MEDICINE

## 2023-01-17 PROCEDURE — 3066F NEPHROPATHY DOC TX: CPT | Mod: CPTII,S$GLB,, | Performed by: INTERNAL MEDICINE

## 2023-01-17 PROCEDURE — 1101F PT FALLS ASSESS-DOCD LE1/YR: CPT | Mod: CPTII,S$GLB,, | Performed by: INTERNAL MEDICINE

## 2023-01-17 PROCEDURE — 3008F BODY MASS INDEX DOCD: CPT | Mod: CPTII,S$GLB,, | Performed by: INTERNAL MEDICINE

## 2023-01-17 PROCEDURE — 3079F PR MOST RECENT DIASTOLIC BLOOD PRESSURE 80-89 MM HG: ICD-10-PCS | Mod: CPTII,S$GLB,, | Performed by: INTERNAL MEDICINE

## 2023-01-17 PROCEDURE — 99999 PR PBB SHADOW E&M-EST. PATIENT-LVL IV: CPT | Mod: PBBFAC,,, | Performed by: INTERNAL MEDICINE

## 2023-01-17 PROCEDURE — 3288F PR FALLS RISK ASSESSMENT DOCUMENTED: ICD-10-PCS | Mod: CPTII,S$GLB,, | Performed by: INTERNAL MEDICINE

## 2023-01-17 PROCEDURE — 96372 PR INJECTION,THERAP/PROPH/DIAG2ST, IM OR SUBCUT: ICD-10-PCS | Mod: S$GLB,,, | Performed by: INTERNAL MEDICINE

## 2023-01-17 PROCEDURE — 3079F DIAST BP 80-89 MM HG: CPT | Mod: CPTII,S$GLB,, | Performed by: INTERNAL MEDICINE

## 2023-01-17 PROCEDURE — 3051F PR MOST RECENT HEMOGLOBIN A1C LEVEL 7.0 - < 8.0%: ICD-10-PCS | Mod: CPTII,S$GLB,, | Performed by: INTERNAL MEDICINE

## 2023-01-17 RX ORDER — SODIUM CHLORIDE 0.9 % (FLUSH) 0.9 %
10 SYRINGE (ML) INJECTION
Status: CANCELLED | OUTPATIENT
Start: 2023-02-13

## 2023-01-17 RX ORDER — ACETAMINOPHEN 325 MG/1
650 TABLET ORAL
Status: CANCELLED | OUTPATIENT
Start: 2023-02-13

## 2023-01-17 RX ORDER — METHYLPREDNISOLONE ACETATE 80 MG/ML
80 INJECTION, SUSPENSION INTRA-ARTICULAR; INTRALESIONAL; INTRAMUSCULAR; SOFT TISSUE
Status: COMPLETED | OUTPATIENT
Start: 2023-01-17 | End: 2023-01-17

## 2023-01-17 RX ORDER — HEPARIN 100 UNIT/ML
500 SYRINGE INTRAVENOUS
Status: CANCELLED | OUTPATIENT
Start: 2023-02-13

## 2023-01-17 RX ADMIN — SODIUM CHLORIDE 1000 MG: 9 INJECTION, SOLUTION INTRAVENOUS at 10:01

## 2023-01-17 RX ADMIN — METHYLPREDNISOLONE ACETATE 80 MG: 80 INJECTION, SUSPENSION INTRA-ARTICULAR; INTRALESIONAL; INTRAMUSCULAR; SOFT TISSUE at 11:01

## 2023-01-17 NOTE — PROGRESS NOTES
"HPI:  Maru Shelby is a 72 y.o. female here for Follow-up, Hip Pain, Hypertension, Hyperlipidemia, and Diabetes  C/o hip pain ++    Review of Systems   Constitutional:  Negative for chills and fever.   HENT:  Negative for congestion, hearing loss, sinus pressure and sore throat.    Eyes:  Negative for photophobia.   Respiratory:  Negative for cough, choking, chest tightness and wheezing.    Cardiovascular:  Negative for chest pain and palpitations.   Gastrointestinal:  Negative for blood in stool, nausea and vomiting.   Genitourinary:  Negative for dysuria and hematuria.   Musculoskeletal:  Positive for arthralgias, gait problem, joint swelling, myalgias and neck pain.   Skin:  Negative for pallor.   Neurological:  Positive for headaches. Negative for dizziness and numbness.   Hematological:  Does not bruise/bleed easily.   Psychiatric/Behavioral:  Negative for suicidal ideas. The patient is not nervous/anxious.     A review of systems was performed and is negative except as noted above.    Objective:  Vitals:    01/17/23 1127   BP: 136/82   Pulse: 91   Resp: 18   SpO2: 97%   Weight: 90.1 kg (198 lb 10.2 oz)   Height: 5' 6" (1.676 m)      Physical Exam  Vitals and nursing note reviewed.   Constitutional:       Appearance: She is well-developed.   HENT:      Head: Normocephalic and atraumatic.      Right Ear: External ear normal.      Left Ear: External ear normal.   Eyes:      Conjunctiva/sclera: Conjunctivae normal.      Pupils: Pupils are equal, round, and reactive to light.   Neck:      Thyroid: No thyromegaly.      Vascular: No JVD.      Trachea: No tracheal deviation.   Cardiovascular:      Rate and Rhythm: Normal rate and regular rhythm.      Heart sounds: Normal heart sounds.   Pulmonary:      Effort: Pulmonary effort is normal. No respiratory distress.      Breath sounds: Normal breath sounds. No wheezing or rales.   Chest:      Chest wall: No tenderness.   Abdominal:      General: Bowel sounds are " normal. There is no distension.      Palpations: Abdomen is soft. There is no mass.      Tenderness: There is no abdominal tenderness. There is no guarding or rebound.   Musculoskeletal:         General: Normal range of motion.      Cervical back: Normal range of motion and neck supple.   Lymphadenopathy:      Cervical: No cervical adenopathy.   Skin:     General: Skin is warm and dry.   Neurological:      Mental Status: She is alert and oriented to person, place, and time.      Cranial Nerves: No cranial nerve deficit.      Motor: No abnormal muscle tone.      Coordination: Coordination normal.      Deep Tendon Reflexes: Reflexes are normal and symmetric. Reflexes normal.      Comments: CN: Optic discs are flat with normal vasculature, PERRL, Extraoccular movements and visual fields are full. Normal facial sensation and strength, Hearing symmetric, Tongue and Palate are midline and strong. Shoulder Shrug symmetric and strong.        Assessment/Plan:  1. Type 2 diabetes mellitus with other specified complication, with long-term current use of insulin  -     TSH; Future; Expected date: 07/16/2023  -     Hemoglobin A1C; Future; Expected date: 07/16/2023  -     Microalbumin/Creatinine Ratio, Urine; Future; Expected date: 07/16/2023  Patient has uncontrolled Diabetes .  We discussed about diet ;low in calories. Avoid sweats, sodas.  Also increasing activity;walking 2-3 miles a day.  I also adjusted medications and gave patient  instructions about adherence to plan.  Goal of  A1c  less than 7 % stressed.  Also goal of LDL less than 70 highlighted to patient.   2. Aortic atherosclerosis  -     Lipid Panel; Future; Expected date: 07/16/2023  -     TSH; Future; Expected date: 07/16/2023  Lab Results   Component Value Date    LDLCALC 92.6 01/12/2023       3. Rheumatoid arthritis involving both ankles with positive rheumatoid factor  Overview:  Results for MECCA DUONG (MRN [id number]) as of 1/9/2018 07:13   Ref. Range  8/1/2008 07:11 7/6/2009 08:26 8/15/2013 08:25   ds DNA Ab Latest Ref Range: Negative Titer Negative     Protein, Serum Latest Ref Range: 6.0 - 8.4 g/dL   6.4   Albumin grams/dl Latest Ref Range: 3.35 - 5.55 gm/dL   3.88   Alpha-1 grams/dl Latest Ref Range: 0.17 - 0.41 gm/dL   0.29   Alpha-2 grams/dl Latest Ref Range: 0.43 - 0.99 gm/dL   0.71   Beta grams/dl Latest Ref Range: 0.50 - 1.10 gm/dL   0.82   Gamma grams/dl Latest Ref Range: 0.67 - 1.58 gm/dL   0.70   CCP Antibodies Latest Ref Range: <5.0 U/mL  119.9      Please tell pt the joint x-rays show stable mild slippage C4 on C5 on degenerative basis, no RA changes. The hands show mild narrowing at the wrists likely from RA. There are moderate degenerative changes thumb bases. There is mild degenerative change inner aspect of the knees and also of the great toes. No significant progression of the RA compared with previous. [facility]          PACS Images     Show images for XR Arthritis Survey   Reviewed By Brooks Royal MD on 4/3/2018 14:30   External Result Report     External Result Report   Narrative     EXAMINATION:  XR ARTHRITIS SURVEY    CLINICAL HISTORY:  suspected systemic arthritis;  Rheumatoid arthritis, unspecified    TECHNIQUE:  A lateral flexion view of the cervical spine was performed.  AP standing views of both knees were performed.  AP standing views of both feet and PA views of both hands were performed.    COMPARISON:  03/02/2017    FINDINGS:  Bilateral hands: Bone mineralization is within normal limits.  There is bilateral radiocarpal joint space narrowing as well as mild-to-moderate degenerative changes of the bilateral carpometacarpal joint spaces, left greater than right.  No osseous erosions are identified.    Bilateral knees: There is bilateral medial compartment joint space narrowing, left greater than right.  There is a small marginal osteophyte of the medial tibial plateau on the left.  No osseous erosions are seen.    Flexion  image of the cervical spine: There is mild anterior listhesis of C4 in respect to C5 by approximately 3 mm.  This is stable.  Atlantodental interval is maintained.  There is facet arthropathy in the midcervical spine.    Bilateral feet: There is bilateral hallux valgus deformity with bunion formation with mild to moderate degenerative changes of the 1st metatarsophalangeal joints bilaterally.  No osseous erosions are detected.   Impression       As above.      Electronically signed by: Farida Llanes MD  Date: 04/03/2018            Orders:  -     methylPREDNISolone acetate injection 80 mg  -     Comprehensive Metabolic Panel; Future; Expected date: 07/16/2023  -     TSH; Future; Expected date: 07/16/2023    4. Immunodeficiency, unspecified  -     CBC Auto Differential; Future; Expected date: 07/16/2023  -     Comprehensive Metabolic Panel; Future; Expected date: 07/16/2023  She is on methotrexate /orencia     5. Other nonthrombocytopenic purpura    -     CBC Auto Differential; Future; Expected date: 07/16/2023  Lab Results   Component Value Date    WBC 11.32 01/12/2023    HGB 14.8 01/12/2023    HCT 44.6 01/12/2023    MCV 94 01/12/2023     01/12/2023

## 2023-02-20 ENCOUNTER — INFUSION (OUTPATIENT)
Dept: INFUSION THERAPY | Facility: HOSPITAL | Age: 73
End: 2023-02-20
Attending: INTERNAL MEDICINE
Payer: MEDICARE

## 2023-02-20 VITALS
TEMPERATURE: 98 F | BODY MASS INDEX: 32.12 KG/M2 | DIASTOLIC BLOOD PRESSURE: 70 MMHG | RESPIRATION RATE: 18 BRPM | WEIGHT: 199 LBS | HEART RATE: 90 BPM | SYSTOLIC BLOOD PRESSURE: 139 MMHG

## 2023-02-20 DIAGNOSIS — D84.9 IMMUNODEFICIENCY, UNSPECIFIED: ICD-10-CM

## 2023-02-20 DIAGNOSIS — R29.6 FALLS FREQUENTLY: Primary | ICD-10-CM

## 2023-02-20 DIAGNOSIS — Z74.09 IMPAIRED FUNCTIONAL MOBILITY, BALANCE, GAIT, AND ENDURANCE: ICD-10-CM

## 2023-02-20 PROCEDURE — 25000003 PHARM REV CODE 250: Performed by: INTERNAL MEDICINE

## 2023-02-20 PROCEDURE — 96365 THER/PROPH/DIAG IV INF INIT: CPT

## 2023-02-20 PROCEDURE — 63600175 PHARM REV CODE 636 W HCPCS: Mod: JA | Performed by: INTERNAL MEDICINE

## 2023-02-20 RX ORDER — ACETAMINOPHEN 325 MG/1
650 TABLET ORAL
Status: CANCELLED | OUTPATIENT
Start: 2023-03-13

## 2023-02-20 RX ORDER — SODIUM CHLORIDE 0.9 % (FLUSH) 0.9 %
10 SYRINGE (ML) INJECTION
Status: CANCELLED | OUTPATIENT
Start: 2023-03-13

## 2023-02-20 RX ORDER — HEPARIN 100 UNIT/ML
500 SYRINGE INTRAVENOUS
Status: CANCELLED | OUTPATIENT
Start: 2023-03-13

## 2023-02-20 RX ADMIN — SODIUM CHLORIDE 1000 MG: 9 INJECTION, SOLUTION INTRAVENOUS at 08:02

## 2023-02-27 ENCOUNTER — LAB VISIT (OUTPATIENT)
Dept: LAB | Facility: HOSPITAL | Age: 73
End: 2023-02-27
Attending: INTERNAL MEDICINE
Payer: MEDICARE

## 2023-02-27 DIAGNOSIS — M06.9 RHEUMATOID ARTHRITIS INVOLVING MULTIPLE SITES, UNSPECIFIED WHETHER RHEUMATOID FACTOR PRESENT: ICD-10-CM

## 2023-02-27 LAB
ALBUMIN SERPL BCP-MCNC: 3.5 G/DL (ref 3.5–5.2)
ALP SERPL-CCNC: 75 U/L (ref 55–135)
ALT SERPL W/O P-5'-P-CCNC: 19 U/L (ref 10–44)
ANION GAP SERPL CALC-SCNC: 10 MMOL/L (ref 8–16)
AST SERPL-CCNC: 16 U/L (ref 10–40)
BASOPHILS # BLD AUTO: 0.08 K/UL (ref 0–0.2)
BASOPHILS NFR BLD: 0.7 % (ref 0–1.9)
BILIRUB SERPL-MCNC: 0.2 MG/DL (ref 0.1–1)
BUN SERPL-MCNC: 10 MG/DL (ref 8–23)
CALCIUM SERPL-MCNC: 9.4 MG/DL (ref 8.7–10.5)
CHLORIDE SERPL-SCNC: 108 MMOL/L (ref 95–110)
CO2 SERPL-SCNC: 26 MMOL/L (ref 23–29)
CREAT SERPL-MCNC: 0.8 MG/DL (ref 0.5–1.4)
CRP SERPL-MCNC: 2.4 MG/L (ref 0–8.2)
DIFFERENTIAL METHOD: ABNORMAL
EOSINOPHIL # BLD AUTO: 0.4 K/UL (ref 0–0.5)
EOSINOPHIL NFR BLD: 3.2 % (ref 0–8)
ERYTHROCYTE [DISTWIDTH] IN BLOOD BY AUTOMATED COUNT: 14 % (ref 11.5–14.5)
ERYTHROCYTE [SEDIMENTATION RATE] IN BLOOD BY WESTERGREN METHOD: 16 MM/HR (ref 0–20)
EST. GFR  (NO RACE VARIABLE): >60 ML/MIN/1.73 M^2
GLUCOSE SERPL-MCNC: 156 MG/DL (ref 70–110)
HCT VFR BLD AUTO: 43 % (ref 37–48.5)
HGB BLD-MCNC: 14 G/DL (ref 12–16)
IMM GRANULOCYTES # BLD AUTO: 0.09 K/UL (ref 0–0.04)
IMM GRANULOCYTES NFR BLD AUTO: 0.8 % (ref 0–0.5)
LYMPHOCYTES # BLD AUTO: 3.6 K/UL (ref 1–4.8)
LYMPHOCYTES NFR BLD: 31.3 % (ref 18–48)
MCH RBC QN AUTO: 31 PG (ref 27–31)
MCHC RBC AUTO-ENTMCNC: 32.6 G/DL (ref 32–36)
MCV RBC AUTO: 95 FL (ref 82–98)
MONOCYTES # BLD AUTO: 0.8 K/UL (ref 0.3–1)
MONOCYTES NFR BLD: 6.8 % (ref 4–15)
NEUTROPHILS # BLD AUTO: 6.5 K/UL (ref 1.8–7.7)
NEUTROPHILS NFR BLD: 57.2 % (ref 38–73)
NRBC BLD-RTO: 0 /100 WBC
PLATELET # BLD AUTO: 270 K/UL (ref 150–450)
PMV BLD AUTO: 10.1 FL (ref 9.2–12.9)
POTASSIUM SERPL-SCNC: 4.6 MMOL/L (ref 3.5–5.1)
PROT SERPL-MCNC: 6.4 G/DL (ref 6–8.4)
RBC # BLD AUTO: 4.51 M/UL (ref 4–5.4)
SODIUM SERPL-SCNC: 144 MMOL/L (ref 136–145)
WBC # BLD AUTO: 11.41 K/UL (ref 3.9–12.7)

## 2023-02-27 PROCEDURE — 85651 RBC SED RATE NONAUTOMATED: CPT | Performed by: INTERNAL MEDICINE

## 2023-02-27 PROCEDURE — 86140 C-REACTIVE PROTEIN: CPT | Performed by: INTERNAL MEDICINE

## 2023-02-27 PROCEDURE — 85025 COMPLETE CBC W/AUTO DIFF WBC: CPT | Performed by: INTERNAL MEDICINE

## 2023-02-27 PROCEDURE — 80053 COMPREHEN METABOLIC PANEL: CPT | Performed by: INTERNAL MEDICINE

## 2023-02-27 PROCEDURE — 36415 COLL VENOUS BLD VENIPUNCTURE: CPT | Performed by: INTERNAL MEDICINE

## 2023-03-06 ENCOUNTER — OFFICE VISIT (OUTPATIENT)
Dept: RHEUMATOLOGY | Facility: CLINIC | Age: 73
End: 2023-03-06
Payer: MEDICARE

## 2023-03-06 VITALS
DIASTOLIC BLOOD PRESSURE: 74 MMHG | WEIGHT: 188 LBS | HEART RATE: 94 BPM | BODY MASS INDEX: 30.22 KG/M2 | HEIGHT: 66 IN | SYSTOLIC BLOOD PRESSURE: 115 MMHG

## 2023-03-06 DIAGNOSIS — M06.9 RHEUMATOID ARTHRITIS, INVOLVING UNSPECIFIED SITE, UNSPECIFIED WHETHER RHEUMATOID FACTOR PRESENT: Primary | ICD-10-CM

## 2023-03-06 DIAGNOSIS — M05.771 RHEUMATOID ARTHRITIS INVOLVING BOTH ANKLES WITH POSITIVE RHEUMATOID FACTOR: ICD-10-CM

## 2023-03-06 DIAGNOSIS — M05.772 RHEUMATOID ARTHRITIS INVOLVING BOTH ANKLES WITH POSITIVE RHEUMATOID FACTOR: ICD-10-CM

## 2023-03-06 PROCEDURE — 3008F PR BODY MASS INDEX (BMI) DOCUMENTED: ICD-10-PCS | Mod: CPTII,S$GLB,, | Performed by: INTERNAL MEDICINE

## 2023-03-06 PROCEDURE — 1159F PR MEDICATION LIST DOCUMENTED IN MEDICAL RECORD: ICD-10-PCS | Mod: CPTII,S$GLB,, | Performed by: INTERNAL MEDICINE

## 2023-03-06 PROCEDURE — 3060F PR POS MICROALBUMINURIA RESULT DOCUMENTED/REVIEW: ICD-10-PCS | Mod: CPTII,S$GLB,, | Performed by: INTERNAL MEDICINE

## 2023-03-06 PROCEDURE — 1125F AMNT PAIN NOTED PAIN PRSNT: CPT | Mod: CPTII,S$GLB,, | Performed by: INTERNAL MEDICINE

## 2023-03-06 PROCEDURE — 3051F PR MOST RECENT HEMOGLOBIN A1C LEVEL 7.0 - < 8.0%: ICD-10-PCS | Mod: CPTII,S$GLB,, | Performed by: INTERNAL MEDICINE

## 2023-03-06 PROCEDURE — 3074F PR MOST RECENT SYSTOLIC BLOOD PRESSURE < 130 MM HG: ICD-10-PCS | Mod: CPTII,S$GLB,, | Performed by: INTERNAL MEDICINE

## 2023-03-06 PROCEDURE — 1101F PT FALLS ASSESS-DOCD LE1/YR: CPT | Mod: CPTII,S$GLB,, | Performed by: INTERNAL MEDICINE

## 2023-03-06 PROCEDURE — 3078F PR MOST RECENT DIASTOLIC BLOOD PRESSURE < 80 MM HG: ICD-10-PCS | Mod: CPTII,S$GLB,, | Performed by: INTERNAL MEDICINE

## 2023-03-06 PROCEDURE — 1101F PR PT FALLS ASSESS DOC 0-1 FALLS W/OUT INJ PAST YR: ICD-10-PCS | Mod: CPTII,S$GLB,, | Performed by: INTERNAL MEDICINE

## 2023-03-06 PROCEDURE — 3060F POS MICROALBUMINURIA REV: CPT | Mod: CPTII,S$GLB,, | Performed by: INTERNAL MEDICINE

## 2023-03-06 PROCEDURE — 99213 PR OFFICE/OUTPT VISIT, EST, LEVL III, 20-29 MIN: ICD-10-PCS | Mod: S$GLB,,, | Performed by: INTERNAL MEDICINE

## 2023-03-06 PROCEDURE — 3008F BODY MASS INDEX DOCD: CPT | Mod: CPTII,S$GLB,, | Performed by: INTERNAL MEDICINE

## 2023-03-06 PROCEDURE — 99999 PR PBB SHADOW E&M-EST. PATIENT-LVL IV: CPT | Mod: PBBFAC,,, | Performed by: INTERNAL MEDICINE

## 2023-03-06 PROCEDURE — 3288F PR FALLS RISK ASSESSMENT DOCUMENTED: ICD-10-PCS | Mod: CPTII,S$GLB,, | Performed by: INTERNAL MEDICINE

## 2023-03-06 PROCEDURE — 99999 PR PBB SHADOW E&M-EST. PATIENT-LVL IV: ICD-10-PCS | Mod: PBBFAC,,, | Performed by: INTERNAL MEDICINE

## 2023-03-06 PROCEDURE — 3051F HG A1C>EQUAL 7.0%<8.0%: CPT | Mod: CPTII,S$GLB,, | Performed by: INTERNAL MEDICINE

## 2023-03-06 PROCEDURE — 3074F SYST BP LT 130 MM HG: CPT | Mod: CPTII,S$GLB,, | Performed by: INTERNAL MEDICINE

## 2023-03-06 PROCEDURE — 3078F DIAST BP <80 MM HG: CPT | Mod: CPTII,S$GLB,, | Performed by: INTERNAL MEDICINE

## 2023-03-06 PROCEDURE — 3066F PR DOCUMENTATION OF TREATMENT FOR NEPHROPATHY: ICD-10-PCS | Mod: CPTII,S$GLB,, | Performed by: INTERNAL MEDICINE

## 2023-03-06 PROCEDURE — 1125F PR PAIN SEVERITY QUANTIFIED, PAIN PRESENT: ICD-10-PCS | Mod: CPTII,S$GLB,, | Performed by: INTERNAL MEDICINE

## 2023-03-06 PROCEDURE — 1159F MED LIST DOCD IN RCRD: CPT | Mod: CPTII,S$GLB,, | Performed by: INTERNAL MEDICINE

## 2023-03-06 PROCEDURE — 3066F NEPHROPATHY DOC TX: CPT | Mod: CPTII,S$GLB,, | Performed by: INTERNAL MEDICINE

## 2023-03-06 PROCEDURE — 99213 OFFICE O/P EST LOW 20 MIN: CPT | Mod: S$GLB,,, | Performed by: INTERNAL MEDICINE

## 2023-03-06 PROCEDURE — 3288F FALL RISK ASSESSMENT DOCD: CPT | Mod: CPTII,S$GLB,, | Performed by: INTERNAL MEDICINE

## 2023-03-06 RX ORDER — METHOTREXATE 25 MG/ML
INJECTION INTRA-ARTERIAL; INTRAMUSCULAR; INTRATHECAL; INTRAVENOUS
COMMUNITY
Start: 2022-12-06 | End: 2023-03-06

## 2023-03-06 RX ORDER — DOXYCYCLINE 100 MG/1
100 CAPSULE ORAL 2 TIMES DAILY
COMMUNITY
Start: 2023-03-02 | End: 2023-06-05

## 2023-03-06 RX ORDER — METHOTREXATE 25 MG/ML
INJECTION, SOLUTION INTRA-ARTERIAL; INTRAMUSCULAR; INTRAVENOUS
Qty: 4 ML | Refills: 2 | Status: SHIPPED | OUTPATIENT
Start: 2023-03-06 | End: 2023-06-05 | Stop reason: SDUPTHER

## 2023-03-06 ASSESSMENT — ROUTINE ASSESSMENT OF PATIENT INDEX DATA (RAPID3)
PATIENT GLOBAL ASSESSMENT SCORE: 1
TOTAL RAPID3 SCORE: 1
FATIGUE SCORE: 2
AM STIFFNESS SCORE: 0, NO
MDHAQ FUNCTION SCORE: 0.3
PSYCHOLOGICAL DISTRESS SCORE: 0
PAIN SCORE: 1

## 2023-03-06 NOTE — PROGRESS NOTES
Subjective:       Patient ID: Maru Shelby is a 72 y.o. female.    Chief Complaint: RA RF+ ACPA+ GUSTABO-; osteoporosis and osteoarthritis    Mrs. Shelby is a 72 year old female with RA RF+ ACPA+ GUSTABO-; osteoporosis and osteoarthritis presents for 3 month follow up. [facility] 12/5/22. At that time, elavil, parafon forte, gabapentin, meloxicam, nortriptyline were discontinued due to concern of medication induced dizziness and was referred to PT for balance difficulties and conditioning. She was also provided with a rolling walker for ambulation at that time.     Since the LCV, patient was discharged from formal PT.  She has a home exercise program provided from PT and she has been keeping up with this. Her dizziness has improved since the last clinic visit. No recent falls. She is using her rolling walker for longer distances. She denies any joint pain in her hands. She is having pain over her right dorsum of her foot with a bump that swells and causes pain due to osteoarthritis.     She states she had a skin rash after fishing x 2 and was told it was a form rosacea. She was given antibiotics for these out breaks.     She remains on abatacept (Orencia) IV 1000mg monthly, methotrexate 25mg sc q7day, leflunomide 20mg daily, and folic acid 1mg daily (except 8mg on day after taking MTX) for her RA. Also on pravastatin 40mg nightly and amlodipine 10mg daily for CV health.        Review of Systems   Constitutional:  Negative for activity change, fatigue and fever.   HENT:  Negative for congestion, mouth sores, nosebleeds, postnasal drip, sinus pain, sneezing, sore throat and trouble swallowing.    Eyes:  Negative for pain, redness and visual disturbance.   Respiratory:  Negative for cough, chest tightness and shortness of breath.    Cardiovascular:  Negative for chest pain.   Gastrointestinal:  Negative for abdominal pain, constipation, diarrhea, nausea and vomiting.   Endocrine: Negative.    Genitourinary: Negative.   "  Musculoskeletal:  Negative for arthralgias, back pain, gait problem, joint swelling and myalgias.   Skin:  Positive for rash.   Allergic/Immunologic: Negative.    Neurological:  Negative for dizziness, tremors, syncope, weakness, light-headedness, numbness and headaches.   Hematological: Negative.    Psychiatric/Behavioral:  Negative for confusion and sleep disturbance.        Objective:   /74   Pulse 94   Ht 5' 6" (1.676 m)   Wt 85.3 kg (188 lb)   BMI 30.34 kg/m²      Physical Exam   Constitutional: She is oriented to person, place, and time. normal appearance. She appears obese.   HENT:   Head: Normocephalic.   Nose: Nose normal. No rhinorrhea or nasal congestion.   Mouth/Throat: Mucous membranes are moist. No posterior oropharyngeal erythema.   Eyes: Pupils are equal, round, and reactive to light.   Cardiovascular: Normal rate and regular rhythm.   Pulmonary/Chest: Effort normal and breath sounds normal.   Abdominal: Soft. There is no abdominal tenderness.   Musculoskeletal:      Cervical back: Normal range of motion.   Neurological: She is alert and oriented to person, place, and time. She displays no weakness.   Psychiatric: Her behavior is normal. Mood normal.          5/31/2022 8/30/2022 12/5/2022 3/6/2023   Tender (LEES-28) 0 / 28  0 / 28  0 / 28  0 / 28    Swollen (LEES-28) 1 / 28  0 / 28  0 / 28  0 / 28    Provider Global 0 mm 0 mm 0 mm 0 mm   Patient Global 30 mm 0 mm 20 mm 10 mm   ESR 18 mm/hr 12 mm/hr 26 mm/hr 16 mm/hr   CRP 8.2 mg/L 1.6 mg/L 5.3 mg/L 2.4 mg/L   LEES-28 (ESR) 2.72 (Low disease activity) 1.74 (Remission) 2.56 (Remission) 2.08 (Remission)   LEES-28 (CRP) 2.46 (Remission) 1.3 (Remission) 1.9 (Remission) 1.54 (Remission)   CDAI Score 4  0  2  1         Assessment:     72 year old female presents for medical management  of RA RF+ ACPA+ GUSTABO-; osteoporosis and osteoarthritis.    Plan:       Continue abatacept (Orencia) IV 1000mg monthly, methotrexate 25mg sc q7day, leflunomide 20mg " daily, and folic acid 1mg daily (except 8mg on day after taking MTX)  Continue to monitor glucose and blood pressure   Continue home exercise program given by PT  Continue use of walker in community   Cont amlodipine 10mg daily  Cont pravastatin 40mg every evening  Increase walking,stationary bike  Ref to Bariatric Medicine  Mediterranean/DASH diet, no junk food, low carb, weight reduction  Opthalomology appointment next month, please forward a copy of note   Return to clinic in 3 months with standing labs

## 2023-03-06 NOTE — PROGRESS NOTES
"I have personally taken the history and examined the patient and agree with the resident,s note as stated above          RA: RF+ ACPA+ GUSTABO-:  TJ 0  SJ 0 Pt global  10 ESR 16 CRP 2.4  DAS28 [id number] 1.54 CDAI 1(all remission)  Rheumatoid Arthritis Treatment Goals:  -Disease Activity Measure: CDAI  -Target: LDA  -Therapy/Change: none  -Shared Decision Making: Discussed goals of care and therapy plan together with patient as outlined above.      Frequent falls, none recent  OA knees;left>right  OA hips: left>right  Low bone density DXA 7/14/22 on alendronate intermittently with drug holidays since 2015. Can go back on "drug holiday" off alendronate. Repeat DXA in 1-2 years  Obesity, lost 6 # since 12/5/22  /72 on amlodipine 10mg daily  Hyperlipidemia LDL 92.6 1/12/23  on pravastatin 40mg every evening  T2 DM HbA1c 7.9 1/12/23  S/p Covid 10/19/21     Rollator walker prn  Completed PT, very helpful, no subsequent falls  Cont HEP rod right hip  Cont abatacept 1000mg IV q 28 days re-ordered and p.a. [place]'s  Cont methotrexate 20mg (0.8 ml) sc q 7 days with folic acid 1mg daily but 8 mg day after methotrexate dosing.  Cont leflunomide 20mg daily  Cont amlodipine 10mg daily  Cont pravastatin 40mg every evening  Increase walking,stationary bike  Mediterranean/DASH diet, no junk food, low carb, weight reduction  F/u Dr. Mark for EH, T2 DM  hyperlipidemia  RTC 3 months with standing labs   "

## 2023-03-13 ENCOUNTER — PATIENT OUTREACH (OUTPATIENT)
Dept: ADMINISTRATIVE | Facility: HOSPITAL | Age: 73
End: 2023-03-13
Payer: MEDICARE

## 2023-03-13 DIAGNOSIS — Z12.31 SCREENING MAMMOGRAM FOR BREAST CANCER: Primary | ICD-10-CM

## 2023-03-13 NOTE — PROGRESS NOTES
Chart reviewed, immunization record updated.  No new results noted on [facility] or [facility] web site.  Care Everywhere updated.   Patient care coordination note updated.   Upcoming PCP visit updated.  Next PCP visit 07/18/2023.  LOV with PCP 01/17/2023.   Patient called clinic to schedule Mammogram.  Mammogram scheduled for 04/03/2023.

## 2023-03-20 ENCOUNTER — TELEPHONE (OUTPATIENT)
Dept: RHEUMATOLOGY | Facility: CLINIC | Age: 73
End: 2023-03-20
Payer: MEDICARE

## 2023-03-20 ENCOUNTER — INFUSION (OUTPATIENT)
Dept: INFUSION THERAPY | Facility: HOSPITAL | Age: 73
End: 2023-03-20
Attending: INTERNAL MEDICINE
Payer: MEDICARE

## 2023-03-20 VITALS
DIASTOLIC BLOOD PRESSURE: 69 MMHG | SYSTOLIC BLOOD PRESSURE: 136 MMHG | TEMPERATURE: 98 F | RESPIRATION RATE: 20 BRPM | HEART RATE: 87 BPM

## 2023-03-20 DIAGNOSIS — M06.9 RHEUMATOID ARTHRITIS, INVOLVING UNSPECIFIED SITE, UNSPECIFIED WHETHER RHEUMATOID FACTOR PRESENT: Primary | ICD-10-CM

## 2023-03-20 DIAGNOSIS — M05.79 RHEUMATOID ARTHRITIS INVOLVING MULTIPLE SITES WITH POSITIVE RHEUMATOID FACTOR: Primary | ICD-10-CM

## 2023-03-20 PROCEDURE — 25000003 PHARM REV CODE 250: Performed by: INTERNAL MEDICINE

## 2023-03-20 PROCEDURE — 63600175 PHARM REV CODE 636 W HCPCS: Mod: JZ,JA | Performed by: INTERNAL MEDICINE

## 2023-03-20 PROCEDURE — 96365 THER/PROPH/DIAG IV INF INIT: CPT

## 2023-03-20 RX ORDER — SODIUM CHLORIDE 0.9 % (FLUSH) 0.9 %
10 SYRINGE (ML) INJECTION
Status: CANCELLED | OUTPATIENT
Start: 2023-04-10

## 2023-03-20 RX ORDER — ACETAMINOPHEN 325 MG/1
650 TABLET ORAL
Status: CANCELLED | OUTPATIENT
Start: 2023-04-10

## 2023-03-20 RX ORDER — HEPARIN 100 UNIT/ML
500 SYRINGE INTRAVENOUS
Status: CANCELLED | OUTPATIENT
Start: 2023-04-10

## 2023-03-20 RX ADMIN — SODIUM CHLORIDE 1000 MG: 9 INJECTION, SOLUTION INTRAVENOUS at 08:03

## 2023-04-03 ENCOUNTER — HOSPITAL ENCOUNTER (OUTPATIENT)
Dept: RADIOLOGY | Facility: HOSPITAL | Age: 73
Discharge: HOME | End: 2023-04-03
Attending: INTERNAL MEDICINE
Payer: MEDICARE

## 2023-04-03 VITALS — HEIGHT: 66 IN | WEIGHT: 188 LBS | BODY MASS INDEX: 30.22 KG/M2

## 2023-04-03 DIAGNOSIS — Z12.31 SCREENING MAMMOGRAM FOR BREAST CANCER: ICD-10-CM

## 2023-04-03 PROCEDURE — 77063 BREAST TOMOSYNTHESIS BI: CPT | Mod: 26,,, | Performed by: RADIOLOGY

## 2023-04-03 PROCEDURE — 77063 MAMMO DIGITAL SCREENING BILAT WITH TOMO: ICD-10-PCS | Mod: 26,,, | Performed by: RADIOLOGY

## 2023-04-03 PROCEDURE — 77067 SCR MAMMO BI INCL CAD: CPT | Mod: 26,,, | Performed by: RADIOLOGY

## 2023-04-03 PROCEDURE — 77067 SCR MAMMO BI INCL CAD: CPT | Mod: TC

## 2023-04-03 PROCEDURE — 77067 MAMMO DIGITAL SCREENING BILAT WITH TOMO: ICD-10-PCS | Mod: 26,,, | Performed by: RADIOLOGY

## 2023-04-17 ENCOUNTER — INFUSION (OUTPATIENT)
Dept: INFUSION THERAPY | Facility: HOSPITAL | Age: 73
End: 2023-04-17
Attending: INTERNAL MEDICINE
Payer: MEDICARE

## 2023-04-17 VITALS
RESPIRATION RATE: 20 BRPM | SYSTOLIC BLOOD PRESSURE: 119 MMHG | DIASTOLIC BLOOD PRESSURE: 67 MMHG | HEART RATE: 76 BPM | TEMPERATURE: 98 F

## 2023-04-17 DIAGNOSIS — M05.79 RHEUMATOID ARTHRITIS INVOLVING MULTIPLE SITES WITH POSITIVE RHEUMATOID FACTOR: Primary | ICD-10-CM

## 2023-04-17 PROCEDURE — 63600175 PHARM REV CODE 636 W HCPCS: Mod: JZ,JA | Performed by: INTERNAL MEDICINE

## 2023-04-17 PROCEDURE — 25000003 PHARM REV CODE 250: Performed by: INTERNAL MEDICINE

## 2023-04-17 PROCEDURE — 96365 THER/PROPH/DIAG IV INF INIT: CPT

## 2023-04-17 RX ORDER — SODIUM CHLORIDE 0.9 % (FLUSH) 0.9 %
10 SYRINGE (ML) INJECTION
Status: CANCELLED | OUTPATIENT
Start: 2023-05-08

## 2023-04-17 RX ORDER — HEPARIN 100 UNIT/ML
500 SYRINGE INTRAVENOUS
Status: CANCELLED | OUTPATIENT
Start: 2023-05-08

## 2023-04-17 RX ORDER — ACETAMINOPHEN 325 MG/1
650 TABLET ORAL
Status: CANCELLED | OUTPATIENT
Start: 2023-05-08

## 2023-04-17 RX ADMIN — SODIUM CHLORIDE 1000 MG: 9 INJECTION, SOLUTION INTRAVENOUS at 08:04

## 2023-05-15 ENCOUNTER — INFUSION (OUTPATIENT)
Dept: INFUSION THERAPY | Facility: HOSPITAL | Age: 73
End: 2023-05-15
Attending: INTERNAL MEDICINE
Payer: MEDICARE

## 2023-05-15 VITALS
DIASTOLIC BLOOD PRESSURE: 69 MMHG | HEART RATE: 98 BPM | RESPIRATION RATE: 20 BRPM | SYSTOLIC BLOOD PRESSURE: 118 MMHG | TEMPERATURE: 98 F

## 2023-05-15 DIAGNOSIS — M05.79 RHEUMATOID ARTHRITIS INVOLVING MULTIPLE SITES WITH POSITIVE RHEUMATOID FACTOR: Primary | ICD-10-CM

## 2023-05-15 PROCEDURE — 96365 THER/PROPH/DIAG IV INF INIT: CPT

## 2023-05-15 PROCEDURE — 63600175 PHARM REV CODE 636 W HCPCS: Mod: JZ,JA | Performed by: INTERNAL MEDICINE

## 2023-05-15 PROCEDURE — 25000003 PHARM REV CODE 250: Performed by: INTERNAL MEDICINE

## 2023-05-15 RX ORDER — HEPARIN 100 UNIT/ML
500 SYRINGE INTRAVENOUS
Status: CANCELLED | OUTPATIENT
Start: 2023-06-05

## 2023-05-15 RX ORDER — ACETAMINOPHEN 325 MG/1
650 TABLET ORAL
Status: CANCELLED | OUTPATIENT
Start: 2023-06-05

## 2023-05-15 RX ORDER — SODIUM CHLORIDE 0.9 % (FLUSH) 0.9 %
10 SYRINGE (ML) INJECTION
Status: CANCELLED | OUTPATIENT
Start: 2023-06-05

## 2023-05-15 RX ADMIN — SODIUM CHLORIDE 1000 MG: 9 INJECTION, SOLUTION INTRAVENOUS at 08:05

## 2023-05-24 LAB
LEFT EYE DM RETINOPATHY: NEGATIVE
RIGHT EYE DM RETINOPATHY: NEGATIVE

## 2023-05-29 ENCOUNTER — LAB VISIT (OUTPATIENT)
Dept: LAB | Facility: HOSPITAL | Age: 73
End: 2023-05-29
Attending: INTERNAL MEDICINE
Payer: MEDICARE

## 2023-05-29 DIAGNOSIS — M06.9 RHEUMATOID ARTHRITIS, INVOLVING UNSPECIFIED SITE, UNSPECIFIED WHETHER RHEUMATOID FACTOR PRESENT: ICD-10-CM

## 2023-05-29 LAB
ALBUMIN SERPL BCP-MCNC: 3.7 G/DL (ref 3.5–5.2)
ALP SERPL-CCNC: 77 U/L (ref 55–135)
ALT SERPL W/O P-5'-P-CCNC: 16 U/L (ref 10–44)
ANION GAP SERPL CALC-SCNC: 11 MMOL/L (ref 8–16)
AST SERPL-CCNC: 14 U/L (ref 10–40)
BASOPHILS # BLD AUTO: 0.09 K/UL (ref 0–0.2)
BASOPHILS NFR BLD: 0.8 % (ref 0–1.9)
BILIRUB SERPL-MCNC: 0.4 MG/DL (ref 0.1–1)
BUN SERPL-MCNC: 14 MG/DL (ref 8–23)
CALCIUM SERPL-MCNC: 9.5 MG/DL (ref 8.7–10.5)
CHLORIDE SERPL-SCNC: 105 MMOL/L (ref 95–110)
CO2 SERPL-SCNC: 26 MMOL/L (ref 23–29)
CREAT SERPL-MCNC: 0.8 MG/DL (ref 0.5–1.4)
CRP SERPL-MCNC: 7.2 MG/L (ref 0–8.2)
DIFFERENTIAL METHOD: ABNORMAL
EOSINOPHIL # BLD AUTO: 0.4 K/UL (ref 0–0.5)
EOSINOPHIL NFR BLD: 3.5 % (ref 0–8)
ERYTHROCYTE [DISTWIDTH] IN BLOOD BY AUTOMATED COUNT: 14 % (ref 11.5–14.5)
ERYTHROCYTE [SEDIMENTATION RATE] IN BLOOD BY WESTERGREN METHOD: 26 MM/HR (ref 0–20)
EST. GFR  (NO RACE VARIABLE): >60 ML/MIN/1.73 M^2
GLUCOSE SERPL-MCNC: 146 MG/DL (ref 70–110)
HCT VFR BLD AUTO: 44 % (ref 37–48.5)
HGB BLD-MCNC: 14.3 G/DL (ref 12–16)
IMM GRANULOCYTES # BLD AUTO: 0.08 K/UL (ref 0–0.04)
IMM GRANULOCYTES NFR BLD AUTO: 0.7 % (ref 0–0.5)
LYMPHOCYTES # BLD AUTO: 3.5 K/UL (ref 1–4.8)
LYMPHOCYTES NFR BLD: 32 % (ref 18–48)
MCH RBC QN AUTO: 30.4 PG (ref 27–31)
MCHC RBC AUTO-ENTMCNC: 32.5 G/DL (ref 32–36)
MCV RBC AUTO: 93 FL (ref 82–98)
MONOCYTES # BLD AUTO: 0.6 K/UL (ref 0.3–1)
MONOCYTES NFR BLD: 5.8 % (ref 4–15)
NEUTROPHILS # BLD AUTO: 6.3 K/UL (ref 1.8–7.7)
NEUTROPHILS NFR BLD: 57.2 % (ref 38–73)
NRBC BLD-RTO: 0 /100 WBC
PLATELET # BLD AUTO: 269 K/UL (ref 150–450)
PMV BLD AUTO: 9.8 FL (ref 9.2–12.9)
POTASSIUM SERPL-SCNC: 4 MMOL/L (ref 3.5–5.1)
PROT SERPL-MCNC: 6.8 G/DL (ref 6–8.4)
RBC # BLD AUTO: 4.71 M/UL (ref 4–5.4)
SODIUM SERPL-SCNC: 142 MMOL/L (ref 136–145)
WBC # BLD AUTO: 10.92 K/UL (ref 3.9–12.7)

## 2023-05-29 PROCEDURE — 86140 C-REACTIVE PROTEIN: CPT | Performed by: INTERNAL MEDICINE

## 2023-05-29 PROCEDURE — 80053 COMPREHEN METABOLIC PANEL: CPT | Performed by: INTERNAL MEDICINE

## 2023-05-29 PROCEDURE — 85025 COMPLETE CBC W/AUTO DIFF WBC: CPT | Performed by: INTERNAL MEDICINE

## 2023-05-29 PROCEDURE — 36415 COLL VENOUS BLD VENIPUNCTURE: CPT | Performed by: INTERNAL MEDICINE

## 2023-05-29 PROCEDURE — 85651 RBC SED RATE NONAUTOMATED: CPT | Performed by: INTERNAL MEDICINE

## 2023-06-05 ENCOUNTER — OFFICE VISIT (OUTPATIENT)
Dept: RHEUMATOLOGY | Facility: CLINIC | Age: 73
End: 2023-06-05
Payer: MEDICARE

## 2023-06-05 VITALS
HEIGHT: 66 IN | HEART RATE: 95 BPM | SYSTOLIC BLOOD PRESSURE: 122 MMHG | BODY MASS INDEX: 31.99 KG/M2 | DIASTOLIC BLOOD PRESSURE: 71 MMHG | WEIGHT: 199.06 LBS

## 2023-06-05 DIAGNOSIS — M05.772 RHEUMATOID ARTHRITIS INVOLVING BOTH ANKLES WITH POSITIVE RHEUMATOID FACTOR: ICD-10-CM

## 2023-06-05 DIAGNOSIS — I10 ESSENTIAL HYPERTENSION: ICD-10-CM

## 2023-06-05 DIAGNOSIS — M05.771 RHEUMATOID ARTHRITIS INVOLVING BOTH ANKLES WITH POSITIVE RHEUMATOID FACTOR: ICD-10-CM

## 2023-06-05 DIAGNOSIS — E55.9 VITAMIN D DEFICIENCY: Primary | ICD-10-CM

## 2023-06-05 DIAGNOSIS — M85.80 OSTEOPENIA, UNSPECIFIED LOCATION: ICD-10-CM

## 2023-06-05 PROCEDURE — 3051F PR MOST RECENT HEMOGLOBIN A1C LEVEL 7.0 - < 8.0%: ICD-10-PCS | Mod: CPTII,S$GLB,, | Performed by: INTERNAL MEDICINE

## 2023-06-05 PROCEDURE — 99213 OFFICE O/P EST LOW 20 MIN: CPT | Mod: S$GLB,,, | Performed by: INTERNAL MEDICINE

## 2023-06-05 PROCEDURE — 3051F HG A1C>EQUAL 7.0%<8.0%: CPT | Mod: CPTII,S$GLB,, | Performed by: INTERNAL MEDICINE

## 2023-06-05 PROCEDURE — 3074F PR MOST RECENT SYSTOLIC BLOOD PRESSURE < 130 MM HG: ICD-10-PCS | Mod: CPTII,S$GLB,, | Performed by: INTERNAL MEDICINE

## 2023-06-05 PROCEDURE — 3078F DIAST BP <80 MM HG: CPT | Mod: CPTII,S$GLB,, | Performed by: INTERNAL MEDICINE

## 2023-06-05 PROCEDURE — 99213 PR OFFICE/OUTPT VISIT, EST, LEVL III, 20-29 MIN: ICD-10-PCS | Mod: S$GLB,,, | Performed by: INTERNAL MEDICINE

## 2023-06-05 PROCEDURE — 3078F PR MOST RECENT DIASTOLIC BLOOD PRESSURE < 80 MM HG: ICD-10-PCS | Mod: CPTII,S$GLB,, | Performed by: INTERNAL MEDICINE

## 2023-06-05 PROCEDURE — 99999 PR PBB SHADOW E&M-EST. PATIENT-LVL IV: ICD-10-PCS | Mod: PBBFAC,,, | Performed by: INTERNAL MEDICINE

## 2023-06-05 PROCEDURE — 3008F PR BODY MASS INDEX (BMI) DOCUMENTED: ICD-10-PCS | Mod: CPTII,S$GLB,, | Performed by: INTERNAL MEDICINE

## 2023-06-05 PROCEDURE — 1159F MED LIST DOCD IN RCRD: CPT | Mod: CPTII,S$GLB,, | Performed by: INTERNAL MEDICINE

## 2023-06-05 PROCEDURE — 1125F AMNT PAIN NOTED PAIN PRSNT: CPT | Mod: CPTII,S$GLB,, | Performed by: INTERNAL MEDICINE

## 2023-06-05 PROCEDURE — 3074F SYST BP LT 130 MM HG: CPT | Mod: CPTII,S$GLB,, | Performed by: INTERNAL MEDICINE

## 2023-06-05 PROCEDURE — 1125F PR PAIN SEVERITY QUANTIFIED, PAIN PRESENT: ICD-10-PCS | Mod: CPTII,S$GLB,, | Performed by: INTERNAL MEDICINE

## 2023-06-05 PROCEDURE — 3066F PR DOCUMENTATION OF TREATMENT FOR NEPHROPATHY: ICD-10-PCS | Mod: CPTII,S$GLB,, | Performed by: INTERNAL MEDICINE

## 2023-06-05 PROCEDURE — 3060F POS MICROALBUMINURIA REV: CPT | Mod: CPTII,S$GLB,, | Performed by: INTERNAL MEDICINE

## 2023-06-05 PROCEDURE — 3008F BODY MASS INDEX DOCD: CPT | Mod: CPTII,S$GLB,, | Performed by: INTERNAL MEDICINE

## 2023-06-05 PROCEDURE — 99999 PR PBB SHADOW E&M-EST. PATIENT-LVL IV: CPT | Mod: PBBFAC,,, | Performed by: INTERNAL MEDICINE

## 2023-06-05 PROCEDURE — 1159F PR MEDICATION LIST DOCUMENTED IN MEDICAL RECORD: ICD-10-PCS | Mod: CPTII,S$GLB,, | Performed by: INTERNAL MEDICINE

## 2023-06-05 PROCEDURE — 3060F PR POS MICROALBUMINURIA RESULT DOCUMENTED/REVIEW: ICD-10-PCS | Mod: CPTII,S$GLB,, | Performed by: INTERNAL MEDICINE

## 2023-06-05 PROCEDURE — 3066F NEPHROPATHY DOC TX: CPT | Mod: CPTII,S$GLB,, | Performed by: INTERNAL MEDICINE

## 2023-06-05 RX ORDER — NORTRIPTYLINE HYDROCHLORIDE 10 MG/1
10 CAPSULE ORAL NIGHTLY
COMMUNITY
End: 2023-06-05 | Stop reason: SDUPTHER

## 2023-06-05 RX ORDER — ERGOCALCIFEROL 1.25 MG/1
50000 CAPSULE ORAL
Qty: 12 CAPSULE | Refills: 3 | Status: SHIPPED | OUTPATIENT
Start: 2023-06-05 | End: 2023-09-01 | Stop reason: SDUPTHER

## 2023-06-05 RX ORDER — AMLODIPINE BESYLATE 10 MG/1
10 TABLET ORAL DAILY
Qty: 90 TABLET | Refills: 3 | Status: SHIPPED | OUTPATIENT
Start: 2023-06-05 | End: 2023-07-17

## 2023-06-05 RX ORDER — METHOTREXATE 25 MG/ML
INJECTION INTRA-ARTERIAL; INTRAMUSCULAR; INTRATHECAL; INTRAVENOUS
COMMUNITY
Start: 2023-04-26 | End: 2023-06-05

## 2023-06-05 RX ORDER — LEFLUNOMIDE 20 MG/1
20 TABLET ORAL DAILY
Qty: 90 TABLET | Refills: 0 | Status: SHIPPED | OUTPATIENT
Start: 2023-06-05 | End: 2023-09-01 | Stop reason: SDUPTHER

## 2023-06-05 RX ORDER — METFORMIN HYDROCHLORIDE 500 MG/1
500 TABLET ORAL 2 TIMES DAILY WITH MEALS
COMMUNITY
End: 2024-01-23

## 2023-06-05 RX ORDER — NORTRIPTYLINE HYDROCHLORIDE 10 MG/1
10 CAPSULE ORAL NIGHTLY
Qty: 90 CAPSULE | Refills: 1 | Status: SHIPPED | OUTPATIENT
Start: 2023-06-05 | End: 2024-03-27 | Stop reason: SDUPTHER

## 2023-06-05 RX ORDER — FOLIC ACID 1 MG/1
TABLET ORAL
Qty: 150 TABLET | Refills: 3 | Status: SHIPPED | OUTPATIENT
Start: 2023-06-05 | End: 2023-09-01 | Stop reason: SDUPTHER

## 2023-06-05 RX ORDER — METHOTREXATE 25 MG/ML
INJECTION, SOLUTION INTRA-ARTERIAL; INTRAMUSCULAR; INTRAVENOUS
Qty: 4 ML | Refills: 2 | Status: SHIPPED | OUTPATIENT
Start: 2023-06-05 | End: 2023-09-01 | Stop reason: SDUPTHER

## 2023-06-05 ASSESSMENT — ROUTINE ASSESSMENT OF PATIENT INDEX DATA (RAPID3)
FATIGUE SCORE: 3
TOTAL RAPID3 SCORE: 1.83
MDHAQ FUNCTION SCORE: 0.3
PAIN SCORE: 2
AM STIFFNESS SCORE: 1, YES
PSYCHOLOGICAL DISTRESS SCORE: 1.1
PATIENT GLOBAL ASSESSMENT SCORE: 2.5
WHEN YOU AWAKENED IN THE MORNING OVER THE LAST WEEK, PLEASE INDICATE THE AMOUNT OF TIME IT TAKES UNTIL YOU ARE AS LIMBER AS YOU WILL BE FOR THE DAY: 10 MIN

## 2023-06-05 NOTE — PROGRESS NOTES
"I have personally taken the history and examined the patient and agree with the resident,s note as stated above              RA: RF+ ACPA+ GUSTABO-:  TJ 0  SJ 0 Pt global  25 ESR 26 CRP 7.2   DAS28 2.63  [id number]  2.07  CDAI 2.5(all remission)  Rheumatoid Arthritis Treatment Goals:  -Disease Activity Measure: CDAI  -Target: LDA  -Therapy/Change: none  -Shared Decision Making: Discussed goals of care and therapy plan together with patient as outlined above.      Frequent falls, none recent  OA knees;left>right  OA hips: left>right  Low bone density DXA 7/14/22 on alendronate intermittently with drug holidays since 2015. Can go back on "drug holiday" off alendronate. Repeat DXA in 1-2 years  Class 1 Obesity, gained 11# since 3/6/23  Body mass index is 32.13 kg/m².   EH  122/71 on amlodipine 10mg daily  Hyperlipidemia LDL 92.6 1/12/23  on pravastatin 40mg every evening  T2 DM HbA1c 7.9 1/12/23  S/p Covid 10/19/21     Rollator walker prn  Cont HEP rod right hip  Cont abatacept 1000mg IV q 28 days re-ordered and p.a. [place]'s  Cont methotrexate 20mg (0.8 ml) sc q 7 days with folic acid 1mg daily but 8 mg day after methotrexate dosing.  Cont leflunomide 20mg daily  *amlodipine 10mg daily she will check her bottles at home to see if she has been taking or not.   Cont pravastatin 40mg every evening  Increase walking,stationary bike  Mediterranean/DASH diet, no junk food, low carb, weight reduction  F/u Dr. Mark for EH, T2 DM  hyperlipidemia  RTC 3 months with standing labs and vitamin D  "

## 2023-06-05 NOTE — PROGRESS NOTES
Patient ID:  Maru Shelby    YOB: 1950     MRN:  [id number]     Subjective:     Chief Complaint:  Disease Management     History of Present Illness:  Pt is a 72 y.o. female with RA RF+, ACPA+, GUSTABO-, osteoporosis, and osteoarthritis who presents today for f/u. LCV was 3/6/23. At that time she was to continue her medications, cont HEP for dizziness, and cont with walker for longer ambulation.     Today: She states she has been doing well. She has continued to perform HEP for dizziness. Denies any new falls. She continues to take orencia, MTX, leflunomide, and folic acid as directed. She does report approximately 10 min of morning stiffness. She was having some difficulty falling asleep; she began taking Elavil 10mg nightly which helped her sleep. She does report some dry mouth. Denies hair loss, dry eyes, vision changes, oral/nasal ulcers, trouble swallowing, new rashes, joint swelling, morning stiffness, or GI disturbances. Had normal screening mammography 4/3/23. DXA scan performed 5/22 with report below.       Review of Systems   Review of Systems   Constitutional:  Negative for activity change, fatigue and fever.   HENT:  Negative for postnasal drip, rhinorrhea and sore throat.         +Dry Mouth   Eyes:  Negative for pain, redness and visual disturbance.   Respiratory:  Negative for cough, chest tightness, shortness of breath and wheezing.    Cardiovascular:  Negative for chest pain and palpitations.   Gastrointestinal:  Negative for abdominal pain, constipation, diarrhea, nausea and vomiting.   Genitourinary:  Negative for dysuria.   Musculoskeletal:  Positive for arthralgias and gait problem. Negative for back pain.   Skin:  Negative for wound.   Neurological:  Negative for syncope, weakness, numbness and headaches.      Current Medications:    Current Outpatient Medications:     blood sugar diagnostic (ACCU-CHEK GUIDE) Strp, Use to check blood sugar once daily and as needed., Disp: 100 each,  "Rfl: 3    clotrimazole-betamethasone 1-0.05% (LOTRISONE) cream, APPLY TO AFFECTED AREA TWICE A DAY, Disp: 45 g, Rfl: 1    DOCOSAHEXANOIC ACID/EPA (FISH OIL ORAL), Take by mouth.  , Disp: , Rfl:     insulin syringe-needle U-100 1 mL 31 gauge x 5/16 Syrg, Use as directed, Disp: 10 each, Rfl: 3    lancets Misc, Check blood sugar once daily, Disp: 100 each, Rfl: 1    latanoprost 0.005 % ophthalmic solution, , Disp: , Rfl: 0    LIDOcaine (LIDODERM) 5 %, Place 1 patch onto the skin once daily. Remove & Discard patch within 12 hours or as directed by MD, Disp: 30 patch, Rfl: 1    metFORMIN (GLUCOPHAGE) 500 MG tablet, Take 500 mg by mouth 2 (two) times daily with meals., Disp: , Rfl:     multivitamin (ONE DAILY MULTIVITAMIN) per tablet, Take 1 tablet by mouth once daily.  , Disp: , Rfl:     pravastatin (PRAVACHOL) 40 MG tablet, TAKE 1 TABLET BY MOUTH EVERY DAY IN THE EVENING, Disp: 90 tablet, Rfl: 3    SITagliptan-metformin (JANUMET) 50-1,000 mg per tablet, Take 1 tablet by mouth 2 (two) times daily with meals., Disp: 180 tablet, Rfl: 3    syringe with needle, safety (BD SAFETY-ALONDRA TUBERCULIN) 1 mL 27 gauge x 1/2" Syrg, USE AS DIRECTED, Disp: 12 Syringe, Rfl: 3    tobramycin-dexAMETHasone 0.3-0.1% (TOBRADEX) 0.3-0.1 % DrpS, , Disp: , Rfl:     UNABLE TO FIND, 250 mg every 30 days. medication name: Arencia, Disp: , Rfl:     amLODIPine (NORVASC) 10 MG tablet, Take 1 tablet (10 mg total) by mouth once daily. (Patient not taking: Reported on 6/5/2023), Disp: 90 tablet, Rfl: 3    blood-glucose meter (ACCU-CHEK GUIDE GLUCOSE METER) Misc, 1 each by Misc.(Non-Drug; Combo Route) route once daily., Disp: 100 each, Rfl: 1    ergocalciferol (ERGOCALCIFEROL) 50,000 unit Cap, Take 1 capsule (50,000 Units total) by mouth every 7 days., Disp: 12 capsule, Rfl: 3    folic acid (FOLVITE) 1 MG tablet, Take 1 tablet daily, but take 8 tablets every Thursday., Disp: 150 tablet, Rfl: 3    leflunomide (ARAVA) 20 MG Tab, Take 1 tablet (20 mg total) " by mouth once daily., Disp: 90 tablet, Rfl: 0    methotrexate 25 mg/mL injection, INJECT 0.8 MLS INTO THE SKIN EVERY 7 DAYS., Disp: 4 mL, Rfl: 2    nortriptyline (PAMELOR) 10 MG capsule, Take 1 capsule (10 mg total) by mouth every evening., Disp: 90 capsule, Rfl: 1    Current Facility-Administered Medications:     abatacept (with maltose) (ORENCIA) 1,000 mg in sodium chloride 0.9% 100 mL IVPB, 1,000 mg, Intravenous, 1 time in Clinic/HOD, Timi Royal MD, Stopped at 12/19/22 0600    acetaminophen tablet 650 mg, 650 mg, Oral, Once PRN, Ronaldo Moralez MD    albuterol inhaler 2 puff, 2 puff, Inhalation, Q20 Min PRN, Ronaldo Moralez MD    diphenhydrAMINE injection 25 mg, 25 mg, Intravenous, Once PRN, Ronaldo Moralez MD    EPINEPHrine (EPIPEN) 0.3 mg/0.3 mL pen injection 0.3 mg, 0.3 mg, Intramuscular, PRN, Ronaldo Moralez MD    ondansetron disintegrating tablet 4 mg, 4 mg, Oral, Once PRN, Ronaldo Moralez MD    sodium chloride 0.9% 500 mL flush bag, , Intravenous, PRN, Ronaldo Moralez MD    sodium chloride 0.9% flush 10 mL, 10 mL, Intravenous, PRN, Ronaldo Moralez MD     Objective:     Vitals:    06/05/23 0830   BP: 122/71   Pulse: 95      Body mass index is 32.13 kg/m².     Physical Exam   Constitutional: She is oriented to person, place, and time. normal appearance. She appears obese. No distress.   HENT:   Head: Normocephalic.   Mouth/Throat: Oropharynx is clear.   Eyes: Pupils are equal, round, and reactive to light. Conjunctivae are normal.   Cardiovascular: Normal rate, regular rhythm and normal pulses.   Pulmonary/Chest: Effort normal and breath sounds normal. No respiratory distress. She has no wheezes. She has no rales.   Abdominal: Bowel sounds are normal. She exhibits no distension.   Musculoskeletal:         General: No swelling. Normal range of motion.   Neurological: She is alert and oriented to person, place, and time.   Skin: Skin is warm. Capillary refill takes less  than 2 seconds. No bruising noted.   Psychiatric: Mood normal.        8/30/2022 12/5/2022 3/6/2023 6/5/2023   Tender (LEES-28) 0 / 28  0 / 28  0 / 28  0 / 28    Swollen (LEES-28) 0 / 28  0 / 28  0 / 28  0 / 28    Provider Global 0 mm 0 mm 0 mm 0 mm   Patient Global 0 mm 20 mm 10 mm 25 mm   ESR 12 mm/hr 26 mm/hr 16 mm/hr 26 mm/hr   CRP 1.6 mg/L 5.3 mg/L 2.4 mg/L 7.2 mg/L   LEES-28 (ESR) 1.74 (Remission) 2.56 (Remission) 2.08 (Remission) 2.63 (Low disease activity)   LEES-28 (CRP) 1.3 (Remission) 1.9 (Remission) 1.54 (Remission) 2.07 (Remission)   CDAI Score 0  2  1  2.5               IMAGING:   Result:  Mammo Digital Screening Bilat w/ Kamran     History:  Patient is 72 y.o. and is seen for a screening mammogram.        Films Compared:  Prior images (if available) were compared.      Findings:   This procedure was performed using tomosynthesis.   Computer-aided detection was utilized in the interpretation of this examination.     The breasts have scattered areas of fibroglandular density. There is no evidence of suspicious masses, microcalcifications or architectural distortion.     Impression:   No mammographic evidence of malignancy.      EXAMINATION:  DEXA BONE DENSITY SPINE HIP     CLINICAL HISTORY:  Age-related osteoporosis without current pathological fracture.  71 y/o female with no history of fractures.  She had menopausal symptoms at 48 y/o.  She is taking 50,000 units of Vit D and Fosamax.  She has a history of Diabetes and Rheumatoid Arthritis.  She does not exercise or smoke.     TECHNIQUE:  DXA specification: Main [place] Holo120 Sports A ([id number])     Bone Mineral Density scanning was performed over the hip and lumbar spine.     Review of the images confirms satisfactory positioning and technique.     COMPARISON:  Comparison study done on 10/28/2019.     Lumbar spine:    BMD 1.051 g/cm2 and T-score 0.0.     Total Hip:           BMD 0.843 g/cm2 and T-score -0.8.     Distal 1/3 radius: Not applicable      FINDINGS:  Lumbar spine (L1-L4):              BMD is 1.387 g/cm2, T-score is 3.1, and Z-score is 5.4.     Total hip:                                BMD is 0.934 g/cm2, T-score is -0.1, and Z-score is 1.7.     Femoral neck:                          BMD is 0.693 g/cm2, T-score is -1.4, and Z-score is 0.6.     Distal 1/3 radius:                      Not applicable     FRAX:     13% risk of a major osteoporotic fracture in the next 10 years.     2.5% risk of hip fracture in the next 10 years.     Impression:     *Low bone mass (Osteopenia); FRAX calculations do not support treatment as osteoporosis  *Compared with previous DXA, BMD at the lumbar spine has increased by 31.9%, and the BMD at the total hip has increased by 10.7%.     Latest Reference Range & Units 05/29/23 07:10   WBC 3.90 - 12.70 K/uL 10.92   RBC 4.00 - 5.40 M/uL 4.71   Hemoglobin 12.0 - 16.0 g/dL 14.3   Hematocrit 37.0 - 48.5 % 44.0   MCV 82 - 98 fL 93   MCH 27.0 - 31.0 pg 30.4   MCHC 32.0 - 36.0 g/dL 32.5   RDW 11.5 - 14.5 % 14.0   Platelets 150 - 450 K/uL 269   MPV 9.2 - 12.9 fL 9.8   Gran % 38.0 - 73.0 % 57.2   Lymph % 18.0 - 48.0 % 32.0   Mono % 4.0 - 15.0 % 5.8   Eosinophil % 0.0 - 8.0 % 3.5   Basophil % 0.0 - 1.9 % 0.8   Immature Granulocytes 0.0 - 0.5 % 0.7 (H)   Gran # (ANC) 1.8 - 7.7 K/uL 6.3   Lymph # 1.0 - 4.8 K/uL 3.5   Mono # 0.3 - 1.0 K/uL 0.6   Eos # 0.0 - 0.5 K/uL 0.4   Baso # 0.00 - 0.20 K/uL 0.09   Immature Grans (Abs) 0.00 - 0.04 K/uL 0.08 (H)   nRBC 0 /100 WBC 0   Differential Method  Automated   Sed Rate 0 - 20 mm/Hr 26 (H)   Sodium 136 - 145 mmol/L 142   Potassium 3.5 - 5.1 mmol/L 4.0   Chloride 95 - 110 mmol/L 105   CO2 23 - 29 mmol/L 26   Anion Gap 8 - 16 mmol/L 11   BUN 8 - 23 mg/dL 14   Creatinine 0.5 - 1.4 mg/dL 0.8   eGFR >60 mL/min/1.73 m^2 >60   Glucose 70 - 110 mg/dL 146 (H)   Calcium 8.7 - 10.5 mg/dL 9.5   Alkaline Phosphatase 55 - 135 U/L 77   PROTEIN TOTAL 6.0 - 8.4 g/dL 6.8   Albumin 3.5 - 5.2 g/dL 3.7   BILIRUBIN  "TOTAL 0.1 - 1.0 mg/dL 0.4   AST 10 - 40 U/L 14   ALT 10 - 44 U/L 16   CRP 0.0 - 8.2 mg/L 7.2   (H): Data is abnormally high    Assessment:     Frequent falls, none recent  OA knees;left>right  OA hips: left>right  Low bone density DXA 7/14/22 on alendronate intermittently with drug holidays since 2015. Can go back on "drug holiday" off alendronate. Repeat DXA in 1-2 years  Class 1 Obesity, gained 11# since 3/6/23  Body mass index is 32.13 kg/m².   EH  122/71 on amlodipine 10mg daily  Hyperlipidemia LDL 92.6 1/12/23  on pravastatin 40mg every evening  T2 DM HbA1c 7.9 1/12/23  S/p Covid 10/19/21       1. Vitamin D deficiency    2. Osteopenia, unspecified location    3. Rheumatoid arthritis involving both ankles with positive rheumatoid factor          Plan:      Problem List Items Addressed This Visit          Immunology/Multi System    RA (rheumatoid arthritis) (Chronic)    Relevant Medications    leflunomide (ARAVA) 20 MG Tab    methotrexate 25 mg/mL injection       Endocrine    Vitamin D deficiency - Primary    Relevant Orders    Vitamin D     Other Visit Diagnoses       Osteopenia, unspecified location        Relevant Medications    ergocalciferol (ERGOCALCIFEROL) 50,000 unit Cap              Rollator walker prn  Cont HEP rod right hip  Cont abatacept 1000mg IV q 28 days re-ordered and p.a. [place]  Cont methotrexate 20mg (0.8 ml) sc q 7 days with folic acid 1mg daily but 8 mg day after methotrexate dosing.  Cont leflunomide 20mg daily  *amlodipine 10mg daily - she will check her bottles at home to see if she has been taking or not.   Cont pravastatin 40mg every evening  Increase walking,stationary bike  Mediterranean/DASH diet, no junk food, low carb, weight reduction  F/u Dr. Mark for EH, T2 DM  hyperlipidemia  RTC 3 months with standing labs and vitamin D     Corey Zaldivar M.D.  PGY-1  [employer] [facility]    "

## 2023-06-12 ENCOUNTER — INFUSION (OUTPATIENT)
Dept: INFUSION THERAPY | Facility: HOSPITAL | Age: 73
End: 2023-06-12
Attending: INTERNAL MEDICINE
Payer: MEDICARE

## 2023-06-12 VITALS
TEMPERATURE: 98 F | SYSTOLIC BLOOD PRESSURE: 131 MMHG | HEART RATE: 98 BPM | RESPIRATION RATE: 18 BRPM | DIASTOLIC BLOOD PRESSURE: 67 MMHG

## 2023-06-12 DIAGNOSIS — M05.79 RHEUMATOID ARTHRITIS INVOLVING MULTIPLE SITES WITH POSITIVE RHEUMATOID FACTOR: Primary | ICD-10-CM

## 2023-06-12 PROCEDURE — 25000003 PHARM REV CODE 250: Performed by: INTERNAL MEDICINE

## 2023-06-12 PROCEDURE — 63600175 PHARM REV CODE 636 W HCPCS: Mod: JZ,JA | Performed by: INTERNAL MEDICINE

## 2023-06-12 PROCEDURE — 96365 THER/PROPH/DIAG IV INF INIT: CPT

## 2023-06-12 RX ORDER — ACETAMINOPHEN 325 MG/1
650 TABLET ORAL
Status: DISCONTINUED | OUTPATIENT
Start: 2023-06-12 | End: 2023-06-12 | Stop reason: HOSPADM

## 2023-06-12 RX ORDER — HEPARIN 100 UNIT/ML
500 SYRINGE INTRAVENOUS
Status: CANCELLED | OUTPATIENT
Start: 2023-07-03

## 2023-06-12 RX ORDER — SODIUM CHLORIDE 0.9 % (FLUSH) 0.9 %
10 SYRINGE (ML) INJECTION
Status: CANCELLED | OUTPATIENT
Start: 2023-07-03

## 2023-06-12 RX ORDER — ACETAMINOPHEN 325 MG/1
650 TABLET ORAL
Status: CANCELLED | OUTPATIENT
Start: 2023-07-03

## 2023-06-12 RX ADMIN — SODIUM CHLORIDE 1000 MG: 9 INJECTION, SOLUTION INTRAVENOUS at 08:06

## 2023-07-05 ENCOUNTER — PATIENT OUTREACH (OUTPATIENT)
Dept: ADMINISTRATIVE | Facility: HOSPITAL | Age: 73
End: 2023-07-05
Payer: MEDICARE

## 2023-07-05 NOTE — LETTER
AUTHORIZATION FOR RELEASE OF   CONFIDENTIAL INFORMATION    Dear Dr. Balbuena,    We are seeing Maru Shelby, date of birth 1950, in the clinic at [facility]. Florence Mark MD is the patient's PCP. Maru Shelby has an outstanding lab/procedure at the time we reviewed her chart. In order to help keep her health information updated, she has authorized us to request the following medical record(s):                                            ( X )  EYE EXAM                 Please fax records to [facility], Mohammad Q Nawaz, MD, [phone].    If you have any questions, please contact...  Sloane Hi LPN  Clinical Care Coordinator  [facility]  Phone: [phone]  Fax: [phone]           Patient Name: Maru Shelby  : 1950  Patient Phone #: [phone]

## 2023-07-05 NOTE — PROGRESS NOTES
Chart reviewed, immunization record updated.  No new results noted on [facility] or [facility] web site.  Care Everywhere updated.   Patient care coordination note updated.   Upcoming PCP visit updated.  Next PCP visit 07/18/2023.  LOV with PCP 01/17/2023.  E-fax sent to Dr. Balbuena for last eye exam completed.

## 2023-07-10 ENCOUNTER — LAB VISIT (OUTPATIENT)
Dept: LAB | Facility: HOSPITAL | Age: 73
End: 2023-07-10
Attending: INTERNAL MEDICINE
Payer: MEDICARE

## 2023-07-10 ENCOUNTER — INFUSION (OUTPATIENT)
Dept: INFUSION THERAPY | Facility: HOSPITAL | Age: 73
End: 2023-07-10
Attending: INTERNAL MEDICINE
Payer: MEDICARE

## 2023-07-10 VITALS
SYSTOLIC BLOOD PRESSURE: 126 MMHG | TEMPERATURE: 98 F | HEART RATE: 80 BPM | BODY MASS INDEX: 32.28 KG/M2 | WEIGHT: 200 LBS | DIASTOLIC BLOOD PRESSURE: 65 MMHG | RESPIRATION RATE: 20 BRPM

## 2023-07-10 DIAGNOSIS — M05.771 RHEUMATOID ARTHRITIS INVOLVING BOTH ANKLES WITH POSITIVE RHEUMATOID FACTOR: ICD-10-CM

## 2023-07-10 DIAGNOSIS — Z79.4 TYPE 2 DIABETES MELLITUS WITH OTHER SPECIFIED COMPLICATION, WITH LONG-TERM CURRENT USE OF INSULIN: ICD-10-CM

## 2023-07-10 DIAGNOSIS — E55.9 VITAMIN D DEFICIENCY: ICD-10-CM

## 2023-07-10 DIAGNOSIS — M05.79 RHEUMATOID ARTHRITIS INVOLVING MULTIPLE SITES WITH POSITIVE RHEUMATOID FACTOR: Primary | ICD-10-CM

## 2023-07-10 DIAGNOSIS — D84.9 IMMUNODEFICIENCY, UNSPECIFIED: ICD-10-CM

## 2023-07-10 DIAGNOSIS — I70.0 AORTIC ATHEROSCLEROSIS: ICD-10-CM

## 2023-07-10 DIAGNOSIS — D69.2 OTHER NONTHROMBOCYTOPENIC PURPURA: ICD-10-CM

## 2023-07-10 DIAGNOSIS — E11.69 TYPE 2 DIABETES MELLITUS WITH OTHER SPECIFIED COMPLICATION, WITH LONG-TERM CURRENT USE OF INSULIN: ICD-10-CM

## 2023-07-10 DIAGNOSIS — M05.772 RHEUMATOID ARTHRITIS INVOLVING BOTH ANKLES WITH POSITIVE RHEUMATOID FACTOR: ICD-10-CM

## 2023-07-10 LAB
25(OH)D3+25(OH)D2 SERPL-MCNC: 65 NG/ML (ref 30–96)
ALBUMIN SERPL BCP-MCNC: 3.5 G/DL (ref 3.5–5.2)
ALBUMIN/CREAT UR: 46 UG/MG (ref 0–30)
ALP SERPL-CCNC: 75 U/L (ref 55–135)
ALT SERPL W/O P-5'-P-CCNC: 17 U/L (ref 10–44)
ANION GAP SERPL CALC-SCNC: 10 MMOL/L (ref 8–16)
AST SERPL-CCNC: 14 U/L (ref 10–40)
BASOPHILS # BLD AUTO: 0.06 K/UL (ref 0–0.2)
BASOPHILS NFR BLD: 0.5 % (ref 0–1.9)
BILIRUB SERPL-MCNC: 0.4 MG/DL (ref 0.1–1)
BUN SERPL-MCNC: 14 MG/DL (ref 8–23)
CALCIUM SERPL-MCNC: 9.3 MG/DL (ref 8.7–10.5)
CHLORIDE SERPL-SCNC: 105 MMOL/L (ref 95–110)
CHOLEST SERPL-MCNC: 192 MG/DL (ref 120–199)
CHOLEST/HDLC SERPL: 4.2 {RATIO} (ref 2–5)
CO2 SERPL-SCNC: 25 MMOL/L (ref 23–29)
CREAT SERPL-MCNC: 0.8 MG/DL (ref 0.5–1.4)
CREAT UR-MCNC: 115.3 MG/DL (ref 15–325)
DIFFERENTIAL METHOD: ABNORMAL
EOSINOPHIL # BLD AUTO: 0.8 K/UL (ref 0–0.5)
EOSINOPHIL NFR BLD: 7 % (ref 0–8)
ERYTHROCYTE [DISTWIDTH] IN BLOOD BY AUTOMATED COUNT: 14.3 % (ref 11.5–14.5)
EST. GFR  (NO RACE VARIABLE): >60 ML/MIN/1.73 M^2
ESTIMATED AVG GLUCOSE: 140 MG/DL (ref 68–131)
GLUCOSE SERPL-MCNC: 170 MG/DL (ref 70–110)
HBA1C MFR BLD: 6.5 % (ref 4–5.6)
HCT VFR BLD AUTO: 42.1 % (ref 37–48.5)
HDLC SERPL-MCNC: 46 MG/DL (ref 40–75)
HDLC SERPL: 24 % (ref 20–50)
HGB BLD-MCNC: 13.8 G/DL (ref 12–16)
IMM GRANULOCYTES # BLD AUTO: 0.07 K/UL (ref 0–0.04)
IMM GRANULOCYTES NFR BLD AUTO: 0.6 % (ref 0–0.5)
LDLC SERPL CALC-MCNC: 94.8 MG/DL (ref 63–159)
LYMPHOCYTES # BLD AUTO: 3.1 K/UL (ref 1–4.8)
LYMPHOCYTES NFR BLD: 27.4 % (ref 18–48)
MCH RBC QN AUTO: 30.7 PG (ref 27–31)
MCHC RBC AUTO-ENTMCNC: 32.8 G/DL (ref 32–36)
MCV RBC AUTO: 94 FL (ref 82–98)
MICROALBUMIN UR DL<=1MG/L-MCNC: 53 UG/ML
MONOCYTES # BLD AUTO: 0.7 K/UL (ref 0.3–1)
MONOCYTES NFR BLD: 6.1 % (ref 4–15)
NEUTROPHILS # BLD AUTO: 6.7 K/UL (ref 1.8–7.7)
NEUTROPHILS NFR BLD: 58.4 % (ref 38–73)
NONHDLC SERPL-MCNC: 146 MG/DL
NRBC BLD-RTO: 0 /100 WBC
PLATELET # BLD AUTO: 222 K/UL (ref 150–450)
PMV BLD AUTO: 9.4 FL (ref 9.2–12.9)
POTASSIUM SERPL-SCNC: 4 MMOL/L (ref 3.5–5.1)
PROT SERPL-MCNC: 6.8 G/DL (ref 6–8.4)
RBC # BLD AUTO: 4.5 M/UL (ref 4–5.4)
SODIUM SERPL-SCNC: 140 MMOL/L (ref 136–145)
TRIGL SERPL-MCNC: 256 MG/DL (ref 30–150)
TSH SERPL DL<=0.005 MIU/L-ACNC: 1.85 UIU/ML (ref 0.4–4)
WBC # BLD AUTO: 11.48 K/UL (ref 3.9–12.7)

## 2023-07-10 PROCEDURE — 36415 COLL VENOUS BLD VENIPUNCTURE: CPT | Performed by: INTERNAL MEDICINE

## 2023-07-10 PROCEDURE — 96365 THER/PROPH/DIAG IV INF INIT: CPT

## 2023-07-10 PROCEDURE — 80053 COMPREHEN METABOLIC PANEL: CPT | Performed by: INTERNAL MEDICINE

## 2023-07-10 PROCEDURE — 25000003 PHARM REV CODE 250: Performed by: INTERNAL MEDICINE

## 2023-07-10 PROCEDURE — 82570 ASSAY OF URINE CREATININE: CPT | Performed by: INTERNAL MEDICINE

## 2023-07-10 PROCEDURE — 83036 HEMOGLOBIN GLYCOSYLATED A1C: CPT | Performed by: INTERNAL MEDICINE

## 2023-07-10 PROCEDURE — 82306 VITAMIN D 25 HYDROXY: CPT | Performed by: INTERNAL MEDICINE

## 2023-07-10 PROCEDURE — 85025 COMPLETE CBC W/AUTO DIFF WBC: CPT | Performed by: INTERNAL MEDICINE

## 2023-07-10 PROCEDURE — 84443 ASSAY THYROID STIM HORMONE: CPT | Performed by: INTERNAL MEDICINE

## 2023-07-10 PROCEDURE — 80061 LIPID PANEL: CPT | Performed by: INTERNAL MEDICINE

## 2023-07-10 PROCEDURE — 63600175 PHARM REV CODE 636 W HCPCS: Mod: JZ,JA,TB | Performed by: INTERNAL MEDICINE

## 2023-07-10 RX ORDER — HEPARIN 100 UNIT/ML
500 SYRINGE INTRAVENOUS
Status: CANCELLED | OUTPATIENT
Start: 2023-07-31

## 2023-07-10 RX ORDER — SODIUM CHLORIDE 0.9 % (FLUSH) 0.9 %
10 SYRINGE (ML) INJECTION
Status: CANCELLED | OUTPATIENT
Start: 2023-07-31

## 2023-07-10 RX ORDER — ACETAMINOPHEN 325 MG/1
650 TABLET ORAL
Status: CANCELLED | OUTPATIENT
Start: 2023-07-31

## 2023-07-10 RX ADMIN — SODIUM CHLORIDE 1000 MG: 9 INJECTION, SOLUTION INTRAVENOUS at 08:07

## 2023-07-18 ENCOUNTER — OFFICE VISIT (OUTPATIENT)
Dept: INTERNAL MEDICINE | Facility: CLINIC | Age: 73
End: 2023-07-18
Payer: MEDICARE

## 2023-07-18 VITALS
WEIGHT: 204.13 LBS | RESPIRATION RATE: 19 BRPM | BODY MASS INDEX: 32.81 KG/M2 | HEART RATE: 92 BPM | SYSTOLIC BLOOD PRESSURE: 102 MMHG | OXYGEN SATURATION: 96 % | DIASTOLIC BLOOD PRESSURE: 60 MMHG | HEIGHT: 66 IN

## 2023-07-18 DIAGNOSIS — R21 RASH: ICD-10-CM

## 2023-07-18 DIAGNOSIS — E11.69 TYPE 2 DIABETES MELLITUS WITH OTHER SPECIFIED COMPLICATION, WITH LONG-TERM CURRENT USE OF INSULIN: ICD-10-CM

## 2023-07-18 DIAGNOSIS — I10 BENIGN ESSENTIAL HTN: Primary | ICD-10-CM

## 2023-07-18 DIAGNOSIS — Z79.4 TYPE 2 DIABETES MELLITUS WITH OTHER SPECIFIED COMPLICATION, WITH LONG-TERM CURRENT USE OF INSULIN: ICD-10-CM

## 2023-07-18 DIAGNOSIS — E78.00 PURE HYPERCHOLESTEROLEMIA: ICD-10-CM

## 2023-07-18 PROCEDURE — 1125F AMNT PAIN NOTED PAIN PRSNT: CPT | Mod: CPTII,S$GLB,, | Performed by: INTERNAL MEDICINE

## 2023-07-18 PROCEDURE — 1160F RVW MEDS BY RX/DR IN RCRD: CPT | Mod: CPTII,S$GLB,, | Performed by: INTERNAL MEDICINE

## 2023-07-18 PROCEDURE — 3044F HG A1C LEVEL LT 7.0%: CPT | Mod: CPTII,S$GLB,, | Performed by: INTERNAL MEDICINE

## 2023-07-18 PROCEDURE — 1101F PR PT FALLS ASSESS DOC 0-1 FALLS W/OUT INJ PAST YR: ICD-10-PCS | Mod: CPTII,S$GLB,, | Performed by: INTERNAL MEDICINE

## 2023-07-18 PROCEDURE — 99999 PR PBB SHADOW E&M-EST. PATIENT-LVL III: ICD-10-PCS | Mod: PBBFAC,,, | Performed by: INTERNAL MEDICINE

## 2023-07-18 PROCEDURE — 3044F PR MOST RECENT HEMOGLOBIN A1C LEVEL <7.0%: ICD-10-PCS | Mod: CPTII,S$GLB,, | Performed by: INTERNAL MEDICINE

## 2023-07-18 PROCEDURE — 3074F PR MOST RECENT SYSTOLIC BLOOD PRESSURE < 130 MM HG: ICD-10-PCS | Mod: CPTII,S$GLB,, | Performed by: INTERNAL MEDICINE

## 2023-07-18 PROCEDURE — 3288F FALL RISK ASSESSMENT DOCD: CPT | Mod: CPTII,S$GLB,, | Performed by: INTERNAL MEDICINE

## 2023-07-18 PROCEDURE — 99999 PR PBB SHADOW E&M-EST. PATIENT-LVL III: CPT | Mod: PBBFAC,,, | Performed by: INTERNAL MEDICINE

## 2023-07-18 PROCEDURE — 3060F POS MICROALBUMINURIA REV: CPT | Mod: CPTII,S$GLB,, | Performed by: INTERNAL MEDICINE

## 2023-07-18 PROCEDURE — 99214 OFFICE O/P EST MOD 30 MIN: CPT | Mod: S$GLB,,, | Performed by: INTERNAL MEDICINE

## 2023-07-18 PROCEDURE — 1159F PR MEDICATION LIST DOCUMENTED IN MEDICAL RECORD: ICD-10-PCS | Mod: CPTII,S$GLB,, | Performed by: INTERNAL MEDICINE

## 2023-07-18 PROCEDURE — 99214 PR OFFICE/OUTPT VISIT, EST, LEVL IV, 30-39 MIN: ICD-10-PCS | Mod: S$GLB,,, | Performed by: INTERNAL MEDICINE

## 2023-07-18 PROCEDURE — 3288F PR FALLS RISK ASSESSMENT DOCUMENTED: ICD-10-PCS | Mod: CPTII,S$GLB,, | Performed by: INTERNAL MEDICINE

## 2023-07-18 PROCEDURE — 1160F PR REVIEW ALL MEDS BY PRESCRIBER/CLIN PHARMACIST DOCUMENTED: ICD-10-PCS | Mod: CPTII,S$GLB,, | Performed by: INTERNAL MEDICINE

## 2023-07-18 PROCEDURE — 3078F PR MOST RECENT DIASTOLIC BLOOD PRESSURE < 80 MM HG: ICD-10-PCS | Mod: CPTII,S$GLB,, | Performed by: INTERNAL MEDICINE

## 2023-07-18 PROCEDURE — 3060F PR POS MICROALBUMINURIA RESULT DOCUMENTED/REVIEW: ICD-10-PCS | Mod: CPTII,S$GLB,, | Performed by: INTERNAL MEDICINE

## 2023-07-18 PROCEDURE — 1125F PR PAIN SEVERITY QUANTIFIED, PAIN PRESENT: ICD-10-PCS | Mod: CPTII,S$GLB,, | Performed by: INTERNAL MEDICINE

## 2023-07-18 PROCEDURE — 3074F SYST BP LT 130 MM HG: CPT | Mod: CPTII,S$GLB,, | Performed by: INTERNAL MEDICINE

## 2023-07-18 PROCEDURE — 1159F MED LIST DOCD IN RCRD: CPT | Mod: CPTII,S$GLB,, | Performed by: INTERNAL MEDICINE

## 2023-07-18 PROCEDURE — 1101F PT FALLS ASSESS-DOCD LE1/YR: CPT | Mod: CPTII,S$GLB,, | Performed by: INTERNAL MEDICINE

## 2023-07-18 PROCEDURE — 3066F NEPHROPATHY DOC TX: CPT | Mod: CPTII,S$GLB,, | Performed by: INTERNAL MEDICINE

## 2023-07-18 PROCEDURE — 3066F PR DOCUMENTATION OF TREATMENT FOR NEPHROPATHY: ICD-10-PCS | Mod: CPTII,S$GLB,, | Performed by: INTERNAL MEDICINE

## 2023-07-18 PROCEDURE — 3078F DIAST BP <80 MM HG: CPT | Mod: CPTII,S$GLB,, | Performed by: INTERNAL MEDICINE

## 2023-07-18 RX ORDER — CLOTRIMAZOLE AND BETAMETHASONE DIPROPIONATE 10; .64 MG/G; MG/G
CREAM TOPICAL 2 TIMES DAILY
Qty: 45 G | Refills: 1 | Status: SHIPPED | OUTPATIENT
Start: 2023-07-18

## 2023-07-18 NOTE — PROGRESS NOTES
"HPI:  Maru Shelby is a 73 y.o. female here for Follow-up  Labs are reviewed .      Review of Systems   Constitutional:  Negative for chills and fever.   HENT:  Negative for congestion, hearing loss, sinus pressure and sore throat.    Eyes:  Negative for photophobia.   Respiratory:  Negative for cough, choking, chest tightness and wheezing.    Cardiovascular:  Negative for chest pain and palpitations.   Gastrointestinal:  Negative for blood in stool, nausea and vomiting.   Genitourinary:  Negative for dysuria and hematuria.   Musculoskeletal:  Negative for arthralgias and myalgias.   Skin:  Positive for color change and rash. Negative for pallor.   Neurological:  Negative for dizziness and numbness.   Hematological:  Does not bruise/bleed easily.   Psychiatric/Behavioral:  Negative for confusion and suicidal ideas. The patient is not nervous/anxious.     A review of systems was performed and is negative except as noted above.    Objective:  Vitals:    07/18/23 1025   BP: 102/60   Pulse: 92   Resp: 19   SpO2: 96%   Weight: 92.6 kg (204 lb 2.3 oz)   Height: 5' 6" (1.676 m)      Physical Exam  Vitals and nursing note reviewed.   Constitutional:       Appearance: She is well-developed.   HENT:      Head: Normocephalic and atraumatic.      Right Ear: External ear normal.      Left Ear: External ear normal.   Eyes:      Conjunctiva/sclera: Conjunctivae normal.      Pupils: Pupils are equal, round, and reactive to light.   Neck:      Thyroid: No thyromegaly.      Vascular: No JVD.      Trachea: No tracheal deviation.        Comments: Rash scaly ;   Cardiovascular:      Rate and Rhythm: Normal rate and regular rhythm.      Heart sounds: Normal heart sounds.   Pulmonary:      Effort: Pulmonary effort is normal. No respiratory distress.      Breath sounds: Normal breath sounds. No wheezing or rales.   Chest:      Chest wall: No tenderness.   Abdominal:      General: Bowel sounds are normal. There is no distension.      " Palpations: Abdomen is soft. There is no mass.      Tenderness: There is no abdominal tenderness. There is no guarding or rebound.   Musculoskeletal:         General: Normal range of motion.      Cervical back: Normal range of motion and neck supple.   Lymphadenopathy:      Cervical: No cervical adenopathy.   Skin:     General: Skin is warm and dry.   Neurological:      Mental Status: She is alert and oriented to person, place, and time.      Cranial Nerves: No cranial nerve deficit.      Motor: No abnormal muscle tone.      Coordination: Coordination normal.      Deep Tendon Reflexes: Reflexes are normal and symmetric. Reflexes normal.      Comments: CN: Optic discs are flat with normal vasculature, PERRL, Extraoccular movements and visual fields are full. Normal facial sensation and strength, Hearing symmetric, Tongue and Palate are midline and strong. Shoulder Shrug symmetric and strong.        Assessment/Plan:  1. Benign essential HTN  -     CBC Auto Differential; Future; Expected date: 01/14/2024  -     Comprehensive Metabolic Panel; Future; Expected date: 01/14/2024    Well controlled.  Continue same medication and dose.  Keep weight close to ideal body weight.     Avoid high salt foods (olives, pickles, smoked meats, salted potato chips, etc.).   Do not add salt to your food at the table.   Use only small amounts of salt when cooking.   Begin an exercise program. Discuss with your doctor what type of exercise program would be best for you. It doesn't have to be difficult. Even brisk walking for 20 minutes three times a week is a good form of exercise.   Avoid medicines which contain heart stimulants. This includes many cold and sinus decongestant pills and sprays as well as diet pills. Check the warnings about hypertension on the label. Stimulants such as amphetamine or cocaine could be lethal for someone with hypertension. Never take these.    2. Pure hypercholesterolemia  Overview:  Results for RHODA  MECCA BABAR (MRN [id number]) as of 7/26/2019 08:11   Ref. Range 10/23/2018 07:36   Cholesterol Latest Ref Range: 120 - 199 mg/dL 163   HDL Latest Ref Range: 40 - 75 mg/dL 40   Hdl/Cholesterol Ratio Latest Ref Range: 20.0 - 50.0 % 24.5   LDL Cholesterol External Latest Ref Range: 63.0 - 159.0 mg/dL 70.4   Non-HDL Cholesterol Latest Units: mg/dL 123   Total Cholesterol/HDL Ratio Latest Ref Range: 2.0 - 5.0  4.1   Triglycerides Latest Ref Range: 30 - 150 mg/dL 263 (H)       Orders:  -     Lipid Panel; Future; Expected date: 01/14/2024  -     TSH; Future; Expected date: 01/14/2024  Well controlled.  Continue same medication and dose.   3. Type 2 diabetes mellitus with other specified complication, with long-term current use of insulin  -     Hemoglobin A1C; Future; Expected date: 01/14/2024  -     Microalbumin/Creatinine Ratio, Urine; Future; Expected date: 01/14/2024  Well controlled.  Continue same medication and dose.     4. Rash  Contact dermatitis   Dont wear necklace   If not better call me.    -     clotrimazole-betamethasone 1-0.05% (LOTRISONE) cream; Apply topically 2 (two) times daily.  Dispense: 45 g; Refill: 1

## 2023-08-14 ENCOUNTER — INFUSION (OUTPATIENT)
Dept: INFUSION THERAPY | Facility: HOSPITAL | Age: 73
End: 2023-08-14
Attending: INTERNAL MEDICINE
Payer: MEDICARE

## 2023-08-14 VITALS
WEIGHT: 204 LBS | RESPIRATION RATE: 18 BRPM | BODY MASS INDEX: 32.93 KG/M2 | DIASTOLIC BLOOD PRESSURE: 68 MMHG | HEART RATE: 101 BPM | SYSTOLIC BLOOD PRESSURE: 123 MMHG

## 2023-08-14 DIAGNOSIS — M05.79 RHEUMATOID ARTHRITIS INVOLVING MULTIPLE SITES WITH POSITIVE RHEUMATOID FACTOR: Primary | ICD-10-CM

## 2023-08-14 PROCEDURE — 96365 THER/PROPH/DIAG IV INF INIT: CPT

## 2023-08-14 PROCEDURE — 63600175 PHARM REV CODE 636 W HCPCS: Mod: JZ,JA,TB | Performed by: INTERNAL MEDICINE

## 2023-08-14 PROCEDURE — 25000003 PHARM REV CODE 250: Performed by: INTERNAL MEDICINE

## 2023-08-14 RX ORDER — ACETAMINOPHEN 325 MG/1
650 TABLET ORAL
Status: CANCELLED | OUTPATIENT
Start: 2023-08-28

## 2023-08-14 RX ORDER — SODIUM CHLORIDE 0.9 % (FLUSH) 0.9 %
10 SYRINGE (ML) INJECTION
Status: CANCELLED | OUTPATIENT
Start: 2023-08-28

## 2023-08-14 RX ORDER — HEPARIN 100 UNIT/ML
500 SYRINGE INTRAVENOUS
Status: CANCELLED | OUTPATIENT
Start: 2023-08-28

## 2023-08-14 RX ADMIN — SODIUM CHLORIDE 1000 MG: 9 INJECTION, SOLUTION INTRAVENOUS at 08:08

## 2023-08-23 ENCOUNTER — PATIENT OUTREACH (OUTPATIENT)
Dept: ADMINISTRATIVE | Facility: HOSPITAL | Age: 73
End: 2023-08-23
Payer: MEDICARE

## 2023-08-23 NOTE — PROGRESS NOTES
Chart reviewed, immunization record updated.  No new results noted on [facility] or [facility] web site.  Care Everywhere updated.   Patient care coordination note updated.   Upcoming PCP visit updated.  Next PCP visit 01/23/2024.  LOV with PCP 07/18/2023.  Attempted to contact patient to discuss scheduling Enhanced Annual Wellness Visit.   No answer, unable to leave voicemail.

## 2023-08-23 NOTE — LETTER
AUTHORIZATION FOR RELEASE OF   CONFIDENTIAL INFORMATION    Dear Dr. Balbuena,    We are seeing Maru Shelby, date of birth 1950, in the clinic at [facility]. Florence Mark MD is the patient's PCP. Maru Shelby has an outstanding lab/procedure at the time we reviewed her chart. In order to help keep her health information updated, she has authorized us to request the following medical record(s):                                        ( X )  EYE EXAM                 Please fax records to [facility], Nawaz, Mohammad Q., MD, [phone].    If you have any questions, please contact...  Sloane Hi LPN  Clinical Care Coordinator  [facility]  Phone: [phone]  Fax: [phone]           Patient Name: Maru Shelby  : 1950  Patient Phone #: [phone]

## 2023-08-25 ENCOUNTER — LAB VISIT (OUTPATIENT)
Dept: LAB | Facility: HOSPITAL | Age: 73
End: 2023-08-25
Attending: INTERNAL MEDICINE
Payer: MEDICARE

## 2023-08-25 DIAGNOSIS — M06.9 RHEUMATOID ARTHRITIS, INVOLVING UNSPECIFIED SITE, UNSPECIFIED WHETHER RHEUMATOID FACTOR PRESENT: ICD-10-CM

## 2023-08-25 LAB
ALBUMIN SERPL BCP-MCNC: 4 G/DL (ref 3.5–5.2)
ALP SERPL-CCNC: 87 U/L (ref 55–135)
ALT SERPL W/O P-5'-P-CCNC: 23 U/L (ref 10–44)
ANION GAP SERPL CALC-SCNC: 11 MMOL/L (ref 8–16)
AST SERPL-CCNC: 20 U/L (ref 10–40)
BASOPHILS # BLD AUTO: 0.06 K/UL (ref 0–0.2)
BASOPHILS NFR BLD: 0.6 % (ref 0–1.9)
BILIRUB SERPL-MCNC: 0.4 MG/DL (ref 0.1–1)
BUN SERPL-MCNC: 14 MG/DL (ref 8–23)
CALCIUM SERPL-MCNC: 9.9 MG/DL (ref 8.7–10.5)
CHLORIDE SERPL-SCNC: 105 MMOL/L (ref 95–110)
CO2 SERPL-SCNC: 25 MMOL/L (ref 23–29)
CREAT SERPL-MCNC: 0.8 MG/DL (ref 0.5–1.4)
CRP SERPL-MCNC: 5 MG/L (ref 0–8.2)
DIFFERENTIAL METHOD: ABNORMAL
EOSINOPHIL # BLD AUTO: 0.4 K/UL (ref 0–0.5)
EOSINOPHIL NFR BLD: 4.1 % (ref 0–8)
ERYTHROCYTE [DISTWIDTH] IN BLOOD BY AUTOMATED COUNT: 14.6 % (ref 11.5–14.5)
ERYTHROCYTE [SEDIMENTATION RATE] IN BLOOD BY WESTERGREN METHOD: 22 MM/HR (ref 0–20)
EST. GFR  (NO RACE VARIABLE): >60 ML/MIN/1.73 M^2
GLUCOSE SERPL-MCNC: 147 MG/DL (ref 70–110)
HCT VFR BLD AUTO: 42.5 % (ref 37–48.5)
HGB BLD-MCNC: 14 G/DL (ref 12–16)
IMM GRANULOCYTES # BLD AUTO: 0.05 K/UL (ref 0–0.04)
IMM GRANULOCYTES NFR BLD AUTO: 0.5 % (ref 0–0.5)
LYMPHOCYTES # BLD AUTO: 3.2 K/UL (ref 1–4.8)
LYMPHOCYTES NFR BLD: 30.6 % (ref 18–48)
MCH RBC QN AUTO: 30.2 PG (ref 27–31)
MCHC RBC AUTO-ENTMCNC: 32.9 G/DL (ref 32–36)
MCV RBC AUTO: 92 FL (ref 82–98)
MONOCYTES # BLD AUTO: 0.8 K/UL (ref 0.3–1)
MONOCYTES NFR BLD: 7.8 % (ref 4–15)
NEUTROPHILS # BLD AUTO: 5.8 K/UL (ref 1.8–7.7)
NEUTROPHILS NFR BLD: 56.4 % (ref 38–73)
NRBC BLD-RTO: 0 /100 WBC
PLATELET # BLD AUTO: 247 K/UL (ref 150–450)
PMV BLD AUTO: 9.2 FL (ref 9.2–12.9)
POTASSIUM SERPL-SCNC: 4.9 MMOL/L (ref 3.5–5.1)
PROT SERPL-MCNC: 6.7 G/DL (ref 6–8.4)
RBC # BLD AUTO: 4.64 M/UL (ref 4–5.4)
SODIUM SERPL-SCNC: 141 MMOL/L (ref 136–145)
WBC # BLD AUTO: 10.3 K/UL (ref 3.9–12.7)

## 2023-08-25 PROCEDURE — 86140 C-REACTIVE PROTEIN: CPT | Performed by: INTERNAL MEDICINE

## 2023-08-25 PROCEDURE — 85025 COMPLETE CBC W/AUTO DIFF WBC: CPT | Performed by: INTERNAL MEDICINE

## 2023-08-25 PROCEDURE — 85651 RBC SED RATE NONAUTOMATED: CPT | Performed by: INTERNAL MEDICINE

## 2023-08-25 PROCEDURE — 36415 COLL VENOUS BLD VENIPUNCTURE: CPT | Performed by: INTERNAL MEDICINE

## 2023-08-25 PROCEDURE — 80053 COMPREHEN METABOLIC PANEL: CPT | Performed by: INTERNAL MEDICINE

## 2023-09-01 ENCOUNTER — HOSPITAL ENCOUNTER (OUTPATIENT)
Dept: RADIOLOGY | Facility: HOSPITAL | Age: 73
Discharge: HOME | End: 2023-09-01
Attending: INTERNAL MEDICINE
Payer: MEDICARE

## 2023-09-01 ENCOUNTER — OFFICE VISIT (OUTPATIENT)
Dept: RHEUMATOLOGY | Facility: CLINIC | Age: 73
End: 2023-09-01
Payer: MEDICARE

## 2023-09-01 VITALS
BODY MASS INDEX: 32.18 KG/M2 | DIASTOLIC BLOOD PRESSURE: 78 MMHG | HEIGHT: 67 IN | HEART RATE: 97 BPM | WEIGHT: 205 LBS | SYSTOLIC BLOOD PRESSURE: 124 MMHG

## 2023-09-01 DIAGNOSIS — M06.9 RHEUMATOID ARTHRITIS, INVOLVING UNSPECIFIED SITE, UNSPECIFIED WHETHER RHEUMATOID FACTOR PRESENT: ICD-10-CM

## 2023-09-01 DIAGNOSIS — M70.61 TROCHANTERIC BURSITIS, RIGHT HIP: ICD-10-CM

## 2023-09-01 DIAGNOSIS — M05.771 RHEUMATOID ARTHRITIS INVOLVING BOTH ANKLES WITH POSITIVE RHEUMATOID FACTOR: ICD-10-CM

## 2023-09-01 DIAGNOSIS — M85.80 OSTEOPENIA, UNSPECIFIED LOCATION: ICD-10-CM

## 2023-09-01 DIAGNOSIS — M19.041 PRIMARY OSTEOARTHRITIS OF RIGHT HAND: ICD-10-CM

## 2023-09-01 DIAGNOSIS — M06.9 RHEUMATOID ARTHRITIS, INVOLVING UNSPECIFIED SITE, UNSPECIFIED WHETHER RHEUMATOID FACTOR PRESENT: Primary | ICD-10-CM

## 2023-09-01 DIAGNOSIS — M05.772 RHEUMATOID ARTHRITIS INVOLVING BOTH ANKLES WITH POSITIVE RHEUMATOID FACTOR: ICD-10-CM

## 2023-09-01 PROCEDURE — 99999 PR PBB SHADOW E&M-EST. PATIENT-LVL III: CPT | Mod: PBBFAC,,, | Performed by: INTERNAL MEDICINE

## 2023-09-01 PROCEDURE — 3288F FALL RISK ASSESSMENT DOCD: CPT | Mod: CPTII,S$GLB,, | Performed by: INTERNAL MEDICINE

## 2023-09-01 PROCEDURE — 3060F PR POS MICROALBUMINURIA RESULT DOCUMENTED/REVIEW: ICD-10-PCS | Mod: CPTII,S$GLB,, | Performed by: INTERNAL MEDICINE

## 2023-09-01 PROCEDURE — 1101F PT FALLS ASSESS-DOCD LE1/YR: CPT | Mod: CPTII,S$GLB,, | Performed by: INTERNAL MEDICINE

## 2023-09-01 PROCEDURE — 20610 DRAIN/INJ JOINT/BURSA W/O US: CPT | Mod: RT,S$GLB,, | Performed by: INTERNAL MEDICINE

## 2023-09-01 PROCEDURE — 99213 OFFICE O/P EST LOW 20 MIN: CPT | Mod: 25,S$GLB,, | Performed by: INTERNAL MEDICINE

## 2023-09-01 PROCEDURE — 1101F PR PT FALLS ASSESS DOC 0-1 FALLS W/OUT INJ PAST YR: ICD-10-PCS | Mod: CPTII,S$GLB,, | Performed by: INTERNAL MEDICINE

## 2023-09-01 PROCEDURE — 99999 PR PBB SHADOW E&M-EST. PATIENT-LVL III: ICD-10-PCS | Mod: PBBFAC,,, | Performed by: INTERNAL MEDICINE

## 2023-09-01 PROCEDURE — 1125F PR PAIN SEVERITY QUANTIFIED, PAIN PRESENT: ICD-10-PCS | Mod: CPTII,S$GLB,, | Performed by: INTERNAL MEDICINE

## 2023-09-01 PROCEDURE — 3044F HG A1C LEVEL LT 7.0%: CPT | Mod: CPTII,S$GLB,, | Performed by: INTERNAL MEDICINE

## 2023-09-01 PROCEDURE — 3288F PR FALLS RISK ASSESSMENT DOCUMENTED: ICD-10-PCS | Mod: CPTII,S$GLB,, | Performed by: INTERNAL MEDICINE

## 2023-09-01 PROCEDURE — 3060F POS MICROALBUMINURIA REV: CPT | Mod: CPTII,S$GLB,, | Performed by: INTERNAL MEDICINE

## 2023-09-01 PROCEDURE — 3066F NEPHROPATHY DOC TX: CPT | Mod: CPTII,S$GLB,, | Performed by: INTERNAL MEDICINE

## 2023-09-01 PROCEDURE — 3066F PR DOCUMENTATION OF TREATMENT FOR NEPHROPATHY: ICD-10-PCS | Mod: CPTII,S$GLB,, | Performed by: INTERNAL MEDICINE

## 2023-09-01 PROCEDURE — 1125F AMNT PAIN NOTED PAIN PRSNT: CPT | Mod: CPTII,S$GLB,, | Performed by: INTERNAL MEDICINE

## 2023-09-01 PROCEDURE — 3078F PR MOST RECENT DIASTOLIC BLOOD PRESSURE < 80 MM HG: ICD-10-PCS | Mod: CPTII,S$GLB,, | Performed by: INTERNAL MEDICINE

## 2023-09-01 PROCEDURE — 3078F DIAST BP <80 MM HG: CPT | Mod: CPTII,S$GLB,, | Performed by: INTERNAL MEDICINE

## 2023-09-01 PROCEDURE — 99213 PR OFFICE/OUTPT VISIT, EST, LEVL III, 20-29 MIN: ICD-10-PCS | Mod: 25,S$GLB,, | Performed by: INTERNAL MEDICINE

## 2023-09-01 PROCEDURE — 3074F PR MOST RECENT SYSTOLIC BLOOD PRESSURE < 130 MM HG: ICD-10-PCS | Mod: CPTII,S$GLB,, | Performed by: INTERNAL MEDICINE

## 2023-09-01 PROCEDURE — 73130 X-RAY EXAM OF HAND: CPT | Mod: TC,RT

## 2023-09-01 PROCEDURE — 3044F PR MOST RECENT HEMOGLOBIN A1C LEVEL <7.0%: ICD-10-PCS | Mod: CPTII,S$GLB,, | Performed by: INTERNAL MEDICINE

## 2023-09-01 PROCEDURE — 3074F SYST BP LT 130 MM HG: CPT | Mod: CPTII,S$GLB,, | Performed by: INTERNAL MEDICINE

## 2023-09-01 PROCEDURE — 20610 LARGE JOINT ASPIRATION/INJECTION: R GREATER TROCHANTERIC BURSA: ICD-10-PCS | Mod: RT,S$GLB,, | Performed by: INTERNAL MEDICINE

## 2023-09-01 RX ORDER — METHOTREXATE 25 MG/ML
INJECTION, SOLUTION INTRA-ARTERIAL; INTRAMUSCULAR; INTRAVENOUS
Qty: 4 ML | Refills: 2 | Status: SHIPPED | OUTPATIENT
Start: 2023-09-01 | End: 2024-03-12 | Stop reason: SDUPTHER

## 2023-09-01 RX ORDER — PRAVASTATIN SODIUM 40 MG/1
40 TABLET ORAL NIGHTLY
Qty: 90 TABLET | Refills: 3 | Status: SHIPPED | OUTPATIENT
Start: 2023-09-01 | End: 2024-03-27

## 2023-09-01 RX ORDER — FOLIC ACID 1 MG/1
TABLET ORAL
Qty: 150 TABLET | Refills: 3 | Status: SHIPPED | OUTPATIENT
Start: 2023-09-01 | End: 2024-03-27 | Stop reason: SDUPTHER

## 2023-09-01 RX ORDER — TRIAMCINOLONE ACETONIDE 40 MG/ML
40 INJECTION, SUSPENSION INTRA-ARTICULAR; INTRAMUSCULAR
Status: DISCONTINUED | OUTPATIENT
Start: 2023-09-01 | End: 2023-09-01 | Stop reason: HOSPADM

## 2023-09-01 RX ORDER — PREDNISONE 20 MG/1
TABLET ORAL
COMMUNITY
Start: 2023-06-28 | End: 2024-01-23

## 2023-09-01 RX ORDER — ERGOCALCIFEROL 1.25 MG/1
50000 CAPSULE ORAL
Qty: 12 CAPSULE | Refills: 3 | Status: SHIPPED | OUTPATIENT
Start: 2023-09-01 | End: 2024-03-27 | Stop reason: SDUPTHER

## 2023-09-01 RX ORDER — METHOTREXATE 25 MG/ML
INJECTION INTRA-ARTERIAL; INTRAMUSCULAR; INTRATHECAL; INTRAVENOUS
COMMUNITY
Start: 2023-06-06 | End: 2024-02-07 | Stop reason: SDUPTHER

## 2023-09-01 RX ORDER — HYDROCORTISONE 25 MG/G
CREAM TOPICAL
COMMUNITY
Start: 2023-08-16

## 2023-09-01 RX ORDER — KETOCONAZOLE 20 MG/G
CREAM TOPICAL
COMMUNITY
Start: 2023-08-16

## 2023-09-01 RX ORDER — LEFLUNOMIDE 20 MG/1
20 TABLET ORAL DAILY
Qty: 90 TABLET | Refills: 0 | Status: SHIPPED | OUTPATIENT
Start: 2023-09-01 | End: 2023-09-05

## 2023-09-01 RX ADMIN — TRIAMCINOLONE ACETONIDE 40 MG: 40 INJECTION, SUSPENSION INTRA-ARTICULAR; INTRAMUSCULAR at 08:09

## 2023-09-01 ASSESSMENT — ROUTINE ASSESSMENT OF PATIENT INDEX DATA (RAPID3)
PAIN SCORE: 4.5
AM STIFFNESS SCORE: 1, YES
PSYCHOLOGICAL DISTRESS SCORE: 1.1
WHEN YOU AWAKENED IN THE MORNING OVER THE LAST WEEK, PLEASE INDICATE THE AMOUNT OF TIME IT TAKES UNTIL YOU ARE AS LIMBER AS YOU WILL BE FOR THE DAY: 15 MINUTES
FATIGUE SCORE: 4.5
MDHAQ FUNCTION SCORE: 0.5
PATIENT GLOBAL ASSESSMENT SCORE: 3.5
TOTAL RAPID3 SCORE: 3.22

## 2023-09-01 NOTE — PROGRESS NOTES
"Subjective:      Patient ID: Maru Shelby is a 73 y.o. female.    Chief Complaint: RA; osteoporosis; fibromyalgia    HPI for 3 wks pain and minimal swelling right little DIP and PIP Also right lateral hip pain x 3 wks. Hurts when lies on opposite side at night.  Other joints fine. Has only 15 min am stiffness  Review of Systems   Constitutional:  Negative for appetite change, fatigue, fever and unexpected weight change.   HENT:  Negative for mouth sores.         Dry mouth   Eyes:  Negative for visual disturbance.   Respiratory:  Negative for cough, shortness of breath and wheezing.    Cardiovascular:  Negative for chest pain and palpitations.   Gastrointestinal:  Negative for abdominal pain, anal bleeding, blood in stool, constipation, diarrhea, nausea and vomiting.   Genitourinary:  Negative for dysuria, frequency and urgency.   Musculoskeletal:  Negative for arthralgias, back pain, gait problem, joint swelling, myalgias, neck pain and neck stiffness.   Skin:  Negative for rash.   Neurological:  Negative for weakness, numbness and headaches.   Hematological:  Negative for adenopathy. Does not bruise/bleed easily.   Psychiatric/Behavioral:  Negative for sleep disturbance. The patient is not nervous/anxious.         Objective:   /78   Pulse 97   Ht 5' 7" (1.702 m)   Wt 93 kg (205 lb)   BMI 32.11 kg/m²   Physical Exam     8/30/2022 12/5/2022 3/6/2023 6/5/2023   Tender (LEES-28) 0 / 28  0 / 28  0 / 28  0 / 28    Swollen (LEES-28) 0 / 28  0 / 28  0 / 28  0 / 28    Provider Global 0 mm 0 mm 0 mm 0 mm   Patient Global 0 mm 20 mm 10 mm 25 mm   ESR 12 mm/hr 26 mm/hr 16 mm/hr 26 mm/hr   CRP 1.6 mg/L 5.3 mg/L 2.4 mg/L 7.2 mg/L   LEES-28 (ESR) 1.74 (Remission) 2.56 (Remission) 2.08 (Remission) 2.63 (Low disease activity)   LEES-28 (CRP) 1.3 (Remission) 1.9 (Remission) 1.54 (Remission) 2.07 (Remission)   CDAI Score 0  2  1  2.5          Latest Reference Range & Units 08/25/23 09:10   WBC 3.90 - 12.70 K/uL 10.30 "   RBC 4.00 - 5.40 M/uL 4.64   Hemoglobin 12.0 - 16.0 g/dL 14.0   Hematocrit 37.0 - 48.5 % 42.5   MCV 82 - 98 fL 92   MCH 27.0 - 31.0 pg 30.2   MCHC 32.0 - 36.0 g/dL 32.9   RDW 11.5 - 14.5 % 14.6 (H)   Platelets 150 - 450 K/uL 247   MPV 9.2 - 12.9 fL 9.2   Gran % 38.0 - 73.0 % 56.4   Lymph % 18.0 - 48.0 % 30.6   Mono % 4.0 - 15.0 % 7.8   Eosinophil % 0.0 - 8.0 % 4.1   Basophil % 0.0 - 1.9 % 0.6   Immature Granulocytes 0.0 - 0.5 % 0.5   Gran # (ANC) 1.8 - 7.7 K/uL 5.8   Lymph # 1.0 - 4.8 K/uL 3.2   Mono # 0.3 - 1.0 K/uL 0.8   Eos # 0.0 - 0.5 K/uL 0.4   Baso # 0.00 - 0.20 K/uL 0.06   Immature Grans (Abs) 0.00 - 0.04 K/uL 0.05 (H)   nRBC 0 /100 WBC 0   Differential Method  Automated   Sed Rate 0 - 20 mm/Hr 22 (H)   Sodium 136 - 145 mmol/L 141   Potassium 3.5 - 5.1 mmol/L 4.9   Chloride 95 - 110 mmol/L 105   CO2 23 - 29 mmol/L 25   Anion Gap 8 - 16 mmol/L 11   BUN 8 - 23 mg/dL 14   Creatinine 0.5 - 1.4 mg/dL 0.8   eGFR >60 mL/min/1.73 m^2 >60   Glucose 70 - 110 mg/dL 147 (H)   Calcium 8.7 - 10.5 mg/dL 9.9   Alkaline Phosphatase 55 - 135 U/L 87   PROTEIN TOTAL 6.0 - 8.4 g/dL 6.7   Albumin 3.5 - 5.2 g/dL 4.0   BILIRUBIN TOTAL 0.1 - 1.0 mg/dL 0.4   AST 10 - 40 U/L 20   ALT 10 - 44 U/L 23   CRP 0.0 - 8.2 mg/L 5.0   (H): Data is abnormally high     Latest Reference Range & Units 07/10/23 07:10   Cholesterol 120 - 199 mg/dL 192   HDL 40 - 75 mg/dL 46   HDL/Cholesterol Ratio 20.0 - 50.0 % 24.0   LDL Cholesterol External 63.0 - 159.0 mg/dL 94.8   Non-HDL Cholesterol mg/dL 146   Total Cholesterol/HDL Ratio 2.0 - 5.0  4.2   Triglycerides 30 - 150 mg/dL 256 (H)   (H): Data is abnormally high   Latest Reference Range & Units 07/10/23 07:10   Vit D, 25-Hydroxy 30 - 96 ng/mL 65     Assessment:     RA: RF+ ACPA+ GUSTABO-:  TJ 0  SJ 0 Pt global  3 5 ESR 22 CRP 5   DAS28  [id number]    CDAI   Rheumatoid Arthritis Treatment Goals:  -Disease Activity Measure: CDAI  -Target: LDA  -Therapy/Change: none  -Shared Decision Making: Discussed goals of  "care and therapy plan together with patient as outlined above.      Frequent falls, none recent  OA knees;left>right  OA hips: left>right  Low bone density DXA 7/14/22 FRAX hip 2.5%  MOF 13% on alendronate intermittently with drug holidays since 2015. Can go back on "drug holiday" off alendronate. Repeat DXA in 1-2 years  Class 1 obesity Body mass index is 32.11 kg/m².  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 Trochanteric bursa injected with Kenalog 40mg with 1 ml 1 % lidocaine. Patient tolerated procedure well.   Monitor glucose twice daily for next 48-72 hrs  *Prevnar 20 today  X-ray right hand  Diclofenac gel right little finger PIP and DIP  Wrap with Coban tape  Flu shot and new Covid vaccine when available   Hold methotrexate for one week after any vaccinations  Rollator walker prn  Cont exercise  for right hip  Cont abatacept 1000mg IV q 28 days re-ordered and p.a. [facility]  Cont methotrexate 20mg (0.8 ml) sc q 7 days with folic acid 1mg daily but 8 mg day after methotrexate dosing.  Cont leflunomide 20mg daily  Cont pravastatin 40mg every evening  Increase walking,stationary bike  Mediterranean/DASH diet, no junk food, low carb, weight reduction  Cont F/u Dr. Mark for EH, T2 DM  hyperlipidemia  Standing labs q 3 months  RTC 6 months    "

## 2023-09-01 NOTE — PROCEDURES
Large Joint Aspiration/Injection: R greater trochanteric bursa    Date/Time: 9/1/2023 8:30 AM    Performed by: Timi Royal MD  Authorized by: Timi Royal MD    Consent Done?:  Yes (Verbal)  Indications:  Pain  Site marked: the procedure site was marked    Timeout: prior to procedure the correct patient, procedure, and site was verified    Prep: patient was prepped and draped in usual sterile fashion      Local anesthesia used?: Yes    Local anesthetic:  Lidocaine 1% without epinephrine and topical anesthetic  Anesthetic total (ml):  1      Details:  Needle Size:  25 G  Approach:  Lateral  Location:  Hip  Site:  R greater trochanteric bursa  Medications:  40 mg triamcinolone acetonide 40 mg/mL  Aspirate amount (mL):  0  Patient tolerance:  Patient tolerated the procedure well with no immediate complications

## 2023-09-01 NOTE — PATIENT INSTRUCTIONS
* After verbal consent and cleansing with Chloraprep the right gr. Trochanteric bursa injected with Kenalog 40mg with 1 ml 1 % lidocaine. Patient tolerated procedure well.   Monitor glucose twice daily for next 48-72 hrs  *Prevnar 20 today  X-ray right hand  Diclofenac gel right little finger PIP and DIP  Wrap with Coban tape  Flu shot and new Covid vaccine when available   Hold methotrexate for one week after any vaccinations  Rollator walker prn  Cont exercise  for right hip  Cont abatacept 1000mg IV q 28 days re-ordered and p.a. [employer]  Cont methotrexate 20mg (0.8 ml) sc q 7 days with folic acid 1mg daily but 8 mg day after methotrexate dosing.  Cont leflunomide 20mg daily  Cont pravastatin 40mg every evening  Increase walking,stationary bike  Mediterranean/DASH diet, no junk food, low carb, weight reduction  Cont F/u Dr. Mark for EH, T2 DM  hyperlipidemia  Standing labs q 3 months  RTC 6 months

## 2023-09-05 DIAGNOSIS — M05.772 RHEUMATOID ARTHRITIS INVOLVING BOTH ANKLES WITH POSITIVE RHEUMATOID FACTOR: ICD-10-CM

## 2023-09-05 DIAGNOSIS — M05.771 RHEUMATOID ARTHRITIS INVOLVING BOTH ANKLES WITH POSITIVE RHEUMATOID FACTOR: ICD-10-CM

## 2023-09-05 RX ORDER — LEFLUNOMIDE 20 MG/1
20 TABLET ORAL
Qty: 90 TABLET | Refills: 0 | Status: SHIPPED | OUTPATIENT
Start: 2023-09-05 | End: 2023-12-11

## 2023-09-06 ENCOUNTER — TELEPHONE (OUTPATIENT)
Dept: RHEUMATOLOGY | Facility: CLINIC | Age: 73
End: 2023-09-06
Payer: MEDICARE

## 2023-09-06 DIAGNOSIS — Z79.899 ON ABATACEPT THERAPY: Primary | ICD-10-CM

## 2023-09-11 ENCOUNTER — INFUSION (OUTPATIENT)
Dept: INFUSION THERAPY | Facility: HOSPITAL | Age: 73
End: 2023-09-11
Attending: INTERNAL MEDICINE
Payer: MEDICARE

## 2023-09-11 VITALS
SYSTOLIC BLOOD PRESSURE: 129 MMHG | HEART RATE: 84 BPM | DIASTOLIC BLOOD PRESSURE: 73 MMHG | TEMPERATURE: 97 F | HEIGHT: 67 IN | BODY MASS INDEX: 32.18 KG/M2 | RESPIRATION RATE: 18 BRPM | WEIGHT: 205 LBS

## 2023-09-11 DIAGNOSIS — M05.79 RHEUMATOID ARTHRITIS INVOLVING MULTIPLE SITES WITH POSITIVE RHEUMATOID FACTOR: Primary | ICD-10-CM

## 2023-09-11 PROCEDURE — 63600175 PHARM REV CODE 636 W HCPCS: Mod: JZ,JA,TB | Performed by: INTERNAL MEDICINE

## 2023-09-11 PROCEDURE — 96365 THER/PROPH/DIAG IV INF INIT: CPT

## 2023-09-11 PROCEDURE — 25000003 PHARM REV CODE 250: Performed by: INTERNAL MEDICINE

## 2023-09-11 RX ORDER — HEPARIN 100 UNIT/ML
500 SYRINGE INTRAVENOUS
Status: CANCELLED | OUTPATIENT
Start: 2023-09-25

## 2023-09-11 RX ORDER — ACETAMINOPHEN 325 MG/1
650 TABLET ORAL
Status: CANCELLED | OUTPATIENT
Start: 2023-09-25

## 2023-09-11 RX ORDER — SODIUM CHLORIDE 0.9 % (FLUSH) 0.9 %
10 SYRINGE (ML) INJECTION
Status: CANCELLED | OUTPATIENT
Start: 2023-09-25

## 2023-09-11 RX ADMIN — SODIUM CHLORIDE 1000 MG: 9 INJECTION, SOLUTION INTRAVENOUS at 08:09

## 2023-10-09 ENCOUNTER — INFUSION (OUTPATIENT)
Dept: INFUSION THERAPY | Facility: HOSPITAL | Age: 73
End: 2023-10-09
Attending: INTERNAL MEDICINE
Payer: MEDICARE

## 2023-10-09 VITALS
TEMPERATURE: 98 F | RESPIRATION RATE: 18 BRPM | SYSTOLIC BLOOD PRESSURE: 138 MMHG | HEART RATE: 97 BPM | DIASTOLIC BLOOD PRESSURE: 77 MMHG

## 2023-10-09 DIAGNOSIS — D84.9 IMMUNODEFICIENCY, UNSPECIFIED: ICD-10-CM

## 2023-10-09 DIAGNOSIS — M05.79 RHEUMATOID ARTHRITIS INVOLVING MULTIPLE SITES WITH POSITIVE RHEUMATOID FACTOR: Primary | ICD-10-CM

## 2023-10-09 PROCEDURE — 25000003 PHARM REV CODE 250: Performed by: INTERNAL MEDICINE

## 2023-10-09 PROCEDURE — 96365 THER/PROPH/DIAG IV INF INIT: CPT

## 2023-10-09 PROCEDURE — 63600175 PHARM REV CODE 636 W HCPCS: Mod: JZ,JA,TB | Performed by: INTERNAL MEDICINE

## 2023-10-09 RX ORDER — SODIUM CHLORIDE 0.9 % (FLUSH) 0.9 %
10 SYRINGE (ML) INJECTION
Status: CANCELLED | OUTPATIENT
Start: 2023-10-23

## 2023-10-09 RX ORDER — ACETAMINOPHEN 325 MG/1
650 TABLET ORAL
Status: CANCELLED | OUTPATIENT
Start: 2023-10-23

## 2023-10-09 RX ORDER — HEPARIN 100 UNIT/ML
500 SYRINGE INTRAVENOUS
Status: CANCELLED | OUTPATIENT
Start: 2023-10-23

## 2023-10-09 RX ORDER — SODIUM CHLORIDE 0.9 % (FLUSH) 0.9 %
10 SYRINGE (ML) INJECTION
Status: DISCONTINUED | OUTPATIENT
Start: 2023-10-09 | End: 2023-10-09 | Stop reason: HOSPADM

## 2023-10-09 RX ADMIN — SODIUM CHLORIDE 1000 MG: 9 INJECTION, SOLUTION INTRAVENOUS at 08:10

## 2023-11-06 ENCOUNTER — INFUSION (OUTPATIENT)
Dept: INFUSION THERAPY | Facility: HOSPITAL | Age: 73
End: 2023-11-06
Attending: INTERNAL MEDICINE
Payer: MEDICARE

## 2023-11-06 VITALS
HEART RATE: 100 BPM | RESPIRATION RATE: 18 BRPM | DIASTOLIC BLOOD PRESSURE: 76 MMHG | WEIGHT: 205 LBS | TEMPERATURE: 98 F | BODY MASS INDEX: 32.11 KG/M2 | SYSTOLIC BLOOD PRESSURE: 130 MMHG

## 2023-11-06 DIAGNOSIS — D84.9 IMMUNODEFICIENCY, UNSPECIFIED: ICD-10-CM

## 2023-11-06 DIAGNOSIS — M05.79 RHEUMATOID ARTHRITIS INVOLVING MULTIPLE SITES WITH POSITIVE RHEUMATOID FACTOR: Primary | ICD-10-CM

## 2023-11-06 PROCEDURE — 96365 THER/PROPH/DIAG IV INF INIT: CPT

## 2023-11-06 PROCEDURE — 63600175 PHARM REV CODE 636 W HCPCS: Mod: JZ,JA,TB | Performed by: INTERNAL MEDICINE

## 2023-11-06 PROCEDURE — 25000003 PHARM REV CODE 250: Performed by: INTERNAL MEDICINE

## 2023-11-06 RX ORDER — ACETAMINOPHEN 325 MG/1
650 TABLET ORAL
Status: CANCELLED | OUTPATIENT
Start: 2023-11-27

## 2023-11-06 RX ORDER — HEPARIN 100 UNIT/ML
500 SYRINGE INTRAVENOUS
Status: CANCELLED | OUTPATIENT
Start: 2023-11-27

## 2023-11-06 RX ORDER — SODIUM CHLORIDE 0.9 % (FLUSH) 0.9 %
10 SYRINGE (ML) INJECTION
Status: CANCELLED | OUTPATIENT
Start: 2023-11-27

## 2023-11-06 RX ADMIN — SODIUM CHLORIDE 1000 MG: 9 INJECTION, SOLUTION INTRAVENOUS at 08:11

## 2023-11-17 ENCOUNTER — LAB VISIT (OUTPATIENT)
Dept: LAB | Facility: HOSPITAL | Age: 73
End: 2023-11-17
Attending: INTERNAL MEDICINE
Payer: MEDICARE

## 2023-11-17 DIAGNOSIS — Z79.899 ON ABATACEPT THERAPY: ICD-10-CM

## 2023-11-17 DIAGNOSIS — M06.9 RHEUMATOID ARTHRITIS, INVOLVING UNSPECIFIED SITE, UNSPECIFIED WHETHER RHEUMATOID FACTOR PRESENT: ICD-10-CM

## 2023-11-17 LAB
ALBUMIN SERPL BCP-MCNC: 3.6 G/DL (ref 3.5–5.2)
ALP SERPL-CCNC: 100 U/L (ref 55–135)
ALT SERPL W/O P-5'-P-CCNC: 28 U/L (ref 10–44)
ANION GAP SERPL CALC-SCNC: 12 MMOL/L (ref 8–16)
AST SERPL-CCNC: 23 U/L (ref 10–40)
BASOPHILS # BLD AUTO: 0.04 K/UL (ref 0–0.2)
BASOPHILS NFR BLD: 0.5 % (ref 0–1.9)
BILIRUB SERPL-MCNC: 0.5 MG/DL (ref 0.1–1)
BUN SERPL-MCNC: 12 MG/DL (ref 8–23)
CALCIUM SERPL-MCNC: 9.4 MG/DL (ref 8.7–10.5)
CHLORIDE SERPL-SCNC: 102 MMOL/L (ref 95–110)
CO2 SERPL-SCNC: 25 MMOL/L (ref 23–29)
CREAT SERPL-MCNC: 0.8 MG/DL (ref 0.5–1.4)
CRP SERPL-MCNC: 8.7 MG/L (ref 0–8.2)
DIFFERENTIAL METHOD: ABNORMAL
EOSINOPHIL # BLD AUTO: 0.3 K/UL (ref 0–0.5)
EOSINOPHIL NFR BLD: 3.6 % (ref 0–8)
ERYTHROCYTE [DISTWIDTH] IN BLOOD BY AUTOMATED COUNT: 14.9 % (ref 11.5–14.5)
ERYTHROCYTE [SEDIMENTATION RATE] IN BLOOD BY WESTERGREN METHOD: 26 MM/HR (ref 0–20)
EST. GFR  (NO RACE VARIABLE): >60 ML/MIN/1.73 M^2
GLUCOSE SERPL-MCNC: 257 MG/DL (ref 70–110)
HBV CORE AB SERPL QL IA: NORMAL
HBV SURFACE AG SERPL QL IA: NORMAL
HCT VFR BLD AUTO: 41.3 % (ref 37–48.5)
HGB BLD-MCNC: 13.7 G/DL (ref 12–16)
IMM GRANULOCYTES # BLD AUTO: 0.03 K/UL (ref 0–0.04)
IMM GRANULOCYTES NFR BLD AUTO: 0.3 % (ref 0–0.5)
LYMPHOCYTES # BLD AUTO: 2.8 K/UL (ref 1–4.8)
LYMPHOCYTES NFR BLD: 31.6 % (ref 18–48)
MCH RBC QN AUTO: 30.7 PG (ref 27–31)
MCHC RBC AUTO-ENTMCNC: 33.2 G/DL (ref 32–36)
MCV RBC AUTO: 93 FL (ref 82–98)
MONOCYTES # BLD AUTO: 0.8 K/UL (ref 0.3–1)
MONOCYTES NFR BLD: 8.5 % (ref 4–15)
NEUTROPHILS # BLD AUTO: 4.9 K/UL (ref 1.8–7.7)
NEUTROPHILS NFR BLD: 55.5 % (ref 38–73)
NRBC BLD-RTO: 0 /100 WBC
PLATELET # BLD AUTO: 246 K/UL (ref 150–450)
PMV BLD AUTO: 9.4 FL (ref 9.2–12.9)
POTASSIUM SERPL-SCNC: 4.6 MMOL/L (ref 3.5–5.1)
PROT SERPL-MCNC: 6.7 G/DL (ref 6–8.4)
RBC # BLD AUTO: 4.46 M/UL (ref 4–5.4)
SODIUM SERPL-SCNC: 139 MMOL/L (ref 136–145)
WBC # BLD AUTO: 8.86 K/UL (ref 3.9–12.7)

## 2023-11-17 PROCEDURE — 36415 COLL VENOUS BLD VENIPUNCTURE: CPT | Performed by: INTERNAL MEDICINE

## 2023-11-17 PROCEDURE — 86704 HEP B CORE ANTIBODY TOTAL: CPT | Performed by: INTERNAL MEDICINE

## 2023-11-17 PROCEDURE — 85651 RBC SED RATE NONAUTOMATED: CPT | Performed by: INTERNAL MEDICINE

## 2023-11-17 PROCEDURE — 86480 TB TEST CELL IMMUN MEASURE: CPT | Performed by: INTERNAL MEDICINE

## 2023-11-17 PROCEDURE — 86140 C-REACTIVE PROTEIN: CPT | Performed by: INTERNAL MEDICINE

## 2023-11-17 PROCEDURE — 85025 COMPLETE CBC W/AUTO DIFF WBC: CPT | Performed by: INTERNAL MEDICINE

## 2023-11-17 PROCEDURE — 87340 HEPATITIS B SURFACE AG IA: CPT | Performed by: INTERNAL MEDICINE

## 2023-11-17 PROCEDURE — 80053 COMPREHEN METABOLIC PANEL: CPT | Performed by: INTERNAL MEDICINE

## 2023-11-18 LAB
GAMMA INTERFERON BACKGROUND BLD IA-ACNC: 0.09 IU/ML
M TB IFN-G CD4+ BCKGRND COR BLD-ACNC: -0 IU/ML
M TB IFN-G CD4+ BCKGRND COR BLD-ACNC: 0.01 IU/ML
MITOGEN IGNF BCKGRD COR BLD-ACNC: 6.59 IU/ML
TB GOLD PLUS: NEGATIVE

## 2023-11-20 ENCOUNTER — TELEPHONE (OUTPATIENT)
Dept: INTERNAL MEDICINE | Facility: CLINIC | Age: 73
End: 2023-11-20
Payer: MEDICARE

## 2023-11-20 NOTE — TELEPHONE ENCOUNTER
Lab Results   Component Value Date    HGBA1C 6.5 (H) 07/10/2023     Her A1c is still reasonably well controlled   Continue same regimen for DM   Watch diet

## 2023-11-20 NOTE — TELEPHONE ENCOUNTER
----- Message from Timi Royal MD sent at 11/17/2023 11:28 AM CST -----  The sed rate is only minimally elevated. The glucose is 257. Will copy Dr. Mark your pcp and you should contact him as well to adjust your diabetes regimen. The liver and kidney tests are normal.The cbc is fine. YESENIAQ

## 2023-11-21 ENCOUNTER — TELEPHONE (OUTPATIENT)
Dept: INTERNAL MEDICINE | Facility: CLINIC | Age: 73
End: 2023-11-21
Payer: MEDICARE

## 2023-11-21 DIAGNOSIS — U07.1 COVID-19: Primary | ICD-10-CM

## 2023-11-21 RX ORDER — NIRMATRELVIR AND RITONAVIR 300-100 MG
KIT ORAL
Qty: 30 TABLET | Refills: 0 | Status: SHIPPED | OUTPATIENT
Start: 2023-11-21 | End: 2023-11-25

## 2023-11-21 RX ORDER — BENZONATATE 200 MG/1
200 CAPSULE ORAL 3 TIMES DAILY PRN
Qty: 30 CAPSULE | Refills: 0 | Status: SHIPPED | OUTPATIENT
Start: 2023-11-21 | End: 2023-12-01

## 2023-11-21 NOTE — TELEPHONE ENCOUNTER
6= COVID risk score. She qualifies for paxlovid sent in. Sent tessalon for cough. Rest and stay hydrated. Tyelnol PRN for fevers/body aches.

## 2023-11-21 NOTE — TELEPHONE ENCOUNTER
Patient went to Urgent care today and tested positive for covid and strep. They sent her Azithromycin for the strep and robitussin for her cough. They instructed her to contact PCP for further recommendations.   Patient stated that she feels horrible and is super achy. Please advise. In Dr. Mark's absence

## 2023-11-27 NOTE — TELEPHONE ENCOUNTER
Attempted to contact patient, but no answer. MB full and unable to receive messages. LM on 's VM for patient to return call.

## 2023-12-08 LAB
LEFT EYE DM RETINOPATHY: NEGATIVE
RIGHT EYE DM RETINOPATHY: NEGATIVE

## 2023-12-11 DIAGNOSIS — M05.771 RHEUMATOID ARTHRITIS INVOLVING BOTH ANKLES WITH POSITIVE RHEUMATOID FACTOR: ICD-10-CM

## 2023-12-11 DIAGNOSIS — M05.772 RHEUMATOID ARTHRITIS INVOLVING BOTH ANKLES WITH POSITIVE RHEUMATOID FACTOR: ICD-10-CM

## 2023-12-11 RX ORDER — LEFLUNOMIDE 20 MG/1
20 TABLET ORAL
Qty: 90 TABLET | Refills: 0 | Status: SHIPPED | OUTPATIENT
Start: 2023-12-11 | End: 2024-03-27 | Stop reason: SDUPTHER

## 2023-12-13 ENCOUNTER — INFUSION (OUTPATIENT)
Dept: INFUSION THERAPY | Facility: HOSPITAL | Age: 73
End: 2023-12-13
Attending: INTERNAL MEDICINE
Payer: MEDICARE

## 2023-12-13 VITALS
HEART RATE: 103 BPM | RESPIRATION RATE: 20 BRPM | SYSTOLIC BLOOD PRESSURE: 142 MMHG | TEMPERATURE: 98 F | DIASTOLIC BLOOD PRESSURE: 75 MMHG

## 2023-12-13 DIAGNOSIS — M05.79 RHEUMATOID ARTHRITIS INVOLVING MULTIPLE SITES WITH POSITIVE RHEUMATOID FACTOR: Primary | ICD-10-CM

## 2023-12-13 PROCEDURE — 63600175 PHARM REV CODE 636 W HCPCS: Mod: JZ,JA,TB | Performed by: INTERNAL MEDICINE

## 2023-12-13 PROCEDURE — 25000003 PHARM REV CODE 250: Performed by: INTERNAL MEDICINE

## 2023-12-13 PROCEDURE — 96365 THER/PROPH/DIAG IV INF INIT: CPT

## 2023-12-13 RX ORDER — ACETAMINOPHEN 325 MG/1
650 TABLET ORAL
Status: CANCELLED | OUTPATIENT
Start: 2023-12-25

## 2023-12-13 RX ORDER — SODIUM CHLORIDE 0.9 % (FLUSH) 0.9 %
10 SYRINGE (ML) INJECTION
Status: CANCELLED | OUTPATIENT
Start: 2023-12-25

## 2023-12-13 RX ORDER — HEPARIN 100 UNIT/ML
500 SYRINGE INTRAVENOUS
Status: CANCELLED | OUTPATIENT
Start: 2023-12-25

## 2023-12-13 RX ADMIN — SODIUM CHLORIDE 1000 MG: 9 INJECTION, SOLUTION INTRAVENOUS at 02:12

## 2024-01-10 ENCOUNTER — INFUSION (OUTPATIENT)
Dept: INFUSION THERAPY | Facility: HOSPITAL | Age: 74
End: 2024-01-10
Attending: INTERNAL MEDICINE
Payer: MEDICARE

## 2024-01-10 VITALS
RESPIRATION RATE: 20 BRPM | HEART RATE: 97 BPM | TEMPERATURE: 97 F | BODY MASS INDEX: 32.18 KG/M2 | HEIGHT: 67 IN | SYSTOLIC BLOOD PRESSURE: 131 MMHG | DIASTOLIC BLOOD PRESSURE: 64 MMHG | WEIGHT: 205 LBS

## 2024-01-10 DIAGNOSIS — M05.79 RHEUMATOID ARTHRITIS INVOLVING MULTIPLE SITES WITH POSITIVE RHEUMATOID FACTOR: Primary | ICD-10-CM

## 2024-01-10 PROCEDURE — 96365 THER/PROPH/DIAG IV INF INIT: CPT

## 2024-01-10 PROCEDURE — 25000003 PHARM REV CODE 250: Performed by: INTERNAL MEDICINE

## 2024-01-10 PROCEDURE — 63600175 PHARM REV CODE 636 W HCPCS: Mod: JZ,JA,TB | Performed by: INTERNAL MEDICINE

## 2024-01-10 RX ORDER — HEPARIN 100 UNIT/ML
500 SYRINGE INTRAVENOUS
Status: CANCELLED | OUTPATIENT
Start: 2024-01-22

## 2024-01-10 RX ORDER — SODIUM CHLORIDE 0.9 % (FLUSH) 0.9 %
10 SYRINGE (ML) INJECTION
Status: CANCELLED | OUTPATIENT
Start: 2024-01-22

## 2024-01-10 RX ORDER — ACETAMINOPHEN 325 MG/1
650 TABLET ORAL
Status: CANCELLED | OUTPATIENT
Start: 2024-01-22

## 2024-01-10 RX ADMIN — SODIUM CHLORIDE 1000 MG: 9 INJECTION, SOLUTION INTRAVENOUS at 08:01

## 2024-01-11 DIAGNOSIS — Z00.00 ENCOUNTER FOR MEDICARE ANNUAL WELLNESS EXAM: ICD-10-CM

## 2024-01-16 ENCOUNTER — LAB VISIT (OUTPATIENT)
Dept: LAB | Facility: HOSPITAL | Age: 74
End: 2024-01-16
Attending: INTERNAL MEDICINE
Payer: MEDICARE

## 2024-01-16 DIAGNOSIS — E11.69 TYPE 2 DIABETES MELLITUS WITH OTHER SPECIFIED COMPLICATION, WITH LONG-TERM CURRENT USE OF INSULIN: ICD-10-CM

## 2024-01-16 DIAGNOSIS — Z79.4 TYPE 2 DIABETES MELLITUS WITH OTHER SPECIFIED COMPLICATION, WITH LONG-TERM CURRENT USE OF INSULIN: ICD-10-CM

## 2024-01-16 DIAGNOSIS — E78.00 PURE HYPERCHOLESTEROLEMIA: ICD-10-CM

## 2024-01-16 DIAGNOSIS — I10 BENIGN ESSENTIAL HTN: ICD-10-CM

## 2024-01-16 LAB
ALBUMIN SERPL BCP-MCNC: 3.5 G/DL (ref 3.5–5.2)
ALP SERPL-CCNC: 111 U/L (ref 55–135)
ALT SERPL W/O P-5'-P-CCNC: 31 U/L (ref 10–44)
ANION GAP SERPL CALC-SCNC: 9 MMOL/L (ref 8–16)
AST SERPL-CCNC: 23 U/L (ref 10–40)
BASOPHILS # BLD AUTO: 0.09 K/UL (ref 0–0.2)
BASOPHILS NFR BLD: 1 % (ref 0–1.9)
BILIRUB SERPL-MCNC: 0.4 MG/DL (ref 0.1–1)
BUN SERPL-MCNC: 11 MG/DL (ref 8–23)
CALCIUM SERPL-MCNC: 9.3 MG/DL (ref 8.7–10.5)
CHLORIDE SERPL-SCNC: 105 MMOL/L (ref 95–110)
CHOLEST SERPL-MCNC: 226 MG/DL (ref 120–199)
CHOLEST/HDLC SERPL: 5.1 {RATIO} (ref 2–5)
CO2 SERPL-SCNC: 27 MMOL/L (ref 23–29)
CREAT SERPL-MCNC: 0.8 MG/DL (ref 0.5–1.4)
DIFFERENTIAL METHOD BLD: ABNORMAL
EOSINOPHIL # BLD AUTO: 0.5 K/UL (ref 0–0.5)
EOSINOPHIL NFR BLD: 5.6 % (ref 0–8)
ERYTHROCYTE [DISTWIDTH] IN BLOOD BY AUTOMATED COUNT: 14.5 % (ref 11.5–14.5)
EST. GFR  (NO RACE VARIABLE): >60 ML/MIN/1.73 M^2
ESTIMATED AVG GLUCOSE: 235 MG/DL (ref 68–131)
GLUCOSE SERPL-MCNC: 243 MG/DL (ref 70–110)
HBA1C MFR BLD: 9.8 % (ref 4–5.6)
HCT VFR BLD AUTO: 42.2 % (ref 37–48.5)
HDLC SERPL-MCNC: 44 MG/DL (ref 40–75)
HDLC SERPL: 19.5 % (ref 20–50)
HGB BLD-MCNC: 14 G/DL (ref 12–16)
IMM GRANULOCYTES # BLD AUTO: 0.07 K/UL (ref 0–0.04)
IMM GRANULOCYTES NFR BLD AUTO: 0.8 % (ref 0–0.5)
LDLC SERPL CALC-MCNC: 111 MG/DL (ref 63–159)
LYMPHOCYTES # BLD AUTO: 3.1 K/UL (ref 1–4.8)
LYMPHOCYTES NFR BLD: 34.3 % (ref 18–48)
MCH RBC QN AUTO: 30.6 PG (ref 27–31)
MCHC RBC AUTO-ENTMCNC: 33.2 G/DL (ref 32–36)
MCV RBC AUTO: 92 FL (ref 82–98)
MONOCYTES # BLD AUTO: 0.8 K/UL (ref 0.3–1)
MONOCYTES NFR BLD: 8.4 % (ref 4–15)
NEUTROPHILS # BLD AUTO: 4.5 K/UL (ref 1.8–7.7)
NEUTROPHILS NFR BLD: 49.9 % (ref 38–73)
NONHDLC SERPL-MCNC: 182 MG/DL
NRBC BLD-RTO: 0 /100 WBC
PLATELET # BLD AUTO: 261 K/UL (ref 150–450)
PMV BLD AUTO: 9.9 FL (ref 9.2–12.9)
POTASSIUM SERPL-SCNC: 4.5 MMOL/L (ref 3.5–5.1)
PROT SERPL-MCNC: 6.6 G/DL (ref 6–8.4)
RBC # BLD AUTO: 4.57 M/UL (ref 4–5.4)
SODIUM SERPL-SCNC: 141 MMOL/L (ref 136–145)
TRIGL SERPL-MCNC: 355 MG/DL (ref 30–150)
TSH SERPL DL<=0.005 MIU/L-ACNC: 2.98 UIU/ML (ref 0.4–4)
WBC # BLD AUTO: 9.08 K/UL (ref 3.9–12.7)

## 2024-01-16 PROCEDURE — 85025 COMPLETE CBC W/AUTO DIFF WBC: CPT | Performed by: INTERNAL MEDICINE

## 2024-01-16 PROCEDURE — 80061 LIPID PANEL: CPT | Performed by: INTERNAL MEDICINE

## 2024-01-16 PROCEDURE — 36415 COLL VENOUS BLD VENIPUNCTURE: CPT | Performed by: INTERNAL MEDICINE

## 2024-01-16 PROCEDURE — 83036 HEMOGLOBIN GLYCOSYLATED A1C: CPT | Performed by: INTERNAL MEDICINE

## 2024-01-16 PROCEDURE — 84443 ASSAY THYROID STIM HORMONE: CPT | Performed by: INTERNAL MEDICINE

## 2024-01-16 PROCEDURE — 80053 COMPREHEN METABOLIC PANEL: CPT | Performed by: INTERNAL MEDICINE

## 2024-01-23 ENCOUNTER — OFFICE VISIT (OUTPATIENT)
Dept: INTERNAL MEDICINE | Facility: CLINIC | Age: 74
End: 2024-01-23
Payer: MEDICARE

## 2024-01-23 VITALS
OXYGEN SATURATION: 97 % | DIASTOLIC BLOOD PRESSURE: 72 MMHG | HEART RATE: 99 BPM | HEIGHT: 67 IN | SYSTOLIC BLOOD PRESSURE: 124 MMHG | BODY MASS INDEX: 32.04 KG/M2 | WEIGHT: 204.13 LBS | RESPIRATION RATE: 19 BRPM

## 2024-01-23 DIAGNOSIS — I70.0 AORTIC ATHEROSCLEROSIS: ICD-10-CM

## 2024-01-23 DIAGNOSIS — H10.9 CONJUNCTIVITIS, UNSPECIFIED CONJUNCTIVITIS TYPE, UNSPECIFIED LATERALITY: ICD-10-CM

## 2024-01-23 DIAGNOSIS — E11.69 TYPE 2 DIABETES MELLITUS WITH OTHER SPECIFIED COMPLICATION, WITH LONG-TERM CURRENT USE OF INSULIN: ICD-10-CM

## 2024-01-23 DIAGNOSIS — I10 BENIGN ESSENTIAL HTN: Primary | ICD-10-CM

## 2024-01-23 DIAGNOSIS — Z79.4 TYPE 2 DIABETES MELLITUS WITH OTHER SPECIFIED COMPLICATION, WITH LONG-TERM CURRENT USE OF INSULIN: ICD-10-CM

## 2024-01-23 DIAGNOSIS — D69.2 OTHER NONTHROMBOCYTOPENIC PURPURA: ICD-10-CM

## 2024-01-23 DIAGNOSIS — M05.771 RHEUMATOID ARTHRITIS INVOLVING BOTH ANKLES WITH POSITIVE RHEUMATOID FACTOR: ICD-10-CM

## 2024-01-23 DIAGNOSIS — M05.772 RHEUMATOID ARTHRITIS INVOLVING BOTH ANKLES WITH POSITIVE RHEUMATOID FACTOR: ICD-10-CM

## 2024-01-23 DIAGNOSIS — D84.9 IMMUNODEFICIENCY, UNSPECIFIED: ICD-10-CM

## 2024-01-23 PROCEDURE — 99214 OFFICE O/P EST MOD 30 MIN: CPT | Mod: S$GLB,,, | Performed by: INTERNAL MEDICINE

## 2024-01-23 PROCEDURE — 99999 PR PBB SHADOW E&M-EST. PATIENT-LVL V: CPT | Mod: PBBFAC,,, | Performed by: INTERNAL MEDICINE

## 2024-01-23 RX ORDER — GENTAMICIN SULFATE 3 MG/ML
1 SOLUTION/ DROPS OPHTHALMIC EVERY 4 HOURS
Qty: 5 ML | Refills: 0 | Status: SHIPPED | OUTPATIENT
Start: 2024-01-23

## 2024-01-23 NOTE — PROGRESS NOTES
"HPI:  Maru Shelby is a 73 y.o. female here for Follow-up  .  Sugar is uncontrolled.  Cookies /candies    Review of Systems   Constitutional:  Negative for chills and fever.   HENT:  Negative for congestion, hearing loss, sinus pressure and sore throat.    Eyes:  Negative for photophobia.   Respiratory:  Negative for cough, choking, chest tightness and wheezing.    Cardiovascular:  Negative for chest pain and palpitations.   Gastrointestinal:  Negative for blood in stool, nausea and vomiting.   Genitourinary:  Negative for dysuria and hematuria.   Musculoskeletal:  Positive for arthralgias. Negative for myalgias.   Skin:  Negative for pallor.   Neurological:  Negative for dizziness and numbness.   Hematological:  Does not bruise/bleed easily.   Psychiatric/Behavioral:  Negative for confusion and suicidal ideas. The patient is not nervous/anxious.     A review of systems was performed and is negative except as noted above.    Objective:  Vitals:    01/23/24 0746   BP: 124/72   Pulse: 99   Resp: 19   SpO2: 97%   Weight: 92.6 kg (204 lb 2.3 oz)   Height: 5' 7" (1.702 m)      Physical Exam  Vitals and nursing note reviewed.   Constitutional:       Appearance: She is well-developed.   HENT:      Head: Normocephalic and atraumatic.      Right Ear: External ear normal.      Left Ear: External ear normal.   Eyes:      General:         Left eye: Discharge present.     Conjunctiva/sclera:      Left eye: Left conjunctiva is injected. Chemosis present.      Pupils: Pupils are equal, round, and reactive to light.   Neck:      Thyroid: No thyromegaly.      Vascular: No JVD.      Trachea: No tracheal deviation.   Cardiovascular:      Rate and Rhythm: Normal rate and regular rhythm.      Heart sounds: Normal heart sounds.   Pulmonary:      Effort: Pulmonary effort is normal. No respiratory distress.      Breath sounds: Normal breath sounds. No wheezing or rales.   Chest:      Chest wall: No tenderness.   Abdominal:      " General: Bowel sounds are normal. There is no distension.      Palpations: Abdomen is soft. There is no mass.      Tenderness: There is no abdominal tenderness. There is no guarding or rebound.   Musculoskeletal:         General: Normal range of motion.      Cervical back: Normal range of motion and neck supple.   Lymphadenopathy:      Cervical: No cervical adenopathy.   Skin:     General: Skin is warm and dry.   Neurological:      Mental Status: She is alert and oriented to person, place, and time.      Cranial Nerves: No cranial nerve deficit.      Motor: No abnormal muscle tone.      Coordination: Coordination normal.      Deep Tendon Reflexes: Reflexes are normal and symmetric. Reflexes normal.      Comments: CN: Optic discs are flat with normal vasculature, PERRL, Extraoccular movements and visual fields are full. Normal facial sensation and strength, Hearing symmetric, Tongue and Palate are midline and strong. Shoulder Shrug symmetric and strong.        Assessment/Plan:  1. Benign essential HTN    Well controlled.  Continue same medication and dose.  Keep weight close to ideal body weight.     Avoid high salt foods (olives, pickles, smoked meats, salted potato chips, etc.).   Do not add salt to your food at the table.   Use only small amounts of salt when cooking.   Begin an exercise program. Discuss with your doctor what type of exercise program would be best for you. It doesn't have to be difficult. Even brisk walking for 20 minutes three times a week is a good form of exercise.   Avoid medicines which contain heart stimulants. This includes many cold and sinus decongestant pills and sprays as well as diet pills. Check the warnings about hypertension on the label. Stimulants such as amphetamine or cocaine could be lethal for someone with hypertension. Never take these.    2. Type 2 diabetes mellitus with other specified complication, with long-term current use of insulin  -     Hemoglobin A1C; Future;  Expected date: 04/22/2024  Resume janumet   Stop eating cookies /candies/    3. Rheumatoid arthritis involving both ankles with positive rheumatoid factor  Overview:  Results for MECCA DUONG (MRN [id number]) as of 1/9/2018 07:13   Ref. Range 8/1/2008 07:11 7/6/2009 08:26 8/15/2013 08:25   ds DNA Ab Latest Ref Range: Negative Titer Negative     Protein, Serum Latest Ref Range: 6.0 - 8.4 g/dL   6.4   Albumin grams/dl Latest Ref Range: 3.35 - 5.55 gm/dL   3.88   Alpha-1 grams/dl Latest Ref Range: 0.17 - 0.41 gm/dL   0.29   Alpha-2 grams/dl Latest Ref Range: 0.43 - 0.99 gm/dL   0.71   Beta grams/dl Latest Ref Range: 0.50 - 1.10 gm/dL   0.82   Gamma grams/dl Latest Ref Range: 0.67 - 1.58 gm/dL   0.70   CCP Antibodies Latest Ref Range: <5.0 U/mL  119.9      Please tell pt the joint x-rays show stable mild slippage C4 on C5 on degenerative basis, no RA changes. The hands show mild narrowing at the wrists likely from RA. There are moderate degenerative changes thumb bases. There is mild degenerative change inner aspect of the knees and also of the great toes. No significant progression of the RA compared with previous. [facility]          PACS Images     Show images for XR Arthritis Survey   Reviewed By Brooks Royal MD on 4/3/2018 14:30   External Result Report     External Result Report   Narrative     EXAMINATION:  XR ARTHRITIS SURVEY    CLINICAL HISTORY:  suspected systemic arthritis;  Rheumatoid arthritis, unspecified    TECHNIQUE:  A lateral flexion view of the cervical spine was performed.  AP standing views of both knees were performed.  AP standing views of both feet and PA views of both hands were performed.    COMPARISON:  03/02/2017    FINDINGS:  Bilateral hands: Bone mineralization is within normal limits.  There is bilateral radiocarpal joint space narrowing as well as mild-to-moderate degenerative changes of the bilateral carpometacarpal joint spaces, left greater than right.  No osseous erosions  are identified.    Bilateral knees: There is bilateral medial compartment joint space narrowing, left greater than right.  There is a small marginal osteophyte of the medial tibial plateau on the left.  No osseous erosions are seen.    Flexion image of the cervical spine: There is mild anterior listhesis of C4 in respect to C5 by approximately 3 mm.  This is stable.  Atlantodental interval is maintained.  There is facet arthropathy in the midcervical spine.    Bilateral feet: There is bilateral hallux valgus deformity with bunion formation with mild to moderate degenerative changes of the 1st metatarsophalangeal joints bilaterally.  No osseous erosions are detected.   Impression       As above.      Electronically signed by: Farida Llanes MD  Date: 04/03/2018        She is on methotrexate         4. Immunodeficiency, unspecified  Stable; due to methotrexate     5. Other nonthrombocytopenic purpura  Bruising is finally better     6. Aortic atherosclerosis  We discussed pravastatin

## 2024-01-30 DIAGNOSIS — Z79.4 TYPE 2 DIABETES MELLITUS WITH OTHER SPECIFIED COMPLICATION, WITH LONG-TERM CURRENT USE OF INSULIN: ICD-10-CM

## 2024-01-30 DIAGNOSIS — E11.69 TYPE 2 DIABETES MELLITUS WITH OTHER SPECIFIED COMPLICATION, WITH LONG-TERM CURRENT USE OF INSULIN: ICD-10-CM

## 2024-01-30 RX ORDER — SITAGLIPTIN AND METFORMIN HYDROCHLORIDE 1000; 50 MG/1; MG/1
1 TABLET, FILM COATED ORAL 2 TIMES DAILY WITH MEALS
Qty: 180 TABLET | Refills: 3 | OUTPATIENT
Start: 2024-01-30

## 2024-01-30 NOTE — TELEPHONE ENCOUNTER
No care due was identified.  [facility] Embedded Care Due Messages. Reference number: [id number].   1/30/2024 3:20:31 AM CST

## 2024-01-30 NOTE — TELEPHONE ENCOUNTER
Refill Decision Note   Maru Afsaneh  is requesting a refill authorization.  Brief Assessment and Rationale for Refill:  Quick Discontinue     Medication Therapy Plan:       Medication Reconciliation Completed: No   Comments:     No Care Gaps recommended.     Note composed:10:44 AM 01/30/2024

## 2024-02-07 ENCOUNTER — OFFICE VISIT (OUTPATIENT)
Dept: INTERNAL MEDICINE | Facility: CLINIC | Age: 74
End: 2024-02-07
Payer: MEDICARE

## 2024-02-07 ENCOUNTER — HOSPITAL ENCOUNTER (OUTPATIENT)
Dept: RADIOLOGY | Facility: HOSPITAL | Age: 74
Discharge: HOME | End: 2024-02-07
Attending: NURSE PRACTITIONER
Payer: MEDICARE

## 2024-02-07 VITALS
DIASTOLIC BLOOD PRESSURE: 92 MMHG | SYSTOLIC BLOOD PRESSURE: 154 MMHG | HEART RATE: 108 BPM | OXYGEN SATURATION: 93 % | HEIGHT: 67 IN | BODY MASS INDEX: 31.24 KG/M2 | RESPIRATION RATE: 22 BRPM | WEIGHT: 199.06 LBS

## 2024-02-07 DIAGNOSIS — E11.69 TYPE 2 DIABETES MELLITUS WITH OTHER SPECIFIED COMPLICATION, WITH LONG-TERM CURRENT USE OF INSULIN: ICD-10-CM

## 2024-02-07 DIAGNOSIS — J20.8 ACUTE BACTERIAL BRONCHITIS: Primary | ICD-10-CM

## 2024-02-07 DIAGNOSIS — B96.89 ACUTE BACTERIAL BRONCHITIS: ICD-10-CM

## 2024-02-07 DIAGNOSIS — B96.89 ACUTE BACTERIAL BRONCHITIS: Primary | ICD-10-CM

## 2024-02-07 DIAGNOSIS — J20.8 ACUTE BACTERIAL BRONCHITIS: ICD-10-CM

## 2024-02-07 DIAGNOSIS — Z79.631 LONG TERM METHOTREXATE USER: ICD-10-CM

## 2024-02-07 DIAGNOSIS — R06.2 WHEEZING: ICD-10-CM

## 2024-02-07 DIAGNOSIS — Z79.4 TYPE 2 DIABETES MELLITUS WITH OTHER SPECIFIED COMPLICATION, WITH LONG-TERM CURRENT USE OF INSULIN: ICD-10-CM

## 2024-02-07 LAB
CTP QC/QA: YES
SARS-COV-2 AG RESP QL IA.RAPID: NEGATIVE

## 2024-02-07 PROCEDURE — 71046 X-RAY EXAM CHEST 2 VIEWS: CPT | Mod: 26,,, | Performed by: RADIOLOGY

## 2024-02-07 PROCEDURE — 71046 X-RAY EXAM CHEST 2 VIEWS: CPT | Mod: TC

## 2024-02-07 PROCEDURE — 99214 OFFICE O/P EST MOD 30 MIN: CPT | Mod: 25,S$GLB,, | Performed by: NURSE PRACTITIONER

## 2024-02-07 PROCEDURE — 87426 SARSCOV CORONAVIRUS AG IA: CPT | Mod: QW,S$GLB,, | Performed by: NURSE PRACTITIONER

## 2024-02-07 PROCEDURE — 99999 PR PBB SHADOW E&M-EST. PATIENT-LVL V: CPT | Mod: PBBFAC,,, | Performed by: NURSE PRACTITIONER

## 2024-02-07 PROCEDURE — 96372 THER/PROPH/DIAG INJ SC/IM: CPT | Mod: S$GLB,,, | Performed by: NURSE PRACTITIONER

## 2024-02-07 RX ORDER — PROMETHAZINE HYDROCHLORIDE AND DEXTROMETHORPHAN HYDROBROMIDE 6.25; 15 MG/5ML; MG/5ML
5 SYRUP ORAL EVERY 4 HOURS PRN
COMMUNITY
Start: 2024-02-01

## 2024-02-07 RX ORDER — LEVOFLOXACIN 500 MG/1
500 TABLET, FILM COATED ORAL DAILY
Qty: 5 TABLET | Refills: 0 | Status: SHIPPED | OUTPATIENT
Start: 2024-02-07

## 2024-02-07 RX ORDER — AZITHROMYCIN 250 MG/1
250 TABLET, FILM COATED ORAL
COMMUNITY
Start: 2024-02-01 | End: 2024-02-07

## 2024-02-07 RX ORDER — METHYLPREDNISOLONE 4 MG/1
TABLET ORAL
Qty: 21 EACH | Refills: 0 | Status: SHIPPED | OUTPATIENT
Start: 2024-02-07 | End: 2024-02-28

## 2024-02-07 RX ORDER — PREDNISONE 20 MG/1
20 TABLET ORAL
COMMUNITY
Start: 2024-02-01 | End: 2024-02-07

## 2024-02-07 RX ORDER — METHYLPREDNISOLONE ACETATE 40 MG/ML
60 INJECTION, SUSPENSION INTRA-ARTICULAR; INTRALESIONAL; INTRAMUSCULAR; SOFT TISSUE
Status: COMPLETED | OUTPATIENT
Start: 2024-02-07 | End: 2024-02-07

## 2024-02-07 RX ORDER — IPRATROPIUM BROMIDE AND ALBUTEROL SULFATE 2.5; .5 MG/3ML; MG/3ML
3 SOLUTION RESPIRATORY (INHALATION) EVERY 6 HOURS PRN
Qty: 75 ML | Refills: 0 | Status: SHIPPED | OUTPATIENT
Start: 2024-02-07 | End: 2025-02-06

## 2024-02-07 RX ADMIN — METHYLPREDNISOLONE ACETATE 60 MG: 40 INJECTION, SUSPENSION INTRA-ARTICULAR; INTRALESIONAL; INTRAMUSCULAR; SOFT TISSUE at 02:02

## 2024-02-07 NOTE — PROGRESS NOTES
Subjective:           Patient ID: Maru Shelby is a 73 y.o. female.    Chief Complaint: Wheezing and Shortness of Breath (Patient here today for wheezing and SOB since 2/1/2022, denies fever, chills and or body aches.  Was seen at local urgent care on 2/1/22 was prescribed steriod, z-samreen, cough syrup, and breathing treatments all have failed to improve symptoms.)    Maru Shelby is a 73 y.o. female  Female known to me from an acute visit but usually follows with fellow provider; PMHX of HTN, HLD, Osteoarthritis, RA, osteopenia     Dx with Type II DM lat year Lab Results       Component                Value               Date                       HGBA1C                   9.8 (H)             01/16/2024                Seen 2/1/23 at urgent care for cough and congestion  tx with zithromax, albuterol 2.5 nebulizer tx every 4 hours , cough rx - promethazine DM   And prednisone 20mg bid   Here today with c/o wheezing , SPEARS, fatigue; 1 weeks symptoms despite abx and resp tx   O2 sat 93% , looks tired   Not bringing up mucous with cough  Notes clear mucous from nose   Covid screen negative today   Options discussed   Recommended to go to ED ; she declines   Will try to optimize medications and get CXR but if she worsens and SOB worse needs to go to ED  Duoneb tx given in clinic- she reports feeling like chest more open after but still having poor air exchange and wheezing post neb tx,   Discussed getting O2 sat monitor   Will need to watch blood sugars with steroids   She denies COPD   Last 2021 tx for COVID   Covid screen today negative   She takes methotrexate         Wheezing   Associated symptoms include shortness of breath. Pertinent negatives include no abdominal pain, chills, coughing, diarrhea, ear pain, fever, headaches, sore throat or vomiting.   Shortness of Breath  Associated symptoms include wheezing. Pertinent negatives include no abdominal pain, ear pain, fever, headaches, sore throat or vomiting.      Review of Systems   Constitutional:  Positive for fatigue. Negative for chills and fever.   HENT:  Negative for congestion, ear pain, postnasal drip, sinus pressure, sneezing and sore throat.    Eyes:  Negative for discharge.   Respiratory:  Positive for shortness of breath and wheezing. Negative for cough and chest tightness.    Cardiovascular: Negative.    Gastrointestinal:  Negative for abdominal pain, constipation, diarrhea, nausea and vomiting.   Genitourinary:  Negative for difficulty urinating, flank pain and hematuria.   Musculoskeletal: Negative.  Negative for arthralgias and joint swelling.   Skin: Negative.    Neurological:  Negative for dizziness and headaches.   Psychiatric/Behavioral:  Negative for behavioral problems and confusion.        Objective:      Physical Exam  Pulmonary:      Breath sounds: Wheezing present.      Comments: Diminished breath sounds         Assessment:       1. Acute bacterial bronchitis    2. Wheezing    3. Type 2 diabetes mellitus with other specified complication, with long-term current use of insulin    4. Long term methotrexate user        Plan:   1. Acute bacterial bronchitis  Comments:  rule out pneumonia  Orders:  -     X-Ray Chest PA And Lateral; Future; Expected date: 02/07/2024  -     methylPREDNISolone acetate injection 60 mg  -     albuterol-ipratropium (DUO-NEB) 2.5 mg-0.5 mg/3 mL nebulizer solution; Take 3 mLs by nebulization every 6 (six) hours as needed for Wheezing. Rescue  Dispense: 75 mL; Refill: 0    2. Wheezing  -     SARS Coronavirus 2 Antigen, POCT  -     budesonide-glycopyr-formoterol 160-9-4.8 mcg/actuation HFAA; Inhale 2 puffs into the lungs 2 (two) times a day. Rinse mouth and spit after each use  Dispense: 10.7 g; Refill: 0  -     X-Ray Chest PA And Lateral; Future; Expected date: 02/07/2024  -     methylPREDNISolone acetate injection 60 mg  -     albuterol-ipratropium (DUO-NEB) 2.5 mg-0.5 mg/3 mL nebulizer solution; Take 3 mLs by nebulization  every 6 (six) hours as needed for Wheezing. Rescue  Dispense: 75 mL; Refill: 0    3. Type 2 diabetes mellitus with other specified complication, with long-term current use of insulin  Overview:  Janumet 1000mg bid     Assessment & Plan:  Lab Results   Component Value Date    HGBA1C 9.8 (H) 01/16/2024           4. Long term methotrexate user  Overview:  Noted since 2014      Assessment & Plan:  + immunosuppression   Check CXR-     Orders:  -     X-Ray Chest PA And Lateral; Future; Expected date: 02/07/2024

## 2024-02-07 NOTE — PROGRESS NOTES
Normal result; surprised but no pneumonia  Would like to give broader coverage for bronchitis; will send levofloxacin 500mg daily x 5 days   Will send prednisone to start tomorrow- call for any blood sugars > 300

## 2024-02-12 ENCOUNTER — INFUSION (OUTPATIENT)
Dept: INFUSION THERAPY | Facility: HOSPITAL | Age: 74
End: 2024-02-12
Attending: INTERNAL MEDICINE
Payer: MEDICARE

## 2024-02-12 VITALS
TEMPERATURE: 98 F | HEIGHT: 67 IN | BODY MASS INDEX: 31.23 KG/M2 | DIASTOLIC BLOOD PRESSURE: 86 MMHG | SYSTOLIC BLOOD PRESSURE: 145 MMHG | HEART RATE: 106 BPM | WEIGHT: 199 LBS | RESPIRATION RATE: 20 BRPM

## 2024-02-12 DIAGNOSIS — M05.79 RHEUMATOID ARTHRITIS INVOLVING MULTIPLE SITES WITH POSITIVE RHEUMATOID FACTOR: Primary | ICD-10-CM

## 2024-02-12 PROCEDURE — 96365 THER/PROPH/DIAG IV INF INIT: CPT

## 2024-02-12 PROCEDURE — 63600175 PHARM REV CODE 636 W HCPCS: Mod: JZ,JA,TB | Performed by: INTERNAL MEDICINE

## 2024-02-12 PROCEDURE — 25000003 PHARM REV CODE 250: Performed by: INTERNAL MEDICINE

## 2024-02-12 RX ORDER — SODIUM CHLORIDE 0.9 % (FLUSH) 0.9 %
10 SYRINGE (ML) INJECTION
Status: CANCELLED | OUTPATIENT
Start: 2024-02-19

## 2024-02-12 RX ORDER — ACETAMINOPHEN 325 MG/1
650 TABLET ORAL
Status: CANCELLED | OUTPATIENT
Start: 2024-02-19

## 2024-02-12 RX ORDER — HEPARIN 100 UNIT/ML
500 SYRINGE INTRAVENOUS
Status: CANCELLED | OUTPATIENT
Start: 2024-02-19

## 2024-02-12 RX ADMIN — SODIUM CHLORIDE 1000 MG: 9 INJECTION, SOLUTION INTRAVENOUS at 08:02

## 2024-02-27 ENCOUNTER — TELEPHONE (OUTPATIENT)
Dept: RHEUMATOLOGY | Facility: CLINIC | Age: 74
End: 2024-02-27
Payer: MEDICARE

## 2024-02-27 NOTE — TELEPHONE ENCOUNTER
Spoke with the patient and let her know that I had to cancel her appointment per Dr Royal and will put the new schedule in the mail for her

## 2024-03-08 ENCOUNTER — LAB VISIT (OUTPATIENT)
Dept: LAB | Facility: HOSPITAL | Age: 74
End: 2024-03-08
Attending: INTERNAL MEDICINE
Payer: MEDICARE

## 2024-03-08 DIAGNOSIS — M06.9 RHEUMATOID ARTHRITIS, INVOLVING UNSPECIFIED SITE, UNSPECIFIED WHETHER RHEUMATOID FACTOR PRESENT: ICD-10-CM

## 2024-03-08 LAB
ALBUMIN SERPL BCP-MCNC: 3.7 G/DL (ref 3.5–5.2)
ALP SERPL-CCNC: 86 U/L (ref 55–135)
ALT SERPL W/O P-5'-P-CCNC: 26 U/L (ref 10–44)
ANION GAP SERPL CALC-SCNC: 8 MMOL/L (ref 8–16)
AST SERPL-CCNC: 22 U/L (ref 10–40)
BASOPHILS # BLD AUTO: 0.06 K/UL (ref 0–0.2)
BASOPHILS NFR BLD: 0.6 % (ref 0–1.9)
BILIRUB SERPL-MCNC: 0.5 MG/DL (ref 0.1–1)
BUN SERPL-MCNC: 12 MG/DL (ref 8–23)
CALCIUM SERPL-MCNC: 9.2 MG/DL (ref 8.7–10.5)
CHLORIDE SERPL-SCNC: 104 MMOL/L (ref 95–110)
CO2 SERPL-SCNC: 28 MMOL/L (ref 23–29)
CREAT SERPL-MCNC: 0.8 MG/DL (ref 0.5–1.4)
CRP SERPL-MCNC: 5.9 MG/L (ref 0–8.2)
DIFFERENTIAL METHOD BLD: ABNORMAL
EOSINOPHIL # BLD AUTO: 0.3 K/UL (ref 0–0.5)
EOSINOPHIL NFR BLD: 2.7 % (ref 0–8)
ERYTHROCYTE [DISTWIDTH] IN BLOOD BY AUTOMATED COUNT: 15.3 % (ref 11.5–14.5)
ERYTHROCYTE [SEDIMENTATION RATE] IN BLOOD BY WESTERGREN METHOD: 20 MM/HR (ref 0–20)
EST. GFR  (NO RACE VARIABLE): >60 ML/MIN/1.73 M^2
GLUCOSE SERPL-MCNC: 183 MG/DL (ref 70–110)
HCT VFR BLD AUTO: 42.1 % (ref 37–48.5)
HGB BLD-MCNC: 13.7 G/DL (ref 12–16)
IMM GRANULOCYTES # BLD AUTO: 0.06 K/UL (ref 0–0.04)
IMM GRANULOCYTES NFR BLD AUTO: 0.6 % (ref 0–0.5)
LYMPHOCYTES # BLD AUTO: 3.2 K/UL (ref 1–4.8)
LYMPHOCYTES NFR BLD: 32.5 % (ref 18–48)
MCH RBC QN AUTO: 29.8 PG (ref 27–31)
MCHC RBC AUTO-ENTMCNC: 32.5 G/DL (ref 32–36)
MCV RBC AUTO: 92 FL (ref 82–98)
MONOCYTES # BLD AUTO: 0.7 K/UL (ref 0.3–1)
MONOCYTES NFR BLD: 7.6 % (ref 4–15)
NEUTROPHILS # BLD AUTO: 5.4 K/UL (ref 1.8–7.7)
NEUTROPHILS NFR BLD: 56 % (ref 38–73)
NRBC BLD-RTO: 0 /100 WBC
PLATELET # BLD AUTO: 254 K/UL (ref 150–450)
PMV BLD AUTO: 9.5 FL (ref 9.2–12.9)
POTASSIUM SERPL-SCNC: 4.5 MMOL/L (ref 3.5–5.1)
PROT SERPL-MCNC: 6.4 G/DL (ref 6–8.4)
RBC # BLD AUTO: 4.6 M/UL (ref 4–5.4)
SODIUM SERPL-SCNC: 140 MMOL/L (ref 136–145)
WBC # BLD AUTO: 9.69 K/UL (ref 3.9–12.7)

## 2024-03-08 PROCEDURE — 86140 C-REACTIVE PROTEIN: CPT | Performed by: INTERNAL MEDICINE

## 2024-03-08 PROCEDURE — 85651 RBC SED RATE NONAUTOMATED: CPT | Performed by: INTERNAL MEDICINE

## 2024-03-08 PROCEDURE — 85025 COMPLETE CBC W/AUTO DIFF WBC: CPT | Performed by: INTERNAL MEDICINE

## 2024-03-08 PROCEDURE — 36415 COLL VENOUS BLD VENIPUNCTURE: CPT | Performed by: INTERNAL MEDICINE

## 2024-03-08 PROCEDURE — 80053 COMPREHEN METABOLIC PANEL: CPT | Performed by: INTERNAL MEDICINE

## 2024-03-12 DIAGNOSIS — M05.772 RHEUMATOID ARTHRITIS INVOLVING BOTH ANKLES WITH POSITIVE RHEUMATOID FACTOR: ICD-10-CM

## 2024-03-12 DIAGNOSIS — M05.771 RHEUMATOID ARTHRITIS INVOLVING BOTH ANKLES WITH POSITIVE RHEUMATOID FACTOR: ICD-10-CM

## 2024-03-12 RX ORDER — METHOTREXATE 25 MG/ML
INJECTION, SOLUTION INTRA-ARTERIAL; INTRAMUSCULAR; INTRAVENOUS
Qty: 4 ML | Refills: 2 | Status: SHIPPED | OUTPATIENT
Start: 2024-03-12 | End: 2024-03-27 | Stop reason: SDUPTHER

## 2024-03-12 NOTE — TELEPHONE ENCOUNTER
----- Message from Erinn Madrigal sent at 3/12/2024 10:32 AM CDT -----  Type:  RX Refill Request     Who Called: MECCA DUONG [[id number]]    Refill or New Rx: refill     RX Name and Strength: methotrexate 25 mg/mL injection      How is the patient currently taking it? (ex. 1XDay): INJECT 0.8 MLS INTO THE SKIN EVERY 7 DAYS.    Is this a 30 day or 90 day RX: 90 days     Preferred Pharmacy with phone number: [id number] [place] [facility] [address] AT [facility]  Phone: [phone]  Fax: [phone]      Local or Mail Order: Local     Would the patient rather a call back or a response via [facility]? Call back     Best Call Back Number: [phone]    Additional Information:

## 2024-03-13 ENCOUNTER — INFUSION (OUTPATIENT)
Dept: INFUSION THERAPY | Facility: HOSPITAL | Age: 74
End: 2024-03-13
Attending: INTERNAL MEDICINE
Payer: MEDICARE

## 2024-03-13 VITALS
HEIGHT: 67 IN | SYSTOLIC BLOOD PRESSURE: 116 MMHG | HEART RATE: 102 BPM | WEIGHT: 199 LBS | DIASTOLIC BLOOD PRESSURE: 66 MMHG | BODY MASS INDEX: 31.23 KG/M2 | TEMPERATURE: 97 F | RESPIRATION RATE: 20 BRPM

## 2024-03-13 DIAGNOSIS — M05.79 RHEUMATOID ARTHRITIS INVOLVING MULTIPLE SITES WITH POSITIVE RHEUMATOID FACTOR: Primary | ICD-10-CM

## 2024-03-13 PROCEDURE — 96365 THER/PROPH/DIAG IV INF INIT: CPT

## 2024-03-13 PROCEDURE — 25000003 PHARM REV CODE 250: Performed by: INTERNAL MEDICINE

## 2024-03-13 PROCEDURE — 63600175 PHARM REV CODE 636 W HCPCS: Mod: JZ,JA,TB | Performed by: INTERNAL MEDICINE

## 2024-03-13 RX ORDER — ACETAMINOPHEN 325 MG/1
650 TABLET ORAL
Status: CANCELLED | OUTPATIENT
Start: 2024-03-18

## 2024-03-13 RX ORDER — SODIUM CHLORIDE 0.9 % (FLUSH) 0.9 %
10 SYRINGE (ML) INJECTION
Status: CANCELLED | OUTPATIENT
Start: 2024-03-18

## 2024-03-13 RX ORDER — HEPARIN 100 UNIT/ML
500 SYRINGE INTRAVENOUS
Status: CANCELLED | OUTPATIENT
Start: 2024-03-18

## 2024-03-13 RX ADMIN — SODIUM CHLORIDE 1000 MG: 9 INJECTION, SOLUTION INTRAVENOUS at 08:03

## 2024-03-27 ENCOUNTER — OFFICE VISIT (OUTPATIENT)
Dept: RHEUMATOLOGY | Facility: CLINIC | Age: 74
End: 2024-03-27
Payer: MEDICARE

## 2024-03-27 ENCOUNTER — CLINICAL SUPPORT (OUTPATIENT)
Dept: INFECTIOUS DISEASES | Facility: CLINIC | Age: 74
End: 2024-03-27
Payer: MEDICARE

## 2024-03-27 VITALS
DIASTOLIC BLOOD PRESSURE: 92 MMHG | BODY MASS INDEX: 31.46 KG/M2 | SYSTOLIC BLOOD PRESSURE: 154 MMHG | WEIGHT: 200.81 LBS | HEART RATE: 108 BPM

## 2024-03-27 DIAGNOSIS — E78.5 HYPERLIPIDEMIA, UNSPECIFIED HYPERLIPIDEMIA TYPE: ICD-10-CM

## 2024-03-27 DIAGNOSIS — M85.80 OSTEOPENIA, UNSPECIFIED LOCATION: ICD-10-CM

## 2024-03-27 DIAGNOSIS — M05.771 RHEUMATOID ARTHRITIS INVOLVING BOTH ANKLES WITH POSITIVE RHEUMATOID FACTOR: ICD-10-CM

## 2024-03-27 DIAGNOSIS — I10 ESSENTIAL HYPERTENSION: ICD-10-CM

## 2024-03-27 DIAGNOSIS — Z78.0 MENOPAUSE: Primary | ICD-10-CM

## 2024-03-27 DIAGNOSIS — D84.9 IMMUNODEFICIENCY, UNSPECIFIED: Primary | ICD-10-CM

## 2024-03-27 DIAGNOSIS — M05.772 RHEUMATOID ARTHRITIS INVOLVING BOTH ANKLES WITH POSITIVE RHEUMATOID FACTOR: ICD-10-CM

## 2024-03-27 PROCEDURE — 90471 IMMUNIZATION ADMIN: CPT | Mod: S$GLB,,, | Performed by: INTERNAL MEDICINE

## 2024-03-27 PROCEDURE — 99213 OFFICE O/P EST LOW 20 MIN: CPT | Mod: S$GLB,,, | Performed by: INTERNAL MEDICINE

## 2024-03-27 PROCEDURE — 90715 TDAP VACCINE 7 YRS/> IM: CPT | Mod: S$GLB,,, | Performed by: INTERNAL MEDICINE

## 2024-03-27 PROCEDURE — 99999 PR PBB SHADOW E&M-EST. PATIENT-LVL III: CPT | Mod: PBBFAC,,, | Performed by: INTERNAL MEDICINE

## 2024-03-27 RX ORDER — NORTRIPTYLINE HYDROCHLORIDE 10 MG/1
10 CAPSULE ORAL NIGHTLY
Qty: 90 CAPSULE | Refills: 1 | Status: SHIPPED | OUTPATIENT
Start: 2024-03-27

## 2024-03-27 RX ORDER — LEFLUNOMIDE 20 MG/1
20 TABLET ORAL DAILY
Qty: 90 TABLET | Refills: 0 | Status: SHIPPED | OUTPATIENT
Start: 2024-03-27

## 2024-03-27 RX ORDER — AMLODIPINE BESYLATE 10 MG/1
10 TABLET ORAL DAILY
Qty: 90 TABLET | Refills: 3 | Status: SHIPPED | OUTPATIENT
Start: 2024-03-27

## 2024-03-27 RX ORDER — FOLIC ACID 1 MG/1
TABLET ORAL
Qty: 150 TABLET | Refills: 3 | Status: SHIPPED | OUTPATIENT
Start: 2024-03-27

## 2024-03-27 RX ORDER — ERGOCALCIFEROL 1.25 MG/1
50000 CAPSULE ORAL
Qty: 12 CAPSULE | Refills: 3 | Status: SHIPPED | OUTPATIENT
Start: 2024-03-27 | End: 2024-05-21

## 2024-03-27 RX ORDER — PRAVASTATIN SODIUM 80 MG/1
80 TABLET ORAL NIGHTLY
Qty: 90 TABLET | Refills: 3 | Status: SHIPPED | OUTPATIENT
Start: 2024-03-27 | End: 2024-04-24

## 2024-03-27 RX ORDER — METHOTREXATE 25 MG/ML
INJECTION, SOLUTION INTRA-ARTERIAL; INTRAMUSCULAR; INTRAVENOUS
Qty: 12 ML | Refills: 0 | Status: SHIPPED | OUTPATIENT
Start: 2024-03-27

## 2024-03-27 ASSESSMENT — ROUTINE ASSESSMENT OF PATIENT INDEX DATA (RAPID3)
MDHAQ FUNCTION SCORE: 1.4
AM STIFFNESS SCORE: 0, NO
TOTAL RAPID3 SCORE: 3.89
PATIENT GLOBAL ASSESSMENT SCORE: 3
PAIN SCORE: 4
PSYCHOLOGICAL DISTRESS SCORE: 1.1
FATIGUE SCORE: 5

## 2024-03-27 NOTE — PROGRESS NOTES
"  I have personally taken the history and examined the patient and agree with the resident,s note as stated above          EXAMINATION:  XR HAND COMPLETE 3 VIEW RIGHT     CLINICAL HISTORY:  Rheumatoid arthritis, unspecified     FINDINGS:  No fracture or dislocation.   Moderate osteoarthritis 1st carpal-metacarpal joint.  Soft tissues unremarkable.     Impression:     Moderate osteoarthritis 1st carpal-metacarpal joint.        Electronically signed by: Dario Rain MD  Date:                                            09/01/2023    RA: RF+ ACPA+ GUSTABO-:  TJ 2  SJ 0 Pt global  30 ESR 20 CRP 5.9   DAS28 3.31(MDA)  [id number] 2.87(LDA)     CDAI 5(LDA)  Rheumatoid Arthritis Treatment Goals:  -Disease Activity Measure: CDAI  -Target: LDA  -Therapy/Change: none  -Shared Decision Making: Discussed goals of care and therapy plan together with patient as outlined above.      Frequent falls, none recent  OA knees;left>right  OA hips: left>right  Low bone density DXA 7/14/22 FRAX hip 2.5%  MOF 13% on alendronate intermittently with drug holidays since 2015. on "drug holiday" off alendronate since last DXA 7/14/22. Repeat DXA  due 7/14/24  Class 1 obesity Body mass index is 31.46 kg/m².  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 [facility]  Cont methotrexate 20mg (0.8 ml) sc q 7 days with folic acid 1mg daily but 8 mg day after methotrexate dosing.  Cont leflunomide 20mg daily  Increase pravastatin to 80mg nightly  Increase walking,stationary bike  Mediterranean/DASH diet, no junk food, low carb, weight reduction  Cont F/u Dr. Mark " for EH, T2 DM  hyperlipidemia  Standing labs q 3 months Lipid  panel in 3 months  DXA > 7/14/24  RTC 6 months

## 2024-03-27 NOTE — PROGRESS NOTES
Subjective:      Patient ID: Maru Shelby is a 73 y.o. female.    Chief Complaint: RA; osteoporosis; fibromyalgia    Patient presents today for follow up. LCV was 9/2023. At that time the patient had swelling of right 5th DIP and PIP and right lateral hip pain for which she got an injection for.     Today: Patient reports doing well overall. She endorses her 5th digits BL are painful and intermittently swell. She denies pain or swelling of any other joints. She states the previous right trochanteric injection provided relief of her hip pain. She states she recently had to take 2 weeks off MTX because she could not get a refill, she had been back on the medication for 1 week. She endorses taking leflunomide daily as prescribed without side effect or concern. Her last Orencia was last week. She denies falls with fractures. She denies rashes, chest pain, dyspnea, mouth/nasal ulcers, recent infection or illness.     Diet: She denies any formal diet.   Exercise: She denies formal exercise.   Vaccines: UTD on covid, flu, shingles, PNA. In need of Tdap and RSV.     Review of Systems   Constitutional:  Negative for appetite change, fatigue, fever and unexpected weight change.   HENT:  Negative for mouth sores.         Dry mouth   Eyes:  Negative for visual disturbance.   Respiratory:  Negative for cough, shortness of breath and wheezing.    Cardiovascular:  Negative for chest pain and palpitations.   Gastrointestinal:  Negative for abdominal pain, anal bleeding, blood in stool, constipation, diarrhea, nausea and vomiting.   Genitourinary:  Negative for dysuria, frequency and urgency.   Musculoskeletal:  Negative for arthralgias, back pain, gait problem, joint swelling, myalgias, neck pain and neck stiffness.   Skin:  Negative for rash.   Neurological:  Negative for weakness, numbness and headaches.   Hematological:  Negative for adenopathy. Does not bruise/bleed easily.   Psychiatric/Behavioral:  Negative for sleep  disturbance. The patient is not nervous/anxious.         Objective:   BP (!) 154/92   Pulse 108   Wt 91.1 kg (200 lb 13.4 oz)   BMI 31.46 kg/m²     Physical Exam   Constitutional: She is oriented to person, place, and time. She appears obese. No distress.   Cardiovascular: Normal rate, regular rhythm and normal pulses.   Pulmonary/Chest: Effort normal and breath sounds normal. No respiratory distress.   Musculoskeletal:         General: No swelling.      Right shoulder: Normal.      Left shoulder: Normal.      Right elbow: Normal.      Left elbow: Normal.      Right wrist: Normal.      Left wrist: Normal.      Right knee: Normal.      Left knee: Normal.      Right lower leg: No edema.      Left lower leg: No edema.   Neurological: She is alert and oriented to person, place, and time.   Skin: Skin is warm and dry. No rash noted.       Right Side Rheumatological Exam     Examination finds the shoulder, elbow, wrist, knee, 1st PIP, 1st MCP, 2nd PIP, 2nd MCP, 3rd PIP, 3rd MCP, 4th PIP and 4th MCP normal.    The patient is tender to palpation of the 5th PIP and 5th MCP    Left Side Rheumatological Exam     Examination finds the shoulder, elbow, wrist, knee, 1st PIP, 1st MCP, 2nd PIP, 2nd MCP, 3rd PIP, 3rd MCP, 4th PIP and 4th MCP normal.    The patient is tender to palpation of the 5th PIP and 5th MCP.           12/5/2022 3/6/2023 6/5/2023 9/1/2023   Tender (LEES-28) 0 / 28  0 / 28  0 / 28  1 / 28    Swollen (LEES-28) 0 / 28  0 / 28  0 / 28 1 / 28    Provider Global 0 mm 0 mm 0 mm 10 mm   Patient Global 20 mm 10 mm 25 mm 35 mm   ESR 26 mm/hr 16 mm/hr 26 mm/hr 22 mm/hr   CRP 5.3 mg/L 2.4 mg/L 7.2 mg/L 5 mg/L   LEES-28 (ESR) 2.56 (Remission) 2.08 (Remission) 2.63 (Low disease activity) 3.49 (Moderate disease activity)   LEES-28 (CRP) 1.9 (Remission) 1.54 (Remission) 2.07 (Remission) 2.94 (Low disease activity)   CDAI Score 2  1  2.5  6.5           Assessment:     RA: RF+ ACPA+ GUSTABO-:  TJ 0  SJ 0 Pt global  3 5 ESR 22 CRP 5   " DAS28  [id number]    CDAI   Rheumatoid Arthritis Treatment Goals:  -Disease Activity Measure: CDAI  -Target: LDA  -Therapy/Change: none  -Shared Decision Making: Discussed goals of care and therapy plan together with patient as outlined above.      Frequent falls, none recent  OA knees;left>right  OA hips: left>right  Low bone density DXA 7/14/22 FRAX hip 2.5%  MOF 13% on alendronate intermittently with drug holidays since 2015. Can go back on "drug holiday" off alendronate. Repeat DXA in 1-2 years  Class 1 obesity Body mass index is 31.46 kg/m².  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 [place]'s  Cont methotrexate 20mg (0.8 ml) sc q 7 days with folic acid 1mg daily but 8 mg day after methotrexate dosing.  Cont leflunomide 20mg daily  Cont pravastatin 40mg every evening  Increase walking,stationary bike  Mediterranean/DASH diet, no junk food, low carb, weight reduction  Cont F/u Dr. Mark for EH, T2 DM  hyperlipidemia  Standing labs q 3 months  DXA > 7/14/24  RTC 6 months    "

## 2024-03-27 NOTE — PROGRESS NOTES
Patient received the Tdap vaccines in the left deltoid. Pt tolerated well. Pt asked to wait in the clinic 15 minutes after injection in the event of an allergic reaction. Pt verbalized understanding. Pt left in NAD.

## 2024-03-27 NOTE — PATIENT INSTRUCTIONS
Increase pravastatin to 80mg nightly  *RSV vaccine  *Prevnar 20 >7/26/24  Hold methotrexate for one week after any vaccinations  Rollator walker prn  Cont exercise  for right hip  Cont abatacept 1000mg IV q 28 days re-ordered and p.a. [place]'s  Cont methotrexate 20mg (0.8 ml) sc q 7 days with folic acid 1mg daily but 8 mg day after methotrexate dosing.  Cont leflunomide 20mg daily  Cont pravastatin 40mg every evening  Increase walking,stationary bike  Mediterranean/DASH diet, no junk food, low carb, weight reduction  Cont F/u Dr. Mark for EH, T2 DM  hyperlipidemia  Standing labs q 3 months  DXA > 7/14/24  RTC 6 months

## 2024-04-08 ENCOUNTER — INFUSION (OUTPATIENT)
Dept: INFUSION THERAPY | Facility: HOSPITAL | Age: 74
End: 2024-04-08
Attending: INTERNAL MEDICINE
Payer: MEDICARE

## 2024-04-08 VITALS
DIASTOLIC BLOOD PRESSURE: 67 MMHG | TEMPERATURE: 98 F | HEART RATE: 98 BPM | HEIGHT: 67 IN | RESPIRATION RATE: 20 BRPM | SYSTOLIC BLOOD PRESSURE: 119 MMHG | WEIGHT: 200.81 LBS | BODY MASS INDEX: 31.52 KG/M2

## 2024-04-08 DIAGNOSIS — M05.79 RHEUMATOID ARTHRITIS INVOLVING MULTIPLE SITES WITH POSITIVE RHEUMATOID FACTOR: Primary | ICD-10-CM

## 2024-04-08 PROCEDURE — 25000003 PHARM REV CODE 250: Performed by: INTERNAL MEDICINE

## 2024-04-08 PROCEDURE — 96365 THER/PROPH/DIAG IV INF INIT: CPT

## 2024-04-08 PROCEDURE — 63600175 PHARM REV CODE 636 W HCPCS: Mod: JZ,JA,TB | Performed by: INTERNAL MEDICINE

## 2024-04-08 RX ORDER — HEPARIN 100 UNIT/ML
500 SYRINGE INTRAVENOUS
Status: CANCELLED | OUTPATIENT
Start: 2024-04-15

## 2024-04-08 RX ORDER — ACETAMINOPHEN 325 MG/1
650 TABLET ORAL
Status: CANCELLED | OUTPATIENT
Start: 2024-04-15

## 2024-04-08 RX ORDER — SODIUM CHLORIDE 0.9 % (FLUSH) 0.9 %
10 SYRINGE (ML) INJECTION
Status: CANCELLED | OUTPATIENT
Start: 2024-04-15

## 2024-04-08 RX ADMIN — SODIUM CHLORIDE 1000 MG: 9 INJECTION, SOLUTION INTRAVENOUS at 08:04

## 2024-04-08 NOTE — PLAN OF CARE
Plan of care and safety reinforced-voiced understanding.  Orencia as ordered.  Rolling walker in use

## 2024-04-12 ENCOUNTER — PATIENT OUTREACH (OUTPATIENT)
Dept: ADMINISTRATIVE | Facility: HOSPITAL | Age: 74
End: 2024-04-12
Payer: MEDICARE

## 2024-04-12 DIAGNOSIS — Z12.31 BREAST CANCER SCREENING BY MAMMOGRAM: Primary | ICD-10-CM

## 2024-04-12 NOTE — LETTER
AUTHORIZATION FOR RELEASE OF   CONFIDENTIAL INFORMATION    Dear Dr. Balbuena,    We are seeing Maru Shelby, date of birth 1950, in the clinic at [facility]. Florence Mark MD is the patient's PCP. Maru Shelby has an outstanding lab/procedure at the time we reviewed her chart. In order to help keep her health information updated, she has authorized us to request the following medical record(s):                                     ( X )  EYE EXAM                 Please fax records to [facility], Nawaz, Mohammad Q., MD, [phone].    If you have any questions, please contact...  Sloane Hi LPN  Clinical Care Coordinator  [facility]  Phone: [phone]  Fax: [phone]            Patient Name: Maru Shelby  : 1950  Patient Phone #: [phone]

## 2024-04-12 NOTE — PROGRESS NOTES
Population Health Chart Review & Patient Outreach Details      Additional [facility] Health Notes:    Returned patient's call to clinic.  Patient scheduled Mammogram appointment for 2024.    NOV with PCP: 2024.  LOV with PCP: 2024.         Updates Requested / Reviewed:      Updated Care Coordination Note, Care Everywhere, , External Sources: [facility] and [employer], Care Team Updated, Removed  or Duplicate Orders, and Immunizations Reconciliation Completed or Queried: [place]    Social Determinants Reviewed and Updated:  Tobacco Use  Financial Resource Strain  Transportation  Food Insecurity          Health Maintenance Topics Overdue:      VBHM Score: 1     Foot Exam                       Health Maintenance Topic(s) Outreach Outcomes & Actions Taken:    Breast Cancer Screening - Outreach Outcomes & Actions Taken  : Mammogram Order Placed and Mammogram Screening Scheduled    Tobacco History - Outreach Outcomes & Actions Taken  : Tobacco History Updated, Pt is a Non-Smoker

## 2024-04-17 ENCOUNTER — PATIENT OUTREACH (OUTPATIENT)
Dept: ADMINISTRATIVE | Facility: HOSPITAL | Age: 74
End: 2024-04-17
Payer: MEDICARE

## 2024-04-17 NOTE — PROGRESS NOTES
Patient had previously scheduled mammogram appointment.  Appointment rescheduled to include DEXA Scan also.  DEXA and Mammogram appointment scheduled for 04/30/2024.

## 2024-04-23 ENCOUNTER — LAB VISIT (OUTPATIENT)
Dept: LAB | Facility: HOSPITAL | Age: 74
End: 2024-04-23
Attending: INTERNAL MEDICINE
Payer: MEDICARE

## 2024-04-23 DIAGNOSIS — E78.5 HYPERLIPIDEMIA, UNSPECIFIED HYPERLIPIDEMIA TYPE: ICD-10-CM

## 2024-04-23 DIAGNOSIS — E11.69 TYPE 2 DIABETES MELLITUS WITH OTHER SPECIFIED COMPLICATION, WITH LONG-TERM CURRENT USE OF INSULIN: ICD-10-CM

## 2024-04-23 DIAGNOSIS — Z79.4 TYPE 2 DIABETES MELLITUS WITH OTHER SPECIFIED COMPLICATION, WITH LONG-TERM CURRENT USE OF INSULIN: ICD-10-CM

## 2024-04-23 LAB
CHOLEST SERPL-MCNC: 227 MG/DL (ref 120–199)
CHOLEST/HDLC SERPL: 5.4 {RATIO} (ref 2–5)
ESTIMATED AVG GLUCOSE: 166 MG/DL (ref 68–131)
HBA1C MFR BLD: 7.4 % (ref 4–5.6)
HDLC SERPL-MCNC: 42 MG/DL (ref 40–75)
HDLC SERPL: 18.5 % (ref 20–50)
LDLC SERPL CALC-MCNC: 109.2 MG/DL (ref 63–159)
NONHDLC SERPL-MCNC: 185 MG/DL
TRIGL SERPL-MCNC: 379 MG/DL (ref 30–150)

## 2024-04-23 PROCEDURE — 80061 LIPID PANEL: CPT | Performed by: INTERNAL MEDICINE

## 2024-04-23 PROCEDURE — 36415 COLL VENOUS BLD VENIPUNCTURE: CPT | Performed by: INTERNAL MEDICINE

## 2024-04-23 PROCEDURE — 83036 HEMOGLOBIN GLYCOSYLATED A1C: CPT | Performed by: INTERNAL MEDICINE

## 2024-04-24 ENCOUNTER — TELEPHONE (OUTPATIENT)
Dept: INTERNAL MEDICINE | Facility: CLINIC | Age: 74
End: 2024-04-24
Payer: MEDICARE

## 2024-04-24 DIAGNOSIS — E78.00 PURE HYPERCHOLESTEROLEMIA: Primary | ICD-10-CM

## 2024-04-24 RX ORDER — ROSUVASTATIN CALCIUM 40 MG/1
40 TABLET, COATED ORAL NIGHTLY
Qty: 90 TABLET | Refills: 3 | Status: SHIPPED | OUTPATIENT
Start: 2024-04-24 | End: 2025-04-24

## 2024-04-24 NOTE — TELEPHONE ENCOUNTER
Patient notified of med change and reasoning per Dr. Mark. She is already scheduled for lab and follow up in July.

## 2024-04-24 NOTE — TELEPHONE ENCOUNTER
We will change pravastatin to Crestor 40 mg once a day . Crestor is much more powerful than pravastatin ; 40 mg crestor is max dose : check blood work in 3 months lipid panel and LFTs.  Also she can take fish oil 1 g over-the-counter twice a day to lower triglycerides.

## 2024-04-24 NOTE — TELEPHONE ENCOUNTER
----- Message from Timi Royal MD sent at 4/23/2024  4:38 PM CDT -----  Your lipid panel gives and LDL of 109.2 which is above goal and also triglycerides 379, twice normal. You are on maximal dose of pravastatin 80mg daily. Will copy Dr. Mark as he may wish to change to a more potent statin such as rosuvastatin or add ezetimibe, or change to another type of cholesterol lowering medication. RJQ

## 2024-04-30 ENCOUNTER — TELEPHONE (OUTPATIENT)
Dept: RHEUMATOLOGY | Facility: CLINIC | Age: 74
End: 2024-04-30
Payer: MEDICARE

## 2024-04-30 ENCOUNTER — HOSPITAL ENCOUNTER (OUTPATIENT)
Dept: RADIOLOGY | Facility: HOSPITAL | Age: 74
Discharge: HOME | End: 2024-04-30
Attending: INTERNAL MEDICINE
Payer: MEDICARE

## 2024-04-30 VITALS — BODY MASS INDEX: 31.39 KG/M2 | WEIGHT: 200 LBS | HEIGHT: 67 IN

## 2024-04-30 DIAGNOSIS — M81.0 OSTEOPOROSIS, UNSPECIFIED OSTEOPOROSIS TYPE, UNSPECIFIED PATHOLOGICAL FRACTURE PRESENCE: Primary | ICD-10-CM

## 2024-04-30 DIAGNOSIS — Z12.31 BREAST CANCER SCREENING BY MAMMOGRAM: ICD-10-CM

## 2024-04-30 DIAGNOSIS — Z78.0 MENOPAUSE: ICD-10-CM

## 2024-04-30 PROCEDURE — 77080 DXA BONE DENSITY AXIAL: CPT | Mod: 26,,, | Performed by: RADIOLOGY

## 2024-04-30 PROCEDURE — 77063 BREAST TOMOSYNTHESIS BI: CPT | Mod: 26,,, | Performed by: RADIOLOGY

## 2024-04-30 PROCEDURE — 77067 SCR MAMMO BI INCL CAD: CPT | Mod: 26,,, | Performed by: RADIOLOGY

## 2024-04-30 PROCEDURE — 77080 DXA BONE DENSITY AXIAL: CPT | Mod: TC

## 2024-04-30 PROCEDURE — 77067 SCR MAMMO BI INCL CAD: CPT | Mod: TC

## 2024-04-30 NOTE — TELEPHONE ENCOUNTER
Please schedule osteoporosis labs ordered today, and then brief visit, can be virtual to discuss therapy op[tions. Thank you DELMY

## 2024-05-01 ENCOUNTER — TELEPHONE (OUTPATIENT)
Dept: RHEUMATOLOGY | Facility: CLINIC | Age: 74
End: 2024-05-01
Payer: MEDICARE

## 2024-05-01 ENCOUNTER — PATIENT MESSAGE (OUTPATIENT)
Dept: RHEUMATOLOGY | Facility: CLINIC | Age: 74
End: 2024-05-01
Payer: MEDICARE

## 2024-05-01 NOTE — TELEPHONE ENCOUNTER
-Per  Please schedule osteoporosis labs ordered today, and then brief visit, can be virtual to discuss therapy op[tions. Thank you RJQ    Attempted to reach out to patient. Unable to reach pt voicemail full    Thank you,   Maida

## 2024-05-06 ENCOUNTER — INFUSION (OUTPATIENT)
Dept: INFUSION THERAPY | Facility: HOSPITAL | Age: 74
End: 2024-05-06
Attending: INTERNAL MEDICINE
Payer: MEDICARE

## 2024-05-06 VITALS
DIASTOLIC BLOOD PRESSURE: 69 MMHG | RESPIRATION RATE: 20 BRPM | HEART RATE: 93 BPM | HEIGHT: 67 IN | WEIGHT: 200.81 LBS | SYSTOLIC BLOOD PRESSURE: 120 MMHG | TEMPERATURE: 98 F | BODY MASS INDEX: 31.52 KG/M2

## 2024-05-06 DIAGNOSIS — M05.79 RHEUMATOID ARTHRITIS INVOLVING MULTIPLE SITES WITH POSITIVE RHEUMATOID FACTOR: Primary | ICD-10-CM

## 2024-05-06 PROCEDURE — 25000003 PHARM REV CODE 250: Performed by: INTERNAL MEDICINE

## 2024-05-06 PROCEDURE — 63600175 PHARM REV CODE 636 W HCPCS: Mod: JZ,JA,TB | Performed by: INTERNAL MEDICINE

## 2024-05-06 PROCEDURE — 96365 THER/PROPH/DIAG IV INF INIT: CPT

## 2024-05-06 RX ORDER — SODIUM CHLORIDE 0.9 % (FLUSH) 0.9 %
10 SYRINGE (ML) INJECTION
Status: CANCELLED | OUTPATIENT
Start: 2024-05-13

## 2024-05-06 RX ORDER — HEPARIN 100 UNIT/ML
500 SYRINGE INTRAVENOUS
Status: CANCELLED | OUTPATIENT
Start: 2024-05-13

## 2024-05-06 RX ORDER — ACETAMINOPHEN 325 MG/1
650 TABLET ORAL
Status: CANCELLED | OUTPATIENT
Start: 2024-05-13

## 2024-05-06 RX ADMIN — SODIUM CHLORIDE 1000 MG: 9 INJECTION, SOLUTION INTRAVENOUS at 08:05

## 2024-05-20 DIAGNOSIS — M85.80 OSTEOPENIA, UNSPECIFIED LOCATION: ICD-10-CM

## 2024-05-21 RX ORDER — ERGOCALCIFEROL 1.25 MG/1
50000 CAPSULE ORAL
Qty: 12 CAPSULE | Refills: 3 | Status: SHIPPED | OUTPATIENT
Start: 2024-05-21

## 2024-06-03 ENCOUNTER — INFUSION (OUTPATIENT)
Dept: INFUSION THERAPY | Facility: HOSPITAL | Age: 74
End: 2024-06-03
Attending: INTERNAL MEDICINE
Payer: MEDICARE

## 2024-06-03 VITALS
DIASTOLIC BLOOD PRESSURE: 64 MMHG | HEIGHT: 67 IN | HEART RATE: 91 BPM | RESPIRATION RATE: 20 BRPM | WEIGHT: 200.81 LBS | TEMPERATURE: 98 F | SYSTOLIC BLOOD PRESSURE: 118 MMHG | BODY MASS INDEX: 31.52 KG/M2

## 2024-06-03 DIAGNOSIS — M05.79 RHEUMATOID ARTHRITIS INVOLVING MULTIPLE SITES WITH POSITIVE RHEUMATOID FACTOR: Primary | ICD-10-CM

## 2024-06-03 PROCEDURE — 25000003 PHARM REV CODE 250: Performed by: INTERNAL MEDICINE

## 2024-06-03 PROCEDURE — 63600175 PHARM REV CODE 636 W HCPCS: Mod: JZ,JA,TB | Performed by: INTERNAL MEDICINE

## 2024-06-03 PROCEDURE — 96365 THER/PROPH/DIAG IV INF INIT: CPT

## 2024-06-03 RX ORDER — HEPARIN 100 UNIT/ML
500 SYRINGE INTRAVENOUS
OUTPATIENT
Start: 2024-06-10

## 2024-06-03 RX ORDER — ACETAMINOPHEN 325 MG/1
650 TABLET ORAL
OUTPATIENT
Start: 2024-06-10

## 2024-06-03 RX ORDER — SODIUM CHLORIDE 0.9 % (FLUSH) 0.9 %
10 SYRINGE (ML) INJECTION
OUTPATIENT
Start: 2024-06-10

## 2024-06-03 RX ADMIN — SODIUM CHLORIDE 1000 MG: 9 INJECTION, SOLUTION INTRAVENOUS at 08:06

## 2024-06-07 ENCOUNTER — LAB VISIT (OUTPATIENT)
Dept: LAB | Facility: HOSPITAL | Age: 74
End: 2024-06-07
Attending: INTERNAL MEDICINE
Payer: MEDICARE

## 2024-06-07 DIAGNOSIS — M06.9 RHEUMATOID ARTHRITIS, INVOLVING UNSPECIFIED SITE, UNSPECIFIED WHETHER RHEUMATOID FACTOR PRESENT: ICD-10-CM

## 2024-06-07 LAB
ALBUMIN SERPL BCP-MCNC: 3.6 G/DL (ref 3.5–5.2)
ALP SERPL-CCNC: 82 U/L (ref 55–135)
ALT SERPL W/O P-5'-P-CCNC: 23 U/L (ref 10–44)
ANION GAP SERPL CALC-SCNC: 9 MMOL/L (ref 8–16)
AST SERPL-CCNC: 18 U/L (ref 10–40)
BASOPHILS # BLD AUTO: 0.07 K/UL (ref 0–0.2)
BASOPHILS NFR BLD: 0.6 % (ref 0–1.9)
BILIRUB SERPL-MCNC: 0.4 MG/DL (ref 0.1–1)
BUN SERPL-MCNC: 16 MG/DL (ref 8–23)
CALCIUM SERPL-MCNC: 9.2 MG/DL (ref 8.7–10.5)
CHLORIDE SERPL-SCNC: 106 MMOL/L (ref 95–110)
CO2 SERPL-SCNC: 26 MMOL/L (ref 23–29)
CREAT SERPL-MCNC: 0.8 MG/DL (ref 0.5–1.4)
CRP SERPL-MCNC: 3.1 MG/L (ref 0–8.2)
DIFFERENTIAL METHOD BLD: ABNORMAL
EOSINOPHIL # BLD AUTO: 0.5 K/UL (ref 0–0.5)
EOSINOPHIL NFR BLD: 4.3 % (ref 0–8)
ERYTHROCYTE [DISTWIDTH] IN BLOOD BY AUTOMATED COUNT: 14.6 % (ref 11.5–14.5)
ERYTHROCYTE [SEDIMENTATION RATE] IN BLOOD BY WESTERGREN METHOD: 26 MM/HR (ref 0–20)
EST. GFR  (NO RACE VARIABLE): >60 ML/MIN/1.73 M^2
GLUCOSE SERPL-MCNC: 184 MG/DL (ref 70–110)
HCT VFR BLD AUTO: 40.5 % (ref 37–48.5)
HGB BLD-MCNC: 13.3 G/DL (ref 12–16)
IMM GRANULOCYTES # BLD AUTO: 0.05 K/UL (ref 0–0.04)
IMM GRANULOCYTES NFR BLD AUTO: 0.5 % (ref 0–0.5)
LYMPHOCYTES # BLD AUTO: 2.6 K/UL (ref 1–4.8)
LYMPHOCYTES NFR BLD: 24 % (ref 18–48)
MCH RBC QN AUTO: 30.2 PG (ref 27–31)
MCHC RBC AUTO-ENTMCNC: 32.8 G/DL (ref 32–36)
MCV RBC AUTO: 92 FL (ref 82–98)
MONOCYTES # BLD AUTO: 0.9 K/UL (ref 0.3–1)
MONOCYTES NFR BLD: 8.3 % (ref 4–15)
NEUTROPHILS # BLD AUTO: 6.8 K/UL (ref 1.8–7.7)
NEUTROPHILS NFR BLD: 62.3 % (ref 38–73)
NRBC BLD-RTO: 0 /100 WBC
PLATELET # BLD AUTO: 232 K/UL (ref 150–450)
PMV BLD AUTO: 9.9 FL (ref 9.2–12.9)
POTASSIUM SERPL-SCNC: 4.8 MMOL/L (ref 3.5–5.1)
PROT SERPL-MCNC: 6.5 G/DL (ref 6–8.4)
RBC # BLD AUTO: 4.4 M/UL (ref 4–5.4)
SODIUM SERPL-SCNC: 141 MMOL/L (ref 136–145)
WBC # BLD AUTO: 10.87 K/UL (ref 3.9–12.7)

## 2024-06-07 PROCEDURE — 36415 COLL VENOUS BLD VENIPUNCTURE: CPT | Performed by: INTERNAL MEDICINE

## 2024-06-07 PROCEDURE — 85025 COMPLETE CBC W/AUTO DIFF WBC: CPT | Performed by: INTERNAL MEDICINE

## 2024-06-07 PROCEDURE — 80053 COMPREHEN METABOLIC PANEL: CPT | Performed by: INTERNAL MEDICINE

## 2024-06-07 PROCEDURE — 86140 C-REACTIVE PROTEIN: CPT | Performed by: INTERNAL MEDICINE

## 2024-06-07 PROCEDURE — 85651 RBC SED RATE NONAUTOMATED: CPT | Performed by: INTERNAL MEDICINE

## 2024-06-24 ENCOUNTER — TELEPHONE (OUTPATIENT)
Dept: INTERNAL MEDICINE | Facility: CLINIC | Age: 74
End: 2024-06-24
Payer: MEDICARE

## 2024-06-24 NOTE — TELEPHONE ENCOUNTER
----- Message from Karen Matthews sent at 2024  9:27 AM CDT -----  Contact: Pt  Maru Shelby  MRN: [id number]  : 1950  PCP: Florence Mark  Home Phone      [phone]  Work Phone      Not on file.  Mobile          [phone]      MESSAGE:      Quan states pt has a 90 day script of rosuvastatin (CRESTOR) 40 MG Tab and a 90 day script of Pravastatin prescribed by another doctor and is wanting to know which medication she should be on and that they can close out.         Please advise   [phone]

## 2024-06-24 NOTE — TELEPHONE ENCOUNTER
Patient should be taking crestor 40 mg daily. The pravastatin was discontinued per Dr. Mark on 4/24/24. See telephone encounter dated 4/24/24. [facility] pharmacist notified.

## 2024-07-01 ENCOUNTER — INFUSION (OUTPATIENT)
Dept: INFUSION THERAPY | Facility: HOSPITAL | Age: 74
End: 2024-07-01
Attending: INTERNAL MEDICINE
Payer: MEDICARE

## 2024-07-01 VITALS
RESPIRATION RATE: 20 BRPM | TEMPERATURE: 97 F | WEIGHT: 200 LBS | SYSTOLIC BLOOD PRESSURE: 136 MMHG | DIASTOLIC BLOOD PRESSURE: 80 MMHG | BODY MASS INDEX: 31.32 KG/M2 | HEART RATE: 94 BPM

## 2024-07-01 DIAGNOSIS — M05.79 RHEUMATOID ARTHRITIS INVOLVING MULTIPLE SITES WITH POSITIVE RHEUMATOID FACTOR: Primary | ICD-10-CM

## 2024-07-01 PROCEDURE — 63600175 PHARM REV CODE 636 W HCPCS: Mod: JZ,JA,TB | Performed by: INTERNAL MEDICINE

## 2024-07-01 PROCEDURE — 25000003 PHARM REV CODE 250: Performed by: INTERNAL MEDICINE

## 2024-07-01 PROCEDURE — 96365 THER/PROPH/DIAG IV INF INIT: CPT

## 2024-07-01 RX ORDER — ACETAMINOPHEN 325 MG/1
650 TABLET ORAL
OUTPATIENT
Start: 2024-07-08

## 2024-07-01 RX ORDER — SODIUM CHLORIDE 0.9 % (FLUSH) 0.9 %
10 SYRINGE (ML) INJECTION
OUTPATIENT
Start: 2024-07-08

## 2024-07-01 RX ORDER — HEPARIN 100 UNIT/ML
500 SYRINGE INTRAVENOUS
OUTPATIENT
Start: 2024-07-08

## 2024-07-01 RX ADMIN — SODIUM CHLORIDE 1000 MG: 9 INJECTION, SOLUTION INTRAVENOUS at 08:07

## 2024-07-17 ENCOUNTER — LAB VISIT (OUTPATIENT)
Dept: LAB | Facility: HOSPITAL | Age: 74
End: 2024-07-17
Attending: INTERNAL MEDICINE
Payer: MEDICARE

## 2024-07-17 DIAGNOSIS — E11.69 TYPE 2 DIABETES MELLITUS WITH OTHER SPECIFIED COMPLICATION, WITH LONG-TERM CURRENT USE OF INSULIN: ICD-10-CM

## 2024-07-17 DIAGNOSIS — Z79.4 TYPE 2 DIABETES MELLITUS WITH OTHER SPECIFIED COMPLICATION, WITH LONG-TERM CURRENT USE OF INSULIN: ICD-10-CM

## 2024-07-17 LAB
ALBUMIN SERPL BCP-MCNC: 3.7 G/DL (ref 3.5–5.2)
ALBUMIN/CREAT UR: 243.4 UG/MG (ref 0–30)
ALP SERPL-CCNC: 83 U/L (ref 55–135)
ALT SERPL W/O P-5'-P-CCNC: 30 U/L (ref 10–44)
ANION GAP SERPL CALC-SCNC: 8 MMOL/L (ref 8–16)
AST SERPL-CCNC: 22 U/L (ref 10–40)
BASOPHILS # BLD AUTO: 0.07 K/UL (ref 0–0.2)
BASOPHILS NFR BLD: 0.7 % (ref 0–1.9)
BILIRUB SERPL-MCNC: 0.4 MG/DL (ref 0.1–1)
BUN SERPL-MCNC: 14 MG/DL (ref 8–23)
CALCIUM SERPL-MCNC: 9.8 MG/DL (ref 8.7–10.5)
CHLORIDE SERPL-SCNC: 104 MMOL/L (ref 95–110)
CHOLEST SERPL-MCNC: 94 MG/DL (ref 120–199)
CHOLEST/HDLC SERPL: 2.5 {RATIO} (ref 2–5)
CO2 SERPL-SCNC: 29 MMOL/L (ref 23–29)
CREAT SERPL-MCNC: 0.9 MG/DL (ref 0.5–1.4)
CREAT UR-MCNC: 124.5 MG/DL (ref 15–325)
DIFFERENTIAL METHOD BLD: ABNORMAL
EOSINOPHIL # BLD AUTO: 1 K/UL (ref 0–0.5)
EOSINOPHIL NFR BLD: 9.1 % (ref 0–8)
ERYTHROCYTE [DISTWIDTH] IN BLOOD BY AUTOMATED COUNT: 14.6 % (ref 11.5–14.5)
EST. GFR  (NO RACE VARIABLE): >60 ML/MIN/1.73 M^2
ESTIMATED AVG GLUCOSE: 177 MG/DL (ref 68–131)
GLUCOSE SERPL-MCNC: 203 MG/DL (ref 70–110)
HBA1C MFR BLD: 7.8 % (ref 4–5.6)
HCT VFR BLD AUTO: 42.1 % (ref 37–48.5)
HDLC SERPL-MCNC: 38 MG/DL (ref 40–75)
HDLC SERPL: 40.4 % (ref 20–50)
HGB BLD-MCNC: 13.7 G/DL (ref 12–16)
IMM GRANULOCYTES # BLD AUTO: 0.08 K/UL (ref 0–0.04)
IMM GRANULOCYTES NFR BLD AUTO: 0.8 % (ref 0–0.5)
LDLC SERPL CALC-MCNC: 19.6 MG/DL (ref 63–159)
LYMPHOCYTES # BLD AUTO: 3.1 K/UL (ref 1–4.8)
LYMPHOCYTES NFR BLD: 29.5 % (ref 18–48)
MCH RBC QN AUTO: 29.8 PG (ref 27–31)
MCHC RBC AUTO-ENTMCNC: 32.5 G/DL (ref 32–36)
MCV RBC AUTO: 92 FL (ref 82–98)
MICROALBUMIN UR DL<=1MG/L-MCNC: 303 UG/ML
MONOCYTES # BLD AUTO: 0.8 K/UL (ref 0.3–1)
MONOCYTES NFR BLD: 7.6 % (ref 4–15)
NEUTROPHILS # BLD AUTO: 5.5 K/UL (ref 1.8–7.7)
NEUTROPHILS NFR BLD: 52.3 % (ref 38–73)
NONHDLC SERPL-MCNC: 56 MG/DL
NRBC BLD-RTO: 0 /100 WBC
PLATELET # BLD AUTO: 225 K/UL (ref 150–450)
PMV BLD AUTO: 9.7 FL (ref 9.2–12.9)
POTASSIUM SERPL-SCNC: 4.5 MMOL/L (ref 3.5–5.1)
PROT SERPL-MCNC: 6.8 G/DL (ref 6–8.4)
RBC # BLD AUTO: 4.59 M/UL (ref 4–5.4)
SODIUM SERPL-SCNC: 141 MMOL/L (ref 136–145)
TRIGL SERPL-MCNC: 182 MG/DL (ref 30–150)
TSH SERPL DL<=0.005 MIU/L-ACNC: 1.78 UIU/ML (ref 0.4–4)
WBC # BLD AUTO: 10.56 K/UL (ref 3.9–12.7)

## 2024-07-17 PROCEDURE — 84443 ASSAY THYROID STIM HORMONE: CPT | Performed by: INTERNAL MEDICINE

## 2024-07-17 PROCEDURE — 83036 HEMOGLOBIN GLYCOSYLATED A1C: CPT | Performed by: INTERNAL MEDICINE

## 2024-07-17 PROCEDURE — 82570 ASSAY OF URINE CREATININE: CPT | Performed by: INTERNAL MEDICINE

## 2024-07-17 PROCEDURE — 80061 LIPID PANEL: CPT | Performed by: INTERNAL MEDICINE

## 2024-07-17 PROCEDURE — 36415 COLL VENOUS BLD VENIPUNCTURE: CPT | Performed by: INTERNAL MEDICINE

## 2024-07-17 PROCEDURE — 85025 COMPLETE CBC W/AUTO DIFF WBC: CPT | Performed by: INTERNAL MEDICINE

## 2024-07-17 PROCEDURE — 80053 COMPREHEN METABOLIC PANEL: CPT | Performed by: INTERNAL MEDICINE

## 2024-07-17 PROCEDURE — 82043 UR ALBUMIN QUANTITATIVE: CPT | Performed by: INTERNAL MEDICINE

## 2024-07-23 ENCOUNTER — OFFICE VISIT (OUTPATIENT)
Dept: INTERNAL MEDICINE | Facility: CLINIC | Age: 74
End: 2024-07-23
Payer: MEDICARE

## 2024-07-23 VITALS
RESPIRATION RATE: 18 BRPM | BODY MASS INDEX: 30.83 KG/M2 | DIASTOLIC BLOOD PRESSURE: 92 MMHG | HEART RATE: 101 BPM | HEART RATE: 101 BPM | DIASTOLIC BLOOD PRESSURE: 92 MMHG | OXYGEN SATURATION: 96 % | SYSTOLIC BLOOD PRESSURE: 140 MMHG | OXYGEN SATURATION: 96 % | WEIGHT: 196.44 LBS | RESPIRATION RATE: 18 BRPM | HEIGHT: 67 IN | BODY MASS INDEX: 30.83 KG/M2 | WEIGHT: 196.44 LBS | SYSTOLIC BLOOD PRESSURE: 140 MMHG | HEIGHT: 67 IN

## 2024-07-23 DIAGNOSIS — I10 BENIGN ESSENTIAL HTN: ICD-10-CM

## 2024-07-23 DIAGNOSIS — I10 BENIGN ESSENTIAL HTN: Primary | ICD-10-CM

## 2024-07-23 DIAGNOSIS — R26.9 ABNORMALITY OF GAIT AND MOBILITY: ICD-10-CM

## 2024-07-23 DIAGNOSIS — Z99.89 DEPENDENCE ON OTHER ENABLING MACHINES AND DEVICES: Primary | ICD-10-CM

## 2024-07-23 DIAGNOSIS — Z79.4 TYPE 2 DIABETES MELLITUS WITH OTHER SPECIFIED COMPLICATION, WITH LONG-TERM CURRENT USE OF INSULIN: ICD-10-CM

## 2024-07-23 DIAGNOSIS — E11.69 TYPE 2 DIABETES MELLITUS WITH OTHER SPECIFIED COMPLICATION, WITH LONG-TERM CURRENT USE OF INSULIN: ICD-10-CM

## 2024-07-23 DIAGNOSIS — I70.0 AORTIC ATHEROSCLEROSIS: ICD-10-CM

## 2024-07-23 DIAGNOSIS — R80.9 TYPE 2 DIABETES MELLITUS WITH DIABETIC MICROALBUMINURIA, WITHOUT LONG-TERM CURRENT USE OF INSULIN: ICD-10-CM

## 2024-07-23 DIAGNOSIS — Z74.09 IMPAIRED FUNCTIONAL MOBILITY, BALANCE, GAIT, AND ENDURANCE: ICD-10-CM

## 2024-07-23 DIAGNOSIS — M85.80 OSTEOPENIA, UNSPECIFIED LOCATION: ICD-10-CM

## 2024-07-23 DIAGNOSIS — Z79.631 LONG TERM METHOTREXATE USER: ICD-10-CM

## 2024-07-23 DIAGNOSIS — M05.79 RHEUMATOID ARTHRITIS INVOLVING MULTIPLE SITES WITH POSITIVE RHEUMATOID FACTOR: Chronic | ICD-10-CM

## 2024-07-23 DIAGNOSIS — E11.39 GLAUCOMA DUE TO TYPE 2 DIABETES MELLITUS: ICD-10-CM

## 2024-07-23 DIAGNOSIS — E78.00 PURE HYPERCHOLESTEROLEMIA: ICD-10-CM

## 2024-07-23 DIAGNOSIS — H42 GLAUCOMA DUE TO TYPE 2 DIABETES MELLITUS: ICD-10-CM

## 2024-07-23 DIAGNOSIS — D69.2 OTHER NONTHROMBOCYTOPENIC PURPURA: ICD-10-CM

## 2024-07-23 DIAGNOSIS — E78.2 MIXED HYPERLIPIDEMIA: ICD-10-CM

## 2024-07-23 DIAGNOSIS — Z00.00 ENCOUNTER FOR PREVENTIVE HEALTH EXAMINATION: ICD-10-CM

## 2024-07-23 DIAGNOSIS — Z00.00 ENCOUNTER FOR MEDICARE ANNUAL WELLNESS EXAM: ICD-10-CM

## 2024-07-23 DIAGNOSIS — E11.29 TYPE 2 DIABETES MELLITUS WITH DIABETIC MICROALBUMINURIA, WITHOUT LONG-TERM CURRENT USE OF INSULIN: ICD-10-CM

## 2024-07-23 PROCEDURE — 1158F ADVNC CARE PLAN TLK DOCD: CPT | Mod: CPTII,S$GLB,, | Performed by: NURSE PRACTITIONER

## 2024-07-23 PROCEDURE — 3288F FALL RISK ASSESSMENT DOCD: CPT | Mod: CPTII,S$GLB,, | Performed by: INTERNAL MEDICINE

## 2024-07-23 PROCEDURE — 1159F MED LIST DOCD IN RCRD: CPT | Mod: CPTII,S$GLB,, | Performed by: INTERNAL MEDICINE

## 2024-07-23 PROCEDURE — 3080F DIAST BP >= 90 MM HG: CPT | Mod: CPTII,S$GLB,, | Performed by: NURSE PRACTITIONER

## 2024-07-23 PROCEDURE — 3077F SYST BP >= 140 MM HG: CPT | Mod: CPTII,S$GLB,, | Performed by: INTERNAL MEDICINE

## 2024-07-23 PROCEDURE — 1100F PTFALLS ASSESS-DOCD GE2>/YR: CPT | Mod: CPTII,S$GLB,, | Performed by: INTERNAL MEDICINE

## 2024-07-23 PROCEDURE — 3080F DIAST BP >= 90 MM HG: CPT | Mod: CPTII,S$GLB,, | Performed by: INTERNAL MEDICINE

## 2024-07-23 PROCEDURE — 1170F FXNL STATUS ASSESSED: CPT | Mod: CPTII,S$GLB,, | Performed by: NURSE PRACTITIONER

## 2024-07-23 PROCEDURE — 1126F AMNT PAIN NOTED NONE PRSNT: CPT | Mod: CPTII,S$GLB,, | Performed by: INTERNAL MEDICINE

## 2024-07-23 PROCEDURE — 99214 OFFICE O/P EST MOD 30 MIN: CPT | Mod: S$GLB,,, | Performed by: INTERNAL MEDICINE

## 2024-07-23 PROCEDURE — 1100F PTFALLS ASSESS-DOCD GE2>/YR: CPT | Mod: CPTII,S$GLB,, | Performed by: NURSE PRACTITIONER

## 2024-07-23 PROCEDURE — 3288F FALL RISK ASSESSMENT DOCD: CPT | Mod: CPTII,S$GLB,, | Performed by: NURSE PRACTITIONER

## 2024-07-23 PROCEDURE — G0439 PPPS, SUBSEQ VISIT: HCPCS | Mod: S$GLB,,, | Performed by: NURSE PRACTITIONER

## 2024-07-23 PROCEDURE — 3077F SYST BP >= 140 MM HG: CPT | Mod: CPTII,S$GLB,, | Performed by: NURSE PRACTITIONER

## 2024-07-23 PROCEDURE — G2211 COMPLEX E/M VISIT ADD ON: HCPCS | Mod: S$GLB,,, | Performed by: INTERNAL MEDICINE

## 2024-07-23 PROCEDURE — 99999 PR PBB SHADOW E&M-EST. PATIENT-LVL IV: CPT | Mod: PBBFAC,,, | Performed by: NURSE PRACTITIONER

## 2024-07-23 PROCEDURE — 1160F RVW MEDS BY RX/DR IN RCRD: CPT | Mod: CPTII,S$GLB,, | Performed by: INTERNAL MEDICINE

## 2024-07-23 PROCEDURE — 1126F AMNT PAIN NOTED NONE PRSNT: CPT | Mod: CPTII,S$GLB,, | Performed by: NURSE PRACTITIONER

## 2024-07-23 PROCEDURE — 99999 PR PBB SHADOW E&M-EST. PATIENT-LVL V: CPT | Mod: PBBFAC,,, | Performed by: INTERNAL MEDICINE

## 2024-07-23 RX ORDER — PRAVASTATIN SODIUM 80 MG/1
80 TABLET ORAL
COMMUNITY
Start: 2024-06-24

## 2024-07-23 RX ORDER — METHOTREXATE 25 MG/ML
INJECTION INTRA-ARTERIAL; INTRAMUSCULAR; INTRATHECAL; INTRAVENOUS
COMMUNITY
Start: 2024-04-08 | End: 2024-07-23 | Stop reason: SDUPTHER

## 2024-07-23 RX ORDER — TETANUS TOXOID, REDUCED DIPHTHERIA TOXOID AND ACELLULAR PERTUSSIS VACCINE, ADSORBED 5; 2.5; 8; 8; 2.5 [IU]/.5ML; [IU]/.5ML; UG/.5ML; UG/.5ML; UG/.5ML
SUSPENSION INTRAMUSCULAR
COMMUNITY
Start: 2024-03-27 | End: 2024-07-23

## 2024-07-23 RX ORDER — RESPIRATORY SYNCYTIAL VISUS VACCINE RECOMBINANT, ADJUVANTED 120MCG/0.5
KIT INTRAMUSCULAR
COMMUNITY
Start: 2024-04-09 | End: 2024-07-23

## 2024-07-23 NOTE — PATIENT INSTRUCTIONS
Counseling and Referral of Other Preventative  (Italic type indicates deductible and co-insurance are waived)    Patient Name: Maru Shelby  Today's Date: 7/23/2024    Health Maintenance       Date Due Completion Date    COVID-19 Vaccine (9 - 2023-24 season) 02/06/2024 12/12/2023    Influenza Vaccine (1) 09/01/2024 9/1/2023    Eye Exam 12/08/2024 12/8/2023    Hemoglobin A1c 01/17/2025 7/17/2024    Diabetes Urine Screening 07/17/2025 7/17/2024    Lipid Panel 07/17/2025 7/17/2024    DEXA Scan 04/30/2026 4/30/2024    TETANUS VACCINE 03/27/2034 3/27/2024        No orders of the defined types were placed in this encounter.    The following information is provided to all patients.  This information is to help you find resources for any of the problems found today that may be affecting your health:                  Living healthy guide: www.[facility].[place].[facility]      Understanding Diabetes: www.diabetes.org      Eating healthy: www.cdc.gov/healthyweight      [facility] home safety checklist: www.cdc.gov/steadi/patient.html      Agency on Aging: www.goea.[place].gov      Alcoholics anonymous (AA): www.aa.org      Physical Activity: www.viry.nih.gov/dv4twsh      Tobacco use: www.quitwithusla.org

## 2024-07-23 NOTE — PROGRESS NOTES
"    Maru Shelby presented for a  Medicare AWV and comprehensive Health Risk Assessment today. The following components were reviewed and updated:    Medical history  Family History  Social history  Allergies and Current Medications  Health Risk Assessment  Health Maintenance  Care Team         ** See Completed Assessments for Annual Wellness Visit within the encounter summary.**         The following assessments were completed:  Living Situation  CAGE  Depression Screening  Timed Get Up and Go  Whisper Test  Cognitive Function Screening  Nutrition Screening  ADL Screening  PAQ Screening      Opioid documentation:      Patient does not have a current opioid prescription.       reviewed and she has only filled promethazine DM 6.25/15mg/5ml filled 120ml 2/1/24     Vitals:    07/23/24 0738   BP: (!) 140/92   Pulse: 101   Resp: 18   SpO2: 96%   Weight: 89.1 kg (196 lb 6.9 oz)   Height: 5' 7" (1.702 m)     Body mass index is 30.77 kg/m².  Physical Exam  Vitals and nursing note reviewed.   Constitutional:       Appearance: Normal appearance. She is obese.   HENT:      Head: Normocephalic and atraumatic.   Cardiovascular:      Rate and Rhythm: Normal rate and regular rhythm.   Pulmonary:      Effort: Pulmonary effort is normal.      Breath sounds: Normal breath sounds.   Abdominal:      Palpations: Abdomen is soft.   Musculoskeletal:         General: No swelling. Normal range of motion.   Skin:     General: Skin is warm and dry.      Capillary Refill: Capillary refill takes less than 2 seconds.      Findings: Bruising present.      Comments: Had a trip and fall- bruising to face noted    Neurological:      General: No focal deficit present.      Mental Status: She is alert and oriented to person, place, and time.               Diagnoses and health risks identified today and associated recommendations/orders:    1. Encounter for preventive health examination  PCP Dr Fuentes had full panel blood work 7/2024 4/2024 " mammo   3/2024 -1.6 osteopenia   8/2018 colonoscopy - Repeat colonoscopy in 10 years  UTD with all vaccines     2. Encounter for Medicare annual wellness exam  PCP Dr Fuentes had full panel blood work 7/2024 4/2024 mammo   3/2024 -1.6 osteopenia   8/2018 colonoscopy - Repeat colonoscopy in 10 years  UTD with all vaccines   - Ambulatory Referral/Consult to Enhanced Annual Wellness Visit (eAWV)    3. Aortic atherosclerosis  Pt on statin   CXR 2/2024 > There is atherosclerotic calcification present within the aortic arch. There is tortuosity of the descending thoracic aorta    4. Dependence on other enabling machines and devices  She does use a rollator for walking long distances- not within the home but also not using today in clinic. Typically uses it for balance and due to joint pain to take the pressure of lower joints     5. Abnormality of gait and mobility  She does use a rollator for walking long distances- not within the home but also not using today in clinic. Typically uses it for balance and due to joint pain to take the pressure of lower joints     6. Impaired functional mobility, balance, gait, and endurance  She does use a rollator for walking long distances- not within the home but also not using today in clinic. Typically uses it for balance and due to joint pain to take the pressure of lower joints dur to RA    7. Glaucoma due to type 2 diabetes mellitus  On latanoprost drops   POAG> follows with  [facility] in [place] 8/2023     8. Type 2 diabetes mellitus with diabetic microalbuminuria, without long-term current use of insulin    Lab Results   Component Value Date    HGBA1C 7.8 (H) 07/17/2024   Micro albumin elevated   On janumet     9. Rheumatoid arthritis involving multiple sites with positive rheumatoid factor  Follows with rheumatology  On MTX inj and ARAVA 20mg tablets daily     10. Long term methotrexate user  Due to RA  Lab Results   Component Value Date    WBC 10.56 07/17/2024     HGB 13.7 07/17/2024    HCT 42.1 07/17/2024    MCV 92 07/17/2024     07/17/2024           11. Other nonthrombocytopenic purpura  Noted with bruising to face today after her trip and fall     12. Benign essential HTN  Well controlled on norvasc 10mg daily     13. Moderate mixed hyperlipidemia    On pravastatin 80mg daily     14. Osteopenia, unspecified location  On supplemental vit d      Provided Maru with a 5-10 year written screening schedule and personal prevention plan. Recommendations were developed using the USPSTF age appropriate recommendations. Education, counseling, and referrals were provided as needed. After Visit Summary printed and given to patient which includes a list of additional screenings\tests needed.    No follow-ups on file.    Jaylyn Rangel NP      .     I offered to discuss advanced care planning, including how to pick a person who would make decisions for you if you were unable to make them for yourself, called a health care power of , and what kind of decisions you might make such as use of life sustaining treatments such as ventilators and tube feeding when faced with a life limiting illness recorded on a living will that they will need to know. (How you want to be cared for as you near the end of your natural life)     X Patient is interested in learning more about how to make advanced directives.  I provided them paperwork and offered to discuss this with them. Has a  and 2 children- she was receptive with the LW and POA discussion and will take the packet home to discuss. When completed will bring copies back to PCP

## 2024-07-23 NOTE — PROGRESS NOTES
"HPI:  Maru Shelby is a 74 y.o. female here for Follow-up (6 months)  Labs are reviewed.      Review of Systems   Constitutional:  Negative for chills and fever.   HENT:  Negative for congestion, hearing loss, sinus pressure and sore throat.    Eyes:  Negative for photophobia.   Respiratory:  Negative for cough, choking, chest tightness and wheezing.    Cardiovascular:  Negative for chest pain and palpitations.   Gastrointestinal:  Negative for blood in stool, nausea and vomiting.   Genitourinary:  Negative for dysuria and hematuria.   Musculoskeletal:  Negative for arthralgias and myalgias.   Skin:  Positive for color change. Negative for pallor.        Racoon eyes   Neurological:  Negative for dizziness and numbness.   Hematological:  Does not bruise/bleed easily.   Psychiatric/Behavioral:  Negative for confusion and suicidal ideas. The patient is not nervous/anxious.     A review of systems was performed and is negative except as noted above.    Objective:  Vitals:    07/23/24 0742   BP: (!) 140/92   Pulse: 101   Resp: 18   SpO2: 96%   Weight: 89.1 kg (196 lb 6.9 oz)   Height: 5' 7" (1.702 m)      Physical Exam  Vitals and nursing note reviewed.   Constitutional:       Appearance: She is well-developed.   HENT:      Head: Normocephalic and atraumatic.      Right Ear: External ear normal.      Left Ear: External ear normal.   Eyes:      Conjunctiva/sclera: Conjunctivae normal.      Pupils: Pupils are equal, round, and reactive to light.        Comments: Contusion: recent fall    Neck:      Thyroid: No thyromegaly.      Vascular: No JVD.      Trachea: No tracheal deviation.   Cardiovascular:      Rate and Rhythm: Normal rate and regular rhythm.      Heart sounds: Normal heart sounds.   Pulmonary:      Effort: Pulmonary effort is normal. No respiratory distress.      Breath sounds: Normal breath sounds. No wheezing or rales.   Chest:      Chest wall: No tenderness.   Abdominal:      General: Bowel sounds are " normal. There is no distension.      Palpations: Abdomen is soft. There is no mass.      Tenderness: There is no abdominal tenderness. There is no guarding or rebound.   Musculoskeletal:         General: Normal range of motion.      Cervical back: Normal range of motion and neck supple.   Lymphadenopathy:      Cervical: No cervical adenopathy.   Skin:     General: Skin is warm and dry.   Neurological:      Mental Status: She is alert and oriented to person, place, and time.      Cranial Nerves: No cranial nerve deficit.      Motor: No abnormal muscle tone.      Coordination: Coordination normal.      Deep Tendon Reflexes: Reflexes are normal and symmetric. Reflexes normal.      Comments: CN: Optic discs are flat with normal vasculature, PERRL, Extraoccular movements and visual fields are full. Normal facial sensation and strength, Hearing symmetric, Tongue and Palate are midline and strong. Shoulder Shrug symmetric and strong.        Assessment/Plan:  1. Benign essential HTN  -     CBC Auto Differential; Future; Expected date: 01/19/2025  -     Comprehensive Metabolic Panel; Future; Expected date: 01/19/2025  Continue amlodipine     2. Pure hypercholesterolemia  Overview:  Results for MECCA DUONG (MRN [id number]) as of 7/26/2019 08:11   Ref. Range 10/23/2018 07:36   Cholesterol Latest Ref Range: 120 - 199 mg/dL 163   HDL Latest Ref Range: 40 - 75 mg/dL 40   Hdl/Cholesterol Ratio Latest Ref Range: 20.0 - 50.0 % 24.5   LDL Cholesterol External Latest Ref Range: 63.0 - 159.0 mg/dL 70.4   Non-HDL Cholesterol Latest Units: mg/dL 123   Total Cholesterol/HDL Ratio Latest Ref Range: 2.0 - 5.0  4.1   Triglycerides Latest Ref Range: 30 - 150 mg/dL 263 (H)       Orders:  -     Lipid Panel; Future; Expected date: 01/19/2025  -     TSH; Future; Expected date: 01/19/2025  Well controlled     3. Type 2 diabetes mellitus with other specified complication, with long-term current use of insulin  Overview:  Janumet 1000mg bid      Orders:  -     Hemoglobin A1C; Future; Expected date: 01/19/2025  -     Microalbumin/Creatinine Ratio, Urine; Future; Expected date: 01/19/2025  Needs better control.  We discussed cutting back on desserts .

## 2024-07-24 RX ORDER — FOLIC ACID 1 MG/1
TABLET ORAL
Qty: 150 TABLET | Refills: 3 | Status: SHIPPED | OUTPATIENT
Start: 2024-07-24

## 2024-07-25 DIAGNOSIS — I10 ESSENTIAL HYPERTENSION: ICD-10-CM

## 2024-07-26 RX ORDER — AMLODIPINE BESYLATE 10 MG/1
10 TABLET ORAL
Qty: 90 TABLET | Refills: 3 | Status: SHIPPED | OUTPATIENT
Start: 2024-07-26

## 2024-07-29 ENCOUNTER — INFUSION (OUTPATIENT)
Dept: INFUSION THERAPY | Facility: HOSPITAL | Age: 74
End: 2024-07-29
Attending: INTERNAL MEDICINE
Payer: MEDICARE

## 2024-07-29 VITALS
SYSTOLIC BLOOD PRESSURE: 146 MMHG | HEART RATE: 99 BPM | WEIGHT: 200.63 LBS | HEIGHT: 67 IN | BODY MASS INDEX: 31.49 KG/M2 | RESPIRATION RATE: 18 BRPM | DIASTOLIC BLOOD PRESSURE: 78 MMHG

## 2024-07-29 DIAGNOSIS — M05.79 RHEUMATOID ARTHRITIS INVOLVING MULTIPLE SITES WITH POSITIVE RHEUMATOID FACTOR: Primary | ICD-10-CM

## 2024-07-29 PROCEDURE — 63600175 PHARM REV CODE 636 W HCPCS: Mod: JZ,JA,TB | Performed by: INTERNAL MEDICINE

## 2024-07-29 PROCEDURE — 25000003 PHARM REV CODE 250: Performed by: INTERNAL MEDICINE

## 2024-07-29 PROCEDURE — 96365 THER/PROPH/DIAG IV INF INIT: CPT

## 2024-07-29 RX ORDER — ACETAMINOPHEN 325 MG/1
650 TABLET ORAL
OUTPATIENT
Start: 2024-08-05

## 2024-07-29 RX ORDER — SODIUM CHLORIDE 0.9 % (FLUSH) 0.9 %
10 SYRINGE (ML) INJECTION
OUTPATIENT
Start: 2024-08-05

## 2024-07-29 RX ORDER — HEPARIN 100 UNIT/ML
500 SYRINGE INTRAVENOUS
OUTPATIENT
Start: 2024-08-05

## 2024-07-29 RX ADMIN — SODIUM CHLORIDE 1000 MG: 9 INJECTION, SOLUTION INTRAVENOUS at 08:07

## 2024-08-26 ENCOUNTER — INFUSION (OUTPATIENT)
Dept: INFUSION THERAPY | Facility: HOSPITAL | Age: 74
End: 2024-08-26
Attending: INTERNAL MEDICINE
Payer: MEDICARE

## 2024-08-26 VITALS
HEIGHT: 67 IN | BODY MASS INDEX: 31.49 KG/M2 | RESPIRATION RATE: 16 BRPM | TEMPERATURE: 97 F | HEART RATE: 93 BPM | WEIGHT: 200.63 LBS | SYSTOLIC BLOOD PRESSURE: 134 MMHG | DIASTOLIC BLOOD PRESSURE: 74 MMHG

## 2024-08-26 DIAGNOSIS — M05.79 RHEUMATOID ARTHRITIS INVOLVING MULTIPLE SITES WITH POSITIVE RHEUMATOID FACTOR: Primary | ICD-10-CM

## 2024-08-26 PROCEDURE — 25000003 PHARM REV CODE 250: Performed by: INTERNAL MEDICINE

## 2024-08-26 PROCEDURE — 63600175 PHARM REV CODE 636 W HCPCS: Mod: JZ,JA,TB | Performed by: INTERNAL MEDICINE

## 2024-08-26 PROCEDURE — 96365 THER/PROPH/DIAG IV INF INIT: CPT

## 2024-08-26 RX ORDER — ACETAMINOPHEN 325 MG/1
650 TABLET ORAL
OUTPATIENT
Start: 2024-09-02

## 2024-08-26 RX ORDER — SODIUM CHLORIDE 0.9 % (FLUSH) 0.9 %
10 SYRINGE (ML) INJECTION
OUTPATIENT
Start: 2024-09-02

## 2024-08-26 RX ORDER — HEPARIN 100 UNIT/ML
500 SYRINGE INTRAVENOUS
OUTPATIENT
Start: 2024-09-02

## 2024-08-26 RX ADMIN — SODIUM CHLORIDE 1000 MG: 9 INJECTION, SOLUTION INTRAVENOUS at 08:08

## 2024-08-27 ENCOUNTER — OFFICE VISIT (OUTPATIENT)
Dept: RHEUMATOLOGY | Facility: CLINIC | Age: 74
End: 2024-08-27
Payer: MEDICARE

## 2024-08-27 ENCOUNTER — LAB VISIT (OUTPATIENT)
Dept: LAB | Facility: HOSPITAL | Age: 74
End: 2024-08-27
Attending: INTERNAL MEDICINE
Payer: MEDICARE

## 2024-08-27 VITALS
HEIGHT: 67 IN | SYSTOLIC BLOOD PRESSURE: 132 MMHG | WEIGHT: 201.5 LBS | BODY MASS INDEX: 31.63 KG/M2 | HEART RATE: 108 BPM | DIASTOLIC BLOOD PRESSURE: 81 MMHG

## 2024-08-27 DIAGNOSIS — M05.772 RHEUMATOID ARTHRITIS INVOLVING BOTH ANKLES WITH POSITIVE RHEUMATOID FACTOR: ICD-10-CM

## 2024-08-27 DIAGNOSIS — M06.9 RHEUMATOID ARTHRITIS, INVOLVING UNSPECIFIED SITE, UNSPECIFIED WHETHER RHEUMATOID FACTOR PRESENT: ICD-10-CM

## 2024-08-27 DIAGNOSIS — M05.771 RHEUMATOID ARTHRITIS INVOLVING BOTH ANKLES WITH POSITIVE RHEUMATOID FACTOR: ICD-10-CM

## 2024-08-27 DIAGNOSIS — M85.80 OSTEOPENIA, UNSPECIFIED LOCATION: ICD-10-CM

## 2024-08-27 DIAGNOSIS — M81.0 OSTEOPOROSIS, UNSPECIFIED OSTEOPOROSIS TYPE, UNSPECIFIED PATHOLOGICAL FRACTURE PRESENCE: ICD-10-CM

## 2024-08-27 DIAGNOSIS — I10 ESSENTIAL HYPERTENSION: ICD-10-CM

## 2024-08-27 LAB
CRP SERPL-MCNC: 2 MG/L (ref 0–8.2)
ERYTHROCYTE [SEDIMENTATION RATE] IN BLOOD BY PHOTOMETRIC METHOD: 9 MM/HR (ref 0–36)
MAGNESIUM SERPL-MCNC: 1.9 MG/DL (ref 1.6–2.6)
PHOSPHATE SERPL-MCNC: 3.3 MG/DL (ref 2.7–4.5)
PTH-INTACT SERPL-MCNC: 43.6 PG/ML (ref 9–77)

## 2024-08-27 PROCEDURE — 1101F PT FALLS ASSESS-DOCD LE1/YR: CPT | Mod: CPTII,S$GLB,, | Performed by: INTERNAL MEDICINE

## 2024-08-27 PROCEDURE — 3075F SYST BP GE 130 - 139MM HG: CPT | Mod: CPTII,S$GLB,, | Performed by: INTERNAL MEDICINE

## 2024-08-27 PROCEDURE — 36415 COLL VENOUS BLD VENIPUNCTURE: CPT | Performed by: INTERNAL MEDICINE

## 2024-08-27 PROCEDURE — 3288F FALL RISK ASSESSMENT DOCD: CPT | Mod: CPTII,S$GLB,, | Performed by: INTERNAL MEDICINE

## 2024-08-27 PROCEDURE — 86140 C-REACTIVE PROTEIN: CPT | Performed by: INTERNAL MEDICINE

## 2024-08-27 PROCEDURE — 84100 ASSAY OF PHOSPHORUS: CPT | Performed by: INTERNAL MEDICINE

## 2024-08-27 PROCEDURE — 86334 IMMUNOFIX E-PHORESIS SERUM: CPT | Mod: 26,,, | Performed by: PATHOLOGY

## 2024-08-27 PROCEDURE — 83970 ASSAY OF PARATHORMONE: CPT | Performed by: INTERNAL MEDICINE

## 2024-08-27 PROCEDURE — 99214 OFFICE O/P EST MOD 30 MIN: CPT | Mod: S$GLB,,, | Performed by: INTERNAL MEDICINE

## 2024-08-27 PROCEDURE — 1125F AMNT PAIN NOTED PAIN PRSNT: CPT | Mod: CPTII,S$GLB,, | Performed by: INTERNAL MEDICINE

## 2024-08-27 PROCEDURE — 83735 ASSAY OF MAGNESIUM: CPT | Performed by: INTERNAL MEDICINE

## 2024-08-27 PROCEDURE — 86334 IMMUNOFIX E-PHORESIS SERUM: CPT | Performed by: INTERNAL MEDICINE

## 2024-08-27 PROCEDURE — 3079F DIAST BP 80-89 MM HG: CPT | Mod: CPTII,S$GLB,, | Performed by: INTERNAL MEDICINE

## 2024-08-27 PROCEDURE — 99999 PR PBB SHADOW E&M-EST. PATIENT-LVL V: CPT | Mod: PBBFAC,,, | Performed by: INTERNAL MEDICINE

## 2024-08-27 PROCEDURE — 85652 RBC SED RATE AUTOMATED: CPT | Performed by: INTERNAL MEDICINE

## 2024-08-27 PROCEDURE — 1159F MED LIST DOCD IN RCRD: CPT | Mod: CPTII,S$GLB,, | Performed by: INTERNAL MEDICINE

## 2024-08-27 RX ORDER — AMLODIPINE BESYLATE 10 MG/1
10 TABLET ORAL DAILY
Qty: 90 TABLET | Refills: 3 | Status: SHIPPED | OUTPATIENT
Start: 2024-08-27

## 2024-08-27 RX ORDER — ERGOCALCIFEROL 1.25 MG/1
50000 CAPSULE ORAL
Qty: 12 CAPSULE | Refills: 3 | Status: SHIPPED | OUTPATIENT
Start: 2024-08-27

## 2024-08-27 RX ORDER — FOLIC ACID 1 MG/1
TABLET ORAL
Qty: 150 TABLET | Refills: 3 | Status: SHIPPED | OUTPATIENT
Start: 2024-08-27

## 2024-08-27 RX ORDER — METHOTREXATE 25 MG/ML
INJECTION, SOLUTION INTRA-ARTERIAL; INTRAMUSCULAR; INTRAVENOUS
Qty: 12 ML | Refills: 0 | Status: SHIPPED | OUTPATIENT
Start: 2024-08-27

## 2024-08-27 RX ORDER — LEFLUNOMIDE 20 MG/1
20 TABLET ORAL DAILY
Qty: 90 TABLET | Refills: 0 | Status: SHIPPED | OUTPATIENT
Start: 2024-08-27

## 2024-08-27 ASSESSMENT — ROUTINE ASSESSMENT OF PATIENT INDEX DATA (RAPID3)
TOTAL RAPID3 SCORE: 3.61
PATIENT GLOBAL ASSESSMENT SCORE: 3
FATIGUE SCORE: 3
PAIN SCORE: 6.5
AM STIFFNESS SCORE: 0, NO
PSYCHOLOGICAL DISTRESS SCORE: 0
MDHAQ FUNCTION SCORE: 0.4

## 2024-08-27 NOTE — PROGRESS NOTES
I have personally taken the history and examined the patient and agree with the resident,s note as stated above       Latest Reference Range & Units 07/17/24 07:24   WBC 3.90 - 12.70 K/uL 10.56   RBC 4.00 - 5.40 M/uL 4.59   Hemoglobin 12.0 - 16.0 g/dL 13.7   Hematocrit 37.0 - 48.5 % 42.1   MCV 82 - 98 fL 92   MCH 27.0 - 31.0 pg 29.8   MCHC 32.0 - 36.0 g/dL 32.5   RDW 11.5 - 14.5 % 14.6 (H)   Platelet Count 150 - 450 K/uL 225   MPV 9.2 - 12.9 fL 9.7   Gran % 38.0 - 73.0 % 52.3   Lymph % 18.0 - 48.0 % 29.5   Mono % 4.0 - 15.0 % 7.6   Eos % 0.0 - 8.0 % 9.1 (H)   Basophil % 0.0 - 1.9 % 0.7   Immature Granulocytes 0.0 - 0.5 % 0.8 (H)   Gran # (ANC) 1.8 - 7.7 K/uL 5.5   Lymph # 1.0 - 4.8 K/uL 3.1   Mono # 0.3 - 1.0 K/uL 0.8   Eos # 0.0 - 0.5 K/uL 1.0 (H)   Baso # 0.00 - 0.20 K/uL 0.07   Immature Grans (Abs) 0.00 - 0.04 K/uL 0.08 (H)   nRBC 0 /100 WBC 0   Differential Method  Automated   Sodium 136 - 145 mmol/L 141   Potassium 3.5 - 5.1 mmol/L 4.5   Chloride 95 - 110 mmol/L 104   CO2 23 - 29 mmol/L 29   Anion Gap 8 - 16 mmol/L 8   BUN 8 - 23 mg/dL 14   Creatinine 0.5 - 1.4 mg/dL 0.9   eGFR >60 mL/min/1.73 m^2 >60   Glucose 70 - 110 mg/dL 203 (H)   Calcium 8.7 - 10.5 mg/dL 9.8   ALP 55 - 135 U/L 83   PROTEIN TOTAL 6.0 - 8.4 g/dL 6.8   Albumin 3.5 - 5.2 g/dL 3.7   BILIRUBIN TOTAL 0.1 - 1.0 mg/dL 0.4   AST 10 - 40 U/L 22   ALT 10 - 44 U/L 30   Cholesterol Total 120 - 199 mg/dL 94 (L)   HDL 40 - 75 mg/dL 38 (L)   HDL/Cholesterol Ratio 20.0 - 50.0 % 40.4   Non-HDL Cholesterol mg/dL 56   Total Cholesterol/HDL Ratio 2.0 - 5.0  2.5   Triglycerides 30 - 150 mg/dL 182 (H)   LDL Cholesterol 63.0 - 159.0 mg/dL 19.6 (L)   Hemoglobin A1C External 4.0 - 5.6 % 7.8 (H)   Estimated Avg Glucose 68 - 131 mg/dL 177 (H)   TSH 0.400 - 4.000 uIU/mL 1.785   Urine Creatinine 15.0 - 325.0 mg/dL 124.5   Urine Microalbumin ug/mL 303.0   MICROALB/CREAT RATIO 0.0 - 30.0 ug/mg 243.4 (H)   (H): Data is abnormally high  (L): Data is abnormally low    RA:  RF+ ACPA+ GUSTABO-:  TJ 0  SJ 0 Pt global  30  ESR  CRP    DAS28  [id number]    CDAI   Rheumatoid Arthritis Treatment Goals:  -Disease Activity Measure: CDAI  -Target: LDA  -Therapy/Change: none  -Shared Decision Making: Discussed goals of care and therapy plan together with patient as outlined above.    Frequent falls, none recent  OA knees;left>right  OA hips: left>right  Osteoporosis DXA 4/30/24:  FRAX hip 3.4%  MOF 14.9% on alendronate intermittently with drug holidays  2015- 2022.   Class 1 obesity Body mass index is 31.56 kg/m².   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 Dental check prior to starting  *Fluad and new Covid vaccine in Sept/Oct  *Prevnar 20 >7/26/24  Hold methotrexate for one week after any vaccinations  Rollator walker prn  Cont exercise  for right hip  Cont abatacept 1000mg IV q 28 days re-ordered and p.a. [place]  Cont methotrexate 20mg (0.8 ml) sc q 7 days with folic acid 1mg daily but 8 mg day after methotrexate dosing.  Cont leflunomide 20mg daily  Pt has on her home med list rosuvastatin 40mg daily + pravastatin 80mg daily which likely explains her LDL of 19  She should check with Dr. Mark about which statin he would like her to take  *Needs to   Increase walking,stationary bike  Mediterranean/DASH diet, no junk food, low carb, weight reduction  Cont F/u Dr. Mark for EH, T2 DM  hyperlipidemia  Standing labs q 3 months  RTC 6 months

## 2024-08-27 NOTE — PATIENT INSTRUCTIONS
ESR, CRP and Osteoporosis labs today  Denosumab 60mg sc q 6 months [facility] Risks and benefits discussed, brochure provided. Dental check prior to starting  *Fluad and new Covid vaccine in Sept/Oct  *Prevnar 20 >7/26/24  Hold methotrexate for one week after any vaccinations  Rollator walker prn  Cont exercise  for right hip  Cont abatacept 1000mg IV q 28 days re-ordered and p.a. [place]  Cont methotrexate 20mg (0.8 ml) sc q 7 days with folic acid 1mg daily but 8 mg day after methotrexate dosing.  Cont leflunomide 20mg daily  Pt has on her home med list rosuvastatin 40mg daily + pravastatin 80mg daily which likely explains her LDL of 19  She should check with Dr. Mark about which statin he would like her to take  *Needs to   Increase walking,stationary bike  Mediterranean/DASH diet, no junk food, low carb, weight reduction  Cont F/u Dr. Mark for EH, T2 DM  hyperlipidemia  Standing labs q 3 months  RTC 6 months

## 2024-08-27 NOTE — PROGRESS NOTES
Patient ID:  Maru Shelby    YOB: 1950     MRN:  [id number]     Subjective:     Chief Complaint:  Disease Management     History of Present Illness:  Pt is a 74 y.o. female with RA, osteoporosis, fibromyalgia who presents today for f/u. LCV was 3/27/24. At that time she was doing well and continued on same regimen.    Today: She had a fall about a month ago. It was raining and she had slipped. She did hit her head and was evaluated in ED. Scans at the time were negative. She recently had some pain and swelling in her right 1st MCP about 2 weeks ago. Lasted for about 3-5 days then went away. After that, she started hurting in her left elbow. This also went away after a few days.  Recently had DXA completed which showed osteoporosis. Previously on alendronate.     Current medications: abatacept (orencia) 1000mg IV q28 days (last 8/26/24), methotrexate 20mg sc q 7 days with folic acid 1mg daily, leflunomide 20mg daily, nortriptyline 10mg nightly, gabapentin 300mg nightly.    Exercise: able to walk a little but not much; otherwise not exercising much  Diet: Not following any particular diet. Trying to eat less rice. Portion controlling.    Has had rashes in her antecubital fossa; uses cortisol cream and goes away. Dry eyes but better with drops. Denies hair loss, vision changes, dry mouth, oral/nasal ulcers, trouble swallowing, joint swelling, morning stiffness, or GI disturbances.      Review of Systems   Review of Systems   Constitutional:  Negative for chills, fever and unexpected weight change.   HENT:  Negative for mouth sores and trouble swallowing.    Eyes:  Negative for pain and visual disturbance.   Respiratory:  Positive for cough. Negative for shortness of breath.    Cardiovascular:  Negative for chest pain.   Gastrointestinal:  Negative for constipation, diarrhea, nausea and vomiting.   Genitourinary:  Negative for dysuria and frequency.   Musculoskeletal:  Negative for arthralgias, joint  "swelling and myalgias.   Skin:  Positive for rash.   Neurological:  Negative for weakness, light-headedness and headaches.   Hematological:  Does not bruise/bleed easily.        Current Medications:    Current Outpatient Medications:     blood sugar diagnostic (ACCU-CHEK GUIDE) Strp, Use to check blood sugar once daily and as needed., Disp: 100 each, Rfl: 3    DOCOSAHEXANOIC ACID/EPA (FISH OIL ORAL), Take by mouth.  , Disp: , Rfl:     hydrocortisone 2.5 % cream, APPLY 1 APPLICATION TOPICAL TWICE DAILY TO RASH UNDER BREAST WHEN ITCH MIX IN EQUAL PARTS WITH KETOCONAZOLE, Disp: , Rfl:     insulin syringe-needle U-100 1 mL 31 gauge x 5/16 Syrg, Use as directed, Disp: 10 each, Rfl: 3    ketoconazole (NIZORAL) 2 % cream, APPLY 1 APPLICATION TOPICAL TWICE DAILY TO RASH UNDER THE BREAST WHEN ITCH. MIX IN EQUAL PARTS WITH HYDROCORTISONE, Disp: , Rfl:     lancets Misc, Check blood sugar once daily, Disp: 100 each, Rfl: 1    multivitamin (ONE DAILY MULTIVITAMIN) per tablet, Take 1 tablet by mouth once daily.  , Disp: , Rfl:     mupirocin (BACTROBAN) 2 % ointment, Apply topically 3 (three) times daily., Disp: 22 g, Rfl: 0    nortriptyline (PAMELOR) 10 MG capsule, Take 1 capsule (10 mg total) by mouth every evening., Disp: 90 capsule, Rfl: 1    pravastatin (PRAVACHOL) 80 MG tablet, Take 80 mg by mouth., Disp: , Rfl:     SITagliptan-metformin (JANUMET) 50-1,000 mg per tablet, Take 1 tablet by mouth 2 (two) times daily with meals., Disp: 180 tablet, Rfl: 3    syringe with needle, safety (BD SAFETY-ALONDRA TUBERCULIN) 1 mL 27 gauge x 1/2" Syrg, USE AS DIRECTED, Disp: 12 Syringe, Rfl: 3    amLODIPine (NORVASC) 10 MG tablet, Take 1 tablet (10 mg total) by mouth once daily., Disp: 90 tablet, Rfl: 3    blood-glucose meter (ACCU-CHEK GUIDE GLUCOSE METER) Misc, 1 each by Misc.(Non-Drug; Combo Route) route once daily., Disp: 100 each, Rfl: 1    ergocalciferol (ERGOCALCIFEROL) 50,000 unit Cap, Take 1 capsule (50,000 Units total) by mouth every " 7 days., Disp: 12 capsule, Rfl: 3    folic acid (FOLVITE) 1 MG tablet, TAKE 1 TABLET DAILY, BUT TAKE 8 TABLETS BY MOUTH EVERY THURSDAY, Disp: 150 tablet, Rfl: 3    latanoprost 0.005 % ophthalmic solution, , Disp: , Rfl: 0    leflunomide (ARAVA) 20 MG Tab, Take 1 tablet (20 mg total) by mouth once daily., Disp: 90 tablet, Rfl: 0    LIDOcaine (LIDODERM) 5 %, Place 1 patch onto the skin once daily. Remove & Discard patch within 12 hours or as directed by MD (Patient not taking: Reported on 8/27/2024), Disp: 30 patch, Rfl: 1    methotrexate 25 mg/mL injection, INJECT 0.8 MLS INTO THE SKIN EVERY 7 DAYS., Disp: 12 mL, Rfl: 0    UNABLE TO FIND, 250 mg every 30 days. medication name: [place] (Patient not taking: Reported on 7/23/2024), Disp: , Rfl:     Current Facility-Administered Medications:     abatacept (with maltose) (ORENCIA) 1,000 mg in sodium chloride 0.9% 100 mL IVPB, 1,000 mg, Intravenous, 1 time in Clinic/HOD, Timi Royal MD, Stopped at 12/19/22 0600    acetaminophen tablet 650 mg, 650 mg, Oral, Once PRN, Ronaldo Moralez MD    albuterol inhaler 2 puff, 2 puff, Inhalation, Q20 Min PRN, Ronaldo Moralez MD    diphenhydrAMINE injection 25 mg, 25 mg, Intravenous, Once PRN, Ronaldo Moralez MD    EPINEPHrine (EPIPEN) 0.3 mg/0.3 mL pen injection 0.3 mg, 0.3 mg, Intramuscular, PRN, Ronaldo Moralez MD    ondansetron disintegrating tablet 4 mg, 4 mg, Oral, Once PRN, Ronaldo Moralez MD    sodium chloride 0.9% 500 mL flush bag, , Intravenous, PRN, Ronaldo Moralez MD    sodium chloride 0.9% flush 10 mL, 10 mL, Intravenous, PRN, Ronaldo Moralez MD     Objective:     Vitals:    08/27/24 1002   BP: 132/81   Pulse: 108      Body mass index is 31.56 kg/m².     Physical Exam   Constitutional: No distress.   HENT:   Head: Normocephalic.   Right Ear: External ear normal.   Left Ear: External ear normal.   Eyes: Conjunctivae are normal.   Cardiovascular: Normal rate.   Pulmonary/Chest:  Effort normal. No respiratory distress.   Musculoskeletal:      Right shoulder: Normal.      Left shoulder: Normal.      Right elbow: Normal.      Left elbow: Normal.      Right wrist: Normal.      Left wrist: Normal.      Cervical back: Normal range of motion.      Right knee: Normal.      Left knee: Normal.   Neurological: She is alert.   Skin: Skin is warm.   Psychiatric: Her behavior is normal. Mood, judgment and thought content normal.       Right Side Rheumatological Exam     Examination finds the shoulder, elbow, wrist, knee, 1st PIP, 2nd PIP, 2nd MCP, 3rd PIP, 3rd MCP, 4th PIP and 4th MCP normal.    The patient is tender to palpation of the 1st MCP, 5th PIP and 5th MCP    Muscle Strength (0-5 scale):  Deltoid:  5  Biceps: 5/5   Triceps:  5  : 5/5   Iliopsoas: 4.8  Quadriceps:  5     Left Side Rheumatological Exam     Examination finds the shoulder, elbow, wrist, knee, 1st PIP, 1st MCP, 2nd PIP, 2nd MCP, 3rd PIP, 3rd MCP, 4th PIP and 4th MCP normal.    The patient is tender to palpation of the 5th PIP and 5th MCP.    Muscle Strength (0-5 scale):  Deltoid:  5  Biceps: 5/5   Triceps:  5  :  5/5   Iliopsoas: 4.8  Quadriceps:  5              6/5/2023 9/1/2023 3/27/2024 8/27/2024   Tender (LEES-28) 0 / 28  1 / 28  2 / 28  4 / 28    Swollen (LEES-28) 0 / 28 1 / 28  0 / 28  0 / 28    Provider Global 0 mm 10 mm 0 mm 10 mm   Patient Global 25 mm 35 mm 30 mm 30 mm   ESR 26 mm/hr 22 mm/hr 20 mm/hr --   CRP 7.2 mg/L 5 mg/L 5.9 mg/L --   LEES-28 (ESR) 2.63 (Low disease activity) 3.49 (Moderate disease activity) 3.31 (Moderate disease activity) --   LEES-28 (CRP) 2.07 (Remission) 2.94 (Low disease activity) 2.87 (Low disease activity) --   CDAI Score 2.5  6.5  5  8               Assessment:     1. Essential hypertension    2. Osteopenia, unspecified location    3. Rheumatoid arthritis involving both ankles with positive rheumatoid factor          Plan:      ESR, CRP, and osteoporosis labs today  Will initiate  Denosumab 60mg sc q 6 months at [facility]  Continue abatacept 1000mg IV q 28 days re-ordered and p.a. [facility]  Continue methotrexate 20mg (0.8 ml) sc q 7 days with folic acid 1mg daily but 8 mg day after methotrexate dosing.  Continue leflunomide 20mg daily  Continue pravastatin 80mg every evening  Prevnar 20  Fluad and new Covid vaccine in Sept/Oct  Hold MTX for one week after all vaccinations  Rollator walker prn  Cont exercise  for right hip  Increase walking,stationary bike  Mediterranean/DASH diet, no junk food, low carb, weight reduction  Cont F/u Dr. Mark for EH, T2 DM  hyperlipidemia  Standing labs q 3 months  RTC 6 months with labs       Victor Hugo Mathews M.D.  PGY-1  [employer] [facility]

## 2024-08-28 LAB — INTERPRETATION SERPL IFE-IMP: NORMAL

## 2024-08-29 RX ORDER — GABAPENTIN 300 MG/1
300 CAPSULE ORAL NIGHTLY
Qty: 30 CAPSULE | Refills: 3 | Status: SHIPPED | OUTPATIENT
Start: 2024-08-29

## 2024-08-29 NOTE — TELEPHONE ENCOUNTER
No care due was identified.  [facility] Embedded Care Due Messages. Reference number: [id number].   8/29/2024 9:08:17 AM CDT

## 2024-08-30 LAB — PATHOLOGIST INTERPRETATION IFE: NORMAL

## 2024-09-23 ENCOUNTER — INFUSION (OUTPATIENT)
Dept: INFUSION THERAPY | Facility: HOSPITAL | Age: 74
End: 2024-09-23
Attending: INTERNAL MEDICINE
Payer: MEDICARE

## 2024-09-23 VITALS
BODY MASS INDEX: 31.63 KG/M2 | DIASTOLIC BLOOD PRESSURE: 68 MMHG | SYSTOLIC BLOOD PRESSURE: 116 MMHG | TEMPERATURE: 97 F | RESPIRATION RATE: 20 BRPM | HEART RATE: 94 BPM | WEIGHT: 201.5 LBS | HEIGHT: 67 IN

## 2024-09-23 DIAGNOSIS — M05.79 RHEUMATOID ARTHRITIS INVOLVING MULTIPLE SITES WITH POSITIVE RHEUMATOID FACTOR: Primary | ICD-10-CM

## 2024-09-23 PROCEDURE — 96365 THER/PROPH/DIAG IV INF INIT: CPT

## 2024-09-23 PROCEDURE — 63600175 PHARM REV CODE 636 W HCPCS: Mod: JZ,JA,TB | Performed by: INTERNAL MEDICINE

## 2024-09-23 PROCEDURE — 25000003 PHARM REV CODE 250: Performed by: INTERNAL MEDICINE

## 2024-09-23 RX ORDER — ACETAMINOPHEN 325 MG/1
650 TABLET ORAL
OUTPATIENT
Start: 2024-09-30

## 2024-09-23 RX ORDER — HEPARIN 100 UNIT/ML
500 SYRINGE INTRAVENOUS
OUTPATIENT
Start: 2024-09-30

## 2024-09-23 RX ORDER — SODIUM CHLORIDE 0.9 % (FLUSH) 0.9 %
10 SYRINGE (ML) INJECTION
OUTPATIENT
Start: 2024-09-30

## 2024-09-23 RX ADMIN — SODIUM CHLORIDE 1000 MG: 9 INJECTION, SOLUTION INTRAVENOUS at 08:09

## 2024-10-03 ENCOUNTER — TELEPHONE (OUTPATIENT)
Dept: RHEUMATOLOGY | Facility: CLINIC | Age: 74
End: 2024-10-03
Payer: MEDICARE

## 2024-10-03 DIAGNOSIS — M81.0 OSTEOPOROSIS, UNSPECIFIED OSTEOPOROSIS TYPE, UNSPECIFIED PATHOLOGICAL FRACTURE PRESENCE: Primary | ICD-10-CM

## 2024-10-03 NOTE — TELEPHONE ENCOUNTER
Patient call regarding labs prior to Prolia injection.    No answer x 2, VM box full. Will re-attempt at a later time.

## 2024-10-11 ENCOUNTER — PATIENT MESSAGE (OUTPATIENT)
Dept: ADMINISTRATIVE | Facility: HOSPITAL | Age: 74
End: 2024-10-11
Payer: MEDICARE

## 2024-10-14 ENCOUNTER — LAB VISIT (OUTPATIENT)
Dept: LAB | Facility: HOSPITAL | Age: 74
End: 2024-10-14
Attending: INTERNAL MEDICINE
Payer: MEDICARE

## 2024-10-14 DIAGNOSIS — M81.0 OSTEOPOROSIS, UNSPECIFIED OSTEOPOROSIS TYPE, UNSPECIFIED PATHOLOGICAL FRACTURE PRESENCE: ICD-10-CM

## 2024-10-14 LAB
25(OH)D3+25(OH)D2 SERPL-MCNC: 61 NG/ML (ref 30–96)
ALBUMIN SERPL BCP-MCNC: 3.6 G/DL (ref 3.5–5.2)
ANION GAP SERPL CALC-SCNC: 10 MMOL/L (ref 8–16)
BUN SERPL-MCNC: 13 MG/DL (ref 8–23)
CALCIUM SERPL-MCNC: 9.6 MG/DL (ref 8.7–10.5)
CHLORIDE SERPL-SCNC: 105 MMOL/L (ref 95–110)
CO2 SERPL-SCNC: 24 MMOL/L (ref 23–29)
CREAT SERPL-MCNC: 0.8 MG/DL (ref 0.5–1.4)
EST. GFR  (NO RACE VARIABLE): >60 ML/MIN/1.73 M^2
GLUCOSE SERPL-MCNC: 259 MG/DL (ref 70–110)
PHOSPHATE SERPL-MCNC: 2.6 MG/DL (ref 2.7–4.5)
POTASSIUM SERPL-SCNC: 4.5 MMOL/L (ref 3.5–5.1)
SODIUM SERPL-SCNC: 139 MMOL/L (ref 136–145)

## 2024-10-14 PROCEDURE — 36415 COLL VENOUS BLD VENIPUNCTURE: CPT | Performed by: INTERNAL MEDICINE

## 2024-10-14 PROCEDURE — 80069 RENAL FUNCTION PANEL: CPT | Performed by: INTERNAL MEDICINE

## 2024-10-14 PROCEDURE — 82306 VITAMIN D 25 HYDROXY: CPT | Performed by: INTERNAL MEDICINE

## 2024-10-15 ENCOUNTER — PATIENT MESSAGE (OUTPATIENT)
Dept: RHEUMATOLOGY | Facility: CLINIC | Age: 74
End: 2024-10-15
Payer: MEDICARE

## 2024-10-21 ENCOUNTER — INFUSION (OUTPATIENT)
Dept: INFUSION THERAPY | Facility: HOSPITAL | Age: 74
End: 2024-10-21
Attending: INTERNAL MEDICINE
Payer: MEDICARE

## 2024-10-21 VITALS
TEMPERATURE: 97 F | SYSTOLIC BLOOD PRESSURE: 131 MMHG | HEART RATE: 100 BPM | DIASTOLIC BLOOD PRESSURE: 74 MMHG | RESPIRATION RATE: 20 BRPM

## 2024-10-21 DIAGNOSIS — M05.79 RHEUMATOID ARTHRITIS INVOLVING MULTIPLE SITES WITH POSITIVE RHEUMATOID FACTOR: Primary | ICD-10-CM

## 2024-10-21 DIAGNOSIS — M81.0 OSTEOPOROSIS WITHOUT CURRENT PATHOLOGICAL FRACTURE, UNSPECIFIED OSTEOPOROSIS TYPE: ICD-10-CM

## 2024-10-21 PROCEDURE — 96372 THER/PROPH/DIAG INJ SC/IM: CPT

## 2024-10-21 PROCEDURE — 96365 THER/PROPH/DIAG IV INF INIT: CPT

## 2024-10-21 PROCEDURE — 25000003 PHARM REV CODE 250: Performed by: INTERNAL MEDICINE

## 2024-10-21 PROCEDURE — 63600175 PHARM REV CODE 636 W HCPCS: Mod: JZ,JA,TB | Performed by: INTERNAL MEDICINE

## 2024-10-21 RX ORDER — ACETAMINOPHEN 325 MG/1
650 TABLET ORAL
OUTPATIENT
Start: 2024-10-28

## 2024-10-21 RX ORDER — HEPARIN 100 UNIT/ML
500 SYRINGE INTRAVENOUS
OUTPATIENT
Start: 2024-10-28

## 2024-10-21 RX ORDER — SODIUM CHLORIDE 0.9 % (FLUSH) 0.9 %
10 SYRINGE (ML) INJECTION
OUTPATIENT
Start: 2024-10-28

## 2024-10-21 RX ADMIN — DENOSUMAB 60 MG: 60 INJECTION SUBCUTANEOUS at 08:10

## 2024-10-21 RX ADMIN — SODIUM CHLORIDE 1000 MG: 9 INJECTION, SOLUTION INTRAVENOUS at 08:10

## 2024-11-07 ENCOUNTER — PATIENT OUTREACH (OUTPATIENT)
Dept: ADMINISTRATIVE | Facility: HOSPITAL | Age: 74
End: 2024-11-07
Payer: MEDICARE

## 2024-11-07 NOTE — PROGRESS NOTES
Care Coordination Encounter Details:       MyChart Portal Status:         [x]  Reviewed MyChart Portal Status offered / enrolled if applicable        Additional Notes:     MyChart Outcomes: Pt is enrolled & active          Updates Requested / Reviewed:        Updated Care Coordination Note, Care Everywhere, External Sources: [facility] and [facility], Care Team Updated, Removed  or Duplicate Orders, and Immunizations Reconciliation Completed or Queried: [place]         Health Maintenance Screening(s) Due:      Health Maintenance Topics Overdue:      VB Score: 0     Patient is not due for any topics at this time.                       Health Maintenance Topic(s) Outreach Outcomes & Actions Taken:    Primary Care Appt - Outreach Outcomes & Actions Taken  : appointment is scheduled    Medication Adherence / Statins - Outreach Outcomes & Actions Taken  : no trouble with getting or taking medication pt is compliant     Additional Notes:             Chronic Disease Management:     Diabetes Measures        Lab Results   Component Value Date    HGBA1C 7.8 (H) 2024           [x]  Reviewed chart for active Diabetes diagnosis     []  Scheduled necessary follow up appointments if needed         Additional Notes:             Hypertension Measures        BP Readings from Last 1 Encounters:   10/21/24 131/74           [x]  Reviewed chart for active Hypertension diagnosis     []  Reviewed & documented Home BP Cuff     []  Documented a Remote BP if needed & applicable     []  Scheduled necessary follow up appointments with Primary Care if needed         Additional Notes:             Provider Team Continuity:     Last PCP Visit Date: 2024          [x]  Reviewed Primary Care Provider Visits, Annual Wellness Visit, and Future          Appointments to ensure appointments have been scheduled and/or           completed        Additional Notes:             Social Determinants of Health          [x]  Reviewed, completed,  and/or updated the following sections:                  Food Insecurity, Transportation Needs, Financial Resource Strain,                 Tobacco Use        Additional Notes:             Care Management, Digital Medicine, and/or Education Referrals    OPCM Risk Score: 48.7         Next Steps - Referral Actions: no referrals today        Additional Notes:

## 2024-11-18 ENCOUNTER — INFUSION (OUTPATIENT)
Dept: INFUSION THERAPY | Facility: HOSPITAL | Age: 74
End: 2024-11-18
Attending: INTERNAL MEDICINE
Payer: MEDICARE

## 2024-11-18 VITALS
RESPIRATION RATE: 18 BRPM | DIASTOLIC BLOOD PRESSURE: 80 MMHG | SYSTOLIC BLOOD PRESSURE: 133 MMHG | HEART RATE: 108 BPM | TEMPERATURE: 97 F

## 2024-11-18 DIAGNOSIS — M05.79 RHEUMATOID ARTHRITIS INVOLVING MULTIPLE SITES WITH POSITIVE RHEUMATOID FACTOR: Primary | ICD-10-CM

## 2024-11-18 PROCEDURE — 63600175 PHARM REV CODE 636 W HCPCS: Mod: JZ,JA,TB | Performed by: INTERNAL MEDICINE

## 2024-11-18 PROCEDURE — 96365 THER/PROPH/DIAG IV INF INIT: CPT

## 2024-11-18 PROCEDURE — 25000003 PHARM REV CODE 250: Performed by: INTERNAL MEDICINE

## 2024-11-18 RX ORDER — HEPARIN 100 UNIT/ML
500 SYRINGE INTRAVENOUS
OUTPATIENT
Start: 2024-11-25

## 2024-11-18 RX ORDER — ACETAMINOPHEN 325 MG/1
650 TABLET ORAL
OUTPATIENT
Start: 2024-11-25

## 2024-11-18 RX ORDER — SODIUM CHLORIDE 0.9 % (FLUSH) 0.9 %
10 SYRINGE (ML) INJECTION
OUTPATIENT
Start: 2024-11-25

## 2024-11-18 RX ADMIN — SODIUM CHLORIDE 1000 MG: 9 INJECTION, SOLUTION INTRAVENOUS at 08:11

## 2024-11-18 NOTE — PROGRESS NOTES
Patient tolerated medication well. Reviewed discharge instructions, medications, follow-up appointments, and when to seek medical attention. Patient voiced understanding.

## 2024-11-22 ENCOUNTER — OFFICE VISIT (OUTPATIENT)
Dept: FAMILY MEDICINE | Facility: CLINIC | Age: 74
End: 2024-11-22
Payer: MEDICARE

## 2024-11-22 VITALS
BODY MASS INDEX: 30.26 KG/M2 | SYSTOLIC BLOOD PRESSURE: 106 MMHG | WEIGHT: 192.81 LBS | HEIGHT: 67 IN | DIASTOLIC BLOOD PRESSURE: 62 MMHG | HEART RATE: 80 BPM | RESPIRATION RATE: 16 BRPM

## 2024-11-22 DIAGNOSIS — J02.9 VIRAL PHARYNGITIS: ICD-10-CM

## 2024-11-22 DIAGNOSIS — J02.9 SORE THROAT: Primary | ICD-10-CM

## 2024-11-22 LAB
CTP QC/QA: YES
S PYO RRNA THROAT QL PROBE: NEGATIVE

## 2024-11-22 PROCEDURE — 99999 PR PBB SHADOW E&M-EST. PATIENT-LVL V: CPT | Mod: PBBFAC,,, | Performed by: NURSE PRACTITIONER

## 2024-11-22 RX ORDER — ROSUVASTATIN CALCIUM 40 MG/1
TABLET, COATED ORAL
COMMUNITY
Start: 2024-10-07

## 2024-11-22 RX ORDER — BETAMETHASONE DIPROPIONATE 0.5 MG/G
CREAM TOPICAL
COMMUNITY
Start: 2024-09-17

## 2024-11-22 RX ORDER — DOXYCYCLINE 100 MG/1
CAPSULE ORAL
COMMUNITY
Start: 2024-11-13

## 2024-11-22 RX ORDER — METHOTREXATE 25 MG/ML
INJECTION INTRA-ARTERIAL; INTRAMUSCULAR; INTRATHECAL; INTRAVENOUS
COMMUNITY
Start: 2024-08-30

## 2024-11-22 RX ORDER — TRIAMCINOLONE ACETONIDE 1 MG/G
CREAM TOPICAL
COMMUNITY
Start: 2024-11-13

## 2024-11-22 NOTE — PROGRESS NOTES
Subjective:       Patient ID: Maru Shelby is a 74 y.o. female.    Chief Complaint: Sore Throat (Patient states she started Wednesday with sore throat)    Here with sore throat that started yesterday. Has done salt water gargles and Tylenol PM.    Sore Throat   The current episode started yesterday. The problem has been unchanged. There has been no fever. Associated symptoms include headaches. Pertinent negatives include no abdominal pain, congestion, coughing, diarrhea, ear pain, shortness of breath or vomiting. She has tried acetaminophen and gargles for the symptoms.     Review of Systems   Constitutional:  Positive for fatigue. Negative for appetite change and fever.   HENT:  Positive for sore throat. Negative for congestion and ear pain.    Eyes: Negative.  Negative for visual disturbance.   Respiratory: Negative.  Negative for cough, shortness of breath and wheezing.    Cardiovascular: Negative.    Gastrointestinal: Negative.  Negative for abdominal pain, diarrhea, nausea and vomiting.   Endocrine: Negative.    Genitourinary: Negative.  Negative for difficulty urinating and urgency.   Musculoskeletal: Negative.  Negative for arthralgias and myalgias.   Skin: Negative.  Negative for color change.   Allergic/Immunologic: Negative.    Neurological:  Positive for headaches. Negative for dizziness.   Hematological: Negative.    Psychiatric/Behavioral: Negative.  Negative for sleep disturbance.    All other systems reviewed and are negative.      Objective:      Physical Exam  Vitals and nursing note reviewed.   Constitutional:       General: She is not in acute distress.     Appearance: Normal appearance. She is well-developed.   HENT:      Head: Normocephalic and atraumatic.      Right Ear: External ear normal.      Left Ear: External ear normal.      Nose: Nose normal.      Mouth/Throat:      Pharynx: Posterior oropharyngeal erythema present.   Eyes:      Conjunctiva/sclera: Conjunctivae normal.       Pupils: Pupils are equal, round, and reactive to light.   Cardiovascular:      Rate and Rhythm: Normal rate.      Pulses: Normal pulses.      Heart sounds: Normal heart sounds. No murmur heard.  Pulmonary:      Effort: Pulmonary effort is normal. No respiratory distress.      Breath sounds: Normal breath sounds.   Abdominal:      Palpations: Abdomen is soft.   Musculoskeletal:         General: Normal range of motion.      Cervical back: Normal range of motion and neck supple.   Lymphadenopathy:      Cervical: No cervical adenopathy.   Skin:     General: Skin is warm and dry.   Neurological:      Mental Status: She is alert and oriented to person, place, and time.   Psychiatric:         Mood and Affect: Mood normal.         Assessment:       1. Sore throat    2. Viral pharyngitis        Plan:     1. Sore throat  -     POCT Rapid Strep A    2. Viral pharyngitis     Education given  RTC PRN

## 2024-11-25 ENCOUNTER — OFFICE VISIT (OUTPATIENT)
Dept: INTERNAL MEDICINE | Facility: CLINIC | Age: 74
End: 2024-11-25
Payer: MEDICARE

## 2024-11-25 VITALS
BODY MASS INDEX: 30.31 KG/M2 | HEART RATE: 100 BPM | WEIGHT: 193.13 LBS | DIASTOLIC BLOOD PRESSURE: 96 MMHG | HEIGHT: 67 IN | SYSTOLIC BLOOD PRESSURE: 140 MMHG | RESPIRATION RATE: 18 BRPM

## 2024-11-25 DIAGNOSIS — E11.65 TYPE 2 DIABETES MELLITUS WITH HYPERGLYCEMIA, WITHOUT LONG-TERM CURRENT USE OF INSULIN: Primary | ICD-10-CM

## 2024-11-25 DIAGNOSIS — J02.9 PHARYNGITIS, UNSPECIFIED ETIOLOGY: ICD-10-CM

## 2024-11-25 DIAGNOSIS — I10 ESSENTIAL HYPERTENSION: ICD-10-CM

## 2024-11-25 PROCEDURE — 99214 OFFICE O/P EST MOD 30 MIN: CPT | Mod: S$GLB,,, | Performed by: NURSE PRACTITIONER

## 2024-11-25 PROCEDURE — 1159F MED LIST DOCD IN RCRD: CPT | Mod: CPTII,S$GLB,, | Performed by: NURSE PRACTITIONER

## 2024-11-25 PROCEDURE — 1101F PT FALLS ASSESS-DOCD LE1/YR: CPT | Mod: CPTII,S$GLB,, | Performed by: NURSE PRACTITIONER

## 2024-11-25 PROCEDURE — 3077F SYST BP >= 140 MM HG: CPT | Mod: CPTII,S$GLB,, | Performed by: NURSE PRACTITIONER

## 2024-11-25 PROCEDURE — 3080F DIAST BP >= 90 MM HG: CPT | Mod: CPTII,S$GLB,, | Performed by: NURSE PRACTITIONER

## 2024-11-25 PROCEDURE — 99999 PR PBB SHADOW E&M-EST. PATIENT-LVL V: CPT | Mod: PBBFAC,,, | Performed by: NURSE PRACTITIONER

## 2024-11-25 PROCEDURE — 3288F FALL RISK ASSESSMENT DOCD: CPT | Mod: CPTII,S$GLB,, | Performed by: NURSE PRACTITIONER

## 2024-11-25 PROCEDURE — 1126F AMNT PAIN NOTED NONE PRSNT: CPT | Mod: CPTII,S$GLB,, | Performed by: NURSE PRACTITIONER

## 2024-11-25 RX ORDER — CEPHALEXIN 500 MG/1
500 CAPSULE ORAL EVERY 8 HOURS
Qty: 30 CAPSULE | Refills: 0 | Status: SHIPPED | OUTPATIENT
Start: 2024-11-25

## 2024-11-25 RX ORDER — SEMAGLUTIDE 0.68 MG/ML
0.25 INJECTION, SOLUTION SUBCUTANEOUS
Start: 2024-11-25

## 2024-11-25 RX ORDER — AMLODIPINE BESYLATE 10 MG/1
10 TABLET ORAL DAILY
Qty: 90 TABLET | Refills: 3 | Status: SHIPPED | OUTPATIENT
Start: 2024-11-25

## 2024-11-25 NOTE — PROGRESS NOTES
"Subjective:       Patient ID: Maru Shelby is a 74 y.o. female.    Chief Complaint: Sore Throat, Diabetes, and Hypertension      History of Present Illness    CHIEF COMPLAINT:  Maru presents today for follow-up of sore throat and low resistance.    SORE THROAT AND HEADACHE:  She reports a sore throat that began last Wednesday, accompanied by an atypical headache. She was seen by a doctor on Friday and a strep test was negative. She has been using salt water gargles for symptom relief. She denies fever or cough associated with these symptoms but throat is getting worse and concerned because she is on MTX    CHRONIC PAIN AND MOBILITY:  She experiences daily chronic joint pain. She reports limited mobility, stating she can barely walk, which impacts her ability to exercise.    DIABETES MANAGEMENT:  Her last A1C was 7.8 four months ago. She is currently taking Janumet for diabetes management and expresses interest in potentially increasing the dosage. She denies being on other diabetes medications such as Ozempic or Mounjaro.    HYPERTENSION:  She reports discontinuation of amlodipine for hypertension, stating "they" took her off the medication without specifying who made this decision. Reviewed of chart does not support this and bp 140/96    MEDICATIONS:  She takes methotrexate weekly and is currently completing a course of doxycycline for skin-related concerns, with two days remaining.    WEIGHT MANAGEMENT:  She reports a 4-pound weight loss since her last visit, attributed to dietary changes including smaller portions and more salads. She acknowledges a fondness for potatoes but has reduced her intake.    GROIN AREA TINGLING:  She experiences a tingling sensation in the groin area when sitting, described as similar to when a limb "falls asleep." The tingling occurs most of the time while sitting, depending on her position. She denies associated pain or experiencing the sensation when standing or wearing tight " clothing around the waist.    HEARING ISSUES:  She reports complete hearing loss in one ear without any sensation in the affected ear. She expresses uncertainty about the underlying cause of her hearing impairment.      ROS:  Constitutional: -fevers, +weight loss  ENT: +hearing loss, +sore throat  Respiratory: -cough  Musculoskeletal: +joint pain  Neurological: +tingling          Objective:      Physical Exam    Vitals: Weight: 188 lbs. Blood pressure elevated.  General: No acute distress. Well-developed. Well-nourished.  Eyes: EOMI. Sclerae anicteric.  HENT: Normocephalic. Atraumatic. Nares patent. Moist oral mucosa.  Ears: Bilateral TMs clear. Bilateral EACs clear.  Cardiovascular: Regular rate. Regular rhythm. No murmurs. No rubs. No gallops. Normal S1, S2.  Respiratory: Normal respiratory effort. Clear to auscultation bilaterally. No rales. No rhonchi. No wheezing.  Abdomen: Soft. Tenderness in abdomen. Non-distended. Normoactive bowel sounds.  Musculoskeletal: No  obvious deformity.  Extremities: No lower extremity edema.  Neurological: Alert & oriented x3. No slurred speech. Normal gait.  Psychiatric: Normal mood. Normal affect. Good insight. Good judgment.  Skin: Warm. Dry. No rash. Calloused skin with possible corn on foot.          Assessment:       1. Type 2 diabetes mellitus with hyperglycemia, without long-term current use of insulin    2. Essential hypertension    3. Pharyngitis, unspecified etiology        Plan:     Problem List Items Addressed This Visit    None  Visit Diagnoses       Type 2 diabetes mellitus with hyperglycemia, without long-term current use of insulin    -  Primary    Relevant Medications    semaglutide (OZEMPIC) 0.25 mg or 0.5 mg (2 mg/3 mL) pen injector    Essential hypertension        Relevant Medications    amLODIPine (NORVASC) 10 MG tablet    Pharyngitis, unspecified etiology        Relevant Medications    cephALEXin (KEFLEX) 500 MG capsule          Assessment & Plan    Assessed  sore throat, considering recent negative strep test  Evaluated blood pressure increase, likely due to amlodipine discontinuation  Reviewed diabetes management, noting elevated glucose levels  Considered immunocompromised status due to methotrexate use  Assessed groin tingling, likely due to nerve compression from excess weight  Will initiate Ozempic for diabetes management and potential weight loss    OBESITY MANAGEMENT:  - Explained Ozempic's mechanism of action, including effects on gastric digestion and satiety.  - Discussed potential Ozempic side effects, including nausea, indigestion, and constipation.  - Educated on importance of maintaining hydration while on Ozempic.  - Maru to reduce portion sizes when eating, starting with half of usual amount.  - Maru to maintain adequate water intake.  - Started Ozempic 0.25 mg weekly injection as initial dose to assess tolerability.    ACUTE PHARYNGITIS:  - Started Cephalexin (Keflex) for pharyngitis: 3 times daily for minimum 5 days, up to 10 days if symptoms persist.    TYPE 2 DIABETES MELLITUS:  - Continued Janumet (current dose) for diabetes management.> consider stopping januvia if ozempic tolerated and increasing dose at f/u     ESSENTIAL HYPERTENSION:  - Restarted amlodipine for blood pressure management: 90 tablets with 3 refills at [facility] [address].    RHEUMATOID ARTHRITIS:  - Continued methotrexate (current dose) weekly.    PARESTHESIA OF SKIN:  - Explained connection between excess weight and nerve compression causing groin tingling.    FOLLOW-UP:  - Follow up in 6 weeks to assess Ozempic tolerability and efficacy.  - Contact the office if experiencing issues with Ozempic or if sore throat symptoms persist after completing antibiotic course.          This note was generated with the assistance of ambient listening technology. Verbal consent was obtained by the patient and accompanying visitor(s) for the recording of patient appointment to facilitate  this note. I attest to having reviewed and edited the generated note for accuracy, though some syntax or spelling errors may persist. Please contact the author of this note for any clarification.

## 2024-12-07 DIAGNOSIS — M05.771 RHEUMATOID ARTHRITIS INVOLVING BOTH ANKLES WITH POSITIVE RHEUMATOID FACTOR: ICD-10-CM

## 2024-12-07 DIAGNOSIS — M05.772 RHEUMATOID ARTHRITIS INVOLVING BOTH ANKLES WITH POSITIVE RHEUMATOID FACTOR: ICD-10-CM

## 2024-12-12 ENCOUNTER — TELEPHONE (OUTPATIENT)
Dept: RHEUMATOLOGY | Facility: CLINIC | Age: 74
End: 2024-12-12
Payer: MEDICARE

## 2024-12-12 DIAGNOSIS — M06.9 RHEUMATOID ARTHRITIS, INVOLVING UNSPECIFIED SITE, UNSPECIFIED WHETHER RHEUMATOID FACTOR PRESENT: Primary | ICD-10-CM

## 2024-12-12 RX ORDER — LEFLUNOMIDE 20 MG/1
TABLET ORAL
Qty: 30 TABLET | Refills: 0 | Status: SHIPPED | OUTPATIENT
Start: 2024-12-12

## 2024-12-12 NOTE — TELEPHONE ENCOUNTER
Please schedule overdue standing labs this week or early next so leflunomide can be safely refilled. Thank you DELMY

## 2024-12-16 ENCOUNTER — INFUSION (OUTPATIENT)
Dept: INFUSION THERAPY | Facility: HOSPITAL | Age: 74
End: 2024-12-16
Attending: INTERNAL MEDICINE
Payer: MEDICARE

## 2024-12-16 VITALS
SYSTOLIC BLOOD PRESSURE: 134 MMHG | BODY MASS INDEX: 30.31 KG/M2 | WEIGHT: 193.13 LBS | RESPIRATION RATE: 18 BRPM | HEIGHT: 67 IN | TEMPERATURE: 98 F | DIASTOLIC BLOOD PRESSURE: 78 MMHG | HEART RATE: 95 BPM

## 2024-12-16 DIAGNOSIS — M05.79 RHEUMATOID ARTHRITIS INVOLVING MULTIPLE SITES WITH POSITIVE RHEUMATOID FACTOR: Primary | ICD-10-CM

## 2024-12-16 PROCEDURE — 63600175 PHARM REV CODE 636 W HCPCS: Mod: JZ,JA,TB | Performed by: INTERNAL MEDICINE

## 2024-12-16 PROCEDURE — 25000003 PHARM REV CODE 250: Performed by: INTERNAL MEDICINE

## 2024-12-16 PROCEDURE — 96365 THER/PROPH/DIAG IV INF INIT: CPT

## 2024-12-16 RX ORDER — ACETAMINOPHEN 325 MG/1
650 TABLET ORAL
OUTPATIENT
Start: 2024-12-23

## 2024-12-16 RX ORDER — SODIUM CHLORIDE 0.9 % (FLUSH) 0.9 %
10 SYRINGE (ML) INJECTION
OUTPATIENT
Start: 2024-12-23

## 2024-12-16 RX ORDER — HEPARIN 100 UNIT/ML
500 SYRINGE INTRAVENOUS
OUTPATIENT
Start: 2024-12-23

## 2024-12-16 RX ADMIN — SODIUM CHLORIDE 1000 MG: 9 INJECTION, SOLUTION INTRAVENOUS at 08:12

## 2024-12-27 ENCOUNTER — LAB VISIT (OUTPATIENT)
Dept: LAB | Facility: HOSPITAL | Age: 74
End: 2024-12-27
Attending: INTERNAL MEDICINE
Payer: MEDICARE

## 2024-12-27 DIAGNOSIS — M06.9 RHEUMATOID ARTHRITIS, INVOLVING UNSPECIFIED SITE, UNSPECIFIED WHETHER RHEUMATOID FACTOR PRESENT: ICD-10-CM

## 2024-12-27 LAB
ALBUMIN SERPL BCP-MCNC: 3.8 G/DL (ref 3.5–5.2)
ALP SERPL-CCNC: 72 U/L (ref 40–150)
ALT SERPL W/O P-5'-P-CCNC: 34 U/L (ref 10–44)
ANION GAP SERPL CALC-SCNC: 9 MMOL/L (ref 8–16)
AST SERPL-CCNC: 30 U/L (ref 10–40)
BASOPHILS # BLD AUTO: 0.06 K/UL (ref 0–0.2)
BASOPHILS NFR BLD: 0.6 % (ref 0–1.9)
BILIRUB SERPL-MCNC: 0.4 MG/DL (ref 0.1–1)
BUN SERPL-MCNC: 17 MG/DL (ref 8–23)
CALCIUM SERPL-MCNC: 9.6 MG/DL (ref 8.7–10.5)
CHLORIDE SERPL-SCNC: 103 MMOL/L (ref 95–110)
CO2 SERPL-SCNC: 26 MMOL/L (ref 23–29)
CREAT SERPL-MCNC: 0.8 MG/DL (ref 0.5–1.4)
CRP SERPL-MCNC: 2.3 MG/L (ref 0–8.2)
DIFFERENTIAL METHOD BLD: ABNORMAL
EOSINOPHIL # BLD AUTO: 0.4 K/UL (ref 0–0.5)
EOSINOPHIL NFR BLD: 3.6 % (ref 0–8)
ERYTHROCYTE [DISTWIDTH] IN BLOOD BY AUTOMATED COUNT: 15.5 % (ref 11.5–14.5)
ERYTHROCYTE [SEDIMENTATION RATE] IN BLOOD BY WESTERGREN METHOD: 6 MM/HR (ref 0–20)
EST. GFR  (NO RACE VARIABLE): >60 ML/MIN/1.73 M^2
GLUCOSE SERPL-MCNC: 265 MG/DL (ref 70–110)
HCT VFR BLD AUTO: 40.5 % (ref 37–48.5)
HGB BLD-MCNC: 13.3 G/DL (ref 12–16)
IMM GRANULOCYTES # BLD AUTO: 0.05 K/UL (ref 0–0.04)
IMM GRANULOCYTES NFR BLD AUTO: 0.5 % (ref 0–0.5)
LYMPHOCYTES # BLD AUTO: 2.7 K/UL (ref 1–4.8)
LYMPHOCYTES NFR BLD: 27.5 % (ref 18–48)
MCH RBC QN AUTO: 29.8 PG (ref 27–31)
MCHC RBC AUTO-ENTMCNC: 32.8 G/DL (ref 32–36)
MCV RBC AUTO: 91 FL (ref 82–98)
MONOCYTES # BLD AUTO: 0.8 K/UL (ref 0.3–1)
MONOCYTES NFR BLD: 8.4 % (ref 4–15)
NEUTROPHILS # BLD AUTO: 5.8 K/UL (ref 1.8–7.7)
NEUTROPHILS NFR BLD: 59.4 % (ref 38–73)
NRBC BLD-RTO: 0 /100 WBC
PLATELET # BLD AUTO: 245 K/UL (ref 150–450)
PMV BLD AUTO: 9.7 FL (ref 9.2–12.9)
POTASSIUM SERPL-SCNC: 4.8 MMOL/L (ref 3.5–5.1)
PROT SERPL-MCNC: 6.9 G/DL (ref 6–8.4)
RBC # BLD AUTO: 4.47 M/UL (ref 4–5.4)
SODIUM SERPL-SCNC: 138 MMOL/L (ref 136–145)
WBC # BLD AUTO: 9.74 K/UL (ref 3.9–12.7)

## 2024-12-27 PROCEDURE — 85651 RBC SED RATE NONAUTOMATED: CPT | Performed by: INTERNAL MEDICINE

## 2024-12-27 PROCEDURE — 80053 COMPREHEN METABOLIC PANEL: CPT | Performed by: INTERNAL MEDICINE

## 2024-12-27 PROCEDURE — 36415 COLL VENOUS BLD VENIPUNCTURE: CPT | Performed by: INTERNAL MEDICINE

## 2024-12-27 PROCEDURE — 86140 C-REACTIVE PROTEIN: CPT | Performed by: INTERNAL MEDICINE

## 2024-12-27 PROCEDURE — 85025 COMPLETE CBC W/AUTO DIFF WBC: CPT | Performed by: INTERNAL MEDICINE

## 2025-01-02 DIAGNOSIS — M05.772 RHEUMATOID ARTHRITIS INVOLVING BOTH ANKLES WITH POSITIVE RHEUMATOID FACTOR: ICD-10-CM

## 2025-01-02 DIAGNOSIS — M05.771 RHEUMATOID ARTHRITIS INVOLVING BOTH ANKLES WITH POSITIVE RHEUMATOID FACTOR: ICD-10-CM

## 2025-01-02 RX ORDER — METHOTREXATE 25 MG/ML
INJECTION, SOLUTION INTRAMUSCULAR; INTRATHECAL; INTRAVENOUS; SUBCUTANEOUS
Qty: 12 ML | Refills: 0 | Status: SHIPPED | OUTPATIENT
Start: 2025-01-02

## 2025-01-02 RX ORDER — METHOTREXATE 25 MG/ML
INJECTION, SOLUTION INTRAMUSCULAR; INTRATHECAL; INTRAVENOUS; SUBCUTANEOUS
Qty: 12 ML | Refills: 0 | Status: SHIPPED | OUTPATIENT
Start: 2025-01-02 | End: 2025-01-02 | Stop reason: SDUPTHER

## 2025-01-07 ENCOUNTER — TELEPHONE (OUTPATIENT)
Dept: RHEUMATOLOGY | Facility: CLINIC | Age: 75
End: 2025-01-07

## 2025-01-07 RX ORDER — GABAPENTIN 300 MG/1
300 CAPSULE ORAL NIGHTLY
Qty: 30 CAPSULE | Refills: 3 | Status: SHIPPED | OUTPATIENT
Start: 2025-01-07

## 2025-01-07 NOTE — TELEPHONE ENCOUNTER
Care Due:                  Date            Visit Type   Department     Provider  --------------------------------------------------------------------------------                                EP -                              PRIMARY      [facility]  Last Visit: 07-      CARE ([facility])   MEDICINE II    Florence Mark                              EP -                              PRIMARY      [facility] INTER[facility]  Next Visit: 01-      CARE ([facility])   MEDICINE II    Florence Mark                                                            Last  Test          Frequency    Reason                     Performed    Due Date  --------------------------------------------------------------------------------    HBA1C.......  6 months...  SITagliptan-metformin....  07- 01-    [facility] Embedded Care Due Messages. Reference number: [id number].   1/07/2025 8:45:57 AM CST

## 2025-01-10 DIAGNOSIS — M05.772 RHEUMATOID ARTHRITIS INVOLVING BOTH ANKLES WITH POSITIVE RHEUMATOID FACTOR: ICD-10-CM

## 2025-01-10 DIAGNOSIS — M05.771 RHEUMATOID ARTHRITIS INVOLVING BOTH ANKLES WITH POSITIVE RHEUMATOID FACTOR: ICD-10-CM

## 2025-01-10 RX ORDER — METHOTREXATE 25 MG/ML
INJECTION, SOLUTION INTRAMUSCULAR; INTRATHECAL; INTRAVENOUS; SUBCUTANEOUS
Qty: 12 ML | Refills: 0 | Status: SHIPPED | OUTPATIENT
Start: 2025-01-10

## 2025-01-17 ENCOUNTER — TELEPHONE (OUTPATIENT)
Dept: INTERNAL MEDICINE | Facility: CLINIC | Age: 75
End: 2025-01-17
Payer: MEDICARE

## 2025-01-17 ENCOUNTER — LAB VISIT (OUTPATIENT)
Dept: LAB | Facility: HOSPITAL | Age: 75
End: 2025-01-17
Attending: INTERNAL MEDICINE
Payer: MEDICARE

## 2025-01-17 DIAGNOSIS — E11.69 TYPE 2 DIABETES MELLITUS WITH OTHER SPECIFIED COMPLICATION, WITH LONG-TERM CURRENT USE OF INSULIN: ICD-10-CM

## 2025-01-17 DIAGNOSIS — I10 BENIGN ESSENTIAL HTN: ICD-10-CM

## 2025-01-17 DIAGNOSIS — Z79.4 TYPE 2 DIABETES MELLITUS WITH OTHER SPECIFIED COMPLICATION, WITH LONG-TERM CURRENT USE OF INSULIN: ICD-10-CM

## 2025-01-17 DIAGNOSIS — E11.65 TYPE 2 DIABETES MELLITUS WITH HYPERGLYCEMIA, WITHOUT LONG-TERM CURRENT USE OF INSULIN: Primary | ICD-10-CM

## 2025-01-17 DIAGNOSIS — E78.00 PURE HYPERCHOLESTEROLEMIA: ICD-10-CM

## 2025-01-17 LAB
ALBUMIN SERPL BCP-MCNC: 3.5 G/DL (ref 3.5–5.2)
ALP SERPL-CCNC: 64 U/L (ref 40–150)
ALT SERPL W/O P-5'-P-CCNC: 22 U/L (ref 10–44)
ANION GAP SERPL CALC-SCNC: 9 MMOL/L (ref 8–16)
AST SERPL-CCNC: 21 U/L (ref 10–40)
BASOPHILS # BLD AUTO: 0.06 K/UL (ref 0–0.2)
BASOPHILS NFR BLD: 0.7 % (ref 0–1.9)
BILIRUB SERPL-MCNC: 0.5 MG/DL (ref 0.1–1)
BUN SERPL-MCNC: 11 MG/DL (ref 8–23)
CALCIUM SERPL-MCNC: 8.8 MG/DL (ref 8.7–10.5)
CHLORIDE SERPL-SCNC: 105 MMOL/L (ref 95–110)
CHOLEST SERPL-MCNC: 80 MG/DL (ref 120–199)
CHOLEST/HDLC SERPL: 2.3 {RATIO} (ref 2–5)
CO2 SERPL-SCNC: 24 MMOL/L (ref 23–29)
CREAT SERPL-MCNC: 0.8 MG/DL (ref 0.5–1.4)
DIFFERENTIAL METHOD BLD: ABNORMAL
EOSINOPHIL # BLD AUTO: 0.4 K/UL (ref 0–0.5)
EOSINOPHIL NFR BLD: 4.4 % (ref 0–8)
ERYTHROCYTE [DISTWIDTH] IN BLOOD BY AUTOMATED COUNT: 14.7 % (ref 11.5–14.5)
EST. GFR  (NO RACE VARIABLE): >60 ML/MIN/1.73 M^2
ESTIMATED AVG GLUCOSE: 206 MG/DL (ref 68–131)
GLUCOSE SERPL-MCNC: 287 MG/DL (ref 70–110)
HBA1C MFR BLD: 8.8 % (ref 4–5.6)
HCT VFR BLD AUTO: 41.3 % (ref 37–48.5)
HDLC SERPL-MCNC: 35 MG/DL (ref 40–75)
HDLC SERPL: 43.8 % (ref 20–50)
HGB BLD-MCNC: 13.5 G/DL (ref 12–16)
IMM GRANULOCYTES # BLD AUTO: 0.03 K/UL (ref 0–0.04)
IMM GRANULOCYTES NFR BLD AUTO: 0.3 % (ref 0–0.5)
LDLC SERPL CALC-MCNC: 17 MG/DL (ref 63–159)
LYMPHOCYTES # BLD AUTO: 2.6 K/UL (ref 1–4.8)
LYMPHOCYTES NFR BLD: 28.9 % (ref 18–48)
MCH RBC QN AUTO: 29.2 PG (ref 27–31)
MCHC RBC AUTO-ENTMCNC: 32.7 G/DL (ref 32–36)
MCV RBC AUTO: 89 FL (ref 82–98)
MONOCYTES # BLD AUTO: 0.7 K/UL (ref 0.3–1)
MONOCYTES NFR BLD: 7.9 % (ref 4–15)
NEUTROPHILS # BLD AUTO: 5.2 K/UL (ref 1.8–7.7)
NEUTROPHILS NFR BLD: 57.8 % (ref 38–73)
NONHDLC SERPL-MCNC: 45 MG/DL
NRBC BLD-RTO: 0 /100 WBC
PLATELET # BLD AUTO: 239 K/UL (ref 150–450)
PMV BLD AUTO: 10 FL (ref 9.2–12.9)
POTASSIUM SERPL-SCNC: 4.4 MMOL/L (ref 3.5–5.1)
PROT SERPL-MCNC: 6.5 G/DL (ref 6–8.4)
RBC # BLD AUTO: 4.62 M/UL (ref 4–5.4)
SODIUM SERPL-SCNC: 138 MMOL/L (ref 136–145)
TRIGL SERPL-MCNC: 140 MG/DL (ref 30–150)
TSH SERPL DL<=0.005 MIU/L-ACNC: 1.97 UIU/ML (ref 0.4–4)
WBC # BLD AUTO: 9.02 K/UL (ref 3.9–12.7)

## 2025-01-17 PROCEDURE — 80053 COMPREHEN METABOLIC PANEL: CPT | Performed by: INTERNAL MEDICINE

## 2025-01-17 PROCEDURE — 85025 COMPLETE CBC W/AUTO DIFF WBC: CPT | Performed by: INTERNAL MEDICINE

## 2025-01-17 PROCEDURE — 80061 LIPID PANEL: CPT | Performed by: INTERNAL MEDICINE

## 2025-01-17 PROCEDURE — 84443 ASSAY THYROID STIM HORMONE: CPT | Performed by: INTERNAL MEDICINE

## 2025-01-17 PROCEDURE — 36415 COLL VENOUS BLD VENIPUNCTURE: CPT | Performed by: INTERNAL MEDICINE

## 2025-01-17 PROCEDURE — 83036 HEMOGLOBIN GLYCOSYLATED A1C: CPT | Performed by: INTERNAL MEDICINE

## 2025-01-23 ENCOUNTER — HOSPITAL ENCOUNTER (EMERGENCY)
Facility: HOSPITAL | Age: 75
Discharge: HOME | End: 2025-01-23
Attending: SURGERY
Payer: MEDICARE

## 2025-01-23 VITALS
SYSTOLIC BLOOD PRESSURE: 133 MMHG | HEART RATE: 98 BPM | WEIGHT: 191.81 LBS | HEIGHT: 67 IN | TEMPERATURE: 98 F | DIASTOLIC BLOOD PRESSURE: 75 MMHG | OXYGEN SATURATION: 95 % | BODY MASS INDEX: 30.1 KG/M2 | RESPIRATION RATE: 18 BRPM

## 2025-01-23 DIAGNOSIS — S16.1XXA CERVICAL STRAIN, ACUTE, INITIAL ENCOUNTER: Primary | ICD-10-CM

## 2025-01-23 PROCEDURE — 99284 EMERGENCY DEPT VISIT MOD MDM: CPT | Mod: 25

## 2025-01-23 PROCEDURE — 63600175 PHARM REV CODE 636 W HCPCS: Performed by: SURGERY

## 2025-01-23 PROCEDURE — 96372 THER/PROPH/DIAG INJ SC/IM: CPT | Performed by: SURGERY

## 2025-01-23 RX ORDER — HYDROCODONE BITARTRATE AND ACETAMINOPHEN 5; 325 MG/1; MG/1
1 TABLET ORAL EVERY 4 HOURS PRN
Qty: 15 TABLET | Refills: 0 | Status: SHIPPED | OUTPATIENT
Start: 2025-01-23 | End: 2025-01-26

## 2025-01-23 RX ORDER — MORPHINE SULFATE 2 MG/ML
4 INJECTION, SOLUTION INTRAMUSCULAR; INTRAVENOUS
Status: COMPLETED | OUTPATIENT
Start: 2025-01-23 | End: 2025-01-23

## 2025-01-23 RX ORDER — TIZANIDINE HYDROCHLORIDE 4 MG/1
4 CAPSULE, GELATIN COATED ORAL 4 TIMES DAILY PRN
Qty: 10 CAPSULE | Refills: 0 | Status: SHIPPED | OUTPATIENT
Start: 2025-01-23 | End: 2025-02-02

## 2025-01-23 RX ORDER — TIZANIDINE HYDROCHLORIDE 4 MG/1
4 CAPSULE, GELATIN COATED ORAL 4 TIMES DAILY PRN
Qty: 10 CAPSULE | Refills: 0 | Status: SHIPPED | OUTPATIENT
Start: 2025-01-23 | End: 2025-01-23

## 2025-01-23 RX ORDER — ONDANSETRON HYDROCHLORIDE 2 MG/ML
4 INJECTION, SOLUTION INTRAVENOUS
Status: COMPLETED | OUTPATIENT
Start: 2025-01-23 | End: 2025-01-23

## 2025-01-23 RX ORDER — HYDROCODONE BITARTRATE AND ACETAMINOPHEN 5; 325 MG/1; MG/1
1 TABLET ORAL EVERY 4 HOURS PRN
Qty: 15 TABLET | Refills: 0 | Status: SHIPPED | OUTPATIENT
Start: 2025-01-23 | End: 2025-01-23

## 2025-01-23 RX ADMIN — MORPHINE SULFATE 4 MG: 2 INJECTION, SOLUTION INTRAMUSCULAR; INTRAVENOUS at 10:01

## 2025-01-23 RX ADMIN — ONDANSETRON 4 MG: 2 INJECTION INTRAMUSCULAR; INTRAVENOUS at 10:01

## 2025-01-23 NOTE — ED PROVIDER NOTES
Encounter Date: 1/23/2025       History     Chief Complaint   Patient presents with    Neck Pain     History of Present Illness  Maru Shelby is a 74 y.o. female that presents with right-sided cervical pain  Atraumatic right-sided cervical pain for last 72 hours on ER interview this morning  Patient has chronic neck pain with chronic osteoarthritis related pain every day  Patient has no extremity weakness, good , normal tone bilaterally on ED exam  Good distal pulses, good capillary refill, degenerative disc findings on cervical exam    The history is provided by the patient.     Review of patient's allergies indicates:  No Known Allergies  Past Medical History:   Diagnosis Date    Anxiety     Arthritis     rheumatoid and osteoarthritis    Depression     Hyperlipidemia     Hypertension     Neuromuscular disorder     Obesity     Osteoporosis     osteopenia    Other specified glaucoma 10/31/2019    Pneumonia     Renal manifestation of secondary diabetes mellitus     Rheumatoid arthritis      Past Surgical History:   Procedure Laterality Date    BREAST BIOPSY Right     2016    CHOLECYSTECTOMY  2009    COLONOSCOPY  2005 2008, 2013    COLONOSCOPY N/A 8/14/2018    Procedure: COLONOSCOPY;  Surgeon: Kimani Shelby MD;  Location: [facility];  Service: Endoscopy;  Laterality: N/A;    HAND SURGERY      LUMBAR SPINE SURGERY      TUBAL LIGATION       Family History   Problem Relation Name Age of Onset    Pancreatitis Mother  61    Cancer Father          amedistatic    Lung cancer Father      Breast cancer Sister      Breast cancer Sister       Social History     Tobacco Use    Smoking status: Never     Passive exposure: Never    Smokeless tobacco: Never   Substance Use Topics    Alcohol use: Yes     Alcohol/week: 2.0 standard drinks of alcohol     Types: 2 Glasses of wine per week    Drug use: No     Review of Systems   Constitutional: Negative.    HENT: Negative.     Eyes: Negative.    Respiratory: Negative.      Cardiovascular: Negative.    Gastrointestinal: Negative.    Genitourinary: Negative.    Musculoskeletal:  Positive for neck pain.   Skin: Negative.    Neurological: Negative.    Psychiatric/Behavioral: Negative.         Physical Exam     Initial Vitals   BP Pulse Resp Temp SpO2   01/23/25 0958 01/23/25 0958 01/23/25 0958 01/23/25 1004 01/23/25 0958   139/72 (!) 111 20 97.8 °F (36.6 °C) 95 %      MAP       --                Physical Exam    Nursing note and vitals reviewed.  Constitutional: Vital signs are normal. She appears well-developed and well-nourished. She is cooperative.   HENT:   Head: Normocephalic and atraumatic.   Eyes: Conjunctivae, EOM and lids are normal. Pupils are equal, round, and reactive to light.   Neck: Trachea normal and phonation normal. No JVD present.   (+) degenerative disc changes, arthritic changes of the C-spine    Full passive range of motion without pain.     Cardiovascular:  Normal rate, regular rhythm, S1 normal, S2 normal, normal heart sounds, intact distal pulses and normal pulses.           Pulmonary/Chest: Effort normal and breath sounds normal.   Abdominal: Abdomen is soft and flat. Bowel sounds are normal.   Musculoskeletal:         General: Normal range of motion.      Cervical back: Full passive range of motion without pain.     Neurological: She is alert and oriented to person, place, and time. She has normal strength.   Skin: Skin is warm, dry and intact. Capillary refill takes less than 2 seconds.         ED Course   Procedures  Labs Reviewed - No data to display       Imaging Results              X-Ray Cervical Spine AP And Lateral (In process)  Result time 01/23/25 10:28:53                     Medications   morphine injection 4 mg (4 mg Intramuscular Given 1/23/25 1027)   ondansetron injection 4 mg (4 mg Intramuscular Given 1/23/25 1028)     Medical Decision Making  Differential includes cervical strain, sprain, degenerative disc disease, fracture    Problems  Addressed:  Cervical strain, acute, initial encounter: complicated acute illness or injury    Amount and/or Complexity of Data Reviewed  Radiology: ordered and independent interpretation performed.    ED Management & Risks of Complication, Morbidity, & Mortality:  Osteoporosis, degenerative changes on x-ray, no fracture  IM morphine & Zofran administered in the emergency room today  Short course of Norco & Flexeril on ER discharge tonight  Follow-up with PCP & cervical spine specialists as outpatient  Pt/Family counseled to return to the ER with any concerns on DC    Need for Hospitalization or Surgery with Social Determinants of Health:  This patient does not need to be hospitalized on ER evaluation today  The patient's diagnosis is not limited by social determinants of health  Does not require surgery or procedure (major or minor), no risk factors    Clinical Impression:  Final diagnoses:  [S16.1XXA] Cervical strain, acute, initial encounter (Primary)          ED Disposition Condition    Discharge Stable          ED Prescriptions       Medication Sig Dispense Start Date End Date Auth. Provider    HYDROcodone-acetaminophen (NORCO) 5-325 mg per tablet Take 1 tablet by mouth every 4 (four) hours as needed for Pain. 15 tablet 1/23/2025 1/26/2025 Jeffrey Garcia MD    tiZANidine 4 mg Cap Take 4 mg by mouth 4 (four) times daily as needed. 10 capsule 1/23/2025 2/2/2025 Jeffrey Garcia MD          Follow-up Information       Follow up With Specialties Details Why Contact Info    Florence Mark MD Internal Medicine Schedule an appointment as soon as possible for a visit in 2 days  [address]  [place] [address]  [phone]               Jeffrey Garcia MD  01/23/25 6894

## 2025-01-24 ENCOUNTER — LAB VISIT (OUTPATIENT)
Dept: LAB | Facility: HOSPITAL | Age: 75
End: 2025-01-24
Attending: INTERNAL MEDICINE
Payer: MEDICARE

## 2025-01-24 ENCOUNTER — OFFICE VISIT (OUTPATIENT)
Dept: INTERNAL MEDICINE | Facility: CLINIC | Age: 75
End: 2025-01-24
Payer: MEDICARE

## 2025-01-24 ENCOUNTER — INFUSION (OUTPATIENT)
Dept: INFUSION THERAPY | Facility: HOSPITAL | Age: 75
End: 2025-01-24
Attending: INTERNAL MEDICINE
Payer: MEDICARE

## 2025-01-24 VITALS
BODY MASS INDEX: 30.1 KG/M2 | RESPIRATION RATE: 16 BRPM | WEIGHT: 191.81 LBS | HEIGHT: 67 IN | OXYGEN SATURATION: 94 % | SYSTOLIC BLOOD PRESSURE: 120 MMHG | HEART RATE: 99 BPM | DIASTOLIC BLOOD PRESSURE: 70 MMHG

## 2025-01-24 VITALS
HEART RATE: 100 BPM | RESPIRATION RATE: 18 BRPM | DIASTOLIC BLOOD PRESSURE: 68 MMHG | TEMPERATURE: 97 F | SYSTOLIC BLOOD PRESSURE: 131 MMHG

## 2025-01-24 DIAGNOSIS — E11.39 GLAUCOMA DUE TO TYPE 2 DIABETES MELLITUS: ICD-10-CM

## 2025-01-24 DIAGNOSIS — H42 GLAUCOMA DUE TO TYPE 2 DIABETES MELLITUS: ICD-10-CM

## 2025-01-24 DIAGNOSIS — E11.29 TYPE 2 DIABETES MELLITUS WITH DIABETIC MICROALBUMINURIA, WITHOUT LONG-TERM CURRENT USE OF INSULIN: ICD-10-CM

## 2025-01-24 DIAGNOSIS — R80.9 TYPE 2 DIABETES MELLITUS WITH DIABETIC MICROALBUMINURIA, WITHOUT LONG-TERM CURRENT USE OF INSULIN: ICD-10-CM

## 2025-01-24 DIAGNOSIS — M05.79 RHEUMATOID ARTHRITIS INVOLVING MULTIPLE SITES WITH POSITIVE RHEUMATOID FACTOR: Primary | ICD-10-CM

## 2025-01-24 DIAGNOSIS — E11.65 TYPE 2 DIABETES MELLITUS WITH HYPERGLYCEMIA, WITHOUT LONG-TERM CURRENT USE OF INSULIN: Primary | ICD-10-CM

## 2025-01-24 DIAGNOSIS — M05.771 RHEUMATOID ARTHRITIS INVOLVING BOTH ANKLES WITH POSITIVE RHEUMATOID FACTOR: ICD-10-CM

## 2025-01-24 DIAGNOSIS — M05.772 RHEUMATOID ARTHRITIS INVOLVING BOTH ANKLES WITH POSITIVE RHEUMATOID FACTOR: ICD-10-CM

## 2025-01-24 DIAGNOSIS — E78.2 MODERATE MIXED HYPERLIPIDEMIA NOT REQUIRING STATIN THERAPY: ICD-10-CM

## 2025-01-24 DIAGNOSIS — I10 BENIGN ESSENTIAL HTN: ICD-10-CM

## 2025-01-24 LAB
ALBUMIN/CREAT UR: 224.6 UG/MG (ref 0–30)
CREAT UR-MCNC: 102.4 MG/DL (ref 15–325)
MICROALBUMIN UR DL<=1MG/L-MCNC: 230 UG/ML

## 2025-01-24 PROCEDURE — 99214 OFFICE O/P EST MOD 30 MIN: CPT | Mod: S$GLB,,, | Performed by: INTERNAL MEDICINE

## 2025-01-24 PROCEDURE — 3078F DIAST BP <80 MM HG: CPT | Mod: CPTII,S$GLB,, | Performed by: INTERNAL MEDICINE

## 2025-01-24 PROCEDURE — 25000003 PHARM REV CODE 250: Performed by: INTERNAL MEDICINE

## 2025-01-24 PROCEDURE — 96365 THER/PROPH/DIAG IV INF INIT: CPT

## 2025-01-24 PROCEDURE — 3288F FALL RISK ASSESSMENT DOCD: CPT | Mod: CPTII,S$GLB,, | Performed by: INTERNAL MEDICINE

## 2025-01-24 PROCEDURE — 82570 ASSAY OF URINE CREATININE: CPT | Performed by: INTERNAL MEDICINE

## 2025-01-24 PROCEDURE — 63600175 PHARM REV CODE 636 W HCPCS: Mod: JZ,JA,TB | Performed by: INTERNAL MEDICINE

## 2025-01-24 PROCEDURE — G2211 COMPLEX E/M VISIT ADD ON: HCPCS | Mod: S$GLB,,, | Performed by: INTERNAL MEDICINE

## 2025-01-24 PROCEDURE — 3074F SYST BP LT 130 MM HG: CPT | Mod: CPTII,S$GLB,, | Performed by: INTERNAL MEDICINE

## 2025-01-24 PROCEDURE — 1159F MED LIST DOCD IN RCRD: CPT | Mod: CPTII,S$GLB,, | Performed by: INTERNAL MEDICINE

## 2025-01-24 PROCEDURE — 1160F RVW MEDS BY RX/DR IN RCRD: CPT | Mod: CPTII,S$GLB,, | Performed by: INTERNAL MEDICINE

## 2025-01-24 PROCEDURE — 99999 PR PBB SHADOW E&M-EST. PATIENT-LVL V: CPT | Mod: PBBFAC,,, | Performed by: INTERNAL MEDICINE

## 2025-01-24 PROCEDURE — 1125F AMNT PAIN NOTED PAIN PRSNT: CPT | Mod: CPTII,S$GLB,, | Performed by: INTERNAL MEDICINE

## 2025-01-24 PROCEDURE — 1101F PT FALLS ASSESS-DOCD LE1/YR: CPT | Mod: CPTII,S$GLB,, | Performed by: INTERNAL MEDICINE

## 2025-01-24 RX ORDER — MELOXICAM 15 MG/1
15 TABLET ORAL DAILY
Qty: 30 TABLET | Refills: 0 | Status: SHIPPED | OUTPATIENT
Start: 2025-01-24 | End: 2025-02-23

## 2025-01-24 RX ORDER — ACETAMINOPHEN 325 MG/1
650 TABLET ORAL
OUTPATIENT
Start: 2025-01-27

## 2025-01-24 RX ORDER — HEPARIN 100 UNIT/ML
500 SYRINGE INTRAVENOUS
OUTPATIENT
Start: 2025-01-27

## 2025-01-24 RX ORDER — SODIUM CHLORIDE 0.9 % (FLUSH) 0.9 %
10 SYRINGE (ML) INJECTION
OUTPATIENT
Start: 2025-01-27

## 2025-01-24 RX ORDER — SEMAGLUTIDE 0.68 MG/ML
0.5 INJECTION, SOLUTION SUBCUTANEOUS
Qty: 9 ML | Refills: 0 | Status: SHIPPED | OUTPATIENT
Start: 2025-01-24 | End: 2025-04-24

## 2025-01-24 RX ADMIN — SODIUM CHLORIDE 1000 MG: 9 INJECTION, SOLUTION INTRAVENOUS at 09:01

## 2025-01-24 NOTE — PLAN OF CARE
Problem: Fall Injury Risk  Goal: Absence of Fall and Fall-Related Injury  Outcome: Adequate for Care Transition

## 2025-01-24 NOTE — PROGRESS NOTES
Subjective:   History of Present Illness    CHIEF COMPLAINT:  Maru presents today for neck pain requiring emergency room visit yesterday.    NECK PAIN:  She recently visited the ER due to neck pain. X-ray showed degenerative changes in neck.    RHEUMATOID ARTHRITIS:  She reports her rheumatoid arthritis is not well controlled with worsening symptoms, particularly in response to the cold weather. She describes significant discomfort and exacerbation of symptoms.    DIABETES:  Her A1c has increased to 8.8 from a previous value of 7.8. She denies frequent sweet consumption. She is currently taking Ozempic 0.25 mg without side effects.    OCULAR:  She uses glaucoma eye drops nightly.       Review of Systems   Constitutional:  Negative for chills and fever.   HENT:  Negative for congestion, hearing loss, sinus pressure and sore throat.    Eyes:  Negative for photophobia.   Respiratory:  Negative for cough, choking, chest tightness and wheezing.    Cardiovascular:  Negative for chest pain and palpitations.   Gastrointestinal:  Negative for blood in stool, nausea and vomiting.   Genitourinary:  Negative for dysuria and hematuria.   Musculoskeletal:  Positive for arthralgias, back pain, joint swelling and neck pain. Negative for myalgias.   Skin:  Negative for pallor.   Neurological:  Negative for dizziness and numbness.   Hematological:  Does not bruise/bleed easily.   Psychiatric/Behavioral:  Negative for confusion and suicidal ideas. The patient is not nervous/anxious.       Objective:   Physical Exam  Vitals and nursing note reviewed.   Constitutional:       Appearance: She is well-developed.   HENT:      Head: Normocephalic and atraumatic.      Right Ear: External ear normal.      Left Ear: External ear normal.   Eyes:      Conjunctiva/sclera: Conjunctivae normal.      Pupils: Pupils are equal, round, and reactive to light.   Neck:      Thyroid: No thyromegaly.      Vascular: No JVD.      Trachea: No tracheal  deviation.   Cardiovascular:      Rate and Rhythm: Normal rate and regular rhythm.      Heart sounds: Normal heart sounds.   Pulmonary:      Effort: Pulmonary effort is normal. No respiratory distress.      Breath sounds: Normal breath sounds. No wheezing or rales.   Chest:      Chest wall: No tenderness.   Abdominal:      General: Bowel sounds are normal. There is no distension.      Palpations: Abdomen is soft. There is no mass.      Tenderness: There is no abdominal tenderness. There is no guarding or rebound.   Musculoskeletal:      Cervical back: Neck supple. Pain with movement, spinous process tenderness and muscular tenderness present. Decreased range of motion.   Lymphadenopathy:      Cervical: No cervical adenopathy.   Skin:     General: Skin is warm and dry.   Neurological:      Mental Status: She is alert and oriented to person, place, and time.      Cranial Nerves: No cranial nerve deficit.      Motor: No abnormal muscle tone.      Coordination: Coordination normal.      Deep Tendon Reflexes: Reflexes are normal and symmetric. Reflexes normal.      Comments: CN: Optic discs are flat with normal vasculature, PERRL, Extraoccular movements and visual fields are full. Normal facial sensation and strength, Hearing symmetric, Tongue and Palate are midline and strong. Shoulder Shrug symmetric and strong.        Assessment/Plan:     1. Type 2 diabetes mellitus with hyperglycemia, without long-term current use of insulin  semaglutide (OZEMPIC) 0.25 mg or 0.5 mg (2 mg/3 mL) pen injector    Hemoglobin A1C    Hemoglobin A1C      2. Glaucoma due to type 2 diabetes mellitus        3. Rheumatoid arthritis involving both ankles with positive rheumatoid factor  meloxicam (MOBIC) 15 MG tablet      4. Benign essential HTN  Hemoglobin A1C    CBC Auto Differential      5. Moderate mixed hyperlipidemia not requiring statin therapy        6. Type 2 diabetes mellitus with diabetic microalbuminuria, without long-term current use  "of insulin  Hemoglobin A1C    Hemoglobin A1C    Comprehensive Metabolic Panel    Lipid Panel    TSH         Assessment & Plan    IMPRESSION:  - Assessed patient's 6-month checkup results: blood pressure 120/70, pulse 99, oxygen 94%  - Reviewed recent labs (17th): CBC, electrolytes, kidney function, liver tests, and cholesterol within normal limits  - Noted worsening diabetes control: A1c increased from 7.8 to 8.8  - Evaluated neck pain following ER visit; x-ray showed possible degenerative changes  - Acknowledged rheumatoid arthritis symptoms not well-controlled    TYPE 2 DIABETES MELLITUS WITH OTHER DIABETIC OPHTHALMIC COMPLICATION:  - Maru's A1c has increased from 7.8 to 8.8, indicating worsening diabetes control.  - To address this, Ozempic dosage increased from 0.25mg to 0.5mg.  - Ordered A1c test and scheduled follow up in 3 months to reassess.  - Set goal to reduce A1c from above 8 to below 7.  - Discussed the need for better diabetes control with the patient.  - Confirmed patient is seeing an optometrist for glaucoma management.    RHEUMATOID ARTHRITIS WITH RHEUMATOID FACTOR OF RIGHT ANKLE AND FOOT WITHOUT ORGAN OR SYSTEMS INVOLVEMENT:  - Maru reports arthritis is not well-controlled, leading to an emergency department visit.  - Reviewed emergency department x-ray showing degenerative changes in the neck.  - Continued medications prescribed by emergency department: Norco (narcotic) and Flexeril (muscle relaxer) for pain management.  - Advised patient to take arthritis medication with food, not on an empty stomach.    GLAUCOMA:  - Confirmed patient is administering nightly glaucoma eye drops as prescribed.    NECK PAIN:  - Advised patient not to drive or consume alcohol while taking prescribed medications.    DIABETES:  - Explained importance of portion control in managing diabetes, regardless of food type (e.g., brown vs. white rice).  - Clarified misconception about unlimited consumption of "healthier" " alternatives.  - Maru to reduce calorie intake to improve diabetes control.          This note was generated with the assistance of ambient listening technology. Verbal consent was obtained by the patient and accompanying visitor(s) for the recording of patient appointment to facilitate this note. I attest to having reviewed and edited the generated note for accuracy, though some syntax or spelling errors may persist. Please contact the author of this note for any clarification.

## 2025-01-25 DIAGNOSIS — Z79.4 TYPE 2 DIABETES MELLITUS WITH OTHER SPECIFIED COMPLICATION, WITH LONG-TERM CURRENT USE OF INSULIN: ICD-10-CM

## 2025-01-25 DIAGNOSIS — E11.69 TYPE 2 DIABETES MELLITUS WITH OTHER SPECIFIED COMPLICATION, WITH LONG-TERM CURRENT USE OF INSULIN: ICD-10-CM

## 2025-01-25 NOTE — TELEPHONE ENCOUNTER
No care due was identified.  [facility] Embedded Care Due Messages. Reference number: [id number].   1/25/2025 11:22:13 AM CST

## 2025-01-26 NOTE — TELEPHONE ENCOUNTER
Refill Routing Note   Medication(s) are not appropriate for processing by [facility] for the following reason(s):        Drug-drug interaction      [facility] action(s):  Defer             Appointments  past 12m or future 3m with PCP    Date Provider   Last Visit   1/24/2025 Florence Mark MD   Next Visit   Visit date not found Florence Mark MD   ED visits in past 90 days: 1        Note composed:5:16 PM 01/26/2025

## 2025-01-27 RX ORDER — SITAGLIPTIN AND METFORMIN HYDROCHLORIDE 1000; 50 MG/1; MG/1
1 TABLET, FILM COATED ORAL 2 TIMES DAILY WITH MEALS
Qty: 180 TABLET | Refills: 1 | Status: SHIPPED | OUTPATIENT
Start: 2025-01-27

## 2025-02-07 ENCOUNTER — PATIENT OUTREACH (OUTPATIENT)
Dept: ADMINISTRATIVE | Facility: HOSPITAL | Age: 75
End: 2025-02-07
Payer: MEDICARE

## 2025-02-07 NOTE — PROGRESS NOTES
Care Coordination Encounter Details:       MyChart Portal Status:         [x]  Reviewed MyChart Portal Status offered / enrolled if applicable        Additional Notes:     MyChart Outcomes: Pt is enrolled & active          Updates Requested / Reviewed:        Updated Care Coordination Note, Care Everywhere, , External Sources: [facility] and [facility], Care Team Updated, Removed  or Duplicate Orders, and Immunizations Reconciliation Completed or Queried: [place]         Health Maintenance Screening(s) Due:      Health Maintenance Topics Overdue:      VBHM Score: 0     Patient is not due for any topics at this time.                       Health Maintenance Topic(s) Outreach Outcomes & Actions Taken:    Lab(s) - Outreach Outcomes & Actions Taken  : are scheduled    Primary Care Appt - Outreach Outcomes & Actions Taken  : Primary Care Appt Scheduled    Additional Notes:             Chronic Disease Management:     Diabetes Measures        Lab Results   Component Value Date    HGBA1C 8.8 (H) 2025           [x]  Reviewed chart for active Diabetes diagnosis     []  Scheduled necessary follow up appointments if needed         Additional Notes:             Hypertension Measures        BP Readings from Last 1 Encounters:   25 131/68           [x]  Reviewed chart for active Hypertension diagnosis     []  Reviewed & documented Home BP Cuff     []  Documented a Remote BP if needed & applicable     []  Scheduled necessary follow up appointments with Primary Care if needed         Additional Notes:             Provider Team Continuity:     Last PCP Visit Date: 2025          [x]  Reviewed Primary Care Provider Visits, Annual Wellness Visit, and Future          Appointments to ensure appointments have been scheduled and/or           completed        Additional Notes:             Social Determinants of Health          [x]  Reviewed, completed, and/or updated the following sections:                   Food Insecurity, Transportation Needs, Financial Resource Strain,                 Tobacco Use        Additional Notes:             Care Management, Digital Medicine, and/or Education Referrals    OPCM Risk Score: 48.8                 Additional Notes:

## 2025-02-07 NOTE — LETTER
AUTHORIZATION FOR RELEASE OF   CONFIDENTIAL INFORMATION      We are seeing Maru Shelby, date of birth 1950, in the clinic at [facility] INTERNAL MEDICINE II. Florence Mark MD is the patient's PCP. Maru Shelby has an outstanding lab/procedure at the time we reviewed her chart. In order to help keep her health information updated, she has authorized us to request the following medical record(s):        (  )  MAMMOGRAM                                      (  )  COLONOSCOPY      (  )  PAP SMEAR                                          (  )  OUTSIDE LAB RESULTS     (  )  DEXA SCAN                                          ( x )  EYE EXAM            (  )  FOOT EXAM                                          (  )  ENTIRE RECORD     (  )  OUTSIDE IMMUNIZATIONS                 (  )  _______________         Please fax records to Florence Mark MD, [phone]      If you have any questions, please contact [facility] at [phone].           Patient Name: Maru Shelby  : 1950  Patient Phone #: [phone]

## 2025-02-14 DIAGNOSIS — R21 RASH: ICD-10-CM

## 2025-02-14 DIAGNOSIS — Z12.31 ENCOUNTER FOR SCREENING MAMMOGRAM FOR BREAST CANCER: Primary | ICD-10-CM

## 2025-02-14 NOTE — TELEPHONE ENCOUNTER
----- Message from Karen sent at 2025  1:02 PM CST -----  Contact: Pt  Maru Shelby  MRN: [id number]  : 1950  PCP: Florence Mark  Home Phone      [phone]  Work Phone      Not on file.  Mobile          [phone]      MESSAGE:     Pt is needing diabetes diagnoses sent to [facility]. Pt also needs a mammo appt. Pt needs a refill of clotrimazole betamethason 1 mg sent to [facility] on [address]           Please advise   [phone]

## 2025-02-14 NOTE — TELEPHONE ENCOUNTER
Chronic condition verification phoned in to Na @ [facility]. Confirmed dx of DM and cardiovascular disorder. MMG ordered; however patient is not due for MMG until after 4/30/25. Will schedule after that date.  Rx for lotrisone cream pended. Attempted to contact patient, but she was not home. Left message requesting that patient return call.

## 2025-02-17 ENCOUNTER — OFFICE VISIT (OUTPATIENT)
Dept: RHEUMATOLOGY | Facility: CLINIC | Age: 75
End: 2025-02-17
Payer: MEDICARE

## 2025-02-17 ENCOUNTER — RESULTS FOLLOW-UP (OUTPATIENT)
Dept: RHEUMATOLOGY | Facility: CLINIC | Age: 75
End: 2025-02-17

## 2025-02-17 ENCOUNTER — LAB VISIT (OUTPATIENT)
Dept: LAB | Facility: HOSPITAL | Age: 75
End: 2025-02-17
Attending: INTERNAL MEDICINE
Payer: MEDICARE

## 2025-02-17 VITALS
DIASTOLIC BLOOD PRESSURE: 77 MMHG | BODY MASS INDEX: 29.41 KG/M2 | SYSTOLIC BLOOD PRESSURE: 117 MMHG | HEART RATE: 108 BPM | HEIGHT: 67 IN | WEIGHT: 187.38 LBS

## 2025-02-17 DIAGNOSIS — M54.2 NECK PAIN ON RIGHT SIDE: ICD-10-CM

## 2025-02-17 DIAGNOSIS — Z51.81 ENCOUNTER FOR MONITORING DENOSUMAB THERAPY: Primary | ICD-10-CM

## 2025-02-17 DIAGNOSIS — M43.12 ACQUIRED SPONDYLOLISTHESIS OF CERVICAL VERTEBRA: ICD-10-CM

## 2025-02-17 DIAGNOSIS — M85.80 OSTEOPENIA, UNSPECIFIED LOCATION: ICD-10-CM

## 2025-02-17 DIAGNOSIS — M05.772 RHEUMATOID ARTHRITIS INVOLVING BOTH ANKLES WITH POSITIVE RHEUMATOID FACTOR: ICD-10-CM

## 2025-02-17 DIAGNOSIS — M05.771 RHEUMATOID ARTHRITIS INVOLVING BOTH ANKLES WITH POSITIVE RHEUMATOID FACTOR: ICD-10-CM

## 2025-02-17 DIAGNOSIS — Z79.899 ENCOUNTER FOR MONITORING DENOSUMAB THERAPY: Primary | ICD-10-CM

## 2025-02-17 DIAGNOSIS — M06.9 RHEUMATOID ARTHRITIS, INVOLVING UNSPECIFIED SITE, UNSPECIFIED WHETHER RHEUMATOID FACTOR PRESENT: ICD-10-CM

## 2025-02-17 DIAGNOSIS — M47.812 CERVICAL SPONDYLOSIS: ICD-10-CM

## 2025-02-17 LAB
CRP SERPL-MCNC: 1.1 MG/L (ref 0–8.2)
ERYTHROCYTE [SEDIMENTATION RATE] IN BLOOD BY PHOTOMETRIC METHOD: 10 MM/HR (ref 0–36)

## 2025-02-17 PROCEDURE — 86140 C-REACTIVE PROTEIN: CPT | Performed by: INTERNAL MEDICINE

## 2025-02-17 PROCEDURE — 36415 COLL VENOUS BLD VENIPUNCTURE: CPT | Performed by: INTERNAL MEDICINE

## 2025-02-17 PROCEDURE — 85652 RBC SED RATE AUTOMATED: CPT | Performed by: INTERNAL MEDICINE

## 2025-02-17 RX ORDER — LEFLUNOMIDE 20 MG/1
20 TABLET ORAL DAILY
Qty: 90 TABLET | Refills: 0 | Status: SHIPPED | OUTPATIENT
Start: 2025-02-17

## 2025-02-17 RX ORDER — ERGOCALCIFEROL 1.25 MG/1
50000 CAPSULE ORAL
Qty: 12 CAPSULE | Refills: 3 | Status: SHIPPED | OUTPATIENT
Start: 2025-02-17

## 2025-02-17 RX ORDER — DUPILUMAB 300 MG/2ML
INJECTION, SOLUTION SUBCUTANEOUS
COMMUNITY
Start: 2024-11-26

## 2025-02-17 RX ORDER — FOLIC ACID 1 MG/1
TABLET ORAL
Qty: 150 TABLET | Refills: 3 | Status: SHIPPED | OUTPATIENT
Start: 2025-02-17

## 2025-02-17 RX ORDER — METHOTREXATE 25 MG/ML
INJECTION, SOLUTION INTRAMUSCULAR; INTRATHECAL; INTRAVENOUS; SUBCUTANEOUS
Qty: 12 ML | Refills: 0 | Status: SHIPPED | OUTPATIENT
Start: 2025-02-17

## 2025-02-17 RX ORDER — CLOTRIMAZOLE AND BETAMETHASONE DIPROPIONATE 10; .64 MG/G; MG/G
CREAM TOPICAL 2 TIMES DAILY
Qty: 45 G | Refills: 1 | Status: SHIPPED | OUTPATIENT
Start: 2025-02-17

## 2025-02-17 ASSESSMENT — ROUTINE ASSESSMENT OF PATIENT INDEX DATA (RAPID3)
FATIGUE SCORE: 1.5
PAIN SCORE: 1.5
TOTAL RAPID3 SCORE: 3.06
AM STIFFNESS SCORE: 0, NO
PATIENT GLOBAL ASSESSMENT SCORE: 7
PSYCHOLOGICAL DISTRESS SCORE: 0
MDHAQ FUNCTION SCORE: 0.2

## 2025-02-17 NOTE — PROGRESS NOTES
"Patient ID:  Maru Shelby    YOB: 1950     MRN:  [id number]     Subjective:     Chief Complaint:  Disease Management     History of Present Illness:  Pt is a 74 y.o. female with RA, osteoporosis, fibromyalgia who presents today for f/u. LCV was 8/27/24. At that time she was doing well and continued on same regimen.    Today: During the snowstorm, she had neck pain with reduced rotation, went to the ED and was told she has arthritis and given morphine. Pain resolved and denies any pain currently. She had a recent rash "all over" leg, shoulder, abd in January 2025, went to dermatologist and dx with eczema, she was started on Dupixent 300mg/2mL sc every 2 weeks with complete relief. Denies recent fall. She reports occasional episodes of dizziness/off balance that are instigated by standing up or turning quickly. She does some home exercises that help. She reports drinking a lot of water.    Morning stiffness: 5-10 minutes  Recently had DXA completed which showed osteoporosis. Previously on alendronate.     Current medications: abatacept (orencia) 1000mg IV q28 days (next 2/24/24), methotrexate 20mg sc q 7 days with folic acid 1mg daily, leflunomide 20mg daily, nortriptyline 10mg nightly, gabapentin 300mg nightly, Dupixent 300mg/2mL sc every 2 weeks    Exercise: able to walk a little but not much; otherwise not exercising much  Diet: Trying to adhere to Mediterranean diet. Trying to eat less rice and bread. Portion controlling. Has lost 10 lbs on Ozempic    Has had rashes in her antecubital fossa; resolved on Dupixent. Dry eyes but better with drops from eye doctor. Denies hair loss, vision changes, dry mouth, oral/nasal ulcers, trouble swallowing, joint swelling, or GI disturbances.      Review of Systems   Review of Systems   Constitutional:  Negative for chills, fever and unexpected weight change.   HENT:  Negative for mouth sores and trouble swallowing.    Eyes:  Negative for pain and visual " disturbance.   Respiratory:  Negative for cough and shortness of breath.    Cardiovascular:  Negative for chest pain.   Gastrointestinal:  Negative for constipation, diarrhea, nausea and vomiting.   Genitourinary:  Negative for dysuria and frequency.   Musculoskeletal:  Negative for arthralgias, joint swelling and myalgias.   Skin:  Negative for rash.   Neurological:  Negative for weakness, light-headedness and headaches.   Hematological:  Does not bruise/bleed easily.        Current Medications:    Current Outpatient Medications:     0.9% NaCl SolP 100 mL with abatacept (with maltose) 250 mg SolR 750 mg, Inject 750 mg into the vein every 30 days., Disp: , Rfl:     amLODIPine (NORVASC) 10 MG tablet, Take 1 tablet (10 mg total) by mouth once daily., Disp: 90 tablet, Rfl: 3    blood sugar diagnostic (ACCU-CHEK GUIDE) Strp, Use to check blood sugar once daily and as needed., Disp: 100 each, Rfl: 3    DOCOSAHEXANOIC ACID/EPA (FISH OIL ORAL), Take by mouth.  , Disp: , Rfl:     DUPIXENT  mg/2 mL PnIj, MAINTENANCE DOSE  INJECT 1 (300MG/2ML) INJECTION SUBCUTANEOUSLY EVERY 2 WEEKS, STARTING ON DAY 15, Disp: , Rfl:     gabapentin (NEURONTIN) 300 MG capsule, TAKE 1 CAPSULE BY MOUTH EVERY EVENING, Disp: 30 capsule, Rfl: 3    insulin syringe-needle U-100 1 mL 31 gauge x 5/16 Syrg, Use as directed, Disp: 10 each, Rfl: 3    ketoconazole (NIZORAL) 2 % cream, APPLY 1 APPLICATION TOPICAL TWICE DAILY TO RASH UNDER THE BREAST WHEN ITCH. MIX IN EQUAL PARTS WITH HYDROCORTISONE, Disp: , Rfl:     latanoprost 0.005 % ophthalmic solution, , Disp: , Rfl: 0    meloxicam (MOBIC) 15 MG tablet, Take 1 tablet (15 mg total) by mouth once daily., Disp: 30 tablet, Rfl: 0    multivitamin (ONE DAILY MULTIVITAMIN) per tablet, Take 1 tablet by mouth once daily.  , Disp: , Rfl:     nortriptyline (PAMELOR) 10 MG capsule, Take 1 capsule (10 mg total) by mouth every evening., Disp: 90 capsule, Rfl: 1    rosuvastatin (CRESTOR) 40 MG Tab, , Disp: , Rfl:  "    semaglutide (OZEMPIC) 0.25 mg or 0.5 mg (2 mg/3 mL) pen injector, Inject 0.25 mg into the skin every 7 days., Disp: , Rfl:     semaglutide (OZEMPIC) 0.25 mg or 0.5 mg (2 mg/3 mL) pen injector, Inject 0.5 mg into the skin every 7 days., Disp: 9 mL, Rfl: 0    SITagliptan-metformin (JANUMET) 50-1,000 mg per tablet, Take 1 tablet by mouth 2 (two) times daily with meals., Disp: 180 tablet, Rfl: 1    syringe with needle, safety (BD SAFETY-ALONDRA TUBERCULIN) 1 mL 27 gauge x 1/2" Syrg, USE AS DIRECTED, Disp: 12 Syringe, Rfl: 3    augmented betamethasone dipropionate (DIPROLENE-AF) 0.05 % cream, , Disp: , Rfl:     blood-glucose meter (ACCU-CHEK GUIDE GLUCOSE METER) Misc, 1 each by Misc.(Non-Drug; Combo Route) route once daily., Disp: 100 each, Rfl: 1    clotrimazole-betamethasone 1-0.05% (LOTRISONE) cream, Apply topically 2 (two) times daily. (Patient not taking: Reported on 2/17/2025), Disp: 45 g, Rfl: 1    ergocalciferol (ERGOCALCIFEROL) 50,000 unit Cap, Take 1 capsule (50,000 Units total) by mouth every 7 days., Disp: 12 capsule, Rfl: 3    folic acid (FOLVITE) 1 MG tablet, TAKE 1 TABLET DAILY, BUT TAKE 8 TABLETS BY MOUTH EVERY THURSDAY, Disp: 150 tablet, Rfl: 3    hydrocortisone 2.5 % cream, APPLY 1 APPLICATION TOPICAL TWICE DAILY TO RASH UNDER BREAST WHEN ITCH MIX IN EQUAL PARTS WITH KETOCONAZOLE (Patient not taking: Reported on 2/17/2025), Disp: , Rfl:     lancets Misc, Check blood sugar once daily, Disp: 100 each, Rfl: 1    leflunomide (ARAVA) 20 MG Tab, Take 1 tablet (20 mg total) by mouth once daily., Disp: 90 tablet, Rfl: 0    methotrexate 25 mg/mL injection, INJECT 0.8 MLS INTO THE SKIN EVERY 7 DAYS. Wednesdays, Disp: 12 mL, Rfl: 0    methotrexate, PF, 25 mg/mL Soln, INJECT 0.8 ML INTO SKIN EVERY 7 DAYS (Patient not taking: Reported on 2/17/2025), Disp: , Rfl:     mupirocin (BACTROBAN) 2 % ointment, Apply topically 3 (three) times daily. (Patient not taking: Reported on 11/25/2024), Disp: 22 g, Rfl: 0    " triamcinolone acetonide 0.1% (KENALOG) 0.1 % cream, , Disp: , Rfl:     UNABLE TO FIND, 250 mg every 30 days. medication name: Sid, Disp: , Rfl:     Current Facility-Administered Medications:     abatacept (with maltose) (ORENCIA) 1,000 mg in sodium chloride 0.9% 100 mL IVPB, 1,000 mg, Intravenous, 1 time in Clinic/HOD, Timi Royal MD, Stopped at 12/19/22 0600    acetaminophen tablet 650 mg, 650 mg, Oral, Once PRN, Ronaldo Moralez MD    albuterol inhaler 2 puff, 2 puff, Inhalation, Q20 Min PRN, Ronaldo Moralez MD    diphenhydrAMINE injection 25 mg, 25 mg, Intravenous, Once PRN, Ronaldo Moralez MD    EPINEPHrine (EPIPEN) 0.3 mg/0.3 mL pen injection 0.3 mg, 0.3 mg, Intramuscular, PRN, Ronaldo Moralez MD    ondansetron disintegrating tablet 4 mg, 4 mg, Oral, Once PRN, Ronaldo Moraelz MD    sodium chloride 0.9% 500 mL flush bag, , Intravenous, PRN, Ronaldo Moralez MD    sodium chloride 0.9% flush 10 mL, 10 mL, Intravenous, PRN, Ronaldo Moralez MD     Objective:     Vitals:    02/17/25 0923   BP: 117/77   Pulse: 108        Body mass index is 29.35 kg/m².     Physical Exam   Constitutional: No distress.   HENT:   Head: Normocephalic.   Right Ear: External ear normal.   Left Ear: External ear normal.   Mouth/Throat: Mucous membranes are dry.   Eyes: Conjunctivae are normal.   Cardiovascular: Normal rate.   Pulmonary/Chest: Effort normal. No respiratory distress.   Musculoskeletal:      Right shoulder: Normal.      Left shoulder: Normal.      Right elbow: Normal.      Left elbow: Normal.      Right wrist: Normal.      Left wrist: Normal.      Cervical back: Normal range of motion.      Right knee: Normal.      Left knee: Normal.   Neurological: She is alert.   Skin: Skin is warm. Capillary refill takes less than 2 seconds.   Psychiatric: Her behavior is normal. Mood, judgment and thought content normal.       Right Side Rheumatological Exam     Examination finds the  shoulder, elbow, wrist, knee, 1st PIP, 2nd PIP, 2nd MCP, 3rd PIP, 3rd MCP, 4th PIP and 4th MCP normal.    The patient is tender to palpation of the 1st MCP, 5th PIP and 5th MCP    Muscle Strength (0-5 scale):  Deltoid:  5  Biceps: 5/5   Triceps:  5  : 5/5   Iliopsoas: 4.8  Quadriceps:  5     Left Side Rheumatological Exam     Examination finds the shoulder, elbow, wrist, knee, 1st PIP, 1st MCP, 2nd PIP, 2nd MCP, 3rd PIP, 3rd MCP, 4th PIP and 4th MCP normal.    The patient is tender to palpation of the 5th PIP and 5th MCP.    Muscle Strength (0-5 scale):  Deltoid:  5  Biceps: 5/5   Triceps:  5  :  5/5   Iliopsoas: 4.8  Quadriceps:  5              6/5/2023 9/1/2023 3/27/2024 8/27/2024   Tender (LEES-28) 0 / 28  1 / 28  2 / 28 4 / 28    Swollen (LEES-28) 0 / 28 1 / 28  0 / 28  0 / 28    Provider Global 0 / 100 10 / 100 0 / 100 10 / 100   Patient Global 25 / 100 35 / 100 30 / 100 30 / 100   ESR 26 mm/hr 22 mm/hr 20 mm/hr 9 mm/hr   CRP 7.2 mg/L 5 mg/L 5.9 mg/L 2.0 mg/L   LEES-28 (ESR) 2.63 (Low disease activity) 3.49 (Moderate disease activity) 3.31 (Moderate disease activity) 3.08 (Low disease activity)   LEES-28 (CRP) 2.07 (Remission) 2.94 (Low disease activity) 2.87 (Low disease activity) 2.9 (Low disease activity)   CDAI Score 2.5  6.5  5  8         There is currently no information documented on the homunculus. Go to the Rheumatology activity and complete the homunculus joint exam.      Assessment:     1. Encounter for monitoring denosumab therapy    2. Neck pain on right side    3. Cervical spondylosis    4. Acquired spondylolisthesis of cervical vertebra    5. Osteopenia, unspecified location    6. Rheumatoid arthritis involving both ankles with positive rheumatoid factor       RA: RF+ ACPA+ GUSTABO-:  TJ 0  SJ 0 Pt global  30  ESR  CRP    DAS28  [id number]    CDAI   Rheumatoid Arthritis Treatment Goals:  -Disease Activity Measure: CDAI  -Target: LDA  -Therapy/Change: none  -Shared Decision Making: Discussed  goals of care and therapy plan together with patient as outlined above.      Acute Right sided cervical pain, atraumatic superimposed on chronic neck pain; went to ED 1/23/25 dx cervical strain Rx with Norco, tizanidine   Frequent falls, none recent  OA knees;left>right  OA hips: left>right  Osteoporosis DXA 4/30/24:  FRAX hip 3.4%  MOF 14.9% on alendronate intermittently with drug holidays  2015- 2022. Denosumab 60mg sc 10/21/24  Overweight Body mass index is 29.35 kg/m². 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 [place]'s next dose 2/24/25  Cont methotrexate 20mg (0.8 ml) sc q 7 days with folic acid 1mg daily but 8 mg day after methotrexate dosing.  Cont leflunomide 20mg daily  Increase walking,stationary bike  Mediterranean/DASH diet, no junk food, low carb, weight reduction  Cont F/u Dr. Mark for EH, T2 DM  hyperlipidemia  Standing labs q 3 months  RTC 6 months       Ruby Boston DO  PGY-1  [employer] [facility]

## 2025-02-17 NOTE — PATIENT INSTRUCTIONS
ESR, CRP  today  Denosumab 60mg sc q 6 months next dose 4/21/25 with calcium prior  *Prevnar 20  Hold methotrexate for one week after any vaccinations  Rollator walker prn  Cont exercise  for right hip  Cont abatacept 1000mg IV q 28 days re-ordered and p.a. [place]'s next dose 2/24/25  Cont methotrexate 20mg (0.8 ml) sc q 7 days with folic acid 1mg daily but 8 mg day after methotrexate dosing.  Cont leflunomide 20mg daily  Increase walking,stationary bike  Mediterranean/DASH diet, no junk food, low carb, weight reduction  Cont F/u Dr. Mark for EH, T2 DM  hyperlipidemia  Standing labs q 3 months  RTC 6 months

## 2025-02-17 NOTE — PROGRESS NOTES
I have personally taken the history and examined the patient and agree with the resident,s note as stated above      Chest is clear to ausc     Latest Reference Range & Units 01/17/25 07:10   WBC 3.90 - 12.70 K/uL 9.02   RBC 4.00 - 5.40 M/uL 4.62   Hemoglobin 12.0 - 16.0 g/dL 13.5   Hematocrit 37.0 - 48.5 % 41.3   MCV 82 - 98 fL 89   MCH 27.0 - 31.0 pg 29.2   MCHC 32.0 - 36.0 g/dL 32.7   RDW 11.5 - 14.5 % 14.7 (H)   Platelet Count 150 - 450 K/uL 239   MPV 9.2 - 12.9 fL 10.0   Gran % 38.0 - 73.0 % 57.8   Lymph % 18.0 - 48.0 % 28.9   Mono % 4.0 - 15.0 % 7.9   Eos % 0.0 - 8.0 % 4.4   Basophil % 0.0 - 1.9 % 0.7   Immature Granulocytes 0.0 - 0.5 % 0.3   Gran # (ANC) 1.8 - 7.7 K/uL 5.2   Lymph # 1.0 - 4.8 K/uL 2.6   Mono # 0.3 - 1.0 K/uL 0.7   Eos # 0.0 - 0.5 K/uL 0.4   Baso # 0.00 - 0.20 K/uL 0.06   Immature Grans (Abs) 0.00 - 0.04 K/uL 0.03   nRBC 0 /100 WBC 0   Differential Method  Automated   Sodium 136 - 145 mmol/L 138   Potassium 3.5 - 5.1 mmol/L 4.4   Chloride 95 - 110 mmol/L 105   CO2 23 - 29 mmol/L 24   Anion Gap 8 - 16 mmol/L 9   BUN 8 - 23 mg/dL 11   Creatinine 0.5 - 1.4 mg/dL 0.8   eGFR >60 mL/min/1.73 m^2 >60   Glucose 70 - 110 mg/dL 287 (H)   Calcium 8.7 - 10.5 mg/dL 8.8   ALP 40 - 150 U/L 64   PROTEIN TOTAL 6.0 - 8.4 g/dL 6.5   Albumin 3.5 - 5.2 g/dL 3.5   BILIRUBIN TOTAL 0.1 - 1.0 mg/dL 0.5   AST 10 - 40 U/L 21   ALT 10 - 44 U/L 22   Cholesterol Total 120 - 199 mg/dL 80 (L)   HDL 40 - 75 mg/dL 35 (L)   HDL/Cholesterol Ratio 20.0 - 50.0 % 43.8   Non-HDL Cholesterol mg/dL 45   Total Cholesterol/HDL Ratio 2.0 - 5.0  2.3   Triglycerides 30 - 150 mg/dL 140   LDL Cholesterol 63.0 - 159.0 mg/dL 17.0 (L)   Hemoglobin A1C External 4.0 - 5.6 % 8.8 (H)   Estimated Avg Glucose 68 - 131 mg/dL 206 (H)   TSH 0.400 - 4.000 uIU/mL 1.969   (H): Data is abnormally high  (L): Data is abnormally low   Latest Reference Range & Units 08/27/24 11:21   Sed Rate 0 - 36 mm/Hr 9   Phosphorus Level 2.7 - 4.5 mg/dL 3.3   Magnesium  1.6  - 2.6 mg/dL 1.9   CRP 0.0 - 8.2 mg/L 2.0   PTH 9.0 - 77.0 pg/mL 43.6   Pathologist Interpretation [place]OCK  REVIEWED   [facility] [facility]  SEE COMMENT      Latest Reference Range & Units 10/14/24 07:11   Vitamin D 30 - 96 ng/mL 61     EXAMINATION:  XR CERVICAL SPINE AP LATERAL     CLINICAL HISTORY:  Right-sided neck stiffness;     TECHNIQUE:  AP, lateral and open mouth views of the cervical spine were performed.     COMPARISON:  01/22/2024 and 05/19/2008     FINDINGS:  There is lateral visualization only to the C6 level due to overlapping anatomy.     There is 3 mm anterolisthesis C4 on C5 and 4 mm anterolisthesis C5 on C6 in a stairstep fashion.  This is chronic.  No displaced fracture with preserved vertebral body heights where included.     Moderate degenerative change anterior C1-2 articulation.  Degenerative disc disease is moderate at C5-6.  Multilevel cervical facet degenerative change.  No prevertebral soft tissue thickening.  Intact base of the dens.        Electronically signed by:Ruiz Castellon  Date:                                            01/23/2025  Time:                                           10:44      ASSESSMENT:      RA: RF+ ACPA+ GUSTABO-:  TJ 0  SJ 0 Pt global  70  ESR  CRP    DAS28  [id number]    CDAI 7(LDA)  Rheumatoid Arthritis Treatment Goals:  -Disease Activity Measure: CDAI  -Target: LDA  -Therapy/Change: none  -Shared Decision Making: Discussed goals of care and therapy plan together with patient as outlined above.     Acute Right sided cervical pain, atraumatic superimposed on chronic neck pain; went to ED 1/23/25 dx cervical strain Rx with Norco, tizanidine   Frequent falls, none recent  OA knees;left>right  OA hips: left>right  Osteoporosis DXA 4/30/24:  FRAX hip 3.4%  MOF 14.9% on alendronate intermittently with drug holidays  2015- 2022. Denosumab 60mg sc 10/21/24  Overweight Body mass index is 29.35 kg/m². 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 [place]'s next dose 2/24/25  Cont methotrexate 20mg (0.8 ml) sc q 7 days with folic acid 1mg daily but 8 mg day after methotrexate dosing.  Cont leflunomide 20mg daily  Increase walking,stationary bike  Mediterranean/DASH diet, no junk food, low carb, weight reduction  Cont F/u Dr. Mark for EH, T2 DM  hyperlipidemia  Standing labs q 3 months  RTC 6 months

## 2025-02-24 ENCOUNTER — INFUSION (OUTPATIENT)
Dept: INFUSION THERAPY | Facility: HOSPITAL | Age: 75
End: 2025-02-24
Attending: INTERNAL MEDICINE
Payer: MEDICARE

## 2025-02-24 VITALS
RESPIRATION RATE: 18 BRPM | HEART RATE: 98 BPM | DIASTOLIC BLOOD PRESSURE: 78 MMHG | BODY MASS INDEX: 29.29 KG/M2 | TEMPERATURE: 98 F | SYSTOLIC BLOOD PRESSURE: 131 MMHG | WEIGHT: 187 LBS

## 2025-02-24 DIAGNOSIS — M05.79 RHEUMATOID ARTHRITIS INVOLVING MULTIPLE SITES WITH POSITIVE RHEUMATOID FACTOR: Primary | ICD-10-CM

## 2025-02-24 PROCEDURE — 96365 THER/PROPH/DIAG IV INF INIT: CPT

## 2025-02-24 PROCEDURE — 25000003 PHARM REV CODE 250: Performed by: INTERNAL MEDICINE

## 2025-02-24 PROCEDURE — 63600175 PHARM REV CODE 636 W HCPCS: Mod: JZ,JA,TB | Performed by: INTERNAL MEDICINE

## 2025-02-24 RX ORDER — SODIUM CHLORIDE 0.9 % (FLUSH) 0.9 %
10 SYRINGE (ML) INJECTION
OUTPATIENT
Start: 2025-03-17

## 2025-02-24 RX ORDER — ACETAMINOPHEN 325 MG/1
650 TABLET ORAL
OUTPATIENT
Start: 2025-03-17

## 2025-02-24 RX ORDER — HEPARIN 100 UNIT/ML
500 SYRINGE INTRAVENOUS
OUTPATIENT
Start: 2025-03-17

## 2025-02-24 RX ADMIN — SODIUM CHLORIDE 1000 MG: 9 INJECTION, SOLUTION INTRAVENOUS at 08:02

## 2025-03-01 DIAGNOSIS — M05.772 RHEUMATOID ARTHRITIS INVOLVING BOTH ANKLES WITH POSITIVE RHEUMATOID FACTOR: ICD-10-CM

## 2025-03-01 DIAGNOSIS — M05.771 RHEUMATOID ARTHRITIS INVOLVING BOTH ANKLES WITH POSITIVE RHEUMATOID FACTOR: ICD-10-CM

## 2025-03-01 NOTE — TELEPHONE ENCOUNTER
No care due was identified.  [facility] Embedded Care Due Messages. Reference number: [id number].   3/01/2025 3:58:51 AM CST

## 2025-03-03 RX ORDER — MELOXICAM 15 MG/1
TABLET ORAL
Qty: 30 TABLET | Refills: 0 | Status: SHIPPED | OUTPATIENT
Start: 2025-03-03

## 2025-03-11 PROBLEM — M54.2 NECK PAIN: Status: ACTIVE | Noted: 2025-03-11

## 2025-03-24 ENCOUNTER — INFUSION (OUTPATIENT)
Dept: INFUSION THERAPY | Facility: HOSPITAL | Age: 75
End: 2025-03-24
Attending: INTERNAL MEDICINE
Payer: MEDICARE

## 2025-03-24 VITALS
HEART RATE: 96 BPM | SYSTOLIC BLOOD PRESSURE: 115 MMHG | DIASTOLIC BLOOD PRESSURE: 68 MMHG | WEIGHT: 187 LBS | BODY MASS INDEX: 29.29 KG/M2 | TEMPERATURE: 97 F | RESPIRATION RATE: 18 BRPM

## 2025-03-24 DIAGNOSIS — M05.79 RHEUMATOID ARTHRITIS INVOLVING MULTIPLE SITES WITH POSITIVE RHEUMATOID FACTOR: Primary | ICD-10-CM

## 2025-03-24 PROCEDURE — 25000003 PHARM REV CODE 250: Performed by: INTERNAL MEDICINE

## 2025-03-24 PROCEDURE — 63600175 PHARM REV CODE 636 W HCPCS: Mod: JZ,JA,TB | Performed by: INTERNAL MEDICINE

## 2025-03-24 PROCEDURE — 96365 THER/PROPH/DIAG IV INF INIT: CPT

## 2025-03-24 RX ORDER — HEPARIN 100 UNIT/ML
500 SYRINGE INTRAVENOUS
OUTPATIENT
Start: 2025-04-14

## 2025-03-24 RX ORDER — ACETAMINOPHEN 325 MG/1
650 TABLET ORAL
OUTPATIENT
Start: 2025-04-14

## 2025-03-24 RX ORDER — SODIUM CHLORIDE 0.9 % (FLUSH) 0.9 %
10 SYRINGE (ML) INJECTION
OUTPATIENT
Start: 2025-04-14

## 2025-03-24 RX ADMIN — SODIUM CHLORIDE 1000 MG: 9 INJECTION, SOLUTION INTRAVENOUS at 08:03

## 2025-03-26 NOTE — PROGRESS NOTES
DATE: 4/2/2025  PATIENT: Maru Shelby    Supervising Physician: Zay Hensley M.D.    CHIEF COMPLAINT: low back and bilateral leg pain    HISTORY:  Maru Shelby is a 74 y.o. female with a pmhx of DM II and RA here for initial evaluation of low back and bilateral leg pain (Back - 4, Leg - 4).  The pain in the lower back and both hips is what bothers her most.  The pain has been present for years, worsening over time. The patient describes the pain as aching.  The pain is worse in the morning and standing long distances and improved by rest. There is positive associated numbness and tingling on the right. There is mild subjective weakness. Prior treatments have included remote hx of lumbar discectomy >20 years ago with good relief, but no recent PT or ESIs.    The patient denies myelopathic symptoms such as handwriting changes or difficulty with buttons/coins/keys. Denies perineal paresthesias, bowel/bladder dysfunction.    Pt says she was originally scheduled for her neck but she did some PT and it feels better.    PAST MEDICAL/SURGICAL HISTORY:  Past Medical History:   Diagnosis Date    Anxiety     Arthritis     rheumatoid and osteoarthritis    Depression     Hyperlipidemia     Hypertension     Neuromuscular disorder     Obesity     Osteoporosis     osteopenia    Other specified glaucoma 10/31/2019    Pneumonia     Renal manifestation of secondary diabetes mellitus     Rheumatoid arthritis      Past Surgical History:   Procedure Laterality Date    BREAST BIOPSY Right     2016    CHOLECYSTECTOMY  2009    COLONOSCOPY  2005 2008, 2013    COLONOSCOPY N/A 8/14/2018    Procedure: COLONOSCOPY;  Surgeon: Kimani Shelby MD;  Location: [facility];  Service: Endoscopy;  Laterality: N/A;    HAND SURGERY      LUMBAR SPINE SURGERY      TUBAL LIGATION         Medications:   Medications Ordered Prior to Encounter[1]    Social History: Social History[2]    REVIEW OF SYSTEMS:  Constitution: Negative. Negative for  "chills, fever and night sweats.   Cardiovascular: Negative for chest pain and syncope.   Respiratory: Negative for cough and shortness of breath.   Gastrointestinal: See HPI. Negative for nausea/vomiting. Negative for abdominal pain.  Genitourinary: See HPI. Negative for discoloration or dysuria.  Skin: Negative for dry skin, itching and rash.   Hematologic/Lymphatic: Negative for bleeding problem. Does not bruise/bleed easily.   Musculoskeletal: Negative for falls and muscle weakness.   Neurological: See HPI. No seizures.   Endocrine: Negative for polydipsia, polyphagia and polyuria.   Allergic/Immunologic: Negative for hives and persistent infections.     EXAM:  Ht 5' 7" (1.702 m)   Wt 84.8 kg (186 lb 15.2 oz)   BMI 29.28 kg/m²     General: The patient is a very pleasant 74 y.o. female in no apparent distress, the patient is oriented to person, place and time.  Psych: Normal mood and affect  HEENT: Vision grossly intact, hearing intact to the spoken word.  Lungs: Respirations unlabored.  Gait: antalgic gait  Skin: Dorsal lumbar skin negative for rashes, lesions, hairy patches and surgical scars. There is negative lumbar tenderness to palpation.  Range of motion: Lumbar range of motion is acceptable.  Spinal Balance: Global saggital and coronal spinal balance acceptable, not significant for scoliosis and kyphosis.  Musculoskeletal: No pain with the range of motion of the bilateral hips. No trochanteric tenderness to palpation.  Vascular: Bilateral lower extremities warm and well perfused, dorsalis pedis pulses 2+ bilaterally.  Neurological: Normal strength and tone in all major motor groups in the bilateral lower extremities. Normal sensation to light touch in the L2-S1 dermatomes bilaterally.  Deep tendon reflexes symmetric 2+ in the bilateral lower extremities.  Negative Babinski bilaterally. Straight leg raise negative bilaterally.    IMAGING:      Today I personally reviewed AP, Lat and Flex/Ex  upright " L-spine films (2020) that demonstrate scoliosis and degenerative changes      Body mass index is 29.28 kg/m².    Hemoglobin A1C   Date Value Ref Range Status   01/17/2025 8.8 (H) 4.0 - 5.6 % Final     Comment:     ADA Screening Guidelines:  5.7-6.4%  Consistent with prediabetes  >or=6.5%  Consistent with diabetes    High levels of fetal hemoglobin interfere with the HbA1C  assay. Heterozygous hemoglobin variants (HbS, HgC, etc)do  not significantly interfere with this assay.   However, presence of multiple variants may affect accuracy.     07/17/2024 7.8 (H) 4.0 - 5.6 % Final     Comment:     ADA Screening Guidelines:  5.7-6.4%  Consistent with prediabetes  >or=6.5%  Consistent with diabetes    High levels of fetal hemoglobin interfere with the HbA1C  assay. Heterozygous hemoglobin variants (HbS, HgC, etc)do  not significantly interfere with this assay.   However, presence of multiple variants may affect accuracy.     04/23/2024 7.4 (H) 4.0 - 5.6 % Final     Comment:     ADA Screening Guidelines:  5.7-6.4%  Consistent with prediabetes  >or=6.5%  Consistent with diabetes    High levels of fetal hemoglobin interfere with the HbA1C  assay. Heterozygous hemoglobin variants (HbS, HgC, etc)do  not significantly interfere with this assay.   However, presence of multiple variants may affect accuracy.             ASSESSMENT/PLAN:    Maru was seen today for low-back pain and hip pain.    Diagnoses and all orders for this visit:    Neck pain on right side  -     [facility]    Cervical spondylosis  -     Ambulatory referral[facility]ult [facility]    Acquired spondylolisthesis of cervical vertebra  -     Ambulatory referral[facility]ult [facility]        Today we discussed at length all of the different treatment options including anti-inflammatories, acetaminophen, rest, ice, heat, physical therapy including strengthening and stretching exercises, home exercises, ROM,  aerobic conditioning, aqua therapy, other modalities including ultrasound, massage, and dry needling, epidural steroid injections and finally surgical intervention.      Pt presents with chronic low back pain and radiculopathy. Will send PT orders to [facility]. Pt will fu if pain persists. Will consider MRI/ESIs               [1]   Current Outpatient Medications on File Prior to Visit   Medication Sig Dispense Refill    0.9% NaCl SolP 100 mL with abatacept (with maltose) 250 mg SolR 750 mg Inject 750 mg into the vein every 30 days.      amLODIPine (NORVASC) 10 MG tablet Take 1 tablet (10 mg total) by mouth once daily. 90 tablet 3    augmented betamethasone dipropionate (DIPROLENE-AF) 0.05 % cream  (Patient not taking: Reported on 2/17/2025)      blood sugar diagnostic (ACCU-CHEK GUIDE) Strp Use to check blood sugar once daily and as needed. 100 each 3    blood-glucose meter (ACCU-CHEK GUIDE GLUCOSE METER) Misc 1 each by Misc.(Non-Drug; Combo Route) route once daily. 100 each 1    clotrimazole-betamethasone 1-0.05% (LOTRISONE) cream Apply topically 2 (two) times daily. (Patient not taking: Reported on 2/17/2025) 45 g 1    DOCOSAHEXANOIC ACID/EPA (FISH OIL ORAL) Take by mouth.        DUPIXENT  mg/2 mL PnIj MAINTENANCE DOSE  INJECT 1 (300MG/2ML) INJECTION SUBCUTANEOUSLY EVERY 2 WEEKS, STARTING ON DAY 15      ergocalciferol (ERGOCALCIFEROL) 50,000 unit Cap Take 1 capsule (50,000 Units total) by mouth every 7 days. 12 capsule 3    folic acid (FOLVITE) 1 MG tablet TAKE 1 TABLET DAILY, BUT TAKE 8 TABLETS BY MOUTH EVERY THURSDAY 150 tablet 3    gabapentin (NEURONTIN) 300 MG capsule TAKE 1 CAPSULE BY MOUTH EVERY EVENING 30 capsule 3    hydrocortisone 2.5 % cream APPLY 1 APPLICATION TOPICAL TWICE DAILY TO RASH UNDER BREAST WHEN ITCH MIX IN EQUAL PARTS WITH KETOCONAZOLE (Patient not taking: Reported on 2/17/2025)      insulin syringe-needle U-100 1 mL 31 gauge x 5/16 Syrg Use as directed 10 each 3    ketoconazole  "(NIZORAL) 2 % cream APPLY 1 APPLICATION TOPICAL TWICE DAILY TO RASH UNDER THE BREAST WHEN ITCH. MIX IN EQUAL PARTS WITH HYDROCORTISONE      lancets Misc Check blood sugar once daily 100 each 1    latanoprost 0.005 % ophthalmic solution   0    leflunomide (ARAVA) 20 MG Tab Take 1 tablet (20 mg total) by mouth once daily. 90 tablet 0    meloxicam (MOBIC) 15 MG tablet TAKE 1 TABLET(15 MG) BY MOUTH DAILY 30 tablet 0    methotrexate 25 mg/mL injection INJECT 0.8 MLS INTO THE SKIN EVERY 7 DAYS. Wednesdays 12 mL 0    methotrexate, PF, 25 mg/mL Soln INJECT 0.8 ML INTO SKIN EVERY 7 DAYS (Patient not taking: Reported on 2/17/2025)      multivitamin (ONE DAILY MULTIVITAMIN) per tablet Take 1 tablet by mouth once daily.        mupirocin (BACTROBAN) 2 % ointment Apply topically 3 (three) times daily. (Patient not taking: Reported on 11/25/2024) 22 g 0    nortriptyline (PAMELOR) 10 MG capsule Take 1 capsule (10 mg total) by mouth every evening. 90 capsule 1    rosuvastatin (CRESTOR) 40 MG Tab       semaglutide (OZEMPIC) 0.25 mg or 0.5 mg (2 mg/3 mL) pen injector Inject 0.25 mg into the skin every 7 days.      semaglutide (OZEMPIC) 0.25 mg or 0.5 mg (2 mg/3 mL) pen injector Inject 0.5 mg into the skin every 7 days. 9 mL 0    SITagliptan-metformin (JANUMET) 50-1,000 mg per tablet Take 1 tablet by mouth 2 (two) times daily with meals. 180 tablet 1    syringe with needle, safety (BD SAFETY-ALONDRA TUBERCULIN) 1 mL 27 gauge x 1/2" Syrg USE AS DIRECTED 12 Syringe 3    triamcinolone acetonide 0.1% (KENALOG) 0.1 % cream  (Patient not taking: Reported on 11/25/2024)      UNABLE TO FIND 250 mg every 30 days. medication name: Sid       Current Facility-Administered Medications on File Prior to Visit   Medication Dose Route Frequency Provider Last Rate Last Admin    abatacept (with maltose) (ORENCIA) 1,000 mg in sodium chloride 0.9% 100 mL IVPB  1,000 mg Intravenous 1 time in Clinic/HOD Timi Royal MD   Stopped at 12/19/22 0600    " acetaminophen tablet 650 mg  650 mg Oral Once Ronaldo Lopez MD        albuterol inhaler 2 puff  2 puff Inhalation Q20 Min Ronaldo Lopez MD        diphenhydrAMINE injection 25 mg  25 mg Intravenous Once Ronaldo Lopez MD        EPINEPHrine (EPIPEN) 0.3 mg/0.3 mL pen injection 0.3 mg  0.3 mg Intramuscular Ronaldo Lopez MD        ondansetron disintegrating tablet 4 mg  4 mg Oral Once Ronaldo Lopez MD        sodium chloride 0.9% 500 mL flush bag   Intravenous Ronaldo Lopez MD        sodium chloride 0.9% flush 10 mL  10 mL Intravenous Ronaldo Lopez MD       [2]   Social History  Socioeconomic History    Marital status:    Tobacco Use    Smoking status: Never     Passive exposure: Never    Smokeless tobacco: Never   Substance and Sexual Activity    Alcohol use: Yes     Alcohol/week: 2.0 standard drinks of alcohol     Types: 2 Glasses of wine per week    Drug use: No    Sexual activity: Yes     Partners: Male     Social Drivers of Health     Financial Resource Strain: High Risk (12/22/2024)    Received from [facility] SDOH Screening     In the past year, have you been unable to get any of the following when you really needed them? choose all that apply.: Internet     In the past year, have you been unable to get any of the following when you really needed them? choose all that apply.: Medicine or health care   Food Insecurity: No Food Insecurity (7/23/2024)    Hunger Vital Sign     Worried About Running Out of Food in the Last Year: Never true     Ran Out of Food in the Last Year: Never true   Transportation Needs: No Transportation Needs (7/23/2024)    PRAPARE - Transportation     Lack of Transportation (Medical): No     Lack of Transportation (Non-Medical): No   Physical Activity: Insufficiently Active (7/23/2024)    Exercise Vital Sign     Days of Exercise per Week: 5 days     Minutes of Exercise per Session: 10 min    Stress: No Stress Concern Present (7/23/2024)    [employer] - Occupational Stress Questionnaire     Feeling of Stress : Only a little   Housing Stability: High Risk (12/22/2024)    Received from [facility] SDOH Screening     In the past year, have you been unable to get any of the following when you really needed them? choose all that apply.: Utilities (electric, gas, and water)

## 2025-03-31 DIAGNOSIS — M05.771 RHEUMATOID ARTHRITIS INVOLVING BOTH ANKLES WITH POSITIVE RHEUMATOID FACTOR: ICD-10-CM

## 2025-03-31 DIAGNOSIS — M05.772 RHEUMATOID ARTHRITIS INVOLVING BOTH ANKLES WITH POSITIVE RHEUMATOID FACTOR: ICD-10-CM

## 2025-03-31 RX ORDER — MELOXICAM 15 MG/1
TABLET ORAL
Qty: 30 TABLET | Refills: 0 | Status: SHIPPED | OUTPATIENT
Start: 2025-03-31

## 2025-03-31 NOTE — TELEPHONE ENCOUNTER
No care due was identified.  [facility] Embedded Care Due Messages. Reference number: [id number].   3/31/2025 3:57:46 AM CDT

## 2025-04-02 ENCOUNTER — OFFICE VISIT (OUTPATIENT)
Dept: ORTHOPEDICS | Facility: CLINIC | Age: 75
End: 2025-04-02
Payer: MEDICARE

## 2025-04-02 VITALS — WEIGHT: 186.94 LBS | HEIGHT: 67 IN | BODY MASS INDEX: 29.34 KG/M2

## 2025-04-02 DIAGNOSIS — M54.2 NECK PAIN ON RIGHT SIDE: ICD-10-CM

## 2025-04-02 DIAGNOSIS — M51.369 DEGENERATION OF INTERVERTEBRAL DISC OF LUMBAR REGION, UNSPECIFIED WHETHER PAIN PRESENT: Primary | ICD-10-CM

## 2025-04-02 DIAGNOSIS — M47.812 CERVICAL SPONDYLOSIS: ICD-10-CM

## 2025-04-02 DIAGNOSIS — M43.12 ACQUIRED SPONDYLOLISTHESIS OF CERVICAL VERTEBRA: ICD-10-CM

## 2025-04-02 PROCEDURE — 99999 PR PBB SHADOW E&M-EST. PATIENT-LVL V: CPT | Mod: PBBFAC,,, | Performed by: ORTHOPAEDIC SURGERY

## 2025-04-02 PROCEDURE — 3288F FALL RISK ASSESSMENT DOCD: CPT | Mod: CPTII,S$GLB,, | Performed by: ORTHOPAEDIC SURGERY

## 2025-04-02 PROCEDURE — 1125F AMNT PAIN NOTED PAIN PRSNT: CPT | Mod: CPTII,S$GLB,, | Performed by: ORTHOPAEDIC SURGERY

## 2025-04-02 PROCEDURE — 99204 OFFICE O/P NEW MOD 45 MIN: CPT | Mod: S$GLB,,, | Performed by: ORTHOPAEDIC SURGERY

## 2025-04-02 PROCEDURE — 1159F MED LIST DOCD IN RCRD: CPT | Mod: CPTII,S$GLB,, | Performed by: ORTHOPAEDIC SURGERY

## 2025-04-02 PROCEDURE — 1101F PT FALLS ASSESS-DOCD LE1/YR: CPT | Mod: CPTII,S$GLB,, | Performed by: ORTHOPAEDIC SURGERY

## 2025-04-04 NOTE — TELEPHONE ENCOUNTER
No care due was identified.  [facility] Embedded Care Due Messages. Reference number: [id number].   4/04/2025 8:27:14 AM CDT

## 2025-04-04 NOTE — TELEPHONE ENCOUNTER
Refill Routing Note   Medication(s) are not appropriate for processing by [facility] for the following reason(s):        No active prescription written by provider    [facility] action(s):  Defer               Appointments  past 12m or future 3m with PCP    Date Provider   Last Visit   1/24/2025 Florence Mark MD   Next Visit   7/28/2025 Florence Mark MD   ED visits in past 90 days: 1        Note composed:1:14 PM 04/04/2025

## 2025-04-07 RX ORDER — ROSUVASTATIN CALCIUM 40 MG/1
40 TABLET, COATED ORAL NIGHTLY
Qty: 90 TABLET | Refills: 3 | Status: SHIPPED | OUTPATIENT
Start: 2025-04-07

## 2025-04-21 ENCOUNTER — RESULTS FOLLOW-UP (OUTPATIENT)
Dept: RHEUMATOLOGY | Facility: CLINIC | Age: 75
End: 2025-04-21

## 2025-04-21 ENCOUNTER — TELEPHONE (OUTPATIENT)
Dept: RHEUMATOLOGY | Facility: CLINIC | Age: 75
End: 2025-04-21
Payer: MEDICARE

## 2025-04-21 ENCOUNTER — INFUSION (OUTPATIENT)
Dept: INFUSION THERAPY | Facility: HOSPITAL | Age: 75
End: 2025-04-21
Attending: INTERNAL MEDICINE
Payer: MEDICARE

## 2025-04-21 VITALS
DIASTOLIC BLOOD PRESSURE: 70 MMHG | WEIGHT: 186.94 LBS | HEART RATE: 95 BPM | TEMPERATURE: 97 F | SYSTOLIC BLOOD PRESSURE: 120 MMHG | RESPIRATION RATE: 18 BRPM | BODY MASS INDEX: 29.28 KG/M2

## 2025-04-21 DIAGNOSIS — M81.0 OSTEOPOROSIS WITHOUT CURRENT PATHOLOGICAL FRACTURE, UNSPECIFIED OSTEOPOROSIS TYPE: ICD-10-CM

## 2025-04-21 DIAGNOSIS — E11.65 TYPE 2 DIABETES MELLITUS WITH HYPERGLYCEMIA, WITHOUT LONG-TERM CURRENT USE OF INSULIN: ICD-10-CM

## 2025-04-21 DIAGNOSIS — M06.9 RHEUMATOID ARTHRITIS, INVOLVING UNSPECIFIED SITE, UNSPECIFIED WHETHER RHEUMATOID FACTOR PRESENT: ICD-10-CM

## 2025-04-21 DIAGNOSIS — Z79.620 ENCOUNTER FOR MONITORING DENOSUMAB THERAPY: ICD-10-CM

## 2025-04-21 DIAGNOSIS — Z51.81 ENCOUNTER FOR MONITORING DENOSUMAB THERAPY: ICD-10-CM

## 2025-04-21 DIAGNOSIS — M05.79 RHEUMATOID ARTHRITIS INVOLVING MULTIPLE SITES WITH POSITIVE RHEUMATOID FACTOR: Primary | ICD-10-CM

## 2025-04-21 DIAGNOSIS — M06.9 RHEUMATOID ARTHRITIS, INVOLVING UNSPECIFIED SITE, UNSPECIFIED WHETHER RHEUMATOID FACTOR PRESENT: Primary | ICD-10-CM

## 2025-04-21 LAB
CALCIUM SERPL-MCNC: 9 MG/DL (ref 8.7–10.5)
HBV CORE AB SERPL QL IA: NORMAL
HBV SURFACE AG SERPL QL IA: NORMAL

## 2025-04-21 PROCEDURE — 86704 HEP B CORE ANTIBODY TOTAL: CPT

## 2025-04-21 PROCEDURE — 25000003 PHARM REV CODE 250: Performed by: INTERNAL MEDICINE

## 2025-04-21 PROCEDURE — 82310 ASSAY OF CALCIUM: CPT

## 2025-04-21 PROCEDURE — 87340 HEPATITIS B SURFACE AG IA: CPT

## 2025-04-21 PROCEDURE — 96360 HYDRATION IV INFUSION INIT: CPT

## 2025-04-21 PROCEDURE — 63600175 PHARM REV CODE 636 W HCPCS: Mod: JZ,JA,TB | Performed by: INTERNAL MEDICINE

## 2025-04-21 PROCEDURE — 86480 TB TEST CELL IMMUN MEASURE: CPT

## 2025-04-21 RX ORDER — HEPARIN 100 UNIT/ML
500 SYRINGE INTRAVENOUS
OUTPATIENT
Start: 2025-05-12

## 2025-04-21 RX ORDER — SODIUM CHLORIDE 0.9 % (FLUSH) 0.9 %
10 SYRINGE (ML) INJECTION
OUTPATIENT
Start: 2025-05-12

## 2025-04-21 RX ORDER — SEMAGLUTIDE 0.68 MG/ML
0.5 INJECTION, SOLUTION SUBCUTANEOUS
Qty: 9 ML | Refills: 0 | Status: SHIPPED | OUTPATIENT
Start: 2025-04-21 | End: 2025-07-20

## 2025-04-21 RX ORDER — ACETAMINOPHEN 325 MG/1
650 TABLET ORAL
OUTPATIENT
Start: 2025-05-12

## 2025-04-21 RX ADMIN — DENOSUMAB 60 MG: 60 INJECTION SUBCUTANEOUS at 08:04

## 2025-04-21 RX ADMIN — SODIUM CHLORIDE 1000 MG: 9 INJECTION, SOLUTION INTRAVENOUS at 08:04

## 2025-04-21 NOTE — TELEPHONE ENCOUNTER
----- Message from Renetta sent at 4/21/2025  1:50 PM CDT -----  Contact: pt  Maru ECKERT AfsanehMRN: [id number]: 1950PCP: Florence Mark.Home Phone      Not on file.Work Phone      Not on file.Mobile          [phone]          [phone]: pt stopped by the office because she thought she was taking her medication wrong. She had two extra needles left over in each box. Pt needs a refill. She is to take her shot today semaglutide (OZEMPIC) 0.25 mg or 0.5 mg (2 mg/3 mL) pen [facility]or[id number] [place] - [address] AT [place] [address] [address] [place] [address]Cqvvs: [phone] Fax: [phone]: Not open 24 [phone]

## 2025-04-21 NOTE — TELEPHONE ENCOUNTER
Care Due:                  Date            Visit Type   Department     Provider  --------------------------------------------------------------------------------                                EP -                              PRIMARY      [facility] INTER[facility]  Last Visit: 01-      CARE ([facility])   MEDICINE II    Florence Mark                              EP -                              PRIMARY      [facility] INTERNAL  Next Visit: 07-      CARE ([facility])   MEDICINE II    Florence Mark                                                            Last  Test          Frequency    Reason                     Performed    Due Date  --------------------------------------------------------------------------------    HBA1C.......  6 months...  SITagliptan-metformin,     01- 07-                             semaglutide..............    [facility] [employer] Embedded Care Due Messages. Reference number: [id number].   4/21/2025 2:36:57 PM CDT

## 2025-04-22 LAB
MITOGEN MINUS NIL (OHS): 9.95
NIL TB SYNCED (OHS): 0.05
QUANTIFERON GOLD INTERP (OHS): NEGATIVE
TB1 AG MINUS NIL (OHS): 0.02
TB2 AG MINUS NIL (OHS): 0.02

## 2025-04-24 ENCOUNTER — LAB VISIT (OUTPATIENT)
Dept: LAB | Facility: HOSPITAL | Age: 75
End: 2025-04-24
Attending: INTERNAL MEDICINE
Payer: MEDICARE

## 2025-04-24 ENCOUNTER — RESULTS FOLLOW-UP (OUTPATIENT)
Dept: RHEUMATOLOGY | Facility: CLINIC | Age: 75
End: 2025-04-24

## 2025-04-24 DIAGNOSIS — I10 BENIGN ESSENTIAL HTN: ICD-10-CM

## 2025-04-24 DIAGNOSIS — M06.9 RHEUMATOID ARTHRITIS, INVOLVING UNSPECIFIED SITE, UNSPECIFIED WHETHER RHEUMATOID FACTOR PRESENT: ICD-10-CM

## 2025-04-24 DIAGNOSIS — E11.29 TYPE 2 DIABETES MELLITUS WITH DIABETIC MICROALBUMINURIA, WITHOUT LONG-TERM CURRENT USE OF INSULIN: ICD-10-CM

## 2025-04-24 DIAGNOSIS — E11.65 TYPE 2 DIABETES MELLITUS WITH HYPERGLYCEMIA, WITHOUT LONG-TERM CURRENT USE OF INSULIN: ICD-10-CM

## 2025-04-24 DIAGNOSIS — R80.9 TYPE 2 DIABETES MELLITUS WITH DIABETIC MICROALBUMINURIA, WITHOUT LONG-TERM CURRENT USE OF INSULIN: ICD-10-CM

## 2025-04-24 LAB
EAG (OHS): 183 MG/DL (ref 68–131)
HBA1C MFR BLD: 8 % (ref 4–5.6)
HBV CORE AB SERPL QL IA: NORMAL
HBV SURFACE AG SERPL QL IA: NORMAL

## 2025-04-24 PROCEDURE — 87340 HEPATITIS B SURFACE AG IA: CPT

## 2025-04-24 PROCEDURE — 36415 COLL VENOUS BLD VENIPUNCTURE: CPT

## 2025-04-24 PROCEDURE — 86480 TB TEST CELL IMMUN MEASURE: CPT

## 2025-04-24 PROCEDURE — 83036 HEMOGLOBIN GLYCOSYLATED A1C: CPT

## 2025-04-24 PROCEDURE — 86704 HEP B CORE ANTIBODY TOTAL: CPT

## 2025-04-25 LAB
MITOGEN MINUS NIL (OHS): 9.89
NIL TB SYNCED (OHS): 0.1
QUANTIFERON GOLD INTERP (OHS): NEGATIVE
TB1 AG MINUS NIL (OHS): 0.02
TB2 AG MINUS NIL (OHS): <0

## 2025-04-30 DIAGNOSIS — E11.69 TYPE 2 DIABETES MELLITUS WITH OTHER SPECIFIED COMPLICATION, WITH LONG-TERM CURRENT USE OF INSULIN: ICD-10-CM

## 2025-04-30 DIAGNOSIS — Z79.4 TYPE 2 DIABETES MELLITUS WITH OTHER SPECIFIED COMPLICATION, WITH LONG-TERM CURRENT USE OF INSULIN: ICD-10-CM

## 2025-04-30 RX ORDER — SITAGLIPTIN AND METFORMIN HYDROCHLORIDE 1000; 50 MG/1; MG/1
1 TABLET, FILM COATED ORAL 2 TIMES DAILY WITH MEALS
Qty: 180 TABLET | Refills: 1 | Status: SHIPPED | OUTPATIENT
Start: 2025-04-30

## 2025-04-30 NOTE — TELEPHONE ENCOUNTER
No care due was identified.  [facility] Embedded Care Due Messages. Reference number: [id number].   4/30/2025 8:10:00 AM CDT

## 2025-04-30 NOTE — TELEPHONE ENCOUNTER
Refill Decision Note   Maru Shelby  is requesting a refill authorization.  Brief Assessment and Rationale for Refill:  Approve     Medication Therapy Plan:         Comments:     Note composed:10:54 AM 04/30/2025

## 2025-05-01 DIAGNOSIS — M05.772 RHEUMATOID ARTHRITIS INVOLVING BOTH ANKLES WITH POSITIVE RHEUMATOID FACTOR: ICD-10-CM

## 2025-05-01 DIAGNOSIS — M05.771 RHEUMATOID ARTHRITIS INVOLVING BOTH ANKLES WITH POSITIVE RHEUMATOID FACTOR: ICD-10-CM

## 2025-05-01 NOTE — TELEPHONE ENCOUNTER
No care due was identified.  [facility] Embedded Care Due Messages. Reference number: [id number].   5/01/2025 3:57:53 AM CDT

## 2025-05-02 ENCOUNTER — RESULTS FOLLOW-UP (OUTPATIENT)
Dept: INTERNAL MEDICINE | Facility: CLINIC | Age: 75
End: 2025-05-02

## 2025-05-02 ENCOUNTER — HOSPITAL ENCOUNTER (OUTPATIENT)
Dept: RADIOLOGY | Facility: HOSPITAL | Age: 75
Discharge: HOME | End: 2025-05-02
Attending: INTERNAL MEDICINE
Payer: MEDICARE

## 2025-05-02 VITALS — WEIGHT: 186 LBS | HEIGHT: 67 IN | BODY MASS INDEX: 29.19 KG/M2

## 2025-05-02 DIAGNOSIS — Z12.31 ENCOUNTER FOR SCREENING MAMMOGRAM FOR BREAST CANCER: ICD-10-CM

## 2025-05-02 PROCEDURE — 77067 SCR MAMMO BI INCL CAD: CPT | Mod: TC

## 2025-05-02 PROCEDURE — 77063 BREAST TOMOSYNTHESIS BI: CPT | Mod: 26,,, | Performed by: RADIOLOGY

## 2025-05-02 PROCEDURE — 77067 SCR MAMMO BI INCL CAD: CPT | Mod: 26,,, | Performed by: RADIOLOGY

## 2025-05-02 RX ORDER — MELOXICAM 15 MG/1
TABLET ORAL
Qty: 30 TABLET | Refills: 0 | Status: SHIPPED | OUTPATIENT
Start: 2025-05-02

## 2025-05-05 PROBLEM — M54.2 NECK PAIN: Status: RESOLVED | Noted: 2025-03-11 | Resolved: 2025-05-05

## 2025-05-06 ENCOUNTER — RESULTS FOLLOW-UP (OUTPATIENT)
Dept: INTERNAL MEDICINE | Facility: CLINIC | Age: 75
End: 2025-05-06

## 2025-05-08 RX ORDER — GABAPENTIN 300 MG/1
300 CAPSULE ORAL NIGHTLY
Qty: 30 CAPSULE | Refills: 3 | Status: SHIPPED | OUTPATIENT
Start: 2025-05-08

## 2025-05-08 NOTE — TELEPHONE ENCOUNTER
No care due was identified.  [facility] Embedded Care Due Messages. Reference number: [id number].   5/08/2025 12:18:14 AM CDT

## 2025-05-18 DIAGNOSIS — M05.771 RHEUMATOID ARTHRITIS INVOLVING BOTH ANKLES WITH POSITIVE RHEUMATOID FACTOR: ICD-10-CM

## 2025-05-18 DIAGNOSIS — M05.772 RHEUMATOID ARTHRITIS INVOLVING BOTH ANKLES WITH POSITIVE RHEUMATOID FACTOR: ICD-10-CM

## 2025-05-19 ENCOUNTER — INFUSION (OUTPATIENT)
Dept: INFUSION THERAPY | Facility: HOSPITAL | Age: 75
End: 2025-05-19
Attending: INTERNAL MEDICINE
Payer: MEDICARE

## 2025-05-19 VITALS
RESPIRATION RATE: 16 BRPM | SYSTOLIC BLOOD PRESSURE: 128 MMHG | DIASTOLIC BLOOD PRESSURE: 73 MMHG | TEMPERATURE: 98 F | HEART RATE: 105 BPM

## 2025-05-19 DIAGNOSIS — M05.79 RHEUMATOID ARTHRITIS INVOLVING MULTIPLE SITES WITH POSITIVE RHEUMATOID FACTOR: Primary | ICD-10-CM

## 2025-05-19 PROCEDURE — 96365 THER/PROPH/DIAG IV INF INIT: CPT

## 2025-05-19 PROCEDURE — 25000003 PHARM REV CODE 250: Performed by: INTERNAL MEDICINE

## 2025-05-19 PROCEDURE — 63600175 PHARM REV CODE 636 W HCPCS: Mod: JZ,JA,TB | Performed by: INTERNAL MEDICINE

## 2025-05-19 RX ORDER — ACETAMINOPHEN 325 MG/1
650 TABLET ORAL
OUTPATIENT
Start: 2025-06-09

## 2025-05-19 RX ORDER — SODIUM CHLORIDE 0.9 % (FLUSH) 0.9 %
10 SYRINGE (ML) INJECTION
OUTPATIENT
Start: 2025-06-09

## 2025-05-19 RX ORDER — HEPARIN 100 UNIT/ML
500 SYRINGE INTRAVENOUS
OUTPATIENT
Start: 2025-06-09

## 2025-05-19 RX ORDER — SODIUM CHLORIDE 0.9 % (FLUSH) 0.9 %
10 SYRINGE (ML) INJECTION
Status: DISCONTINUED | OUTPATIENT
Start: 2025-05-19 | End: 2025-05-19 | Stop reason: HOSPADM

## 2025-05-19 RX ADMIN — SODIUM CHLORIDE 1000 MG: 9 INJECTION, SOLUTION INTRAVENOUS at 01:05

## 2025-05-19 NOTE — PATIENT INSTRUCTIONS
Instructed patient when to seek medical care, reviewed appointment times.  Return for appointments.  Discharged in stable condition.

## 2025-05-20 ENCOUNTER — TELEPHONE (OUTPATIENT)
Dept: RHEUMATOLOGY | Facility: CLINIC | Age: 75
End: 2025-05-20
Payer: MEDICARE

## 2025-05-20 RX ORDER — LEFLUNOMIDE 20 MG/1
TABLET ORAL
Qty: 30 TABLET | Refills: 0 | Status: SHIPPED | OUTPATIENT
Start: 2025-05-20

## 2025-05-21 ENCOUNTER — LAB VISIT (OUTPATIENT)
Dept: LAB | Facility: HOSPITAL | Age: 75
End: 2025-05-21
Attending: INTERNAL MEDICINE
Payer: MEDICARE

## 2025-05-21 ENCOUNTER — RESULTS FOLLOW-UP (OUTPATIENT)
Dept: RHEUMATOLOGY | Facility: CLINIC | Age: 75
End: 2025-05-21

## 2025-05-21 ENCOUNTER — TELEPHONE (OUTPATIENT)
Dept: RHEUMATOLOGY | Facility: CLINIC | Age: 75
End: 2025-05-21
Payer: MEDICARE

## 2025-05-21 DIAGNOSIS — M05.772 RHEUMATOID ARTHRITIS INVOLVING BOTH ANKLES WITH POSITIVE RHEUMATOID FACTOR: ICD-10-CM

## 2025-05-21 DIAGNOSIS — M06.9 RHEUMATOID ARTHRITIS, INVOLVING UNSPECIFIED SITE, UNSPECIFIED WHETHER RHEUMATOID FACTOR PRESENT: ICD-10-CM

## 2025-05-21 DIAGNOSIS — M05.771 RHEUMATOID ARTHRITIS INVOLVING BOTH ANKLES WITH POSITIVE RHEUMATOID FACTOR: ICD-10-CM

## 2025-05-21 LAB
ABSOLUTE EOSINOPHIL (OHS): 0.95 K/UL
ABSOLUTE MONOCYTE (OHS): 0.99 K/UL (ref 0.3–1)
ABSOLUTE NEUTROPHIL COUNT (OHS): 5.89 K/UL (ref 1.8–7.7)
ALBUMIN SERPL BCP-MCNC: 3.6 G/DL (ref 3.5–5.2)
ALP SERPL-CCNC: 63 UNIT/L (ref 40–150)
ALT SERPL W/O P-5'-P-CCNC: 21 UNIT/L (ref 10–44)
ANION GAP (OHS): 7 MMOL/L (ref 8–16)
AST SERPL-CCNC: 18 UNIT/L (ref 11–45)
BASOPHILS # BLD AUTO: 0.1 K/UL
BASOPHILS NFR BLD AUTO: 0.9 %
BILIRUB SERPL-MCNC: 0.4 MG/DL (ref 0.1–1)
BUN SERPL-MCNC: 16 MG/DL (ref 8–23)
CALCIUM SERPL-MCNC: 8.9 MG/DL (ref 8.7–10.5)
CHLORIDE SERPL-SCNC: 108 MMOL/L (ref 95–110)
CO2 SERPL-SCNC: 26 MMOL/L (ref 23–29)
CREAT SERPL-MCNC: 0.7 MG/DL (ref 0.5–1.4)
CRP SERPL-MCNC: 0.9 MG/L
ERYTHROCYTE [DISTWIDTH] IN BLOOD BY AUTOMATED COUNT: 16.1 % (ref 11.5–14.5)
ERYTHROCYTE [SEDIMENTATION RATE] IN BLOOD BY PHOTOMETRIC METHOD: 21 MM/HR
GFR SERPLBLD CREATININE-BSD FMLA CKD-EPI: >60 ML/MIN/1.73/M2
GLUCOSE SERPL-MCNC: 163 MG/DL (ref 70–110)
HCT VFR BLD AUTO: 39.5 % (ref 37–48.5)
HGB BLD-MCNC: 12.9 GM/DL (ref 12–16)
IMM GRANULOCYTES # BLD AUTO: 0.1 K/UL (ref 0–0.04)
IMM GRANULOCYTES NFR BLD AUTO: 0.9 % (ref 0–0.5)
LYMPHOCYTES # BLD AUTO: 2.95 K/UL (ref 1–4.8)
MCH RBC QN AUTO: 29.8 PG (ref 27–31)
MCHC RBC AUTO-ENTMCNC: 32.7 G/DL (ref 32–36)
MCV RBC AUTO: 91 FL (ref 82–98)
NUCLEATED RBC (/100WBC) (OHS): 0 /100 WBC
PLATELET # BLD AUTO: 201 K/UL (ref 150–450)
PMV BLD AUTO: 9 FL (ref 9.2–12.9)
POTASSIUM SERPL-SCNC: 4.9 MMOL/L (ref 3.5–5.1)
PROT SERPL-MCNC: 6.8 GM/DL (ref 6–8.4)
RBC # BLD AUTO: 4.33 M/UL (ref 4–5.4)
RELATIVE EOSINOPHIL (OHS): 8.7 %
RELATIVE LYMPHOCYTE (OHS): 26.9 % (ref 18–48)
RELATIVE MONOCYTE (OHS): 9 % (ref 4–15)
RELATIVE NEUTROPHIL (OHS): 53.6 % (ref 38–73)
SODIUM SERPL-SCNC: 141 MMOL/L (ref 136–145)
WBC # BLD AUTO: 10.98 K/UL (ref 3.9–12.7)

## 2025-05-21 PROCEDURE — 86140 C-REACTIVE PROTEIN: CPT

## 2025-05-21 PROCEDURE — 80053 COMPREHEN METABOLIC PANEL: CPT

## 2025-05-21 PROCEDURE — 85652 RBC SED RATE AUTOMATED: CPT

## 2025-05-21 PROCEDURE — 85025 COMPLETE CBC W/AUTO DIFF WBC: CPT

## 2025-05-21 PROCEDURE — 36415 COLL VENOUS BLD VENIPUNCTURE: CPT

## 2025-05-21 RX ORDER — METHOTREXATE 25 MG/ML
INJECTION, SOLUTION INTRAMUSCULAR; INTRATHECAL; INTRAVENOUS; SUBCUTANEOUS
Qty: 12 ML | Refills: 0 | Status: SHIPPED | OUTPATIENT
Start: 2025-05-21

## 2025-06-02 DIAGNOSIS — M05.771 RHEUMATOID ARTHRITIS INVOLVING BOTH ANKLES WITH POSITIVE RHEUMATOID FACTOR: ICD-10-CM

## 2025-06-02 DIAGNOSIS — M05.772 RHEUMATOID ARTHRITIS INVOLVING BOTH ANKLES WITH POSITIVE RHEUMATOID FACTOR: ICD-10-CM

## 2025-06-02 RX ORDER — MELOXICAM 15 MG/1
TABLET ORAL
Qty: 30 TABLET | Refills: 0 | Status: SHIPPED | OUTPATIENT
Start: 2025-06-02

## 2025-06-13 DIAGNOSIS — M05.771 RHEUMATOID ARTHRITIS INVOLVING BOTH ANKLES WITH POSITIVE RHEUMATOID FACTOR: ICD-10-CM

## 2025-06-13 DIAGNOSIS — M05.772 RHEUMATOID ARTHRITIS INVOLVING BOTH ANKLES WITH POSITIVE RHEUMATOID FACTOR: ICD-10-CM

## 2025-06-16 ENCOUNTER — INFUSION (OUTPATIENT)
Dept: INFUSION THERAPY | Facility: HOSPITAL | Age: 75
End: 2025-06-16
Attending: INTERNAL MEDICINE
Payer: MEDICARE

## 2025-06-16 VITALS
DIASTOLIC BLOOD PRESSURE: 64 MMHG | TEMPERATURE: 97 F | WEIGHT: 186 LBS | RESPIRATION RATE: 18 BRPM | BODY MASS INDEX: 29.13 KG/M2 | SYSTOLIC BLOOD PRESSURE: 118 MMHG | HEART RATE: 96 BPM

## 2025-06-16 DIAGNOSIS — M05.79 RHEUMATOID ARTHRITIS INVOLVING MULTIPLE SITES WITH POSITIVE RHEUMATOID FACTOR: Primary | ICD-10-CM

## 2025-06-16 PROCEDURE — 25000003 PHARM REV CODE 250: Performed by: INTERNAL MEDICINE

## 2025-06-16 PROCEDURE — 96365 THER/PROPH/DIAG IV INF INIT: CPT

## 2025-06-16 PROCEDURE — A4216 STERILE WATER/SALINE, 10 ML: HCPCS | Performed by: INTERNAL MEDICINE

## 2025-06-16 PROCEDURE — 63600175 PHARM REV CODE 636 W HCPCS: Mod: JZ,JA,TB | Performed by: INTERNAL MEDICINE

## 2025-06-16 RX ORDER — SODIUM CHLORIDE 0.9 % (FLUSH) 0.9 %
10 SYRINGE (ML) INJECTION
OUTPATIENT
Start: 2025-07-07

## 2025-06-16 RX ORDER — ACETAMINOPHEN 325 MG/1
650 TABLET ORAL
OUTPATIENT
Start: 2025-07-07

## 2025-06-16 RX ORDER — HEPARIN 100 UNIT/ML
500 SYRINGE INTRAVENOUS
OUTPATIENT
Start: 2025-07-07

## 2025-06-16 RX ORDER — SODIUM CHLORIDE 0.9 % (FLUSH) 0.9 %
10 SYRINGE (ML) INJECTION
Status: DISCONTINUED | OUTPATIENT
Start: 2025-06-16 | End: 2025-06-16 | Stop reason: HOSPADM

## 2025-06-16 RX ADMIN — Medication 10 ML: at 08:06

## 2025-06-16 RX ADMIN — SODIUM CHLORIDE 1000 MG: 9 INJECTION, SOLUTION INTRAVENOUS at 08:06

## 2025-06-18 ENCOUNTER — LAB VISIT (OUTPATIENT)
Dept: LAB | Facility: HOSPITAL | Age: 75
End: 2025-06-18
Attending: INTERNAL MEDICINE
Payer: MEDICARE

## 2025-06-18 ENCOUNTER — TELEPHONE (OUTPATIENT)
Dept: RHEUMATOLOGY | Facility: CLINIC | Age: 75
End: 2025-06-18
Payer: MEDICARE

## 2025-06-18 ENCOUNTER — RESULTS FOLLOW-UP (OUTPATIENT)
Dept: RHEUMATOLOGY | Facility: CLINIC | Age: 75
End: 2025-06-18

## 2025-06-18 DIAGNOSIS — M06.9 RHEUMATOID ARTHRITIS, INVOLVING UNSPECIFIED SITE, UNSPECIFIED WHETHER RHEUMATOID FACTOR PRESENT: ICD-10-CM

## 2025-06-18 DIAGNOSIS — D72.10 EOSINOPHILIA, UNSPECIFIED TYPE: Primary | ICD-10-CM

## 2025-06-18 LAB
ABSOLUTE EOSINOPHIL (OHS): 0.94 K/UL
ABSOLUTE MONOCYTE (OHS): 0.95 K/UL (ref 0.3–1)
ABSOLUTE NEUTROPHIL COUNT (OHS): 6.36 K/UL (ref 1.8–7.7)
BASOPHILS # BLD AUTO: 0.08 K/UL
BASOPHILS NFR BLD AUTO: 0.7 %
ERYTHROCYTE [DISTWIDTH] IN BLOOD BY AUTOMATED COUNT: 15.4 % (ref 11.5–14.5)
HCT VFR BLD AUTO: 40.1 % (ref 37–48.5)
HGB BLD-MCNC: 13.4 GM/DL (ref 12–16)
IMM GRANULOCYTES # BLD AUTO: 0.08 K/UL (ref 0–0.04)
IMM GRANULOCYTES NFR BLD AUTO: 0.7 % (ref 0–0.5)
LYMPHOCYTES # BLD AUTO: 3.08 K/UL (ref 1–4.8)
MCH RBC QN AUTO: 30.4 PG (ref 27–31)
MCHC RBC AUTO-ENTMCNC: 33.4 G/DL (ref 32–36)
MCV RBC AUTO: 91 FL (ref 82–98)
NUCLEATED RBC (/100WBC) (OHS): 0 /100 WBC
PLATELET # BLD AUTO: 207 K/UL (ref 150–450)
PMV BLD AUTO: 9.7 FL (ref 9.2–12.9)
RBC # BLD AUTO: 4.41 M/UL (ref 4–5.4)
RELATIVE EOSINOPHIL (OHS): 8.2 %
RELATIVE LYMPHOCYTE (OHS): 26.8 % (ref 18–48)
RELATIVE MONOCYTE (OHS): 8.3 % (ref 4–15)
RELATIVE NEUTROPHIL (OHS): 55.3 % (ref 38–73)
WBC # BLD AUTO: 11.49 K/UL (ref 3.9–12.7)

## 2025-06-18 PROCEDURE — 85025 COMPLETE CBC W/AUTO DIFF WBC: CPT

## 2025-06-18 PROCEDURE — 36415 COLL VENOUS BLD VENIPUNCTURE: CPT

## 2025-06-19 ENCOUNTER — OFFICE VISIT (OUTPATIENT)
Dept: INTERNAL MEDICINE | Facility: CLINIC | Age: 75
End: 2025-06-19
Payer: MEDICARE

## 2025-06-19 VITALS
BODY MASS INDEX: 29.55 KG/M2 | SYSTOLIC BLOOD PRESSURE: 120 MMHG | WEIGHT: 188.25 LBS | DIASTOLIC BLOOD PRESSURE: 70 MMHG | RESPIRATION RATE: 16 BRPM | OXYGEN SATURATION: 94 % | HEIGHT: 67 IN | HEART RATE: 100 BPM

## 2025-06-19 DIAGNOSIS — K12.30 ORAL MUCOSITIS: ICD-10-CM

## 2025-06-19 DIAGNOSIS — S00.521A BLISTER OF LIP: Primary | ICD-10-CM

## 2025-06-19 PROCEDURE — G2211 COMPLEX E/M VISIT ADD ON: HCPCS | Mod: S$GLB,,, | Performed by: INTERNAL MEDICINE

## 2025-06-19 PROCEDURE — 1159F MED LIST DOCD IN RCRD: CPT | Mod: CPTII,S$GLB,, | Performed by: INTERNAL MEDICINE

## 2025-06-19 PROCEDURE — 1101F PT FALLS ASSESS-DOCD LE1/YR: CPT | Mod: CPTII,S$GLB,, | Performed by: INTERNAL MEDICINE

## 2025-06-19 PROCEDURE — 3288F FALL RISK ASSESSMENT DOCD: CPT | Mod: CPTII,S$GLB,, | Performed by: INTERNAL MEDICINE

## 2025-06-19 PROCEDURE — 99999 PR PBB SHADOW E&M-EST. PATIENT-LVL V: CPT | Mod: PBBFAC,,, | Performed by: INTERNAL MEDICINE

## 2025-06-19 PROCEDURE — 3074F SYST BP LT 130 MM HG: CPT | Mod: CPTII,S$GLB,, | Performed by: INTERNAL MEDICINE

## 2025-06-19 PROCEDURE — 3078F DIAST BP <80 MM HG: CPT | Mod: CPTII,S$GLB,, | Performed by: INTERNAL MEDICINE

## 2025-06-19 PROCEDURE — 1126F AMNT PAIN NOTED NONE PRSNT: CPT | Mod: CPTII,S$GLB,, | Performed by: INTERNAL MEDICINE

## 2025-06-19 PROCEDURE — 1160F RVW MEDS BY RX/DR IN RCRD: CPT | Mod: CPTII,S$GLB,, | Performed by: INTERNAL MEDICINE

## 2025-06-19 PROCEDURE — 99213 OFFICE O/P EST LOW 20 MIN: CPT | Mod: S$GLB,,, | Performed by: INTERNAL MEDICINE

## 2025-06-19 RX ORDER — VALACYCLOVIR HYDROCHLORIDE 500 MG/1
500 TABLET, FILM COATED ORAL 2 TIMES DAILY
Qty: 20 TABLET | Refills: 0 | Status: SHIPPED | OUTPATIENT
Start: 2025-06-19 | End: 2026-06-19

## 2025-06-19 RX ORDER — MULTIVITAMIN WITH IRON
TABLET ORAL DAILY
COMMUNITY

## 2025-06-19 RX ORDER — CLINDAMYCIN HYDROCHLORIDE 300 MG/1
300 CAPSULE ORAL EVERY 8 HOURS
Qty: 30 CAPSULE | Refills: 0 | Status: SHIPPED | OUTPATIENT
Start: 2025-06-19 | End: 2025-06-29

## 2025-06-19 RX ORDER — NYSTATIN 100000 [USP'U]/ML
6 SUSPENSION ORAL
Qty: 240 ML | Refills: 0 | Status: SHIPPED | OUTPATIENT
Start: 2025-06-19 | End: 2025-06-29

## 2025-06-19 NOTE — PROGRESS NOTES
Subjective:   History of Present Illness    CHIEF COMPLAINT:  Maru presents today for evaluation of an oral blister.    ORAL LESION:  She reports an oral blister on the inside of her lower lip present for three weeks. The lesion fills with fluid periodically and is mildly symptomatic, causing slight aggravation at times but no significant pain. She denies current dental problems, fever, or systemic symptoms.    RHEUMATOID ARTHRITIS:  She continues monthly Orencia infusions for rheumatoid arthritis management. She reports foot complications including raised bones on the dorsal aspect and a soft, non-hard tissue mass resembling a ganglion cyst. She attributes these foot changes to her rheumatoid arthritis.    DIABETES:  She reports morning glucose of 205, acknowledging the elevation. She continues Ozempic for diabetes management without insulin. She actively monitors glucose levels at home.    HYPERHIDROSIS:  She reports consistent excessive sweating occurring independently of environmental temperature or air conditioning.       Review of Systems   Constitutional:  Negative for chills and fever.   HENT:  Negative for congestion, hearing loss, sinus pressure and sore throat.         Left lower lip blister   Eyes:  Negative for photophobia.   Respiratory:  Negative for cough, choking, chest tightness and wheezing.    Cardiovascular:  Negative for chest pain and palpitations.   Gastrointestinal:  Negative for blood in stool, nausea and vomiting.   Genitourinary:  Negative for dysuria and hematuria.   Musculoskeletal:  Negative for arthralgias and myalgias.   Skin:  Negative for pallor.   Neurological:  Negative for dizziness and numbness.   Hematological:  Does not bruise/bleed easily.   Psychiatric/Behavioral:  Negative for confusion and suicidal ideas. The patient is not nervous/anxious.       Objective:   Physical Exam  Vitals and nursing note reviewed.   Constitutional:       Appearance: She is well-developed.    HENT:      Head: Normocephalic and atraumatic.      Right Ear: External ear normal.      Left Ear: External ear normal.      Mouth/Throat:      Comments: Left lower lip with a blister 0.5 cm in size filled with grayish fluid  Eyes:      Conjunctiva/sclera: Conjunctivae normal.      Pupils: Pupils are equal, round, and reactive to light.   Neck:      Thyroid: No thyromegaly.      Vascular: No JVD.      Trachea: No tracheal deviation.   Cardiovascular:      Rate and Rhythm: Normal rate and regular rhythm.      Heart sounds: Normal heart sounds.   Pulmonary:      Effort: Pulmonary effort is normal. No respiratory distress.      Breath sounds: Normal breath sounds. No wheezing or rales.   Chest:      Chest wall: No tenderness.   Abdominal:      General: Bowel sounds are normal. There is no distension.      Palpations: Abdomen is soft. There is no mass.      Tenderness: There is no abdominal tenderness. There is no guarding or rebound.   Musculoskeletal:         General: Normal range of motion.      Cervical back: Normal range of motion and neck supple.   Lymphadenopathy:      Cervical: No cervical adenopathy.   Skin:     General: Skin is warm and dry.   Neurological:      Mental Status: She is alert and oriented to person, place, and time.      Cranial Nerves: No cranial nerve deficit.      Motor: No abnormal muscle tone.      Coordination: Coordination normal.      Deep Tendon Reflexes: Reflexes are normal and symmetric. Reflexes normal.        Assessment/Plan:     1. Blister of lip  clindamycin (CLEOCIN) 300 MG capsule    valACYclovir (VALTREX) 500 MG tablet    nystatin (MYCOSTATIN) 100,000 unit/mL suspension      2. Oral mucositis  She is on Orencia and methotrexate both can cause mucositis.  We will try clindamycin Valtrex and nystatin for now.         Assessment & Plan    M06.80 Other specified rheumatoid arthritis, unspecified site  M05.671 Rheumatoid arthritis of right ankle and foot with involvement of other  organs and systems  E11.65 Type 2 diabetes mellitus with hyperglycemia  R61 Generalized hyperhidrosis  K13.0 Diseases of lips  M67.471 Ganglion, right ankle and foot    RHEUMATOID ARTHRITIS:  - Maru receives monthly Orencia infusions for rheumatoid arthritis management, which will be continued.  - Evaluated the bump on the patient's foot, which appears to be related to arthritis.  - Discussed potential side effects of Orencia, including mouth ulcers, and educated the patient about this relationship.    TYPE 2 DIABETES WITH HYPERGLYCEMIA:  - Monitored blood sugar, which was 205 this morning, indicating hyperglycemia (above target of 145).  - This elevated glucose is an area of concern requiring better diabetes management.  - Will continue Ozempic for diabetes control.    GENERALIZED HYPERHIDROSIS:  - Maru reports persistent sweating, even in air-conditioned environments.  - Ordered thyroid function tests as part of labs to investigate the cause of hyperhidrosis.  - Will review results at follow-up.    DISEASES OF LIPS:  - Examined large blister on left lower lip that fills with fluid.  - This does not appear to be a fever blister and may be related to dental irritation.  - Initiated multi-pronged treatment approach: Clindamycin for potential bacterial infection, medication for possible viral etiology (cold sores), and oral rinse for potential fungal infection (5 mL swish and spit, do not swallow).  - Recommend dental or oral surgery consultation for possible biopsy if condition does not resolve with current treatment.    GANGLION CYST OF FOOT:  - Examined soft tissue bump on foot, which is not hard like bone.  - Assessment indicates this is likely a benign ganglion cyst.    FOLLOW-UP AND LAB WORK:  - Scheduled labs for July 28 and follow-up visit with Dr. Cuevas on August 4.  - Recent labs show normal white count, hemoglobin, and platelet levels.

## 2025-06-20 RX ORDER — METHOTREXATE 25 MG/ML
INJECTION, SOLUTION INTRAMUSCULAR; INTRATHECAL; INTRAVENOUS; SUBCUTANEOUS
Qty: 12 ML | Refills: 0 | Status: SHIPPED | OUTPATIENT
Start: 2025-06-20

## 2025-06-23 DIAGNOSIS — M05.772 RHEUMATOID ARTHRITIS INVOLVING BOTH ANKLES WITH POSITIVE RHEUMATOID FACTOR: ICD-10-CM

## 2025-06-23 DIAGNOSIS — M05.771 RHEUMATOID ARTHRITIS INVOLVING BOTH ANKLES WITH POSITIVE RHEUMATOID FACTOR: ICD-10-CM

## 2025-06-23 RX ORDER — LEFLUNOMIDE 20 MG/1
TABLET ORAL
Qty: 90 TABLET | Refills: 0 | Status: SHIPPED | OUTPATIENT
Start: 2025-06-23

## 2025-06-26 ENCOUNTER — TELEPHONE (OUTPATIENT)
Dept: INTERNAL MEDICINE | Facility: CLINIC | Age: 75
End: 2025-06-26
Payer: MEDICARE

## 2025-06-26 DIAGNOSIS — K12.30 ORAL MUCOSITIS: ICD-10-CM

## 2025-06-26 DIAGNOSIS — S00.521A BLISTER OF LIP: Primary | ICD-10-CM

## 2025-06-26 NOTE — TELEPHONE ENCOUNTER
----- Message from Renetta sent at 2025 11:10 AM CDT -----  Contact: pt  Maru Shelby  MRN: [id number]  : 1950  PCP: Florence Mark  Home Phone      Not on file.  Work Phone      Not on file.  Mobile          [phone]  Mobile          [phone]      MESSAGE: pt states Dr. Mark told her if the blister in her mouth wasn't better by now to follow up with a dental surgeon for a biopsy. Pt is just making sure that is who the doctor told her to follow up with. Please advise.      [phone] or her   [phone]

## 2025-06-26 NOTE — TELEPHONE ENCOUNTER
"Per Dr. Mark note dated 6/19/25:  "Recommend dental or oral surgery consultation for possible biopsy if condition does not resolve with current treatment."    Patient requesting referral to Dr. Barakat in [place]. Referral created and faxed to his office along with progress notes, insurance and demographic information. She will see him tomorrow   "

## 2025-07-07 DIAGNOSIS — E11.65 TYPE 2 DIABETES MELLITUS WITH HYPERGLYCEMIA, WITHOUT LONG-TERM CURRENT USE OF INSULIN: ICD-10-CM

## 2025-07-07 DIAGNOSIS — M05.772 RHEUMATOID ARTHRITIS INVOLVING BOTH ANKLES WITH POSITIVE RHEUMATOID FACTOR: ICD-10-CM

## 2025-07-07 DIAGNOSIS — M05.771 RHEUMATOID ARTHRITIS INVOLVING BOTH ANKLES WITH POSITIVE RHEUMATOID FACTOR: ICD-10-CM

## 2025-07-07 RX ORDER — MELOXICAM 15 MG/1
TABLET ORAL
Qty: 30 TABLET | Refills: 0 | Status: SHIPPED | OUTPATIENT
Start: 2025-07-07

## 2025-07-07 NOTE — TELEPHONE ENCOUNTER
No care due was identified.  [facility] Embedded Care Due Messages. Reference number: [id number].   7/07/2025 3:57:51 AM CDT

## 2025-07-07 NOTE — TELEPHONE ENCOUNTER
No care due was identified.  [facility] Embedded Care Due Messages. Reference number: [id number].   7/07/2025 12:42:44 PM CDT

## 2025-07-08 RX ORDER — SEMAGLUTIDE 0.68 MG/ML
0.5 INJECTION, SOLUTION SUBCUTANEOUS
Qty: 9 ML | Refills: 1 | Status: SHIPPED | OUTPATIENT
Start: 2025-07-08

## 2025-07-08 NOTE — TELEPHONE ENCOUNTER
Refill Decision Note   Maru Afsaneh  is requesting a refill authorization.  Brief Assessment and Rationale for Refill:  Approve     Medication Therapy Plan:         Alert overridden per protocol: Yes   Comments:     Note composed:5:38 AM 07/08/2025

## 2025-07-14 ENCOUNTER — INFUSION (OUTPATIENT)
Dept: INFUSION THERAPY | Facility: HOSPITAL | Age: 75
End: 2025-07-14
Attending: INTERNAL MEDICINE
Payer: MEDICARE

## 2025-07-14 VITALS
DIASTOLIC BLOOD PRESSURE: 89 MMHG | HEART RATE: 105 BPM | TEMPERATURE: 98 F | RESPIRATION RATE: 20 BRPM | WEIGHT: 180 LBS | BODY MASS INDEX: 28.19 KG/M2 | SYSTOLIC BLOOD PRESSURE: 126 MMHG

## 2025-07-14 DIAGNOSIS — M05.79 RHEUMATOID ARTHRITIS INVOLVING MULTIPLE SITES WITH POSITIVE RHEUMATOID FACTOR: Primary | ICD-10-CM

## 2025-07-14 PROCEDURE — 25000003 PHARM REV CODE 250: Performed by: INTERNAL MEDICINE

## 2025-07-14 PROCEDURE — 63600175 PHARM REV CODE 636 W HCPCS: Mod: JZ,JA,TB | Performed by: INTERNAL MEDICINE

## 2025-07-14 PROCEDURE — 96365 THER/PROPH/DIAG IV INF INIT: CPT

## 2025-07-14 RX ORDER — HEPARIN 100 UNIT/ML
500 SYRINGE INTRAVENOUS
OUTPATIENT
Start: 2025-08-04

## 2025-07-14 RX ORDER — ACETAMINOPHEN 325 MG/1
650 TABLET ORAL
OUTPATIENT
Start: 2025-08-04

## 2025-07-14 RX ORDER — SODIUM CHLORIDE 0.9 % (FLUSH) 0.9 %
10 SYRINGE (ML) INJECTION
OUTPATIENT
Start: 2025-08-04

## 2025-07-14 RX ADMIN — SODIUM CHLORIDE 1000 MG: 9 INJECTION, SOLUTION INTRAVENOUS at 08:07

## 2025-07-24 ENCOUNTER — LAB VISIT (OUTPATIENT)
Dept: LAB | Facility: HOSPITAL | Age: 75
End: 2025-07-24
Attending: INTERNAL MEDICINE
Payer: MEDICARE

## 2025-07-24 DIAGNOSIS — R80.9 TYPE 2 DIABETES MELLITUS WITH DIABETIC MICROALBUMINURIA, WITHOUT LONG-TERM CURRENT USE OF INSULIN: ICD-10-CM

## 2025-07-24 DIAGNOSIS — E11.29 TYPE 2 DIABETES MELLITUS WITH DIABETIC MICROALBUMINURIA, WITHOUT LONG-TERM CURRENT USE OF INSULIN: ICD-10-CM

## 2025-07-24 DIAGNOSIS — E11.65 TYPE 2 DIABETES MELLITUS WITH HYPERGLYCEMIA, WITHOUT LONG-TERM CURRENT USE OF INSULIN: ICD-10-CM

## 2025-07-24 DIAGNOSIS — I10 BENIGN ESSENTIAL HTN: ICD-10-CM

## 2025-07-24 LAB
ABSOLUTE EOSINOPHIL (OHS): 1.73 K/UL
ABSOLUTE MONOCYTE (OHS): 0.89 K/UL (ref 0.3–1)
ABSOLUTE NEUTROPHIL COUNT (OHS): 5.58 K/UL (ref 1.8–7.7)
ALBUMIN SERPL BCP-MCNC: 3.6 G/DL (ref 3.5–5.2)
ALP SERPL-CCNC: 51 UNIT/L (ref 40–150)
ALT SERPL W/O P-5'-P-CCNC: 18 UNIT/L (ref 10–44)
ANION GAP (OHS): 9 MMOL/L (ref 8–16)
AST SERPL-CCNC: 20 UNIT/L (ref 11–45)
BASOPHILS # BLD AUTO: 0.1 K/UL
BASOPHILS NFR BLD AUTO: 0.9 %
BILIRUB SERPL-MCNC: 0.4 MG/DL (ref 0.1–1)
BUN SERPL-MCNC: 15 MG/DL (ref 8–23)
CALCIUM SERPL-MCNC: 9.4 MG/DL (ref 8.7–10.5)
CHLORIDE SERPL-SCNC: 108 MMOL/L (ref 95–110)
CHOLEST SERPL-MCNC: 92 MG/DL (ref 120–199)
CHOLEST/HDLC SERPL: 2.3 {RATIO} (ref 2–5)
CO2 SERPL-SCNC: 25 MMOL/L (ref 23–29)
CREAT SERPL-MCNC: 0.8 MG/DL (ref 0.5–1.4)
EAG (OHS): 169 MG/DL (ref 68–131)
ERYTHROCYTE [DISTWIDTH] IN BLOOD BY AUTOMATED COUNT: 16.2 % (ref 11.5–14.5)
GFR SERPLBLD CREATININE-BSD FMLA CKD-EPI: >60 ML/MIN/1.73/M2
GLUCOSE SERPL-MCNC: 179 MG/DL (ref 70–110)
HBA1C MFR BLD: 7.5 % (ref 4–5.6)
HCT VFR BLD AUTO: 41.1 % (ref 37–48.5)
HDLC SERPL-MCNC: 40 MG/DL (ref 40–75)
HDLC SERPL: 43.5 % (ref 20–50)
HGB BLD-MCNC: 13.7 GM/DL (ref 12–16)
IMM GRANULOCYTES # BLD AUTO: 0.06 K/UL (ref 0–0.04)
IMM GRANULOCYTES NFR BLD AUTO: 0.5 % (ref 0–0.5)
LDLC SERPL CALC-MCNC: 18.8 MG/DL (ref 63–159)
LYMPHOCYTES # BLD AUTO: 3.09 K/UL (ref 1–4.8)
MCH RBC QN AUTO: 30.2 PG (ref 27–31)
MCHC RBC AUTO-ENTMCNC: 33.3 G/DL (ref 32–36)
MCV RBC AUTO: 91 FL (ref 82–98)
NONHDLC SERPL-MCNC: 52 MG/DL
NUCLEATED RBC (/100WBC) (OHS): 0 /100 WBC
PLATELET # BLD AUTO: 225 K/UL (ref 150–450)
PMV BLD AUTO: 9.4 FL (ref 9.2–12.9)
POTASSIUM SERPL-SCNC: 4.1 MMOL/L (ref 3.5–5.1)
PROT SERPL-MCNC: 6.8 GM/DL (ref 6–8.4)
RBC # BLD AUTO: 4.53 M/UL (ref 4–5.4)
RELATIVE EOSINOPHIL (OHS): 15.1 %
RELATIVE LYMPHOCYTE (OHS): 27 % (ref 18–48)
RELATIVE MONOCYTE (OHS): 7.8 % (ref 4–15)
RELATIVE NEUTROPHIL (OHS): 48.7 % (ref 38–73)
SODIUM SERPL-SCNC: 142 MMOL/L (ref 136–145)
TRIGL SERPL-MCNC: 166 MG/DL (ref 30–150)
TSH SERPL-ACNC: 1.41 UIU/ML (ref 0.4–4)
WBC # BLD AUTO: 11.45 K/UL (ref 3.9–12.7)

## 2025-07-24 PROCEDURE — 84075 ASSAY ALKALINE PHOSPHATASE: CPT

## 2025-07-24 PROCEDURE — 84443 ASSAY THYROID STIM HORMONE: CPT

## 2025-07-24 PROCEDURE — 83036 HEMOGLOBIN GLYCOSYLATED A1C: CPT

## 2025-07-24 PROCEDURE — 36415 COLL VENOUS BLD VENIPUNCTURE: CPT

## 2025-07-24 PROCEDURE — 85025 COMPLETE CBC W/AUTO DIFF WBC: CPT

## 2025-07-24 PROCEDURE — 80061 LIPID PANEL: CPT

## 2025-07-25 PROBLEM — Z22.322 MRSA (METHICILLIN RESISTANT STAPHYLOCOCCUS AUREUS) COLONIZATION: Status: ACTIVE | Noted: 2025-07-25

## 2025-07-28 ENCOUNTER — PATIENT MESSAGE (OUTPATIENT)
Dept: ALLERGY | Facility: CLINIC | Age: 75
End: 2025-07-28
Payer: MEDICARE

## 2025-07-28 ENCOUNTER — OFFICE VISIT (OUTPATIENT)
Dept: INTERNAL MEDICINE | Facility: CLINIC | Age: 75
End: 2025-07-28
Payer: MEDICARE

## 2025-07-28 VITALS
WEIGHT: 182.31 LBS | SYSTOLIC BLOOD PRESSURE: 106 MMHG | DIASTOLIC BLOOD PRESSURE: 68 MMHG | RESPIRATION RATE: 16 BRPM | HEART RATE: 107 BPM | BODY MASS INDEX: 28.61 KG/M2 | HEIGHT: 67 IN | OXYGEN SATURATION: 96 %

## 2025-07-28 DIAGNOSIS — E78.2 MODERATE MIXED HYPERLIPIDEMIA NOT REQUIRING STATIN THERAPY: ICD-10-CM

## 2025-07-28 DIAGNOSIS — E55.9 VITAMIN D DEFICIENCY: ICD-10-CM

## 2025-07-28 DIAGNOSIS — M85.80 OSTEOPENIA, UNSPECIFIED LOCATION: ICD-10-CM

## 2025-07-28 DIAGNOSIS — E11.29 TYPE 2 DIABETES MELLITUS WITH DIABETIC MICROALBUMINURIA, WITHOUT LONG-TERM CURRENT USE OF INSULIN: ICD-10-CM

## 2025-07-28 DIAGNOSIS — R80.9 TYPE 2 DIABETES MELLITUS WITH DIABETIC MICROALBUMINURIA, WITHOUT LONG-TERM CURRENT USE OF INSULIN: ICD-10-CM

## 2025-07-28 DIAGNOSIS — I10 BENIGN ESSENTIAL HTN: Primary | ICD-10-CM

## 2025-07-28 PROCEDURE — 3288F FALL RISK ASSESSMENT DOCD: CPT | Mod: CPTII,S$GLB,, | Performed by: INTERNAL MEDICINE

## 2025-07-28 PROCEDURE — 99999 PR PBB SHADOW E&M-EST. PATIENT-LVL V: CPT | Mod: PBBFAC,,, | Performed by: INTERNAL MEDICINE

## 2025-07-28 PROCEDURE — 99214 OFFICE O/P EST MOD 30 MIN: CPT | Mod: S$GLB,,, | Performed by: INTERNAL MEDICINE

## 2025-07-28 PROCEDURE — 3074F SYST BP LT 130 MM HG: CPT | Mod: CPTII,S$GLB,, | Performed by: INTERNAL MEDICINE

## 2025-07-28 PROCEDURE — G2211 COMPLEX E/M VISIT ADD ON: HCPCS | Mod: S$GLB,,, | Performed by: INTERNAL MEDICINE

## 2025-07-28 PROCEDURE — 1101F PT FALLS ASSESS-DOCD LE1/YR: CPT | Mod: CPTII,S$GLB,, | Performed by: INTERNAL MEDICINE

## 2025-07-28 PROCEDURE — 1160F RVW MEDS BY RX/DR IN RCRD: CPT | Mod: CPTII,S$GLB,, | Performed by: INTERNAL MEDICINE

## 2025-07-28 PROCEDURE — 1159F MED LIST DOCD IN RCRD: CPT | Mod: CPTII,S$GLB,, | Performed by: INTERNAL MEDICINE

## 2025-07-28 PROCEDURE — 3078F DIAST BP <80 MM HG: CPT | Mod: CPTII,S$GLB,, | Performed by: INTERNAL MEDICINE

## 2025-07-28 PROCEDURE — 1126F AMNT PAIN NOTED NONE PRSNT: CPT | Mod: CPTII,S$GLB,, | Performed by: INTERNAL MEDICINE

## 2025-07-28 RX ORDER — ERGOCALCIFEROL 1.25 MG/1
50000 CAPSULE ORAL
Qty: 12 CAPSULE | Refills: 3 | Status: SHIPPED | OUTPATIENT
Start: 2025-07-28

## 2025-07-28 RX ORDER — SEMAGLUTIDE 1.34 MG/ML
1 INJECTION, SOLUTION SUBCUTANEOUS
Qty: 3 ML | Refills: 11 | Status: SHIPPED | OUTPATIENT
Start: 2025-07-28 | End: 2026-07-28

## 2025-07-28 NOTE — PROGRESS NOTES
"Subjective:   History of Present Illness    CHIEF COMPLAINT:  Maru presents today for six month follow up    DERMATOLOGIC CONCERNS:  She has a history of recurrent staph infections and is currently taking Rifampin 500 mg, which is causing significant nausea. She has undergone five skin biopsies. A lesion on her thigh was biopsied and diagnosed with leukoclastic vasculitis, an inflammatory condition of blood vessels. She expresses concern about the complexity of her skin condition and ongoing medical evaluations by dermatology and rheumatology specialists. She is experiencing medication-related side effects and appears anxious about her ongoing medical management.    ORAL HEALTH:  She reports ongoing healing complications following an oral biopsy performed by Dr. Mackay. She expresses concern about the biopsy site not healing appropriately and uncertainty about next steps for management.    DIABETES:  Glucose was 179 and A1C was 7.5, trending down from previous values of 8.8 six months ago and 8.0 three months ago. She continues Janumet and Ozempic 0.5 mg with some weight loss noted. She reports no side effects with Ozempic, including no nausea or vomiting, and inquires about potential dose increase.    RHEUMATOID ARTHRITIS:  Her rheumatoid arthritis is currently stable and doing well. She denies any significant current symptoms or disease exacerbation.    FATIGUE:  She reports generalized fatigue and malaise, stating she is "just tired and just does not feel good."    LABS:  Recent lab results from 7/24 showed white count 11, hemoglobin 13, platelet count 225 - all within normal range. Eosinophils were elevated, potentially indicating allergy or inflammation. Electrolytes showed sodium 142, potassium 4.1 - normal. Kidney and liver function tests were normal. Cholesterol was 92. Thyroid function was normal. She is scheduled to see an allergy specialist on August 13th.    MEDICATIONS:  She currently takes Janumet, " amlodipine 10 mg for blood pressure management, and Ozempic 0.5 mg.       Review of Systems   Constitutional:  Negative for chills and fever.   HENT:  Negative for congestion, hearing loss, sinus pressure and sore throat.         Left lower lip blister  She has seen Dr. Barakat  Oral surgeon.  And had biopsy.   Eyes:  Negative for photophobia.   Respiratory:  Negative for cough, choking, chest tightness and wheezing.    Cardiovascular:  Negative for chest pain and palpitations.   Gastrointestinal:  Negative for blood in stool, nausea and vomiting.   Genitourinary:  Negative for dysuria and hematuria.   Musculoskeletal:  Negative for arthralgias and myalgias.   Skin:  Negative for pallor.        MRSA infections.  Also getting multiple biopsies   Neurological:  Negative for dizziness and numbness.   Hematological:  Does not bruise/bleed easily.   Psychiatric/Behavioral:  Negative for confusion and suicidal ideas. The patient is not nervous/anxious.       Objective:   Physical Exam  Vitals and nursing note reviewed.   Constitutional:       Appearance: She is well-developed.   HENT:      Head: Normocephalic and atraumatic.      Right Ear: External ear normal.      Left Ear: External ear normal.   Eyes:      Conjunctiva/sclera: Conjunctivae normal.      Pupils: Pupils are equal, round, and reactive to light.   Neck:      Thyroid: No thyromegaly.      Vascular: No JVD.      Trachea: No tracheal deviation.   Cardiovascular:      Rate and Rhythm: Normal rate and regular rhythm.      Heart sounds: Normal heart sounds.   Pulmonary:      Effort: Pulmonary effort is normal. No respiratory distress.      Breath sounds: Normal breath sounds. No wheezing or rales.   Chest:      Chest wall: No tenderness.   Abdominal:      General: Bowel sounds are normal. There is no distension.      Palpations: Abdomen is soft. There is no mass.      Tenderness: There is no abdominal tenderness. There is no guarding or rebound.   Musculoskeletal:          General: Normal range of motion.      Cervical back: Normal range of motion and neck supple.   Lymphadenopathy:      Cervical: No cervical adenopathy.   Skin:     General: Skin is warm and dry.   Neurological:      Mental Status: She is alert and oriented to person, place, and time.      Cranial Nerves: No cranial nerve deficit.      Motor: No abnormal muscle tone.      Coordination: Coordination normal.      Deep Tendon Reflexes: Reflexes are normal and symmetric. Reflexes normal.        Assessment/Plan:     1. Benign essential HTN  CBC Auto Differential    Comprehensive Metabolic Panel      2. Osteopenia, unspecified location  ergocalciferol (ERGOCALCIFEROL) 50,000 unit Cap      3. Moderate mixed hyperlipidemia not requiring statin therapy  Lipid Panel      4. Type 2 diabetes mellitus with diabetic microalbuminuria, without long-term current use of insulin  semaglutide (OZEMPIC) 1 mg/dose (4 mg/3 mL)    Lipid Panel    TSH    Hemoglobin A1C    Microalbumin/Creatinine Ratio, Urine      5. Vitamin D deficiency  Vitamin D         Assessment & Plan    E11.9 Type 2 diabetes mellitus without complications  M06.9 Rheumatoid arthritis, unspecified  I10 Essential (primary) hypertension  L02.92 Furuncle, unspecified  L95.8 Other vasculitis limited to the skin  R53.83 Other fatigue  D72.828 Other elevated white blood cell count  R11.0 Nausea  T36.6X5A Adverse effect of rifampicins, initial encounter  T81.89XA Other complications of procedures, not elsewhere classified, initial encounter    TYPE 2 DIABETES MELLITUS:  - Glucose elevated at 179, with A1C improved to 7.5 from previous readings of 8.8 and 8.0, showing positive trend in diabetes management.  - Increasing semaglutide (Ozempic) from 0.5 mg to 1 mg when current supply is finished.  - Continuing Janumet.  - Maru educated about Ozempic dosing progression and its effects on weight loss and glucose control.  - Cholesterol levels excellent at 92.    RHEUMATOID  ARTHRITIS:  - Condition is stable.    HYPERTENSION:  - Blood pressure well-controlled at 106/68.  - Continuing amlodipine 10 mg.    STAPHYLOCOCCAL INFECTION:  - Prescribed Rifampin for recent staphylococcal infection.  - Discussed potential side effects, including nausea.    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  - Maru educated about the condition and referred to rheumatologist for management.    FATIGUE:  - Maru reports feeling tired and generally unwell.    ELEVATED WHITE BLOOD CELL COUNT:  - White blood cell count elevated at 11, with normal hemoglobin and platelet count.  - Noted elevated eosinophils, consistent with allergy/inflammation.    MEDICATION SIDE EFFECTS:  - Discussed potential side effects of Rifampin, including nausea.    BIOPSY COMPLICATION:  - Biopsy site not healing properly.    OTHER MANAGEMENT:  - Starting vitamin D 50,000 units (prescription sent to [facility]).  - Renal function, liver tests, and thyroid function all normal.    FOLLOW-UP:  - Follow up with allergy doctor on August 13th as scheduled, bringing recent lab results.          This note was generated with the assistance of ambient listening technology. Verbal consent was obtained by the patient and accompanying visitor(s) for the recording of patient appointment to facilitate this note. I attest to having reviewed and edited the generated note for accuracy, though some syntax or spelling errors may persist. Please contact the author of this note for any clarification.

## 2025-08-04 ENCOUNTER — LAB VISIT (OUTPATIENT)
Dept: LAB | Facility: HOSPITAL | Age: 75
End: 2025-08-04
Attending: INTERNAL MEDICINE
Payer: MEDICARE

## 2025-08-04 ENCOUNTER — OFFICE VISIT (OUTPATIENT)
Dept: RHEUMATOLOGY | Facility: CLINIC | Age: 75
End: 2025-08-04
Payer: MEDICARE

## 2025-08-04 VITALS
SYSTOLIC BLOOD PRESSURE: 136 MMHG | OXYGEN SATURATION: 96 % | WEIGHT: 184.5 LBS | BODY MASS INDEX: 28.96 KG/M2 | DIASTOLIC BLOOD PRESSURE: 80 MMHG | HEART RATE: 114 BPM | HEIGHT: 67 IN

## 2025-08-04 DIAGNOSIS — M85.80 OSTEOPENIA, UNSPECIFIED LOCATION: ICD-10-CM

## 2025-08-04 DIAGNOSIS — M31.0 LEUKOCYTOCLASTIC VASCULITIS: Primary | ICD-10-CM

## 2025-08-04 DIAGNOSIS — M06.9 RHEUMATOID ARTHRITIS, INVOLVING UNSPECIFIED SITE, UNSPECIFIED WHETHER RHEUMATOID FACTOR PRESENT: ICD-10-CM

## 2025-08-04 DIAGNOSIS — M05.772 RHEUMATOID ARTHRITIS INVOLVING BOTH ANKLES WITH POSITIVE RHEUMATOID FACTOR: ICD-10-CM

## 2025-08-04 DIAGNOSIS — M05.771 RHEUMATOID ARTHRITIS INVOLVING BOTH ANKLES WITH POSITIVE RHEUMATOID FACTOR: ICD-10-CM

## 2025-08-04 LAB
CRP SERPL-MCNC: 0.6 MG/L
ERYTHROCYTE [SEDIMENTATION RATE] IN BLOOD BY PHOTOMETRIC METHOD: 19 MM/HR

## 2025-08-04 PROCEDURE — 3079F DIAST BP 80-89 MM HG: CPT | Mod: CPTII,S$GLB,, | Performed by: INTERNAL MEDICINE

## 2025-08-04 PROCEDURE — 1159F MED LIST DOCD IN RCRD: CPT | Mod: CPTII,S$GLB,, | Performed by: INTERNAL MEDICINE

## 2025-08-04 PROCEDURE — 3288F FALL RISK ASSESSMENT DOCD: CPT | Mod: CPTII,S$GLB,, | Performed by: INTERNAL MEDICINE

## 2025-08-04 PROCEDURE — 99214 OFFICE O/P EST MOD 30 MIN: CPT | Mod: S$GLB,,, | Performed by: INTERNAL MEDICINE

## 2025-08-04 PROCEDURE — 3075F SYST BP GE 130 - 139MM HG: CPT | Mod: CPTII,S$GLB,, | Performed by: INTERNAL MEDICINE

## 2025-08-04 PROCEDURE — 85652 RBC SED RATE AUTOMATED: CPT

## 2025-08-04 PROCEDURE — 99999 PR PBB SHADOW E&M-EST. PATIENT-LVL V: CPT | Mod: PBBFAC,,, | Performed by: INTERNAL MEDICINE

## 2025-08-04 PROCEDURE — 1126F AMNT PAIN NOTED NONE PRSNT: CPT | Mod: CPTII,S$GLB,, | Performed by: INTERNAL MEDICINE

## 2025-08-04 PROCEDURE — 36415 COLL VENOUS BLD VENIPUNCTURE: CPT

## 2025-08-04 PROCEDURE — G2211 COMPLEX E/M VISIT ADD ON: HCPCS | Mod: S$GLB,,, | Performed by: INTERNAL MEDICINE

## 2025-08-04 PROCEDURE — 86140 C-REACTIVE PROTEIN: CPT

## 2025-08-04 PROCEDURE — 1101F PT FALLS ASSESS-DOCD LE1/YR: CPT | Mod: CPTII,S$GLB,, | Performed by: INTERNAL MEDICINE

## 2025-08-04 RX ORDER — LEFLUNOMIDE 20 MG/1
20 TABLET ORAL DAILY
Qty: 90 TABLET | Refills: 0 | Status: SHIPPED | OUTPATIENT
Start: 2025-08-04

## 2025-08-04 RX ORDER — METHOTREXATE 25 MG/ML
INJECTION, SOLUTION INTRAMUSCULAR; INTRATHECAL; INTRAVENOUS; SUBCUTANEOUS
Qty: 12 ML | Refills: 0 | Status: SHIPPED | OUTPATIENT
Start: 2025-08-04

## 2025-08-04 RX ORDER — ERGOCALCIFEROL 1.25 MG/1
50000 CAPSULE ORAL
Qty: 12 CAPSULE | Refills: 3 | Status: SHIPPED | OUTPATIENT
Start: 2025-08-04

## 2025-08-04 RX ORDER — FOLIC ACID 1 MG/1
TABLET ORAL
Qty: 150 TABLET | Refills: 3 | Status: SHIPPED | OUTPATIENT
Start: 2025-08-04

## 2025-08-04 RX ORDER — NORTRIPTYLINE HYDROCHLORIDE 10 MG/1
10 CAPSULE ORAL NIGHTLY
Qty: 90 CAPSULE | Refills: 1 | Status: SHIPPED | OUTPATIENT
Start: 2025-08-04

## 2025-08-04 NOTE — PATIENT INSTRUCTIONS
ESR, CRP  today  Obtain reports on all skin biopsies including cultures and office not Dr. Mittal and fax to [phone]  Schedule in Rheum Derm clinic or with Dr. Mauricio in no appts available  Denosumab 60mg sc q 6 months next dose 10/21/25 with calcium prior  *Prevnar 20 today   Hold methotrexate for one week after any vaccinations  Rollator walker prn  Cont exercise  for right hip  Cont abatacept 1000mg IV q 28 days re-ordered and p.a. [place]'s next dose 2/24/25  Cont methotrexate 20mg (0.8 ml) sc q 7 days with folic acid 1mg daily but 8 mg day after methotrexate dosing.  Cont leflunomide 20mg daily  Increase walking,stationary bike  Mediterranean/DASH diet, no junk food, low carb, weight reduction  Cont F/u Dr. Mark for EH, T2 DM  hyperlipidemia  RTC  3 months with standing labs

## 2025-08-04 NOTE — PROGRESS NOTES
I have personally taken the history and examined the patient and agree with the resident,s note as stated above         Has been seeing Dermatology Dr. Mittal for what was originally diagnosed as eczema and treated with Dupixent. Has had 5 skin biopsies over past 8 months. Had skin biopsy left upper chest erythematous papule and right medial thigh apparently with MRSA. Referred to Dr. Denise Greene in ID 2 wks ago who prescribed rifampin, Bactrim(stopped after 4 days due to severe nausea, which has resolved off Bactrim)  Had more recent biopsy left dorsal hand, to get results tomorrow with Dr. Mittal in Derm  One of the biopsies showed LCV and saw Dr Efrem Hi Rheumatologist who did extensive w/u for systemic vasculitis which was negatve and with whom I discussed.      No joint pain or swelling   Latest Reference Range & Units 07/24/25 07:36   WBC 3.90 - 12.70 K/uL 11.45   RBC 4.00 - 5.40 M/uL 4.53   Hemoglobin 12.0 - 16.0 gm/dL 13.7   Hematocrit 37.0 - 48.5 % 41.1   MCV 82 - 98 fL 91   MCH 27.0 - 31.0 pg 30.2   MCHC 32.0 - 36.0 g/dL 33.3   RDW 11.5 - 14.5 % 16.2 (H)   Platelet Count 150 - 450 K/uL 225   MPV 9.2 - 12.9 fL 9.4   Neut % 38 - 73 % 48.7   Lymph % 18 - 48 % 27.0   Mono % 4 - 15 % 7.8   Eos % <=8 % 15.1 (H)   Basophil % <=1.9 % 0.9   Immature Granulocytes 0.0 - 0.5 % 0.5   Gran # (ANC) 1.8 - 7.7 K/uL 5.58   Lymph # 1 - 4.8 K/uL 3.09   Mono # 0.3 - 1 K/uL 0.89   Eos # <=0.5 K/uL 1.73 (H)   Baso # <=0.2 K/uL 0.10   Immature Grans (Abs) 0.00 - 0.04 K/uL 0.06 (H)   nRBC <=0 /100 WBC 0   Sodium 136 - 145 mmol/L 142   Potassium 3.5 - 5.1 mmol/L 4.1   Chloride 95 - 110 mmol/L 108   CO2 23 - 29 mmol/L 25   Anion Gap 8 - 16 mmol/L 9   BUN 8 - 23 mg/dL 15   Creatinine 0.5 - 1.4 mg/dL 0.8   eGFR >60 mL/min/1.73/m2 >60   Glucose 70 - 110 mg/dL 179 (H)   Calcium 8.7 - 10.5 mg/dL 9.4   ALP 40 - 150 unit/L 51   PROTEIN TOTAL 6.0 - 8.4 gm/dL 6.8   Albumin 3.5 - 5.2 g/dL 3.6   BILIRUBIN TOTAL 0.1 - 1.0 mg/dL 0.4   AST  11 - 45 unit/L 20   ALT 10 - 44 unit/L 18   Cholesterol Total 120 - 199 mg/dL 92 (L)   HDL 40 - 75 mg/dL 40   HDL/Cholesterol Ratio 20.0 - 50.0 % 43.5   Non-HDL Cholesterol mg/dL 52   Total Cholesterol/HDL Ratio 2.0 - 5.0  2.3   Triglycerides 30 - 150 mg/dL 166 (H)   LDL Cholesterol 63.0 - 159.0 mg/dL 18.8 (L)   Hemoglobin A1C External 4.0 - 5.6 % 7.5 (H)   Estimated Avg Glucose 68 - 131 mg/dL 169 (H)   TSH 0.400 - 4.000 uIU/mL 1.410   (H): Data is abnormally high  (L): Data is abnormally low    Left lower lip biopsy 7/7/25:  benign vascular proliferation, keratosis with mild atypia, non-specific, chronic stomatitis  negative for malignancy    ASSESSMENT:        RA: RF+ ACPA+ GUSTABO-:  TJ 0  SJ 0 Pt global  70  ESR  CRP    DAS28  [id number]    CDAI 7(LDA)  Rheumatoid Arthritis Treatment Goals:  -Disease Activity Measure: CDAI  -Target: LDA  -Therapy/Change: none  -Shared Decision Making: Discussed goals of care and therapy plan together with patient as outlined above.        LCV on recent outside skin biopsy. Saw Dr. Efrem Hi Rheumatologist  with extensive negative w/u for systemic vasculitis was on rifampin, Bactrim Zofran for  MRSA noted on skin biopsies from 2 months ago left ant chest and right medial thigh Denise Greene ID prescribed rifampin which she continues, Bactrim stopped b/o nausea  Acute Right sided cervical pain, atraumatic superimposed on chronic neck pain; went to ED 1/23/25 dx cervical strain Rx with Norco, tizanidine   Frequent falls, none recent  OA knees;left>right  OA hips: left>right  Osteoporosis DXA 4/30/24:  FRAX hip 3.4%  MOF 14.9% on alendronate intermittently with drug holidays  2015- 2022. Denosumab 60mg sc 10/21/24, most recent 4/21/25  Overweight Body mass index is 28.9 kg/m². 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 Mittal and fax to [phone]  Schedule in Rheum Derm clinic or with Dr. Mauricio in no appts available  Denosumab 60mg sc q 6 months next dose 10/21/25 with calcium prior  *Prevnar 20 today   Hold methotrexate for one week after any vaccinations  Rollator walker prn  Cont exercise  for right hip  Cont abatacept 1000mg IV q 28 days re-ordered and p.a. [place]'s next dose 2/24/25  Cont methotrexate 20mg (0.8 ml) sc q 7 days with folic acid 1mg daily but 8 mg day after methotrexate dosing.  Cont leflunomide 20mg daily  Increase walking,stationary bike  Mediterranean/DASH diet, no junk food, low carb, weight reduction  Cont F/u Dr. Mark for EH, T2 DM  hyperlipidemia  RTC  3 months with standing labs      Received from Dr. Mittal:    Bx 9/17/24 light growth of Pseudomonas putida, MRSA  Subacute spongiotic dermatitis, impetiginized  medial infraclavicular chest   Bx 4/1425 Staph hominis  7/10/25 left infraclavicular ches LCV with numerous eosinophils  7/10/25 right medial thigh: LCV with numerous eosinophils   7/10/25 IF: no IgG IgG4 IgA IgM C C5b-9  Bx 7/17/25 acinetobacter ursingii MRSA

## 2025-08-04 NOTE — PROGRESS NOTES
Patient ID:  Maru Shelby    YOB: 1950     MRN:  [id number]     Subjective:     Chief Complaint:  Disease Management     History of Present Illness:  Pt is a 75 y.o. female with RA, osteoporosis, fibromyalgia, T2DM, HTN who presents today for f/u. LCV was 2/17/2025. At that time, the patient had recently started Dupixent for a new eczema diagnosis following a rash involving bilateral legs.     Today: Since then, the rash has markedly improved, but she has had multiple small purple lesions appear. She has undergone biopsies by her dermatologist, Dr. Mittal, for lesions on her right thigh and left thumb region, as well as a lip biopsy most recently by an oral surgeon. She has also undergone an extensive systemic vasculitis work-up by a rheumatologist in [place], Dr. Hi, that has been unrevealing thus far. She recently saw ID as recent as 7/25 who prescribed Bactrim and Rifampin for MRSA skin lesions. Following starting the antibiotics, she began to experience intense nausea that forced her to be bed-bound for a few days. Per chart review, it is noted that the nausea was from rifampin, though the patient states that it is due to bactrim. She denies new joint swellings, joint pain, SOB or fatigue,    Denies hair loss, dry eyes, vision changes, dry mouth, oral/nasal ulcers, trouble swallowing, morning stiffness, or GI disturbances.      Review of Systems   Review of Systems   Constitutional:  Negative for fatigue.   HENT:  Positive for mouth sores.    Eyes:  Negative for pain and visual disturbance.   Respiratory:  Negative for shortness of breath.    Cardiovascular: Negative.    Gastrointestinal: Negative.    Endocrine: Negative.    Genitourinary: Negative.    Musculoskeletal:  Negative for arthralgias, joint swelling and myalgias.   Skin:  Positive for rash.   Allergic/Immunologic: Negative.    Neurological: Negative.    Hematological: Negative.    Psychiatric/Behavioral: Negative.          Current  Medications:  Current Medications[1]     Objective:     Vitals:    08/04/25 0958   BP: 136/80   Pulse: (!) 114      Body mass index is 28.9 kg/m².     Physical Exam   Constitutional: She is oriented to person, place, and time. normal appearance.   HENT:   Head: Normocephalic and atraumatic.   Nose: Nose normal.   Mouth/Throat: Mucous membranes are moist.   Mouth/Throat: Well-healing ulcer in left lip buccal mucosa s/p biopsy   Eyes: Pupils are equal, round, and reactive to light.   Cardiovascular: Normal rate, regular rhythm, normal heart sounds and normal pulses.   No murmur heard.  Pulmonary/Chest: Effort normal and breath sounds normal.   Abdominal: Soft.   Musculoskeletal:         General: No swelling or deformity. Normal range of motion.      Cervical back: Normal range of motion and neck supple.   Neurological: She is alert and oriented to person, place, and time.   Skin: Skin is warm and dry. Capillary refill takes less than 2 seconds. Lesion noted. No rash noted.   Well-healing papule s/p biosy in left dorsal thumb region, right thigh, left upper anterior chest    Psychiatric: Her behavior is normal. Mood normal.          3/27/2024 8/27/2024 2/17/2025 8/4/2025   Tender (LEES-28) 2 / 28  4 / 28  0 / 28  0 / 28    Swollen (LEES-28) 0 / 28  0 / 28  0 / 28  0 / 28    Provider Global 0 / 100 10 / 100 0 / 100 --   Patient Global 30 / 100 30 / 100 70 / 100 --   ESR 20 mm/hr 9 mm/hr 10 mm/hr --   CRP 5.9 mg/L 2.0 mg/L 1.1 mg/L --   LEES-28 (ESR) 3.31 (Moderate disease activity) 3.08 (Low disease activity) 2.59 (Remission) --   LEES-28 (CRP) 2.87 (Low disease activity) 2.9 (Low disease activity) 2.21 (Remission) --   CDAI Score 5  8  7  --              Assessment:     No diagnosis found.      Plan:      Problem List Items Addressed This Visit    None        ESR, CRP  today  Obtain reports on all skin biopsies including cultures and office not Dr. Mittal and fax to [phone]  Schedule in Rheum Derm clinic or with   Hang in no appts available  Denosumab 60mg sc q 6 months next dose 10/21/25 with calcium prior  *Prevnar 20 today   Hold methotrexate for one week after any vaccinations  Rollator walker prn  Cont exercise  for right hip  Cont abatacept 1000mg IV q 28 days re-ordered and p.a. [place]'s next dose 2/24/25  Cont methotrexate 20mg (0.8 ml) sc q 7 days with folic acid 1mg daily but 8 mg day after methotrexate dosing.  Cont leflunomide 20mg daily  Increase walking,stationary bike  Mediterranean/DASH diet, no junk food, low carb, weight reduction  Cont F/u Dr. Mark for EH, T2 DM  hyperlipidemia  RTC  3 months with standing labs       Juan Carlos Stout M.D., Pharm.D.  PGY-1  [employer] [facility]           [1]   Current Outpatient Medications:     amLODIPine (NORVASC) 10 MG tablet, Take 1 tablet (10 mg total) by mouth once daily., Disp: 90 tablet, Rfl: 3    blood sugar diagnostic (ACCU-CHEK GUIDE) Strp, Use to check blood sugar once daily and as needed., Disp: 100 each, Rfl: 3    DUPIXENT  mg/2 mL PnIj, , Disp: , Rfl:     ergocalciferol (ERGOCALCIFEROL) 50,000 unit Cap, Take 1 capsule (50,000 Units total) by mouth every 7 days., Disp: 12 capsule, Rfl: 3    folic acid (FOLVITE) 1 MG tablet, TAKE 1 TABLET DAILY, BUT TAKE 8 TABLETS BY MOUTH EVERY THURSDAY, Disp: 150 tablet, Rfl: 3    gabapentin (NEURONTIN) 300 MG capsule, TAKE 1 CAPSULE BY MOUTH EVERY EVENING, Disp: 30 capsule, Rfl: 3    insulin syringe-needle U-100 1 mL 31 gauge x 5/16 Syrg, Use as directed, Disp: 10 each, Rfl: 3    JANUMET 50-1,000 mg per tablet, TAKE 1 TABLET BY MOUTH TWICE DAILY WITH MEALS, Disp: 180 tablet, Rfl: 1    ketoconazole (NIZORAL) 2 % cream, APPLY 1 APPLICATION TOPICAL TWICE DAILY TO RASH UNDER THE BREAST WHEN ITCH. MIX IN EQUAL PARTS WITH HYDROCORTISONE, Disp: , Rfl:     lancets Misc, Check blood sugar once daily, Disp: 100 each, Rfl: 1    latanoprost 0.005 % ophthalmic solution, , Disp: , Rfl: 0    leflunomide (ARAVA) 20 MG Tab, TAKE 1 TABLET(20 MG)  "BY MOUTH DAILY, Disp: 90 tablet, Rfl: 0    meloxicam (MOBIC) 15 MG tablet, TAKE 1 TABLET(15 MG) BY MOUTH DAILY, Disp: 30 tablet, Rfl: 0    methotrexate 25 mg/mL injection, INJECT 0.8 MLS INTO THE SKIN EVERY 7 DAYS ON WEDNESDAYS, Disp: 12 mL, Rfl: 0    methotrexate, PF, 25 mg/mL Soln, , Disp: , Rfl:     multivitamin (ONE DAILY MULTIVITAMIN) per tablet, Take 1 tablet by mouth once daily.  , Disp: , Rfl:     nortriptyline (PAMELOR) 10 MG capsule, Take 1 capsule (10 mg total) by mouth every evening., Disp: 90 capsule, Rfl: 1    omega-3 fatty acids/fish oil (FISH OIL-OMEGA-3 FATTY ACIDS) 300-1,000 mg capsule, Take by mouth once daily., Disp: , Rfl:     ondansetron (ZOFRAN) 4 MG tablet, Take 1 tablet (4 mg total) by mouth every 6 (six) hours as needed for Nausea. TAKE 30 MINUTES BEFORE TAKING RIFAMPIN, Disp: 30 tablet, Rfl: 1    rifAMpin (RIFADIN) 300 MG capsule, Take 2 capsules (600 mg total) by mouth once daily. for 14 days, Disp: 28 capsule, Rfl: 0    rosuvastatin (CRESTOR) 40 MG Tab, TAKE 1 TABLET(40 MG) BY MOUTH EVERY EVENING, Disp: 90 tablet, Rfl: 3    semaglutide (OZEMPIC) 1 mg/dose (4 mg/3 mL), Inject 1 mg into the skin every 7 days., Disp: 3 mL, Rfl: 11    sulfamethoxazole-trimethoprim 800-160mg (BACTRIM DS) 800-160 mg Tab, Take 1 tablet by mouth 2 (two) times daily. for 14 days, Disp: 28 tablet, Rfl: 0    syringe with needle, safety (BD SAFETY-ALONDRA TUBERCULIN) 1 mL 27 gauge x 1/2" Syrg, USE AS DIRECTED, Disp: 12 Syringe, Rfl: 3    triamcinolone acetonide 0.1% (KENALOG) 0.1 % cream, , Disp: , Rfl:     UNABLE TO FIND, 250 mg every 30 days. medication name: Arencia, Disp: , Rfl:     blood-glucose meter (ACCU-CHEK GUIDE GLUCOSE METER) Misc, 1 each by Misc.(Non-Drug; Combo Route) route once daily., Disp: 100 each, Rfl: 1    Current Facility-Administered Medications:     abatacept (with maltose) (ORENCIA) 1,000 mg in sodium chloride 0.9% 100 mL IVPB, 1,000 mg, Intravenous, 1 time in Clinic/HOD, Timi Royal MD, " Stopped at 12/19/22 0600    acetaminophen tablet 650 mg, 650 mg, Oral, Once PRN, Ronaldo Moralez MD    diphenhydrAMINE injection 25 mg, 25 mg, Intravenous, Once PRN, Ronaldo Moralez MD    EPINEPHrine (EPIPEN) 0.3 mg/0.3 mL pen injection 0.3 mg, 0.3 mg, Intramuscular, PRN, Ronaldo Moralez MD    ondansetron disintegrating tablet 4 mg, 4 mg, Oral, Once PRN, Ronaldo Moralez MD    sodium chloride 0.9% 500 mL flush bag, , Intravenous, PRN, Ronaldo Moralez MD    sodium chloride 0.9% flush 10 mL, 10 mL, Intravenous, PRN, Ronaldo Moralez MD

## 2025-08-06 DIAGNOSIS — M05.772 RHEUMATOID ARTHRITIS INVOLVING BOTH ANKLES WITH POSITIVE RHEUMATOID FACTOR: ICD-10-CM

## 2025-08-06 DIAGNOSIS — M05.771 RHEUMATOID ARTHRITIS INVOLVING BOTH ANKLES WITH POSITIVE RHEUMATOID FACTOR: ICD-10-CM

## 2025-08-06 RX ORDER — MELOXICAM 15 MG/1
TABLET ORAL
Qty: 30 TABLET | Refills: 0 | OUTPATIENT
Start: 2025-08-06

## 2025-08-11 ENCOUNTER — INFUSION (OUTPATIENT)
Dept: INFUSION THERAPY | Facility: HOSPITAL | Age: 75
End: 2025-08-11
Attending: INTERNAL MEDICINE
Payer: MEDICARE

## 2025-08-11 VITALS
BODY MASS INDEX: 28.9 KG/M2 | SYSTOLIC BLOOD PRESSURE: 128 MMHG | HEART RATE: 101 BPM | WEIGHT: 184.5 LBS | OXYGEN SATURATION: 96 % | RESPIRATION RATE: 20 BRPM | DIASTOLIC BLOOD PRESSURE: 65 MMHG

## 2025-08-11 DIAGNOSIS — M05.79 RHEUMATOID ARTHRITIS INVOLVING MULTIPLE SITES WITH POSITIVE RHEUMATOID FACTOR: Primary | ICD-10-CM

## 2025-08-11 PROCEDURE — 63600175 PHARM REV CODE 636 W HCPCS: Mod: JZ,JA,TB | Performed by: INTERNAL MEDICINE

## 2025-08-11 PROCEDURE — 96365 THER/PROPH/DIAG IV INF INIT: CPT

## 2025-08-11 PROCEDURE — 25000003 PHARM REV CODE 250: Performed by: INTERNAL MEDICINE

## 2025-08-11 RX ORDER — HEPARIN 100 UNIT/ML
500 SYRINGE INTRAVENOUS
OUTPATIENT
Start: 2025-09-01

## 2025-08-11 RX ORDER — ACETAMINOPHEN 325 MG/1
650 TABLET ORAL
OUTPATIENT
Start: 2025-09-01

## 2025-08-11 RX ORDER — SODIUM CHLORIDE 0.9 % (FLUSH) 0.9 %
10 SYRINGE (ML) INJECTION
OUTPATIENT
Start: 2025-09-01

## 2025-08-11 RX ADMIN — SODIUM CHLORIDE 1000 MG: 9 INJECTION, SOLUTION INTRAVENOUS at 08:08

## 2025-08-13 ENCOUNTER — TELEPHONE (OUTPATIENT)
Dept: ALLERGY | Facility: CLINIC | Age: 75
End: 2025-08-13
Payer: MEDICARE

## 2025-08-13 ENCOUNTER — PATIENT MESSAGE (OUTPATIENT)
Dept: ALLERGY | Facility: CLINIC | Age: 75
End: 2025-08-13

## 2025-08-13 ENCOUNTER — OFFICE VISIT (OUTPATIENT)
Dept: ALLERGY | Facility: CLINIC | Age: 75
End: 2025-08-13
Payer: MEDICARE

## 2025-08-13 ENCOUNTER — LAB VISIT (OUTPATIENT)
Dept: LAB | Facility: HOSPITAL | Age: 75
End: 2025-08-13
Payer: MEDICARE

## 2025-08-13 VITALS — HEIGHT: 67 IN | BODY MASS INDEX: 29.07 KG/M2 | WEIGHT: 185.19 LBS

## 2025-08-13 DIAGNOSIS — L20.84 INTRINSIC (ALLERGIC) ECZEMA: ICD-10-CM

## 2025-08-13 DIAGNOSIS — R79.9 ABNORMAL FINDING OF BLOOD CHEMISTRY, UNSPECIFIED: ICD-10-CM

## 2025-08-13 DIAGNOSIS — D72.10 EOSINOPHILIA, UNSPECIFIED TYPE: ICD-10-CM

## 2025-08-13 DIAGNOSIS — R20.0 ANESTHESIA OF SKIN: ICD-10-CM

## 2025-08-13 LAB
ABSOLUTE EOSINOPHIL (OHS): 0.71 K/UL
ABSOLUTE MONOCYTE (OHS): 1.17 K/UL (ref 0.3–1)
ABSOLUTE NEUTROPHIL COUNT (OHS): 4.52 K/UL (ref 1.8–7.7)
BASOPHILS # BLD AUTO: 0.1 K/UL
BASOPHILS NFR BLD AUTO: 1 %
ERYTHROCYTE [DISTWIDTH] IN BLOOD BY AUTOMATED COUNT: 16.9 % (ref 11.5–14.5)
HCT VFR BLD AUTO: 41.6 % (ref 37–48.5)
HGB BLD-MCNC: 12.9 GM/DL (ref 12–16)
IGE SERPL-ACNC: <21 IU/ML
IMM GRANULOCYTES # BLD AUTO: 0.05 K/UL (ref 0–0.04)
IMM GRANULOCYTES NFR BLD AUTO: 0.5 % (ref 0–0.5)
LYMPHOCYTES # BLD AUTO: 3.12 K/UL (ref 1–4.8)
MCH RBC QN AUTO: 29.7 PG (ref 27–31)
MCHC RBC AUTO-ENTMCNC: 31 G/DL (ref 32–36)
MCV RBC AUTO: 96 FL (ref 82–98)
NUCLEATED RBC (/100WBC) (OHS): 0 /100 WBC
PLATELET # BLD AUTO: 249 K/UL (ref 150–450)
PMV BLD AUTO: 10.1 FL (ref 9.2–12.9)
RBC # BLD AUTO: 4.34 M/UL (ref 4–5.4)
RELATIVE EOSINOPHIL (OHS): 7.3 %
RELATIVE LYMPHOCYTE (OHS): 32.3 % (ref 18–48)
RELATIVE MONOCYTE (OHS): 12.1 % (ref 4–15)
RELATIVE NEUTROPHIL (OHS): 46.8 % (ref 38–73)
TROPONIN I SERPL HS-MCNC: 4 NG/L
VIT B12 SERPL-MCNC: 464 PG/ML (ref 210–950)
WBC # BLD AUTO: 9.67 K/UL (ref 3.9–12.7)

## 2025-08-13 PROCEDURE — 1159F MED LIST DOCD IN RCRD: CPT | Mod: CPTII,GC,S$GLB, | Performed by: ALLERGY & IMMUNOLOGY

## 2025-08-13 PROCEDURE — 36415 COLL VENOUS BLD VENIPUNCTURE: CPT

## 2025-08-13 PROCEDURE — 1160F RVW MEDS BY RX/DR IN RCRD: CPT | Mod: CPTII,GC,S$GLB, | Performed by: ALLERGY & IMMUNOLOGY

## 2025-08-13 PROCEDURE — 86682 HELMINTH ANTIBODY: CPT | Mod: 59

## 2025-08-13 PROCEDURE — 86682 HELMINTH ANTIBODY: CPT

## 2025-08-13 PROCEDURE — 99999 PR PBB SHADOW E&M-EST. PATIENT-LVL V: CPT | Mod: PBBFAC,GC,, | Performed by: STUDENT IN AN ORGANIZED HEALTH CARE EDUCATION/TRAINING PROGRAM

## 2025-08-13 PROCEDURE — 83520 IMMUNOASSAY QUANT NOS NONAB: CPT

## 2025-08-13 PROCEDURE — 99204 OFFICE O/P NEW MOD 45 MIN: CPT | Mod: GC,S$GLB,, | Performed by: ALLERGY & IMMUNOLOGY

## 2025-08-13 PROCEDURE — 1126F AMNT PAIN NOTED NONE PRSNT: CPT | Mod: CPTII,GC,S$GLB, | Performed by: ALLERGY & IMMUNOLOGY

## 2025-08-13 PROCEDURE — 82785 ASSAY OF IGE: CPT

## 2025-08-13 PROCEDURE — 85025 COMPLETE CBC W/AUTO DIFF WBC: CPT

## 2025-08-13 PROCEDURE — 82607 VITAMIN B-12: CPT

## 2025-08-13 PROCEDURE — 84484 ASSAY OF TROPONIN QUANT: CPT

## 2025-08-13 RX ORDER — CHLORHEXIDINE GLUCONATE ORAL RINSE 1.2 MG/ML
15 SOLUTION DENTAL 2 TIMES DAILY
COMMUNITY
Start: 2025-08-08

## 2025-08-13 RX ORDER — MUPIROCIN 20 MG/G
OINTMENT TOPICAL
COMMUNITY
Start: 2025-08-05

## 2025-08-13 RX ORDER — CLOBETASOL PROPIONATE 0.5 MG/G
1 OINTMENT TOPICAL 2 TIMES DAILY
COMMUNITY
Start: 2025-08-07

## 2025-08-15 LAB
STRONGYLOIDES IGG SER QL IA: NEGATIVE
TRYPTASE SERPL-MCNC: 4.9 NG/ML

## 2025-08-19 LAB — T CANIS IGG SER QL IA: NEGATIVE

## 2025-08-25 ENCOUNTER — TELEPHONE (OUTPATIENT)
Dept: INTERNAL MEDICINE | Facility: CLINIC | Age: 75
End: 2025-08-25
Payer: MEDICARE

## 2025-08-25 DIAGNOSIS — M05.771 RHEUMATOID ARTHRITIS INVOLVING BOTH ANKLES WITH POSITIVE RHEUMATOID FACTOR: ICD-10-CM

## 2025-08-25 DIAGNOSIS — M05.772 RHEUMATOID ARTHRITIS INVOLVING BOTH ANKLES WITH POSITIVE RHEUMATOID FACTOR: ICD-10-CM

## 2025-08-25 RX ORDER — MELOXICAM 15 MG/1
TABLET ORAL
Qty: 30 TABLET | Refills: 0 | OUTPATIENT
Start: 2025-08-25

## 2025-08-25 RX ORDER — MELOXICAM 15 MG/1
15 TABLET ORAL DAILY
Qty: 30 TABLET | Refills: 0 | Status: SHIPPED | OUTPATIENT
Start: 2025-08-25 | End: 2025-09-24

## 2025-08-27 RX ORDER — GABAPENTIN 300 MG/1
300 CAPSULE ORAL NIGHTLY
Qty: 30 CAPSULE | Refills: 3 | Status: SHIPPED | OUTPATIENT
Start: 2025-08-27